# Patient Record
Sex: FEMALE | Race: WHITE | NOT HISPANIC OR LATINO | Employment: OTHER | URBAN - METROPOLITAN AREA
[De-identification: names, ages, dates, MRNs, and addresses within clinical notes are randomized per-mention and may not be internally consistent; named-entity substitution may affect disease eponyms.]

---

## 2017-02-22 ENCOUNTER — HOSPITAL ENCOUNTER (OUTPATIENT)
Dept: CT IMAGING | Facility: HOSPITAL | Age: 82
Discharge: HOME | End: 2017-02-22
Attending: THORACIC SURGERY (CARDIOTHORACIC VASCULAR SURGERY) | Admitting: THORACIC SURGERY (CARDIOTHORACIC VASCULAR SURGERY)

## 2017-02-22 DIAGNOSIS — J90 PLEURAL EFFUSION: ICD-10-CM

## 2017-02-22 DIAGNOSIS — R91.8 PULMONARY NODULES/LESIONS, MULTIPLE: ICD-10-CM

## 2017-02-22 PROCEDURE — 71250 CT THORAX DX C-: CPT

## 2017-03-08 ENCOUNTER — OFFICE VISIT (OUTPATIENT)
Dept: SURGERY | Facility: CLINIC | Age: 82
End: 2017-03-08

## 2017-03-08 VITALS
HEART RATE: 70 BPM | BODY MASS INDEX: 36.99 KG/M2 | RESPIRATION RATE: 16 BRPM | DIASTOLIC BLOOD PRESSURE: 70 MMHG | OXYGEN SATURATION: 95 % | SYSTOLIC BLOOD PRESSURE: 136 MMHG | WEIGHT: 222 LBS | HEIGHT: 65 IN

## 2017-03-08 DIAGNOSIS — R91.1 SOLITARY PULMONARY NODULE: Primary | ICD-10-CM

## 2017-03-08 PROCEDURE — 99213 OFFICE O/P EST LOW 20 MIN: CPT | Performed by: NURSE PRACTITIONER

## 2017-03-08 RX ORDER — TIMOLOL MALEATE 5 MG/ML
SOLUTION/ DROPS OPHTHALMIC
COMMUNITY
Start: 2017-02-28 | End: 2017-03-08

## 2017-03-08 RX ORDER — COLCHICINE 0.6 MG/1
TABLET ORAL
COMMUNITY
Start: 2017-02-20 | End: 2017-07-06

## 2017-03-08 RX ORDER — OFLOXACIN 3 MG/ML
SOLUTION/ DROPS OPHTHALMIC
COMMUNITY
Start: 2017-03-06 | End: 2017-12-12 | Stop reason: ALTCHOICE

## 2017-03-08 NOTE — PROGRESS NOTES
Subjective     Patient ID: Alessia Madrigal is a 88 y.o. female is here today for 6 month follow-up.    History of Present Illness    Alessia Madrigal presents to the office today for continued follow-up and surveillance concerning prior history of bi-basilar pulmonary nodules and bilateral pleural effusions. Since she was last seen, she has been doing well.  Her main complaints are post nasal drip causing a cough.  She denies any other concerns.  She initially had left lower back pain which is why a CT chest was initially completed.  She states this has also resolved. She denies any complaints of fever, chills, cough, hemoptysis, pleuritic chest pain, shortness of air, dyspnea with exertion, night sweats, hoarseness, or unintentional weight loss.      Patient Active Problem List   Diagnosis   • Actinic keratosis   • Anemia of chronic disorder   • Disorder of aorta   • Chronic coronary artery disease   • Chronic kidney disease, stage IV (severe)   • Constipation   • Cor pulmonale   • Diabetes mellitus   • High level of uric acid in blood   • Fatigue   • Hyperlipidemia   • Hypertension   • Hypothyroidism   • Iron deficiency   • Mitral valve insufficiency   • Pulmonary hypertension   • Swelling of lower extremity   • Shortness of breath   • Tricuspid valve insufficiency   • Uncontrolled type 2 diabetes mellitus   • Type 2 diabetes mellitus   • Vitamin D deficiency   • Secondary hyperparathyroidism, non-renal   • Pulmonary nodules/lesions, multiple   • Pleural effusion   • Paronychia of second finger of right hand   • Hyperuricemia     Past Medical History   Diagnosis Date   • Arthritis 03/13/2014     CONT TYLENOL FOR PAIN/ [facility] CHEW (INTERNAL MEDICINE)   • Cancer of uterus    • Carotid artery stenosis    • Chronic renal failure syndrome    • Coronary atherosclerosis    • Esophageal reflux    • Glaucoma    • Hypertension    • Osteoarthritis    • Sleep apnea      USING CPAP   • Spinal stenosis    • Thyroid  disease    • Type 2 diabetes mellitus 1991     INsulin begun 2012   • Vitamin B12 deficiency      SHE HAS NOT HAD THIS CHECKED IN A WHILE. SHE HAD BEEN ON SHOTS.  CHECK HER B12 LEVELS TODAY.  BY JOSE DANIEL CHEW     Past Surgical History   Procedure Laterality Date   • Coronary artery bypass graft  1991     X3   • Cataract extraction Bilateral    • Cholecystectomy     • Colonoscopy     • Hysterectomy     • Transluminal atherectomy peroneal artery       PERCUTANEOUS TRANSLUMINAL ATHERECTOMY RENAL ARTERY   • Cataract extraction w/ intraocular lens implant Right 10/25/2016     Procedure: RT SUPERFICIAL KERATECTOMY ;  Surgeon: Arian Singh MD;  Location: [facility] [place];  Service:      Family History   Problem Relation Age of Onset   • Colon cancer Other    • Heart disease Other    • Hypertension Other    • Stroke Other      ISCHEMIC   • Colon cancer Mother    • Heart disease Father    • Stroke Father      Social History     Social History   • Marital status: Single     Spouse name: N/A   • Number of children: N/A   • Years of education: N/A     Occupational History   • Not on file.     Social History Main Topics   • Smoking status: Never Smoker   • Smokeless tobacco: Not on file   • Alcohol use No   • Drug use: Not on file   • Sexual activity: Not on file     Other Topics Concern   • Not on file     Social History Narrative       Current Outpatient Prescriptions:   •  acetaminophen (TYLENOL) 500 MG tablet, Take 500 mg by mouth every 4 (four) hours as needed for mild pain (1-3) (Back Pain). EVERY 4 TO 6 HOURS AS NEEDED, Disp: , Rfl:   •  aspirin 81 MG tablet, Take 81 mg by mouth daily., Disp: , Rfl:   •  Cholecalciferol (VITAMIN D) 1000 UNITS tablet, Take 1,000 Units by mouth daily., Disp: , Rfl:   •  clopidogrel (PLAVIX) 75 MG tablet, Take 75 mg by mouth daily., Disp: , Rfl:   •  Coenzyme Q10 (CO Q-10) 300 MG capsule, Take 300 mg by mouth daily., Disp: , Rfl:   •  colchicine 0.6 MG tablet, , Disp: , Rfl:   •   furosemide (LASIX) 40 MG tablet, Take 40 mg by mouth daily., Disp: , Rfl:   •  glucose blood (ONE TOUCH ULTRA TEST) test strip, Test 4 times a day, Disp: 400 each, Rfl: 3  •  insulin detemir (LEVEMIR) 100 UNIT/ML injection, Inject 10 Units under the skin daily., Disp: 5 pen, Rfl: 3  •  insulin lispro (HumaLOG) 100 UNIT/ML patient supplied pump, Inject  under the skin See Admin Instructions. Take 6 units before breakfast, Take 5 units before lunch, and Take 5 units before dinner., Disp: , Rfl:   •  Insulin Pen Needle 32G X 4 MM misc, Use as directed 3 times daily, Disp: 270 each, Rfl: 2  •  isosorbide mononitrate (IMDUR) 120 MG 24 hr tablet, TAKE 1 TABLET DAILY, Disp: 90 tablet, Rfl: 1  •  levothyroxine (SYNTHROID, LEVOTHROID) 125 MCG tablet, TAKE 1 TABLET DAILY AS DIRECTED, Disp: 90 tablet, Rfl: 3  •  losartan (COZAAR) 50 MG tablet, TAKE 1 TABLET DAILY, Disp: 90 tablet, Rfl: 2  •  metoprolol succinate XL (TOPROL-XL) 50 MG 24 hr tablet, TAKE 1 TABLET TWICE A DAY, Disp: 180 tablet, Rfl: 2  •  niacin (NIASPAN) 500 MG CR tablet, Take 1 tablet by mouth every night., Disp: 90 tablet, Rfl: 3  •  NIFEdipine XL (PROCARDIA XL) 90 MG 24 hr tablet, TAKE 1 TABLET DAILY, Disp: 90 tablet, Rfl: 2  •  nitroglycerin (NITROSTAT) 0.4 MG SL tablet, 1 under the tongue as needed for angina, may repeat q5mins for up three doses, Disp: 100 tablet, Rfl: 11  •  ofloxacin (OCUFLOX) 0.3 % ophthalmic solution, , Disp: , Rfl:   •  raNITIdine (ZANTAC) 150 MG tablet, TAKE 1 TABLET EVERY 12 HOURS AS NEEDED, Disp: 180 tablet, Rfl: 3  •  spironolactone (ALDACTONE) 25 MG tablet, Take 1 tablet by mouth daily., Disp: 30 tablet, Rfl: 5  •  travoprost, BAK free, (TRAVATAN) 0.004 % solution ophthalmic solution, Administer 1 drop to both eyes every night. in affected eye(s) , Disp: , Rfl:   Allergies   Allergen Reactions   • Clindamycin/Lincomycin Itching     Felt like she was burning and itching around the neck   • Ampicillin Rash   • Penicillins Rash          Review of Systems   Constitution: Negative.   HENT: Negative.    Eyes: Negative.    Cardiovascular: Negative.    Respiratory: Positive for cough.    Endocrine: Negative.    Hematologic/Lymphatic: Negative.    Skin: Negative.    Musculoskeletal: Negative.    Gastrointestinal: Negative.    Genitourinary: Negative.    Neurological: Negative.    Psychiatric/Behavioral: Negative.    Allergic/Immunologic:        Pnd       Vitals:    03/08/17 1302   BP: 136/70   Pulse: 70   Resp: 16   SpO2: 95%       Objective     Physical Exam   Constitutional: She is oriented to person, place, and time. Vital signs are normal. She appears well-developed.   HENT:   Head: Normocephalic and atraumatic.   Neck: Normal range of motion. Neck supple.   Cardiovascular: Normal rate, regular rhythm, normal heart sounds and intact distal pulses.    No murmur heard.  Pulmonary/Chest: Effort normal and breath sounds normal. She has no wheezes. She has no rhonchi. She has no rales. She exhibits no tenderness.   Abdominal: Soft. There is no tenderness.   Musculoskeletal: She exhibits edema (chronic lower extremity swelling). She exhibits no tenderness.   Ambulates with cane   Neurological: She is alert and oriented to person, place, and time. She has normal strength.   Skin: Skin is warm and dry. No rash noted. No cyanosis or erythema.   Psychiatric: She has a normal mood and affect. Her behavior is normal.       Review of Radiographic Studies:    Study Result   CT CHEST WITHOUT CONTRAST-      CLINICAL: Follow-up pleural effusions and pulmonary nodules.      COMPARISON: 08/10/2016.      CT CHEST FINDINGS: Heart size normal. Sizable hiatal hernia similar to  the prior examination. The esophagus is satisfactory in course and  caliber. Thoracic aorta is satisfactory in caliber, there is  atherosclerotic plaque formation. Small stable mediastinal lymph nodes,  no adenopathy.      No pleural effusion. Ovoid nodularity within the left lower  lobe  costophrenic sulcus has substantially diminished, near completely  resolved in the interim. No acute airspace disease or new pulmonary mass  has developed within the interim. Left lower lobe process likely benign.  Limited images through the upper abdomen are unremarkable.      CONCLUSION:  1. No pleural effusion demonstrated.  2. Ovoid nodularity within the left lower lobe costophrenic sulcus near  completely resolved within the interim, supporting a benign etiology. No  acute airspace disease or new pulmonary nodule has developed.  3. Hiatal hernia.      This report was finalized on 2/23/2017 10:49 AM by Dr. Rm Grace MD.         Assessment/Plan   Diagnoses and all orders for this visit:    Solitary pulmonary nodule  -     CT Chest Without Contrast; Future        SUMMARY  Alessia Madrigal has been doing well since she was last seen.  She denies any new complaints or concerns.  Her CT scan of chest remains stable.  The left lower lobe nodule has decreased.  The mediastinal lymph nodes remain stable.  We will continue surveillance with a repeat CT scan of chest without contrast in one year and follow-up with Dr. Aranda to assure stability.  In the meantime, instructed to contact us sooner for any problems or concerns.        Minerva Mancuso, APRN  03/08/17  3:16 PM

## 2017-03-15 RX ORDER — CLOPIDOGREL BISULFATE 75 MG/1
TABLET ORAL
Qty: 90 TABLET | Refills: 0 | Status: SHIPPED | OUTPATIENT
Start: 2017-03-15 | End: 2017-06-29 | Stop reason: SDUPTHER

## 2017-04-21 RX ORDER — ISOSORBIDE MONONITRATE 120 MG/1
TABLET, EXTENDED RELEASE ORAL
Qty: 90 TABLET | Refills: 0 | Status: SHIPPED | OUTPATIENT
Start: 2017-04-21 | End: 2017-07-20 | Stop reason: SDUPTHER

## 2017-05-25 DIAGNOSIS — I10 ESSENTIAL HYPERTENSION: ICD-10-CM

## 2017-05-25 DIAGNOSIS — E55.9 VITAMIN D DEFICIENCY: ICD-10-CM

## 2017-05-25 RX ORDER — SPIRONOLACTONE 25 MG/1
25 TABLET ORAL DAILY
Qty: 90 TABLET | Refills: 1 | Status: SHIPPED | OUTPATIENT
Start: 2017-05-25 | End: 2017-07-20 | Stop reason: SDUPTHER

## 2017-06-03 ENCOUNTER — APPOINTMENT (OUTPATIENT)
Dept: GENERAL RADIOLOGY | Facility: HOSPITAL | Age: 82
End: 2017-06-03
Attending: EMERGENCY MEDICINE
Payer: MEDICARE

## 2017-06-03 ENCOUNTER — APPOINTMENT (OUTPATIENT)
Dept: CV DIAGNOSTICS | Facility: HOSPITAL | Age: 82
End: 2017-06-03
Attending: HOSPITALIST
Payer: MEDICARE

## 2017-06-03 ENCOUNTER — HOSPITAL ENCOUNTER (OUTPATIENT)
Facility: HOSPITAL | Age: 82
Setting detail: OBSERVATION
Discharge: HOME | End: 2017-06-05
Attending: EMERGENCY MEDICINE | Admitting: HOSPITALIST
Payer: MEDICARE

## 2017-06-03 DIAGNOSIS — R07.9 ACUTE CHEST PAIN: Primary | ICD-10-CM

## 2017-06-03 PROBLEM — Z91.81 AT RISK FOR FALLING: Status: ACTIVE | Noted: 2017-06-03

## 2017-06-03 PROCEDURE — 93306 TTE W/DOPPLER COMPLETE: CPT | Performed by: HOSPITALIST

## 2017-06-03 PROCEDURE — 71010 XR CHEST AP PORTABLE  (CPT=71010): CPT | Performed by: EMERGENCY MEDICINE

## 2017-06-03 PROCEDURE — 99219 INITIAL OBSERVATION CARE,LEVL II: CPT | Performed by: HOSPITALIST

## 2017-06-03 RX ORDER — EZETIMIBE 10 MG/1
10 TABLET ORAL
Status: DISCONTINUED | OUTPATIENT
Start: 2017-06-03 | End: 2017-06-05

## 2017-06-03 RX ORDER — LEVOTHYROXINE SODIUM 0.07 MG/1
75 TABLET ORAL
COMMUNITY

## 2017-06-03 RX ORDER — LEVOTHYROXINE SODIUM 0.07 MG/1
75 TABLET ORAL
Status: DISCONTINUED | OUTPATIENT
Start: 2017-06-03 | End: 2017-06-05

## 2017-06-03 RX ORDER — ASPIRIN 81 MG/1
324 TABLET, CHEWABLE ORAL ONCE
Status: DISCONTINUED | OUTPATIENT
Start: 2017-06-03 | End: 2017-06-03

## 2017-06-03 RX ORDER — CHOLECALCIFEROL (VITAMIN D3) 125 MCG
6 CAPSULE ORAL NIGHTLY
COMMUNITY

## 2017-06-03 RX ORDER — ENOXAPARIN SODIUM 100 MG/ML
40 INJECTION SUBCUTANEOUS DAILY
Status: DISCONTINUED | OUTPATIENT
Start: 2017-06-03 | End: 2017-06-03

## 2017-06-03 RX ORDER — POLYETHYLENE GLYCOL 3350 17 G/17G
17 POWDER, FOR SOLUTION ORAL DAILY
COMMUNITY

## 2017-06-03 RX ORDER — PANTOPRAZOLE SODIUM 40 MG/1
40 TABLET, DELAYED RELEASE ORAL
Status: DISCONTINUED | OUTPATIENT
Start: 2017-06-03 | End: 2017-06-05

## 2017-06-03 RX ORDER — ASPIRIN 325 MG
325 TABLET ORAL DAILY
Status: DISCONTINUED | OUTPATIENT
Start: 2017-06-03 | End: 2017-06-05

## 2017-06-03 RX ORDER — GABAPENTIN 300 MG/1
300 CAPSULE ORAL NIGHTLY
Status: DISCONTINUED | OUTPATIENT
Start: 2017-06-03 | End: 2017-06-05

## 2017-06-03 RX ORDER — SODIUM PHOSPHATE, DIBASIC AND SODIUM PHOSPHATE, MONOBASIC 7; 19 G/133ML; G/133ML
1 ENEMA RECTAL ONCE AS NEEDED
Status: DISCONTINUED | OUTPATIENT
Start: 2017-06-03 | End: 2017-06-05

## 2017-06-03 RX ORDER — LEVOTHYROXINE SODIUM 0.1 MG/1
100 TABLET ORAL
Status: DISCONTINUED | OUTPATIENT
Start: 2017-06-03 | End: 2017-06-03

## 2017-06-03 RX ORDER — CALCIUM CARBONATE 200(500)MG
1 TABLET,CHEWABLE ORAL DAILY
COMMUNITY

## 2017-06-03 RX ORDER — BUTALBITAL, ASPIRIN, AND CAFFEINE 50; 325; 40 MG/1; MG/1; MG/1
1 CAPSULE ORAL EVERY 4 HOURS PRN
COMMUNITY

## 2017-06-03 RX ORDER — POLYETHYLENE GLYCOL 3350 17 G/17G
17 POWDER, FOR SOLUTION ORAL DAILY PRN
Status: DISCONTINUED | OUTPATIENT
Start: 2017-06-03 | End: 2017-06-05

## 2017-06-03 RX ORDER — DIMENHYDRINATE 50 MG/1
50 TABLET ORAL NIGHTLY PRN
COMMUNITY

## 2017-06-03 RX ORDER — BISACODYL 10 MG
10 SUPPOSITORY, RECTAL RECTAL
Status: DISCONTINUED | OUTPATIENT
Start: 2017-06-03 | End: 2017-06-05

## 2017-06-03 RX ORDER — ENOXAPARIN SODIUM 100 MG/ML
30 INJECTION SUBCUTANEOUS DAILY
Status: DISCONTINUED | OUTPATIENT
Start: 2017-06-04 | End: 2017-06-05

## 2017-06-03 RX ORDER — MAGNESIUM OXIDE 400 MG (241.3 MG MAGNESIUM) TABLET
1 TABLET NIGHTLY
Status: DISCONTINUED | OUTPATIENT
Start: 2017-06-03 | End: 2017-06-05

## 2017-06-03 RX ORDER — ESCITALOPRAM OXALATE 5 MG/1
5 TABLET ORAL DAILY
Status: DISCONTINUED | OUTPATIENT
Start: 2017-06-03 | End: 2017-06-05

## 2017-06-03 RX ORDER — PRAMIPEXOLE DIHYDROCHLORIDE 0.25 MG/1
0.25 TABLET ORAL 3 TIMES DAILY
Status: DISCONTINUED | OUTPATIENT
Start: 2017-06-03 | End: 2017-06-03

## 2017-06-03 RX ORDER — OMEPRAZOLE 20 MG/1
40 CAPSULE, DELAYED RELEASE ORAL
COMMUNITY

## 2017-06-03 RX ORDER — ESCITALOPRAM OXALATE 5 MG/1
5 TABLET ORAL DAILY
COMMUNITY

## 2017-06-03 RX ORDER — HYDROCHLOROTHIAZIDE 25 MG/1
25 TABLET ORAL DAILY
Status: DISCONTINUED | OUTPATIENT
Start: 2017-06-03 | End: 2017-06-05

## 2017-06-03 RX ORDER — HYDROCHLOROTHIAZIDE 25 MG/1
25 TABLET ORAL DAILY
COMMUNITY

## 2017-06-03 RX ORDER — GABAPENTIN 300 MG/1
300 CAPSULE ORAL DAILY
Status: DISCONTINUED | OUTPATIENT
Start: 2017-06-03 | End: 2017-06-05

## 2017-06-03 RX ORDER — ACETAMINOPHEN 325 MG/1
650 TABLET ORAL EVERY 6 HOURS PRN
Status: DISCONTINUED | OUTPATIENT
Start: 2017-06-03 | End: 2017-06-05

## 2017-06-03 RX ORDER — MELATONIN
6 NIGHTLY
Status: DISCONTINUED | OUTPATIENT
Start: 2017-06-03 | End: 2017-06-03 | Stop reason: SDUPTHER

## 2017-06-03 RX ORDER — BUPROPION HYDROCHLORIDE 150 MG/1
150 TABLET, EXTENDED RELEASE ORAL 2 TIMES DAILY
Status: DISCONTINUED | OUTPATIENT
Start: 2017-06-03 | End: 2017-06-05

## 2017-06-03 RX ORDER — ONDANSETRON 2 MG/ML
4 INJECTION INTRAMUSCULAR; INTRAVENOUS EVERY 6 HOURS PRN
Status: DISCONTINUED | OUTPATIENT
Start: 2017-06-03 | End: 2017-06-05

## 2017-06-03 RX ORDER — GABAPENTIN 300 MG/1
300 CAPSULE ORAL DAILY
COMMUNITY

## 2017-06-03 RX ORDER — NITROGLYCERIN 0.4 MG/1
0.4 TABLET SUBLINGUAL EVERY 5 MIN PRN
Status: DISCONTINUED | OUTPATIENT
Start: 2017-06-03 | End: 2017-06-05

## 2017-06-03 NOTE — PROGRESS NOTES
ADMISSION NOTE  Pt. Oriented to room. Call light in reach, Menu in room. Tele monitor on, VS taken. Head to Toe complete. Admission Navigators complete including PTA medications.   Cons

## 2017-06-03 NOTE — PROGRESS NOTES
06/03/17 1336 06/03/17 1339 06/03/17 1344   Vital Signs   Heart Rate Source Monitor --  --    Resp 18 --  --    Respiratory Quality Normal --  --    /67 mmHg 140/65 mmHg 145/77 mmHg   BP Location Left arm Left arm Left arm   BP Method Automatic Au

## 2017-06-03 NOTE — CONSULTS
Cardiology Consult for Chest Pain    Atypical chest pain  ECG with no ischemia or injury  Initial troponin negative  Hyperlipidemia only known risk factor for CAD    Plan    Serial cardiac markers  Cont ASA and Zetia  Echo

## 2017-06-03 NOTE — ED PROVIDER NOTES
Patient Seen in: [facility] Emergency Department    History   Patient presents with:  Chest Pain Angina (cardiovascular)    Stated Complaint: cp    HPI    70-year-old female, here for chest pain that lasted about a half hour just prior to arrival. Tab,  1 TABLET DAILY   SYNTHROID 100 MCG OR TABS,  1 TABLET DAILY   FOLIC ACID 1 MG OR TABS,  1 TABLET DAILY   WELLBUTRIN  MG OR TB12,  1 TABLET TWICE DAILY   ZETIA 10 MG OR TABS,  1 TABLET DAILY   CLONAZEPAM 1 MG OR TABS,  1 TABLET 3 TIMES DAILY   P content normal.       ED Course     Labs Reviewed   COMP METABOLIC PANEL (14) - Abnormal; Notable for the following:     Creatinine 1.28 (*)     GFR 37 (*)     Alt 13 (*)     Albumin 3.1 (*)     All other components within normal limits   CBC W/ DIFFERENTI Impression:  Acute chest pain  (primary encounter diagnosis)    Disposition:  Admit    Follow-up:  No follow-up provider specified.     Medications Prescribed:  Current Discharge Medication List        Present on Admission           ICD-10-CM Noted POA    A

## 2017-06-03 NOTE — PROGRESS NOTES
[facility] Note:  Renal Dose Adjustment for Enoxaparin (LOVENOX)    Sara Medina has been prescribed Enoxaparin (LOVENOX) 40 mg subcutaneously every 24 hours. Estimated Creatinine Clearance: 21.8 mL/min (based on Cr of 1.28).     Her calculated c

## 2017-06-04 PROCEDURE — 99225 SUBSEQUENT OBSERVATION CARE: CPT | Performed by: HOSPITALIST

## 2017-06-04 RX ORDER — POTASSIUM CHLORIDE 20 MEQ/1
40 TABLET, EXTENDED RELEASE ORAL ONCE
Status: DISCONTINUED | OUTPATIENT
Start: 2017-06-04 | End: 2017-06-05

## 2017-06-04 RX ORDER — POTASSIUM CHLORIDE 20 MEQ/1
40 TABLET, EXTENDED RELEASE ORAL ONCE
Status: COMPLETED | OUTPATIENT
Start: 2017-06-04 | End: 2017-06-04

## 2017-06-04 NOTE — CONSULTS
[facility]    PATIENT'S NAME: Ritchie Kuhn   ATTENDING PHYSICIAN: Amado Hays.  Estela Matamoros PHYSICIAN: Naveen Godinez MD   PATIENT ACCOUNT#:   [id number]    [facility] [address] [facility]  MEDICAL RECORD #:   [id number]       DATE OF LANDON or hematochezia. GENITOURINARY: She denies dysuria. MUSCULOSKELETAL: She denies joint stiffness or joint pain.   ENDOCRINE: She denies intolerance to heat or cold, excessive hunger or thirst.  NEUROLOGIC: She denies numbness, tingling, or weakness of extr markers. We will do a 2D echocardiogram to evaluate her left ventricular function. She feels that she made a mistake by coming to the hospital, as the pain has resolved and was better once she got out of bed and started walking.   I have discussed a jeffy

## 2017-06-04 NOTE — PROGRESS NOTES
[facility]  Progress Note     Maryse Granda Patient Status:  Observation    10/9/1928 MRN [id number]   [facility] 2NE-A Attending Taniya Vazquez, [address] Day # 1 PCP Shannan Andersen     Chief Complaint: chest pain    S: Patie mg Oral Daily   • gabapentin  300 mg Oral Daily   • hydrochlorothiazide  25 mg Oral Daily   • Levothyroxine Sodium  75 mcg Oral Before breakfast   • Pantoprazole Sodium  40 mg Oral QAM AC   • enoxaparin  30 mg Subcutaneous Daily   • melatonin  1 mg Oral Ni

## 2017-06-04 NOTE — PLAN OF CARE
CARDIOVASCULAR - ADULT    • Absence of cardiac arrhythmias or at baseline Progressing        Patient/Family Goals    • Patient/Family Long Term Goal Progressing    • Patient/Family Short Term Goal Progressing        Cardiac monitor shows. ... SR  Cont.  Monit

## 2017-06-04 NOTE — PROGRESS NOTES
· [facility] Cardiology Progress Note     Subjective:  No recurrent chest pain    Objective:  152/52  Afebrile  SR    BUN/cr 16/1.07  Troponin normal   I/O incomplete    Neuro:awake/alert  HEENT:no JVD  Cardiac:S1 S2 regular  Lungs: clear  Abdomen:soft

## 2017-06-04 NOTE — PLAN OF CARE
CARDIOVASCULAR - ADULT    • Absence of cardiac arrhythmias or at baseline Progressing        Patient/Family Goals    • Patient/Family Long Term Goal Progressing    • Patient/Family Short Term Goal Progressing          Denies c/o pain, malaise, or cardiac s

## 2017-06-05 ENCOUNTER — APPOINTMENT (OUTPATIENT)
Dept: CV DIAGNOSTICS | Facility: HOSPITAL | Age: 82
End: 2017-06-05
Attending: INTERNAL MEDICINE
Payer: MEDICARE

## 2017-06-05 VITALS
HEART RATE: 86 BPM | DIASTOLIC BLOOD PRESSURE: 86 MMHG | RESPIRATION RATE: 18 BRPM | HEIGHT: 60 IN | BODY MASS INDEX: 32.85 KG/M2 | WEIGHT: 167.31 LBS | OXYGEN SATURATION: 96 % | TEMPERATURE: 98 F | SYSTOLIC BLOOD PRESSURE: 154 MMHG

## 2017-06-05 PROCEDURE — 78452 HT MUSCLE IMAGE SPECT MULT: CPT | Performed by: INTERNAL MEDICINE

## 2017-06-05 PROCEDURE — 93018 CV STRESS TEST I&R ONLY: CPT | Performed by: INTERNAL MEDICINE

## 2017-06-05 PROCEDURE — 93017 CV STRESS TEST TRACING ONLY: CPT | Performed by: INTERNAL MEDICINE

## 2017-06-05 PROCEDURE — 99217 OBSERVATION CARE DISCHARGE: CPT | Performed by: HOSPITALIST

## 2017-06-05 RX ORDER — HYDROCODONE BITARTRATE AND ACETAMINOPHEN 5; 325 MG/1; MG/1
1 TABLET ORAL ONCE
Status: COMPLETED | OUTPATIENT
Start: 2017-06-05 | End: 2017-06-05

## 2017-06-05 RX ORDER — HYDROCODONE BITARTRATE AND ACETAMINOPHEN 5; 325 MG/1; MG/1
1 TABLET ORAL NIGHTLY PRN
Status: DISCONTINUED | OUTPATIENT
Start: 2017-06-05 | End: 2017-06-05

## 2017-06-05 NOTE — PLAN OF CARE
Pt a/ox3. Forgetful. Dementia at baseline. RA. 1st AV block. Echo results pending. Lexiscan negative. Voids. Denies pain. Up with standby assist and a walker. Non-pitting edema to BLE. Awaiting Hospitalist to round. Call light in reach.  Will continue to mo

## 2017-06-05 NOTE — PROGRESS NOTES
NURSING DISCHARGE NOTE    Discharged Home via Wheelchair. Accompanied by Family member  Belongings Taken by patient/family. VSS. DC papers given to pt and ambulance personal. Pt taken to [address] [place] [address] via Ambulance.  All personal belongings harriett

## 2017-06-05 NOTE — PROGRESS NOTES
Cardiology Progress Note     PRIMARY CARDIOLOGIST: LUIS      CONSULT FOR: ATYPICAL CHEST PAIN      SUBJECTIVE: NO RECURRENT CHEST PAIN      Scheduled Meds:   • Potassium Chloride ER  40 mEq Oral Once   • BuPROPion HCl ER (SR)  150 mg Oral BID   • ezetimibe extremities. Skin: Skin color, texture, turgor normal. No rashes or lesions   Neurologic: CNII-XII intact. Normal strength and sensation throughout.        Data Review:     HEM: Recent Labs   Lab  06/03/17   1017   WBC  5.7   HGB  13.0   PLT  185.0   INR

## 2017-06-05 NOTE — PROGRESS NOTES
PRELIMINARY LEXISCAN EKG NOTE  Pt tolerated Lexiscan well. No ekg changes, or arrhythmias. Denied cardiac symptoms. Nuclear images pending. Nuclear results: LVEF 76%, no ischemia.  PRASHANTH Lovell

## 2017-06-05 NOTE — PLAN OF CARE
Patient received A+Ox4. Denies sob or cp. On room air. NSR on tele. Up with assistance with walker. Continue to monitor and assess. See Flowsheets for further assessment information.

## 2017-06-05 NOTE — PHYSICAL THERAPY NOTE
PHYSICAL THERAPY EVALUATION - INPATIENT     Room Number: 2606/2606-A  Evaluation Date: 6/5/2017  Type of Evaluation: Initial  Physician Order: PT Eval and Treat (Fall Risk Protocol)    Presenting Problem: L Chest Pain  Reason for Therapy: [employer] staff, in her wheelchair for meals. The pt reports a fall last week, resulting in a right knee injury with swelling. SUBJECTIVE  \"I always walk fast.  They tell me I need to slow down. \"    Patient self-stated goal is return home.     OBJECTIVE  [place]au LLE AROM  L Hip Flexion: 3/4 - Full ROM   L Knee Flexion: 3/4 - Full ROM   L Knee Extension: 3/4 - Full ROM   L Ankle Dorsiflexion:  3/4 - Full ROM  L Ankle Plantar Flexion: 3/4 - Full ROM   LLE PROM     LLE Strength  L Hip Iliopsoas: 3+/5   L Quadricep: support. Pt completed stand>sit to EOB with SBA. Pt ambulated with a gait speed, tested over 25 feet, of .28 meters/second.     Exercise/Education Provided:  Functional activity tolerated  Gait training  Transfer training    Patient End of Session: Needs 5      CURRENT GOALS    Goal #1 Patient is able to demonstrate supine - sit EOB @ level: modified independent     Goal #2 Patient is able to demonstrate transfers Sit to/from Stand at assistance level: modified independent     Goal #3 Patient is able to am

## 2017-07-03 DIAGNOSIS — Z00.00 HEALTH CARE MAINTENANCE: Primary | ICD-10-CM

## 2017-07-03 DIAGNOSIS — E55.9 VITAMIN D DEFICIENCY: ICD-10-CM

## 2017-07-03 DIAGNOSIS — E61.1 IRON DEFICIENCY: ICD-10-CM

## 2017-07-03 DIAGNOSIS — E11.8 UNCONTROLLED TYPE 2 DIABETES MELLITUS WITH COMPLICATION, UNSPECIFIED LONG TERM INSULIN USE STATUS: ICD-10-CM

## 2017-07-03 DIAGNOSIS — E11.65 UNCONTROLLED TYPE 2 DIABETES MELLITUS WITH COMPLICATION, UNSPECIFIED LONG TERM INSULIN USE STATUS: ICD-10-CM

## 2017-07-03 DIAGNOSIS — E78.2 MIXED HYPERLIPIDEMIA: ICD-10-CM

## 2017-07-03 DIAGNOSIS — I10 ESSENTIAL HYPERTENSION: ICD-10-CM

## 2017-07-03 RX ORDER — CLOPIDOGREL BISULFATE 75 MG/1
75 TABLET ORAL DAILY
Qty: 90 TABLET | Refills: 1 | Status: SHIPPED | OUTPATIENT
Start: 2017-07-03 | End: 2018-10-18 | Stop reason: SDUPTHER

## 2017-07-03 RX ORDER — CLOPIDOGREL BISULFATE 75 MG/1
TABLET ORAL
Qty: 90 TABLET | Refills: 3 | Status: SHIPPED | OUTPATIENT
Start: 2017-07-03 | End: 2017-07-03 | Stop reason: SDUPTHER

## 2017-07-05 LAB
25(OH)D3+25(OH)D2 SERPL-MCNC: 21.6 NG/ML (ref 30–100)
ALBUMIN SERPL-MCNC: 4.2 G/DL (ref 3.5–5.2)
ALBUMIN/CREAT UR: 11.5 MG/G CREAT (ref 0–30)
ALBUMIN/GLOB SERPL: 1.8 G/DL
ALP SERPL-CCNC: 121 U/L (ref 39–117)
ALT SERPL-CCNC: 5 U/L (ref 1–33)
APPEARANCE UR: CLEAR
AST SERPL-CCNC: 10 U/L (ref 1–32)
BACTERIA #/AREA URNS HPF: NORMAL /HPF
BACTERIA UR CULT: NO GROWTH
BACTERIA UR CULT: NORMAL
BASOPHILS # BLD AUTO: 0.05 10*3/MM3 (ref 0–0.2)
BASOPHILS NFR BLD AUTO: 0.7 % (ref 0–1.5)
BILIRUB SERPL-MCNC: 0.3 MG/DL (ref 0.1–1.2)
BILIRUB UR QL STRIP: NEGATIVE
BUN SERPL-MCNC: 39 MG/DL (ref 8–23)
BUN/CREAT SERPL: 27.3 (ref 7–25)
CALCIUM SERPL-MCNC: 9.4 MG/DL (ref 8.6–10.5)
CHLORIDE SERPL-SCNC: 99 MMOL/L (ref 98–107)
CHOLEST SERPL-MCNC: 204 MG/DL (ref 0–200)
CO2 SERPL-SCNC: 23.7 MMOL/L (ref 22–29)
COLOR UR: YELLOW
CREAT SERPL-MCNC: 1.43 MG/DL (ref 0.57–1)
CREAT UR-MCNC: 31.3 MG/DL
EOSINOPHIL # BLD AUTO: 0.45 10*3/MM3 (ref 0–0.7)
EOSINOPHIL NFR BLD AUTO: 6.4 % (ref 0.3–6.2)
EPI CELLS #/AREA URNS HPF: NORMAL /HPF
ERYTHROCYTE [DISTWIDTH] IN BLOOD BY AUTOMATED COUNT: 14.1 % (ref 11.7–13)
GLOBULIN SER CALC-MCNC: 2.4 GM/DL
GLUCOSE SERPL-MCNC: 129 MG/DL (ref 65–99)
GLUCOSE UR QL: NEGATIVE
HBA1C MFR BLD: 8.54 % (ref 4.8–5.6)
HCT VFR BLD AUTO: 40.5 % (ref 35.6–45.5)
HDLC SERPL-MCNC: 73 MG/DL (ref 40–60)
HGB BLD-MCNC: 13.1 G/DL (ref 11.9–15.5)
HGB UR QL STRIP: NEGATIVE
IMM GRANULOCYTES # BLD: 0.03 10*3/MM3 (ref 0–0.03)
IMM GRANULOCYTES NFR BLD: 0.4 % (ref 0–0.5)
KETONES UR QL STRIP: NEGATIVE
LDLC SERPL CALC-MCNC: 117 MG/DL (ref 0–100)
LEUKOCYTE ESTERASE UR QL STRIP: ABNORMAL
LYMPHOCYTES # BLD AUTO: 1.9 10*3/MM3 (ref 0.9–4.8)
LYMPHOCYTES NFR BLD AUTO: 26.9 % (ref 19.6–45.3)
MCH RBC QN AUTO: 31.9 PG (ref 26.9–32)
MCHC RBC AUTO-ENTMCNC: 32.3 G/DL (ref 32.4–36.3)
MCV RBC AUTO: 98.5 FL (ref 80.5–98.2)
MICRO URNS: ABNORMAL
MICROALBUMIN UR-MCNC: 3.6 UG/ML
MONOCYTES # BLD AUTO: 0.74 10*3/MM3 (ref 0.2–1.2)
MONOCYTES NFR BLD AUTO: 10.5 % (ref 5–12)
NEUTROPHILS # BLD AUTO: 3.9 10*3/MM3 (ref 1.9–8.1)
NEUTROPHILS NFR BLD AUTO: 55.1 % (ref 42.7–76)
NITRITE UR QL STRIP: NEGATIVE
PH UR STRIP: 6.5 [PH] (ref 5–7.5)
PLATELET # BLD AUTO: 314 10*3/MM3 (ref 140–500)
POTASSIUM SERPL-SCNC: 4.4 MMOL/L (ref 3.5–5.2)
PROT SERPL-MCNC: 6.6 G/DL (ref 6–8.5)
PROT UR QL STRIP: NEGATIVE
RBC # BLD AUTO: 4.11 10*6/MM3 (ref 3.9–5.2)
RBC #/AREA URNS HPF: NORMAL /HPF
SODIUM SERPL-SCNC: 139 MMOL/L (ref 136–145)
SP GR UR: 1.01 (ref 1–1.03)
TRIGL SERPL-MCNC: 68 MG/DL (ref 0–150)
TSH SERPL DL<=0.005 MIU/L-ACNC: 1.17 MIU/ML (ref 0.27–4.2)
URATE SERPL-MCNC: 7.1 MG/DL (ref 2.4–5.7)
URINALYSIS REFLEX: ABNORMAL
UROBILINOGEN UR STRIP-MCNC: 0.2 MG/DL (ref 0.2–1)
VLDLC SERPL CALC-MCNC: 13.6 MG/DL (ref 5–40)
WBC # BLD AUTO: 7.07 10*3/MM3 (ref 4.5–10.7)
WBC #/AREA URNS HPF: NORMAL /HPF

## 2017-07-06 ENCOUNTER — OFFICE VISIT (OUTPATIENT)
Dept: INTERNAL MEDICINE | Facility: CLINIC | Age: 82
End: 2017-07-06

## 2017-07-06 VITALS
SYSTOLIC BLOOD PRESSURE: 126 MMHG | DIASTOLIC BLOOD PRESSURE: 60 MMHG | RESPIRATION RATE: 18 BRPM | HEIGHT: 64 IN | WEIGHT: 214 LBS | HEART RATE: 77 BPM | BODY MASS INDEX: 36.54 KG/M2 | OXYGEN SATURATION: 98 %

## 2017-07-06 DIAGNOSIS — Z00.00 MEDICARE ANNUAL WELLNESS VISIT, SUBSEQUENT: ICD-10-CM

## 2017-07-06 DIAGNOSIS — E03.9 ACQUIRED HYPOTHYROIDISM: ICD-10-CM

## 2017-07-06 DIAGNOSIS — N18.4 CHRONIC KIDNEY DISEASE, STAGE IV (SEVERE) (HCC): ICD-10-CM

## 2017-07-06 DIAGNOSIS — E79.0 HYPERURICEMIA: ICD-10-CM

## 2017-07-06 DIAGNOSIS — Z00.00 HEALTH MAINTENANCE EXAMINATION: Primary | ICD-10-CM

## 2017-07-06 DIAGNOSIS — I10 ESSENTIAL HYPERTENSION: ICD-10-CM

## 2017-07-06 DIAGNOSIS — E78.2 MIXED HYPERLIPIDEMIA: ICD-10-CM

## 2017-07-06 DIAGNOSIS — IMO0002 UNCONTROLLED TYPE 2 DIABETES MELLITUS WITH STAGE 4 CHRONIC KIDNEY DISEASE, WITH LONG-TERM CURRENT USE OF INSULIN: ICD-10-CM

## 2017-07-06 PROCEDURE — G0439 PPPS, SUBSEQ VISIT: HCPCS | Performed by: INTERNAL MEDICINE

## 2017-07-06 PROCEDURE — 99397 PER PM REEVAL EST PAT 65+ YR: CPT | Performed by: INTERNAL MEDICINE

## 2017-07-06 RX ORDER — ALLOPURINOL 100 MG/1
TABLET ORAL
COMMUNITY
Start: 2017-06-18 | End: 2017-12-12

## 2017-07-06 NOTE — PROGRESS NOTES
Medicare Annual Wellness Visit & CPE    Chief Complaint:  Annual Exam (CPE & SUB AWV)      History of Present Illness    Alessia Madrigal is a 88 y.o. female who presents for an Annual Wellness Visit and CPE. In addition, we addressed the following health issues:    Mammo:10/1/15 when she got her notice last year, she was in the middle of moving and she forgot to go.    C-scope:2014  Flu shot:2016  Prevnar:3/26/15  Pneumovax: she had this years ago.  Dental Exam: about a month ago.  She has this scheduled soon.    Eye Exam:she goes every 2-3 months.  Dr. Arian Singh  Exercise: minimal    Advanced Care Planning:  has an advance directive - a copy has been provided and is in file   Immunization History   Administered Date(s) Administered   • Influenza Split High Dose Preservative Free IM 09/29/2016   • Influenza, Quadrivalent 09/15/2014   • Pneumococcal Conjugate 03/26/2015     She has been having some SOA more than usual.  She is worried about her GFR and the spironolactone.  Dr. Garza ordered the spironolactone.  She's been on this for about a year and a half.  About a month ago, she had an episode of soa and an odd feeling.  She didn't know what was going on.  It did finally pass.  She hasn't been having any cp or pressure.  It doesn't take much for her to get out of breath.  Her balance is not as good as it used to be.      Her blood sugars are high.  Most nights she is taking 8 or 9 units of levemir.  If she takes 10 units she gets hypoglycemic.  She is supposed to take 4 units of humalog with each meal.  Sometimes she forgets to take this.  She had a feeling her A1c would be up more.      Her right foot hurt all day long.  It hurt to flex her foot.  It's not hurting as much today.  She kept her leg elevated last night and during the night.  She hadn't injured her leg.  She has a strong family history of blood clots.  It hurt to walk yesterday.      Her bypass surgery was 26 years ago.      Review of  Systems   Constitutional: Positive for fatigue. Negative for chills and fever.   HENT: Negative for hearing loss, sore throat, tinnitus, trouble swallowing and voice change.    Eyes: Negative for visual disturbance.   Respiratory: Positive for cough and shortness of breath.    Cardiovascular: Positive for leg swelling. Negative for chest pain and palpitations.   Gastrointestinal: Negative for abdominal distention, abdominal pain, anal bleeding, blood in stool, constipation, diarrhea, nausea and vomiting.   Endocrine: Negative for polydipsia and polyuria.   Genitourinary: Positive for frequency. Negative for dysuria and hematuria.   Musculoskeletal: Positive for arthralgias. Negative for myalgias.   Skin: Negative for rash and wound.   Neurological: Negative for dizziness, syncope, light-headedness and headaches.   Hematological: Negative for adenopathy. Does not bruise/bleed easily.   Psychiatric/Behavioral: Negative for confusion and dysphoric mood. The patient is not nervous/anxious.        Past Medical History:   Diagnosis Date   • Arthritis 03/13/2014    CONT TYLENOL FOR PAIN/ JOSE DANIEL CHEW (INTERNAL MEDICINE)   • Cancer of uterus    • Carotid artery stenosis    • Chronic renal failure syndrome    • Coronary atherosclerosis    • Esophageal reflux    • Glaucoma    • Hypertension    • Osteoarthritis    • Sleep apnea     USING CPAP   • Spinal stenosis    • Thyroid disease    • Type 2 diabetes mellitus 1991    INsulin begun 2012   • Vitamin B12 deficiency     SHE HAS NOT HAD THIS CHECKED IN A WHILE. SHE HAD BEEN ON SHOTS.  CHECK HER B12 LEVELS TODAY.  BY JOSE DANIEL CHEW       Past Surgical History:   Procedure Laterality Date   • CATARACT EXTRACTION Bilateral    • CATARACT EXTRACTION W/ INTRAOCULAR LENS IMPLANT Right 10/25/2016    Procedure: RT SUPERFICIAL KERATECTOMY ;  Surgeon: Arian Singh MD;  [place] [facility] [place] [facility] [place]  Service:    • CHOLECYSTECTOMY     • COLONOSCOPY     • CORONARY ARTERY BYPASS GRAFT   1991    X3   • HYSTERECTOMY     • TRANSLUMINAL ATHERECTOMY PERONEAL ARTERY      PERCUTANEOUS TRANSLUMINAL ATHERECTOMY RENAL ARTERY       Social History     Social History   • Marital status: Single     Spouse name: N/A   • Number of children: N/A   • Years of education: N/A     Occupational History   • Not on file.     Social History Main Topics   • Smoking status: Never Smoker   • Smokeless tobacco: Never Used   • Alcohol use No   • Drug use: Not on file   • Sexual activity: Not on file     Other Topics Concern   • Not on file     Social History Narrative   • No narrative on file       Family History   Problem Relation Age of Onset   • Colon cancer Other    • Heart disease Other    • Hypertension Other    • Stroke Other      ISCHEMIC   • Colon cancer Mother    • Heart disease Father    • Stroke Father        Allergies   Allergen Reactions   • Clindamycin/Lincomycin Itching     Felt like she was burning and itching around the neck   • Ampicillin Rash   • Penicillins Rash       Current Outpatient Prescriptions on File Prior to Visit   Medication Sig Dispense Refill   • acetaminophen (TYLENOL) 500 MG tablet Take 500 mg by mouth every 4 (four) hours as needed for mild pain (1-3) (Back Pain). EVERY 4 TO 6 HOURS AS NEEDED     • aspirin 81 MG tablet Take 81 mg by mouth daily.     • Cholecalciferol (VITAMIN D) 1000 UNITS tablet Take 1,000 Units by mouth daily.     • clopidogrel (PLAVIX) 75 MG tablet Take 1 tablet by mouth Daily. 90 tablet 1   • Coenzyme Q10 (CO Q-10) 300 MG capsule Take 300 mg by mouth daily.     • colchicine 0.6 MG tablet      • furosemide (LASIX) 40 MG tablet Take 40 mg by mouth daily.     • insulin detemir (LEVEMIR) 100 UNIT/ML injection Inject 10 Units under the skin daily. 5 pen 3   • insulin lispro (HumaLOG) 100 UNIT/ML patient supplied pump Inject  under the skin See Admin Instructions. Take 6 units before breakfast, Take 5 units before lunch, and Take 5 units before dinner.     • Insulin Pen Needle  32G X 4 MM misc Use as directed 3 times daily 270 each 2   • isosorbide mononitrate (IMDUR) 120 MG 24 hr tablet TAKE 1 TABLET DAILY 90 tablet 0   • levothyroxine (SYNTHROID, LEVOTHROID) 125 MCG tablet TAKE 1 TABLET DAILY AS DIRECTED 90 tablet 3   • losartan (COZAAR) 50 MG tablet TAKE 1 TABLET DAILY 90 tablet 2   • metoprolol succinate XL (TOPROL-XL) 50 MG 24 hr tablet TAKE 1 TABLET TWICE A  tablet 2   • niacin (NIASPAN) 500 MG CR tablet Take 1 tablet by mouth every night. 90 tablet 3   • NIFEdipine XL (PROCARDIA XL) 90 MG 24 hr tablet TAKE 1 TABLET DAILY 90 tablet 2   • nitroglycerin (NITROSTAT) 0.4 MG SL tablet 1 under the tongue as needed for angina, may repeat q5mins for up three doses 100 tablet 11   • ofloxacin (OCUFLOX) 0.3 % ophthalmic solution      • ONE TOUCH ULTRA TEST test strip TEST FOUR TIMES A  each 3   • raNITIdine (ZANTAC) 150 MG tablet TAKE 1 TABLET EVERY 12 HOURS AS NEEDED 180 tablet 3   • spironolactone (ALDACTONE) 25 MG tablet Take 1 tablet by mouth Daily. 90 tablet 1   • travoprost, BAK free, (TRAVATAN) 0.004 % solution ophthalmic solution Administer 1 drop to both eyes every night. in affected eye(s)        No current facility-administered medications on file prior to visit.        Patient Active Problem List   Diagnosis   • Actinic keratosis   • Anemia of chronic disorder   • Disorder of aorta   • Chronic coronary artery disease   • Chronic kidney disease, stage IV (severe)   • Constipation   • Cor pulmonale   • Diabetes mellitus   • High level of uric acid in blood   • Fatigue   • Hyperlipidemia   • Hypertension   • Hypothyroidism   • Iron deficiency   • Mitral valve insufficiency   • Pulmonary hypertension   • Swelling of lower extremity   • Shortness of breath   • Tricuspid valve insufficiency   • Uncontrolled type 2 diabetes mellitus   • Type 2 diabetes mellitus   • Vitamin D deficiency   • Secondary hyperparathyroidism, non-renal   • Pulmonary nodules/lesions, multiple   •  "Pleural effusion   • Paronychia of second finger of right hand   • Hyperuricemia       Health Risk Assessment/Depression Screen/Functional and Cognitive Screening:   The patient has completed a Health Risk Assessment & Depression screen. These have been reviewed with them and have been scanned as a separate documents.    Age-appropriate Screening Schedule:  Refer to the list below for future screening recommendations based on patient's age. Orders for these recommended tests are listed in the plan section. The patient has been provided with a written plan.     I have reviewed their problem list, past medical history, family history, social history, and surgical history.     Vitals:    07/06/17 1116   BP: 142/66   Pulse: 77   Resp: 18   SpO2: 98%   Weight: 214 lb (97.1 kg)   Height: 64\" (162.6 cm)   PainSc: 4  Comment: Normal back and arthritis pain.   PainLoc: Back       Body mass index is 36.73 kg/(m^2).    Physical Exam   Constitutional: She is oriented to person, place, and time. She appears well-developed and well-nourished. No distress.   HENT:   Head: Normocephalic and atraumatic.   Nose: Nose normal.   Mouth/Throat: Oropharynx is clear and moist.   Eyes: Conjunctivae and EOM are normal. Pupils are equal, round, and reactive to light. No scleral icterus.   Neck: Normal range of motion. Neck supple. No thyromegaly present.   Cardiovascular: Normal rate, regular rhythm and normal heart sounds.  Exam reveals no gallop and no friction rub.    No murmur heard.  Pulses:       Carotid pulses are 2+ on the right side, and 2+ on the left side.       Femoral pulses are 2+ on the right side, and 2+ on the left side.       Dorsalis pedis pulses are 2+ on the right side, and 2+ on the left side.        Posterior tibial pulses are 2+ on the right side, and 2+ on the left side.   Pulmonary/Chest: Effort normal and breath sounds normal. No respiratory distress. She has no wheezes. She has no rales. Right breast exhibits no " mass and no nipple discharge. Left breast exhibits no mass and no nipple discharge.   Abdominal: Soft. Bowel sounds are normal. She exhibits no distension and no mass. There is no tenderness.   Musculoskeletal: Normal range of motion. She exhibits edema (right leg with chronic venous stasis changes.  3+ edema.  Left leg 2+ edema.  ). She exhibits no tenderness.        Right ankle: She exhibits swelling. She exhibits normal range of motion and normal pulse. No tenderness.        Left ankle: She exhibits swelling (minor). She exhibits normal range of motion and normal pulse.    Alessia had a diabetic foot exam performed today.   During the foot exam she had a monofilament test performed (normal throughout).    Vascular Status -  Her exam exhibits right foot vasculature normal and right foot edema. Her exam exhibits left foot vasculature normal and left foot edema.   Skin Integrity  -  Her right foot skin is intact.  She has right foot onychomycosis.  She hasno right foot ulcer, no right foot warmth and no right foot blister.    Alessia 's left foot skin is intact. She has left foot onychomycosis. She has no left foot ulcer, no left foot warmth and no left foot blister..  Lymphadenopathy:     She has no cervical adenopathy.     She has no axillary adenopathy.        Right: No inguinal and no supraclavicular adenopathy present.        Left: No inguinal and no supraclavicular adenopathy present.   Neurological: She is alert and oriented to person, place, and time. She has normal reflexes. No cranial nerve deficit.   Skin: Skin is warm and dry.   Psychiatric: She has a normal mood and affect. Her speech is normal and behavior is normal. Judgment and thought content normal. Cognition and memory are normal.   Vitals reviewed.      Results for orders placed or performed in visit on 07/03/17   Comprehensive Metabolic Panel   Result Value Ref Range    Glucose 129 (H) 65 - 99 mg/dL    BUN 39 (H) 8 - 23 mg/dL    Creatinine  1.43 (H) 0.57 - 1.00 mg/dL    eGFR Non African Am 35 (L) >60 mL/min/1.73    eGFR  Am 42 (L) >60 mL/min/1.73    BUN/Creatinine Ratio 27.3 (H) 7.0 - 25.0    Sodium 139 136 - 145 mmol/L    Potassium 4.4 3.5 - 5.2 mmol/L    Chloride 99 98 - 107 mmol/L    Total CO2 23.7 22.0 - 29.0 mmol/L    Calcium 9.4 8.6 - 10.5 mg/dL    Total Protein 6.6 6.0 - 8.5 g/dL    Albumin 4.20 3.50 - 5.20 g/dL    Globulin 2.4 gm/dL    A/G Ratio 1.8 g/dL    Total Bilirubin 0.3 0.1 - 1.2 mg/dL    Alkaline Phosphatase 121 (H) 39 - 117 U/L    AST (SGOT) 10 1 - 32 U/L    ALT (SGPT) 5 1 - 33 U/L   Lipid Panel   Result Value Ref Range    Total Cholesterol 204 (H) 0 - 200 mg/dL    Triglycerides 68 0 - 150 mg/dL    HDL Cholesterol 73 (H) 40 - 60 mg/dL    VLDL Cholesterol 13.6 5 - 40 mg/dL    LDL Cholesterol  117 (H) 0 - 100 mg/dL   TSH Rfx On Abnormal To Free T4   Result Value Ref Range    TSH 1.17 0.27 - 4.2 mIU/mL   UA / M With / Rflx Culture([facility] ONLY)   Result Value Ref Range    Specific Gravity, UA 1.009 1.005 - 1.030    pH, UA 6.5 5.0 - 7.5    Color, UA Yellow Yellow    Appearance, UA Clear Clear    Leukocytes, UA Trace (A) Negative    Protein Negative Negative/Trace    Glucose, UA Negative Negative    Ketones Negative Negative    Blood, UA Negative Negative    Bilirubin, UA Negative Negative    Urobilinogen, UA 0.2 0.2 - 1.0 mg/dL    Nitrite, UA Negative Negative    Microscopic Examination See below:     Urinalysis Reflex Comment    Vitamin D 25 Hydroxy   Result Value Ref Range    25 Hydroxy, Vitamin D 21.6 (L) 30.0 - 100.0 ng/mL   Hemoglobin A1c   Result Value Ref Range    Hemoglobin A1C 8.54 (H) 4.80 - 5.60 %   Microalbumin / Creatinine Urine Ratio   Result Value Ref Range    Creatinine, Urine 31.3 Not Estab. mg/dL    Microalbumin, Urine 3.6 Not Estab. ug/mL    Microalbumin/Creatinine Ratio Urine 11.5 0.0 - 30.0 mg/g creat   Microscopic Examination   Result Value Ref Range    WBC, UA 0-5 0 - 5 /hpf    RBC, UA None seen 0 - 2 /hpf     Epithelial Cells (non renal) 0-10 0 - 10 /hpf    Bacteria, UA None seen None seen/Few /hpf   Urine Culture, Routine   Result Value Ref Range    Urine Culture Final report     Result 1 No growth    Uric Acid   Result Value Ref Range    Uric Acid 7.1 (H) 2.4 - 5.7 mg/dL   CBC & Differential   Result Value Ref Range    WBC 7.07 4.50 - 10.70 10*3/mm3    RBC 4.11 3.90 - 5.20 10*6/mm3    Hemoglobin 13.1 11.9 - 15.5 g/dL    Hematocrit 40.5 35.6 - 45.5 %    MCV 98.5 (H) 80.5 - 98.2 fL    MCH 31.9 26.9 - 32.0 pg    MCHC 32.3 (L) 32.4 - 36.3 g/dL    RDW 14.1 (H) 11.7 - 13.0 %    Platelets 314 140 - 500 10*3/mm3    Neutrophil Rel % 55.1 42.7 - 76.0 %    Lymphocyte Rel % 26.9 19.6 - 45.3 %    Monocyte Rel % 10.5 5.0 - 12.0 %    Eosinophil Rel % 6.4 (H) 0.3 - 6.2 %    Basophil Rel % 0.7 0.0 - 1.5 %    Neutrophils Absolute 3.90 1.90 - 8.10 10*3/mm3    Lymphocytes Absolute 1.90 0.90 - 4.80 10*3/mm3    Monocytes Absolute 0.74 0.20 - 1.20 10*3/mm3    Eosinophils Absolute 0.45 0.00 - 0.70 10*3/mm3    Basophils Absolute 0.05 0.00 - 0.20 10*3/mm3    Immature Granulocyte Rel % 0.4 0.0 - 0.5 %    Immature Grans Absolute 0.03 0.00 - 0.03 10*3/mm3       Assessment & Plan:    Diagnoses and all orders for this visit:    Health maintenance examination    Medicare annual wellness visit, subsequent    Chronic kidney disease, stage IV (severe)    Uncontrolled type 2 diabetes mellitus with stage 4 chronic kidney disease, with long-term current use of insulin    Essential hypertension    Mixed hyperlipidemia    Other orders  -     allopurinol (ZYLOPRIM) 100 MG tablet;         Discussion/Summary  Alessia is here for her AWV and CPE and follow up.  She is up to date on most health maintenance.  She missed her mammo last year but will schedule this.   She has had her diabetic eye exam but we do not have a copy of it.  Will request this to be sent over.  She has had more soa lately and has an appt in about two weeks with her cardiologist.  She had a  CT chest in Feb which was negative.  Her DM is a little worse.  She has missed doses of her insulin.  She is going to try to be better about this. Her creatinine is better than it was on last check.  She is very good about following up on her kidneys.  Advised to continue all current meds.  Her right foot/ankle pain and swelling seems better today.  Advised that if it got worse again, we could always get a doppler.  She has chronic venous insufficiency in that leg worse than in the right anyway.  She is going to just keep an eye on it today and tomorrow since it did improve since yesterday.  Her uric acid is better than it had been.  As she is really quite stable I am not going to change anything other than to have her try not to miss her insulin.  I will see her back in six months.     Follow Up:  No Follow-up on file.     An After Visit Summary and PPPS with all of these plans were given to the patient.

## 2017-07-06 NOTE — PATIENT INSTRUCTIONS
Medicare Wellness  Personal Prevention Plan of Service     Date of Office Visit:  2017  Encounter Provider:  Summer Gordon MD  Place of Service:  [facility] INTERNAL MEDICINE  Patient Name: Alessia Madrigal  :  10/9/1928    As part of the Medicare Wellness portion of your visit today, we are providing you with this personalized preventive plan of services (PPPS). This plan is based upon recommendations of the [place] Preventive Services Task Force (USPSTF) and the Advisory Committee on Immunization Practices (ACIP).    This lists the preventive care services that should be considered, and provides dates of when you are due. Items listed as completed are up-to-date and do not require any further intervention.    Health Maintenance   Topic Date Due   • MEDICARE ANNUAL WELLNESS  2017   • DIABETIC FOOT EXAM  2017   • DIABETIC EYE EXAM  2017   • INFLUENZA VACCINE  2017   • MAMMOGRAM  10/01/2017   • HEMOGLOBIN A1C  2018   • LIPID PANEL  2018   • TDAP/TD VACCINES (2 - Td) 2019   • PNEUMOCOCCAL VACCINES (65+ LOW/MEDIUM RISK)  Addressed   • ZOSTER VACCINE  Completed       No orders of the defined types were placed in this encounter.      No Follow-up on file.

## 2017-07-19 ENCOUNTER — OFFICE VISIT (OUTPATIENT)
Dept: CARDIOLOGY | Facility: CLINIC | Age: 82
End: 2017-07-19

## 2017-07-19 VITALS
BODY MASS INDEX: 35.99 KG/M2 | DIASTOLIC BLOOD PRESSURE: 77 MMHG | SYSTOLIC BLOOD PRESSURE: 150 MMHG | HEART RATE: 77 BPM | HEIGHT: 65 IN | WEIGHT: 216 LBS

## 2017-07-19 DIAGNOSIS — E78.2 MIXED HYPERLIPIDEMIA: ICD-10-CM

## 2017-07-19 DIAGNOSIS — I27.20 PULMONARY HYPERTENSION (HCC): ICD-10-CM

## 2017-07-19 DIAGNOSIS — N18.4 CHRONIC KIDNEY DISEASE, STAGE IV (SEVERE) (HCC): ICD-10-CM

## 2017-07-19 DIAGNOSIS — I25.10 CAD IN NATIVE ARTERY: Primary | ICD-10-CM

## 2017-07-19 DIAGNOSIS — I77.9 DISORDER OF AORTA (HCC): ICD-10-CM

## 2017-07-19 DIAGNOSIS — I10 HTN (HYPERTENSION), BENIGN: ICD-10-CM

## 2017-07-19 DIAGNOSIS — I27.81 COR PULMONALE (HCC): ICD-10-CM

## 2017-07-19 DIAGNOSIS — I25.10 CHRONIC CORONARY ARTERY DISEASE: ICD-10-CM

## 2017-07-19 DIAGNOSIS — M79.89 SWELLING OF LOWER EXTREMITY: ICD-10-CM

## 2017-07-19 DIAGNOSIS — R91.8 PULMONARY NODULES/LESIONS, MULTIPLE: ICD-10-CM

## 2017-07-19 DIAGNOSIS — I36.1 NON-RHEUMATIC TRICUSPID VALVE INSUFFICIENCY: ICD-10-CM

## 2017-07-19 DIAGNOSIS — I34.0 NON-RHEUMATIC MITRAL REGURGITATION: ICD-10-CM

## 2017-07-19 DIAGNOSIS — R53.82 CHRONIC FATIGUE: ICD-10-CM

## 2017-07-19 DIAGNOSIS — I10 ESSENTIAL HYPERTENSION: ICD-10-CM

## 2017-07-19 DIAGNOSIS — D63.8 ANEMIA OF CHRONIC DISORDER: ICD-10-CM

## 2017-07-19 PROCEDURE — 99214 OFFICE O/P EST MOD 30 MIN: CPT | Performed by: INTERNAL MEDICINE

## 2017-07-19 PROCEDURE — 93000 ELECTROCARDIOGRAM COMPLETE: CPT | Performed by: INTERNAL MEDICINE

## 2017-07-19 NOTE — PROGRESS NOTES
[facility]  Cardiology Office Visit Note        Subjective:     Encounter Date:2017      Patient ID: Alessia Madrigal   Age: 88 y.o.  Sex: female  :  10/9/1928  MRN: [id number]             Date of Office Visit: 2017  Encounter Provider: Kenneth Constantino MD  Place of Service: [facility]     .    Chief Complaint:  History of Present Illness    The following portions of the patient's history were reviewed and updated as appropriate: allergies, current medications, past family history, past medical history, past social history, past surgical history and problem list.    Review of Systems   Constitution: Negative for chills, fever and weight gain.   HENT: Negative for congestion, headaches, hearing loss and sore throat.    Eyes: Negative for blurred vision and double vision.   Cardiovascular: Positive for leg swelling. Negative for chest pain, claudication, cyanosis, dyspnea on exertion, irregular heartbeat, near-syncope, orthopnea, palpitations, paroxysmal nocturnal dyspnea and syncope.   Respiratory: Positive for cough and shortness of breath. Negative for snoring and wheezing.    Endocrine: Negative for cold intolerance.   Hematologic/Lymphatic: Negative for adenopathy. Does not bruise/bleed easily.   Skin: Negative for rash.   Musculoskeletal: Negative for back pain.   Gastrointestinal: Negative for abdominal pain, change in bowel habit, constipation, diarrhea, nausea and vomiting.   Genitourinary: Negative for dysuria, frequency, hematuria and hesitancy.   Neurological: Positive for loss of balance. Negative for disturbances in coordination, excessive daytime sleepiness, dizziness, focal weakness, light-headedness and numbness.   Psychiatric/Behavioral: Negative for depression and memory loss. The patient is not nervous/anxious.    Allergic/Immunologic: Negative for hives.         ECG 12 Lead  Date/Time: 2017 11:35 AM  Performed by:  HERO HUMPHRIES JR  Authorized by: HERO HUMPHRIES JR   Comparison: compared with previous ECG   Similar to previous ECG  Rhythm: sinus rhythm  BPM: 74  T flattening: all  Clinical impression: abnormal ECG                       HPI     The patient is a 88-year-old white female with history of CABG 26 years ago, history of renal artery stent in 2003, diabetes, hyperlipidemia, hypertension, anemia presents for cardiac follow-up.  I last saw in the office in November 2016.  She had been on Ranexa at one point in the past year but stopped because she was worried that it might affect her kidneys and cause her constipation.  Since I last saw her, she denies chest pain.  She had a weird feeling in her chest recently when she was getting up out of a chair.  She sat back down for about 35 minutes before the pain went away.  No associated diaphoresis or nausea but she was short of breath.  No radiation of the pain.  She does not take any medicine for the pain.  The pain did not seem to worsen with taking a deep breath, movement, change of position or eating or palpation.    Cardiac review systems: She uses CPAP at night.  No PND, orthopnea.  She has chronic ankle edema where the veins were taken for the bypass surgery.  She does complain of dyspnea on exertion.  No recent change in weight.  No palpitations dizziness or syncope.  She wonders whether she should come off Aldactone    Cardiac risk factors: Positive for family history really CAD his father had MI at age 53 and brother MI at age 70.  Positive for diabetes, hypertension and hyperlipidemia.  No smoking ever.          Past Medical History:   Diagnosis Date   • Arthritis 03/13/2014    CONT TYLENOL FOR PAIN/ [facility]W (INTERNAL MEDICINE)   • Cancer of uterus    • Carotid artery stenosis    • Chronic renal failure syndrome    • Coronary atherosclerosis    • Esophageal reflux    • Glaucoma    • Hypertension    • Osteoarthritis    • Sleep apnea     USING CPAP   •  Spinal stenosis    • Thyroid disease    • Type 2 diabetes mellitus 1991    INsulin begun 2012   • Vitamin B12 deficiency     SHE HAS NOT HAD THIS CHECKED IN A WHILE. SHE HAD BEEN ON SHOTS.  CHECK HER B12 LEVELS TODAY.  BY JOSE DANIEL CHEW       Past Surgical History:   Procedure Laterality Date   • CATARACT EXTRACTION Bilateral    • CATARACT EXTRACTION W/ INTRAOCULAR LENS IMPLANT Right 10/25/2016    Procedure: RT SUPERFICIAL KERATECTOMY ;  Surgeon: Arian Singh MD;  Location: [facility] OR [facility];  Service:    • CHOLECYSTECTOMY     • COLONOSCOPY     • CORONARY ARTERY BYPASS GRAFT  1991    X3   • HYSTERECTOMY     • TRANSLUMINAL ATHERECTOMY PERONEAL ARTERY      PERCUTANEOUS TRANSLUMINAL ATHERECTOMY RENAL ARTERY       Social History     Social History   • Marital status: Single     Spouse name: N/A   • Number of children: N/A   • Years of education: N/A     Occupational History   • Not on file.     Social History Main Topics   • Smoking status: Never Smoker   • Smokeless tobacco: Never Used   • Alcohol use No   • Drug use: Not on file   • Sexual activity: Not on file     Other Topics Concern   • Not on file     Social History Narrative   • No narrative on file       Family History   Problem Relation Age of Onset   • Colon cancer Other    • Heart disease Other    • Hypertension Other    • Stroke Other      ISCHEMIC   • Colon cancer Mother    • Heart disease Father    • Stroke Father            Scheduled Meds:  Current Outpatient Prescriptions on File Prior to Visit   Medication Sig Dispense Refill   • acetaminophen (TYLENOL) 500 MG tablet Take 500 mg by mouth every 4 (four) hours as needed for mild pain (1-3) (Back Pain). EVERY 4 TO 6 HOURS AS NEEDED     • allopurinol (ZYLOPRIM) 100 MG tablet      • aspirin 81 MG tablet Take 81 mg by mouth daily.     • Cholecalciferol (VITAMIN D) 1000 UNITS tablet Take 1,000 Units by mouth daily.     • clopidogrel (PLAVIX) 75 MG tablet Take 1 tablet by mouth Daily. 90 tablet 1   •  "Coenzyme Q10 (CO Q-10) 300 MG capsule Take 300 mg by mouth daily.     • furosemide (LASIX) 40 MG tablet Take 40 mg by mouth daily.     • insulin detemir (LEVEMIR) 100 UNIT/ML injection Inject 10 Units under the skin daily. 5 pen 3   • insulin lispro (HumaLOG) 100 UNIT/ML patient supplied pump Inject  under the skin See Admin Instructions. Take 6 units before breakfast, Take 5 units before lunch, and Take 5 units before dinner.     • Insulin Pen Needle 32G X 4 MM misc Use as directed 3 times daily 270 each 2   • isosorbide mononitrate (IMDUR) 120 MG 24 hr tablet TAKE 1 TABLET DAILY 90 tablet 0   • levothyroxine (SYNTHROID, LEVOTHROID) 125 MCG tablet TAKE 1 TABLET DAILY AS DIRECTED 90 tablet 3   • losartan (COZAAR) 50 MG tablet TAKE 1 TABLET DAILY 90 tablet 2   • metoprolol succinate XL (TOPROL-XL) 50 MG 24 hr tablet TAKE 1 TABLET TWICE A  tablet 2   • niacin (NIASPAN) 500 MG CR tablet Take 1 tablet by mouth every night. 90 tablet 3   • NIFEdipine XL (PROCARDIA XL) 90 MG 24 hr tablet TAKE 1 TABLET DAILY 90 tablet 2   • nitroglycerin (NITROSTAT) 0.4 MG SL tablet 1 under the tongue as needed for angina, may repeat q5mins for up three doses 100 tablet 11   • ofloxacin (OCUFLOX) 0.3 % ophthalmic solution      • ONE TOUCH ULTRA TEST test strip TEST FOUR TIMES A  each 3   • raNITIdine (ZANTAC) 150 MG tablet TAKE 1 TABLET EVERY 12 HOURS AS NEEDED 180 tablet 3   • spironolactone (ALDACTONE) 25 MG tablet Take 1 tablet by mouth Daily. 90 tablet 1   • travoprost, BAK free, (TRAVATAN) 0.004 % solution ophthalmic solution Administer 1 drop to both eyes every night. in affected eye(s)        No current facility-administered medications on file prior to visit.        /77  Pulse 77  Ht 64.5\" (163.8 cm)  Wt 216 lb (98 kg)  BMI 36.5 kg/m2    Objective:     Physical Exam   Constitutional: She is oriented to person, place, and time. She appears well-developed and well-nourished. No distress.   HENT:   Head: " Normocephalic and atraumatic.   Right Ear: External ear normal.   Left Ear: External ear normal.   Mouth/Throat: Oropharynx is clear and moist. No oropharyngeal exudate.   Eyes: Conjunctivae and EOM are normal. Pupils are equal, round, and reactive to light. No scleral icterus.   Neck: Normal range of motion. Neck supple. No JVD present. No tracheal deviation present. No thyromegaly present.   Cardiovascular: Normal rate, regular rhythm, S1 normal, S2 normal and intact distal pulses.  PMI is not displaced.  Exam reveals no gallop, no distant heart sounds, no friction rub and no decreased pulses.    Murmur heard.  2/6 systolic murmur at the left sternal border.   Pulmonary/Chest: Effort normal and breath sounds normal. No accessory muscle usage. No respiratory distress. She has no wheezes. She has no rales. She exhibits no tenderness.   Abdominal: Soft. Bowel sounds are normal. She exhibits no distension and no mass. There is no tenderness. There is no rebound and no guarding.   Musculoskeletal: Normal range of motion. She exhibits no edema, tenderness or deformity.   Lymphadenopathy:     She has no cervical adenopathy.   Neurological: She is alert and oriented to person, place, and time. She has normal reflexes. No cranial nerve deficit. Coordination normal.   Skin: Skin is dry. No rash noted. She is not diaphoretic. No erythema. No pallor.   Psychiatric: She has a normal mood and affect.             Lab Review:               Lab Review:         Lab Review     No results found for: CHOL  Lab Results   Component Value Date    HDL 73 (H) 07/03/2017    HDL 63 (H) 06/23/2016    HDL 74 (H) 03/25/2016     Lab Results   Component Value Date     (H) 07/03/2017    LDL 99 06/23/2016     (H) 03/25/2016     Lab Results   Component Value Date    TRIG 68 07/03/2017    TRIG 77 06/23/2016    TRIG 92 03/25/2016     No components found for: CHOLHDL  Lab Results   Component Value Date    GLUCOSE 214 (H) 11/08/2016    BUN  39 (H) 07/03/2017    CREATININE 1.43 (H) 07/03/2017    EGFRIFNONA 35 (L) 07/03/2017    EGFRIFAFRI 42 (L) 07/03/2017    BCR 27.3 (H) 07/03/2017    CO2 23.7 07/03/2017    CALCIUM 9.4 07/03/2017    PROTENTOTREF 6.6 07/03/2017    ALBUMIN 4.20 07/03/2017    LABIL2 1.8 07/03/2017    AST 10 07/03/2017    ALT 5 07/03/2017     Lab Results   Component Value Date    GLUCOSE 214 (H) 11/08/2016    CALCIUM 9.4 07/03/2017     07/03/2017    K 4.4 07/03/2017    CO2 23.7 07/03/2017    CL 99 07/03/2017    BUN 39 (H) 07/03/2017    CREATININE 1.43 (H) 07/03/2017    EGFRIFAFRI 42 (L) 07/03/2017    EGFRIFNONA 35 (L) 07/03/2017    BCR 27.3 (H) 07/03/2017    ANIONGAP 14.8 11/08/2016     Lab Results   Component Value Date    WBC 7.07 07/03/2017    HGB 13.1 07/03/2017    HCT 40.5 07/03/2017    MCV 98.5 (H) 07/03/2017     07/03/2017     Lab Results   Component Value Date    DDIMER 335 (H) 04/03/2015     Lab Results   Component Value Date    TSH 1.17 07/03/2017    S8UQMRU 8.28 03/25/2016     Lab Results   Component Value Date    CKTOTAL 53 08/18/2015     No results found for: DIGOXIN  Lab Results   Component Value Date    CKTOTAL 53 08/18/2015    CKMB 1.9 07/15/2015    TROPONINT 0.03 07/15/2015     Lab Results   Component Value Date    INR 0.9 07/13/2015    PROTIME 10.6 07/13/2015     Estimated Creatinine Clearance: 31.2 mL/min (by C-G formula based on Cr of 1.43).    Assessment:          Diagnosis Plan   1. CAD in native artery  ECG 12 Lead   2. HTN (hypertension), benign  ECG 12 Lead   3. Chronic coronary artery disease     4. Cor pulmonale     5. Disorder of aorta     6. Mixed hyperlipidemia     7. Essential hypertension     8. Non-rheumatic mitral regurgitation     9. Pulmonary hypertension     10. Non-rheumatic tricuspid valve insufficiency     11. Pulmonary nodules/lesions, multiple     12. Chronic kidney disease, stage IV (severe)     13. Anemia of chronic disorder     14. Chronic fatigue     15. Swelling of lower extremity             Assessment and Plan:    Alessia was seen today for coronary artery disease, hypertension and hyperlipidemia.    Diagnoses and all orders for this visit:    CAD in native artery  -     ECG 12 Lead    HTN (hypertension), benign  -     ECG 12 Lead    Chronic coronary artery disease    Cor pulmonale    Disorder of aorta    Mixed hyperlipidemia    Essential hypertension    Non-rheumatic mitral regurgitation    Pulmonary hypertension    Non-rheumatic tricuspid valve insufficiency    Pulmonary nodules/lesions, multiple    Chronic kidney disease, stage IV (severe)    Anemia of chronic disorder    Chronic fatigue    Swelling of lower extremity    The patient had an episode of chest discomfort this was a weird feeling substernally when trying to push himself out of a chair.  She was short of breath.  The symptoms passed after about 35 minutes.  She has not had them since.  It has been 26 years since her bypass surgery.  Her last Cardiolite stress test was in December 2016 was normal.  Her last echo at the same time showed normal ejection fraction.  Normal wall motion.  EKG shows tiny T-wave inversion generally, unchanged from prior EKG.  I did not think the patient had a heart attack.  However, I cannot be sure that the chest pain was not due to ischemia.  At age 88, I will be conservative and treat her medically and not have her undergo another stress test or catheterization.  Therefore, I will be more aggressive in treating underlying coronary artery disease.  I will Continue her usual anti-ischemic medications including Toprol, Procardia and Imdur.  I will add back a statin which she stopped in the past year because of myalgia.  She will start on Pravachol 40 mg by mouth daily at bedtime Monday Wednesday Friday.  If she does not develop myalgia after month, she can add another dose for the week.  Eventually, she might feel tolerated 7 days a week.  She is also on coenzyme Q 10 to reduce myalgia.    Her last  cardiac catheterization in 2012 showed 100% occlusion of the LAD, RCA and circumflex.  The circumflex filled by retrograde flow from an obtuse marginal-2.  There is a 55% stenosis of the obtuse marginal-1.  The LAD filled by the LIMA, as well as a vein graft to the diagonal.  The vein graft to the RCA was patent.  She was treated medically.      A total of 25 minutes was spent in the care of this patient, including at least 13 minutes face-to-face with the patient.    I not only counseled the patient today on the significant factors noted in the assessment and plan, but I also recommended that the patient reduce salt and saturated animal fat intake in diet, as well as to perform scheduled exercise on a regular basis.      Plan:                  07/19/2017  1:31 PM  MD Kenneth Herrmann MD  7/19/2017, 1:31 PM    EMR Dragon/Transcription disclaimer:   Much of this encounter note is an electronic transcription/translation of spoken language to printed text. The electronic translation of spoken language may permit erroneous, or at times, nonsensical words or phrases to be inadvertently transcribed; Although I have reviewed the note for such errors, some may still exist.

## 2017-07-20 DIAGNOSIS — R53.82 CHRONIC FATIGUE: ICD-10-CM

## 2017-07-20 DIAGNOSIS — I10 ESSENTIAL HYPERTENSION: ICD-10-CM

## 2017-07-20 DIAGNOSIS — E78.5 HYPERLIPIDEMIA, UNSPECIFIED HYPERLIPIDEMIA TYPE: ICD-10-CM

## 2017-07-20 DIAGNOSIS — E55.9 VITAMIN D DEFICIENCY: ICD-10-CM

## 2017-07-20 RX ORDER — SPIRONOLACTONE 25 MG/1
25 TABLET ORAL DAILY
Qty: 90 TABLET | Refills: 3 | Status: SHIPPED | OUTPATIENT
Start: 2017-07-20 | End: 2018-05-29 | Stop reason: SDUPTHER

## 2017-07-20 RX ORDER — ISOSORBIDE MONONITRATE 120 MG/1
120 TABLET, EXTENDED RELEASE ORAL DAILY
Qty: 90 TABLET | Refills: 3 | Status: SHIPPED | OUTPATIENT
Start: 2017-07-20 | End: 2018-03-22 | Stop reason: DRUGHIGH

## 2017-07-20 RX ORDER — NIFEDIPINE 90 MG/1
90 TABLET, EXTENDED RELEASE ORAL DAILY
Qty: 90 TABLET | Refills: 3 | Status: SHIPPED | OUTPATIENT
Start: 2017-07-20 | End: 2018-05-29 | Stop reason: SDUPTHER

## 2017-07-20 RX ORDER — NIACIN 500 MG/1
500 TABLET, EXTENDED RELEASE ORAL NIGHTLY
Qty: 90 TABLET | Refills: 3 | Status: ON HOLD | OUTPATIENT
Start: 2017-07-20 | End: 2018-03-11

## 2017-07-20 RX ORDER — LOSARTAN POTASSIUM 50 MG/1
50 TABLET ORAL DAILY
Qty: 90 TABLET | Refills: 3 | Status: SHIPPED | OUTPATIENT
Start: 2017-07-20 | End: 2018-05-29 | Stop reason: SDUPTHER

## 2017-07-21 ENCOUNTER — CLINICAL SUPPORT (OUTPATIENT)
Dept: ORTHOPEDIC SURGERY | Facility: CLINIC | Age: 82
End: 2017-07-21

## 2017-07-21 VITALS — BODY MASS INDEX: 35.99 KG/M2 | TEMPERATURE: 98.6 F | WEIGHT: 216 LBS | HEIGHT: 65 IN

## 2017-07-21 DIAGNOSIS — M17.12 PRIMARY OSTEOARTHRITIS OF LEFT KNEE: Primary | ICD-10-CM

## 2017-07-21 PROCEDURE — 20610 DRAIN/INJ JOINT/BURSA W/O US: CPT | Performed by: NURSE PRACTITIONER

## 2017-07-21 RX ORDER — METHYLPREDNISOLONE ACETATE 80 MG/ML
80 INJECTION, SUSPENSION INTRA-ARTICULAR; INTRALESIONAL; INTRAMUSCULAR; SOFT TISSUE
Status: COMPLETED | OUTPATIENT
Start: 2017-07-21 | End: 2017-07-21

## 2017-07-21 RX ORDER — BUPIVACAINE HYDROCHLORIDE 2.5 MG/ML
2 INJECTION, SOLUTION INFILTRATION; PERINEURAL
Status: COMPLETED | OUTPATIENT
Start: 2017-07-21 | End: 2017-07-21

## 2017-07-21 RX ORDER — LIDOCAINE HYDROCHLORIDE 10 MG/ML
4 INJECTION, SOLUTION INFILTRATION; PERINEURAL
Status: COMPLETED | OUTPATIENT
Start: 2017-07-21 | End: 2017-07-21

## 2017-07-21 RX ADMIN — BUPIVACAINE HYDROCHLORIDE 2 ML: 2.5 INJECTION, SOLUTION INFILTRATION; PERINEURAL at 16:39

## 2017-07-21 RX ADMIN — METHYLPREDNISOLONE ACETATE 80 MG: 80 INJECTION, SUSPENSION INTRA-ARTICULAR; INTRALESIONAL; INTRAMUSCULAR; SOFT TISSUE at 16:39

## 2017-07-21 RX ADMIN — LIDOCAINE HYDROCHLORIDE 4 ML: 10 INJECTION, SOLUTION INFILTRATION; PERINEURAL at 16:39

## 2017-07-21 NOTE — PROGRESS NOTES
7/21/2017    Alessia Madrigal is here today for worsening knee pain. Pt has undergone injection of the knee in the past with good resolution of symptoms. Pt is requesting a repeat injection.     KNEE Injection Procedure Note:    Large Joint Arthrocentesis  Date/Time: 7/21/2017 4:39 PM  Consent given by: patient  Site marked: site marked  Timeout: Immediately prior to procedure a time out was called to verify the correct patient, procedure, equipment, support staff and site/side marked as required   Supporting Documentation  Indications: pain and joint swelling   Procedure Details  Location: knee - L knee  Preparation: Patient was prepped and draped in the usual sterile fashion  Needle size: 18 G  Approach: anterolateral  Medications administered: 4 mL lidocaine 1 %; 80 mg methylPREDNISolone acetate 80 MG/ML; 2 mL bupivacaine            At the conclusion of the injection I discussed the importance of continued quad strengthening exercises on a daily basis. I will see the patient back if the symptoms should fail to improve or worsen.    Nicole Serna, APRN  7/21/2017

## 2017-08-08 RX ORDER — PRAVASTATIN SODIUM 40 MG
40 TABLET ORAL DAILY
Qty: 45 TABLET | Refills: 3 | Status: SHIPPED | OUTPATIENT
Start: 2017-08-08 | End: 2017-12-12

## 2017-08-24 ENCOUNTER — TELEPHONE (OUTPATIENT)
Dept: CARDIOLOGY | Facility: CLINIC | Age: 82
End: 2017-08-24

## 2017-08-24 RX ORDER — METOPROLOL SUCCINATE 50 MG/1
50 TABLET, EXTENDED RELEASE ORAL DAILY
Qty: 180 TABLET | Refills: 0 | Status: SHIPPED | OUTPATIENT
Start: 2017-08-24 | End: 2018-05-29 | Stop reason: SDUPTHER

## 2017-08-24 NOTE — TELEPHONE ENCOUNTER
----- Message from Marilynn Hutchinson sent at 8/24/2017  1:06 PM EDT -----  Regarding: refill  [facility]      Done

## 2017-08-30 RX ORDER — PEN NEEDLE, DIABETIC 32GX 5/32"
NEEDLE, DISPOSABLE MISCELLANEOUS
Qty: 100 EACH | Refills: 3 | Status: SHIPPED | OUTPATIENT
Start: 2017-08-30 | End: 2017-09-05 | Stop reason: SDUPTHER

## 2017-10-23 DIAGNOSIS — E55.9 VITAMIN D DEFICIENCY: ICD-10-CM

## 2017-10-23 DIAGNOSIS — IMO0002 UNCONTROLLED TYPE 2 DIABETES MELLITUS: ICD-10-CM

## 2017-10-23 DIAGNOSIS — R53.82 CHRONIC FATIGUE: ICD-10-CM

## 2017-10-23 DIAGNOSIS — E21.1 SECONDARY HYPERPARATHYROIDISM, NON-RENAL (HCC): ICD-10-CM

## 2017-10-23 DIAGNOSIS — E78.5 HYPERLIPIDEMIA: ICD-10-CM

## 2017-10-23 DIAGNOSIS — I10 ESSENTIAL HYPERTENSION: ICD-10-CM

## 2017-10-23 DIAGNOSIS — E03.9 HYPOTHYROIDISM: ICD-10-CM

## 2017-10-23 RX ORDER — LEVOTHYROXINE SODIUM 0.12 MG/1
TABLET ORAL
Qty: 90 TABLET | Refills: 3 | Status: SHIPPED | OUTPATIENT
Start: 2017-10-23 | End: 2018-11-07 | Stop reason: SDUPTHER

## 2017-12-12 ENCOUNTER — OFFICE VISIT (OUTPATIENT)
Dept: CARDIOLOGY | Facility: CLINIC | Age: 82
End: 2017-12-12

## 2017-12-12 VITALS
HEIGHT: 65 IN | WEIGHT: 223 LBS | SYSTOLIC BLOOD PRESSURE: 134 MMHG | BODY MASS INDEX: 37.15 KG/M2 | HEART RATE: 66 BPM | DIASTOLIC BLOOD PRESSURE: 80 MMHG

## 2017-12-12 DIAGNOSIS — I10 ESSENTIAL HYPERTENSION: ICD-10-CM

## 2017-12-12 DIAGNOSIS — I25.708 CORONARY ARTERY DISEASE INVOLVING CORONARY BYPASS GRAFT OF NATIVE HEART WITH OTHER FORMS OF ANGINA PECTORIS (HCC): Primary | ICD-10-CM

## 2017-12-12 DIAGNOSIS — E78.2 MIXED HYPERLIPIDEMIA: ICD-10-CM

## 2017-12-12 DIAGNOSIS — E11.59 TYPE 2 DIABETES MELLITUS WITH OTHER CIRCULATORY COMPLICATION, WITHOUT LONG-TERM CURRENT USE OF INSULIN (HCC): ICD-10-CM

## 2017-12-12 PROCEDURE — 93000 ELECTROCARDIOGRAM COMPLETE: CPT | Performed by: INTERNAL MEDICINE

## 2017-12-12 PROCEDURE — 99214 OFFICE O/P EST MOD 30 MIN: CPT | Performed by: INTERNAL MEDICINE

## 2017-12-12 RX ORDER — ERGOCALCIFEROL 1.25 MG/1
50000 CAPSULE ORAL
Status: ON HOLD | COMMUNITY
End: 2018-03-11 | Stop reason: ALTCHOICE

## 2017-12-12 NOTE — PROGRESS NOTES
Date of Office Visit: 17  Encounter Provider: Griffin Joseph MD  Place of Service: [facility] CARDIOLOGY  Patient Name: Alessia Madrigal  :10/9/1928      Chief Complaint   Patient presents with   • Coronary Artery Disease     History of Present Illness  HPI Comments: Mrs Madrigal is a pleasant 89 yo female with history of coronary disease, previous colon bypass grafting, hypertension, diabetes mellitus, hyperlipidemia, renal failure, previous renal artery stenting.  In  she had coronary bypass grafting in  had repeat cardiac catheterization which showed severe three-vessel coronary disease with occlusion of left anterior descending and right coronary artery and circumflex the left internal mammary graft left anterior descending was patent the vein graft to the diagonal was patent vein graft to the right coronary was patent and the circumflex filled by collaterals was only ischemic area.  She's also had previous left renal artery stent follows with nephrology for renal failure.  She has carotid artery disease follows with vascular surgery just mild.  She now comes into establish with cardiology.  She does have shortness of breath which may be gradually worse over the past year but she has severe spinal stenosis and arthritis which makes it difficult for her to walk.  Some of this she denies any chest pain or pressure denies any orthopnea or PND.  Denies any palpitations, near-syncope or syncope denies any blood in her stool or black tarry stools.  Denies any stroke type symptoms such as dysarthria, paralysis, or paresthesia.  No blood in her stool or black tarry stools.  She still lives at home alone he gets along fine.    Coronary Artery Disease   Pertinent negatives include no dizziness, muscle weakness or weight gain. There is no history of past myocardial infarction.         Past Medical History:   Diagnosis Date   • Arthritis 2014    CONT TYLENOL FOR PAIN/  JOSE DANIEL CHEW (INTERNAL MEDICINE)   • Cancer of uterus    • Carotid artery stenosis    • Chronic renal failure syndrome    • Coronary atherosclerosis    • Esophageal reflux    • Glaucoma    • Hypertension    • Osteoarthritis    • Sleep apnea     USING CPAP   • Spinal stenosis    • Thyroid disease    • Type 2 diabetes mellitus 1991    INsulin begun 2012   • Vitamin B12 deficiency     SHE HAS NOT HAD THIS CHECKED IN A WHILE. SHE HAD BEEN ON SHOTS.  CHECK HER B12 LEVELS TODAY.  BY JOSE DANIEL CHEW         Past Surgical History:   Procedure Laterality Date   • CATARACT EXTRACTION Bilateral    • CATARACT EXTRACTION W/ INTRAOCULAR LENS IMPLANT Right 10/25/2016    Procedure: RT SUPERFICIAL KERATECTOMY ;  Surgeon: Arian Singh MD;  Location: [facility] OR [facility] [place];  Service:    • CHOLECYSTECTOMY     • COLONOSCOPY     • CORONARY ARTERY BYPASS GRAFT  1991    X3   • HYSTERECTOMY     • TRANSLUMINAL ATHERECTOMY PERONEAL ARTERY      PERCUTANEOUS TRANSLUMINAL ATHERECTOMY RENAL ARTERY         Current Outpatient Prescriptions on File Prior to Visit   Medication Sig Dispense Refill   • acetaminophen (TYLENOL) 500 MG tablet Take 500 mg by mouth every 4 (four) hours as needed for mild pain (1-3) (Back Pain). EVERY 4 TO 6 HOURS AS NEEDED     • aspirin 81 MG tablet Take 81 mg by mouth daily.     • clopidogrel (PLAVIX) 75 MG tablet Take 1 tablet by mouth Daily. 90 tablet 1   • Coenzyme Q10 (CO Q-10) 300 MG capsule Take 300 mg by mouth daily.     • furosemide (LASIX) 40 MG tablet Take 40 mg by mouth daily.     • insulin detemir (LEVEMIR) 100 UNIT/ML injection Inject 10 Units under the skin daily. 5 pen 3   • insulin lispro (HumaLOG) 100 UNIT/ML patient supplied pump Inject  under the skin See Admin Instructions. Take 6 units before breakfast, Take 5 units before lunch, and Take 5 units before dinner.     • Insulin Pen Needle (BD PEN NEEDLE LISA U/F) 32G X 4 MM misc USE AS DIRECTED THREE TIMES A DAY. 300 each 3   • isosorbide mononitrate  (IMDUR) 120 MG 24 hr tablet Take 1 tablet by mouth Daily. 90 tablet 3   • levothyroxine (SYNTHROID, LEVOTHROID) 125 MCG tablet TAKE 1 TABLET DAILY AS DIRECTED 90 tablet 3   • losartan (COZAAR) 50 MG tablet Take 1 tablet by mouth Daily. 90 tablet 3   • metoprolol succinate XL (TOPROL-XL) 50 MG 24 hr tablet Take 1 tablet by mouth Daily. (Patient taking differently: Take 50 mg by mouth 2 (Two) Times a Day.) 180 tablet 0   • niacin (NIASPAN) 500 MG CR tablet Take 1 tablet by mouth Every Night. 90 tablet 3   • NIFEdipine XL (PROCARDIA XL) 90 MG 24 hr tablet Take 1 tablet by mouth Daily. 90 tablet 3   • nitroglycerin (NITROSTAT) 0.4 MG SL tablet 1 under the tongue as needed for angina, may repeat q5mins for up three doses 100 tablet 11   • ONE TOUCH ULTRA TEST test strip TEST FOUR TIMES A  each 3   • raNITIdine (ZANTAC) 150 MG tablet TAKE 1 TABLET EVERY 12 HOURS AS NEEDED 180 tablet 3   • spironolactone (ALDACTONE) 25 MG tablet Take 1 tablet by mouth Daily. 90 tablet 3   • travoprost, BAK free, (TRAVATAN) 0.004 % solution ophthalmic solution Administer 1 drop to both eyes every night. in affected eye(s)      • [DISCONTINUED] allopurinol (ZYLOPRIM) 100 MG tablet      • [DISCONTINUED] Cholecalciferol (VITAMIN D) 1000 UNITS tablet Take 1,000 Units by mouth daily.     • [DISCONTINUED] ofloxacin (OCUFLOX) 0.3 % ophthalmic solution      • [DISCONTINUED] pravastatin (PRAVACHOL) 40 MG tablet Take 1 tablet by mouth Daily. On Monday, Wednesday and Fridays only. 45 tablet 3     No current facility-administered medications on file prior to visit.          Social History     Social History   • Marital status: Single     Spouse name: N/A   • Number of children: N/A   • Years of education: N/A     Occupational History   • Not on file.     Social History Main Topics   • Smoking status: Never Smoker   • Smokeless tobacco: Never Used   • Alcohol use No   • Drug use: Not on file   • Sexual activity: Defer     Other Topics Concern   •  "Not on file     Social History Narrative       Family History   Problem Relation Age of Onset   • Colon cancer Other    • Heart disease Other    • Hypertension Other    • Stroke Other      ISCHEMIC   • Colon cancer Mother    • Heart disease Father    • Stroke Father          Review of Systems   Constitution: Positive for malaise/fatigue. Negative for decreased appetite, diaphoresis, fever, weakness, weight gain and weight loss.   HENT: Negative for congestion, hearing loss, nosebleeds and tinnitus.    Eyes: Negative for blurred vision, double vision, vision loss in left eye, vision loss in right eye and visual disturbance.   Cardiovascular:        As noted in HPI   Respiratory:        As noted HPI   Endocrine: Negative for cold intolerance and heat intolerance.   Hematologic/Lymphatic: Negative for bleeding problem. Does not bruise/bleed easily.   Skin: Negative for color change, flushing, itching and rash.   Musculoskeletal: Positive for back pain and joint pain. Negative for arthritis, joint swelling, muscle weakness and myalgias.   Gastrointestinal: Negative for bloating, abdominal pain, constipation, diarrhea, dysphagia, heartburn, hematemesis, hematochezia, melena, nausea and vomiting.   Genitourinary: Negative for bladder incontinence, dysuria, frequency, nocturia and urgency.   Neurological: Negative for dizziness, focal weakness, headaches, light-headedness, loss of balance, numbness, paresthesias and vertigo.   Psychiatric/Behavioral: Negative for depression, memory loss and substance abuse.       Procedures      ECG 12 Lead  Date/Time: 12/12/2017 12:03 PM  Performed by: CEFERINO LEA  Authorized by: CEFERINO LEA   Comparison: compared with previous ECG   Comparison to previous ECG: T wave inversion in I and AVL worse  Rhythm: sinus rhythm  Rate: normal  T depression: I, aVL and V2  QRS axis: normal                 Objective:    /80 (BP Location: Left arm)  Pulse 66  Ht 163.8 cm (64.5\")  " Wt 101 kg (223 lb)  BMI 37.69 kg/m2       Physical Exam  Physical Exam   Constitutional: She is oriented to person, place, and time. She appears well-developed and well-nourished. No distress.   HENT:   Head: Normocephalic.   Eyes: Conjunctivae are normal. Pupils are equal, round, and reactive to light. No scleral icterus.   Neck: Normal carotid pulses, no hepatojugular reflux and no JVD present. Carotid bruit is not present. No tracheal deviation, no edema and no erythema present. No thyromegaly present.   Cardiovascular: Normal rate, regular rhythm, S1 normal, S2 normal, normal heart sounds and intact distal pulses.   No extrasystoles are present. PMI is not displaced.  Exam reveals no gallop, no distant heart sounds and no friction rub.    No murmur heard.  Pulses:       Carotid pulses are 2+ on the right side with bruit, and 2+ on the left side.       Radial pulses are 2+ on the right side, and 2+ on the left side.        Femoral pulses are 2+ on the right side, and 2+ on the left side.       Dorsalis pedis pulses are 2+ on the right side, and 2+ on the left side.        Posterior tibial pulses are 2+ on the right side, and 2+ on the left side.   Pulmonary/Chest: Effort normal and breath sounds normal. No respiratory distress. She has no decreased breath sounds. She has no wheezes. She has no rhonchi. She has no rales. She exhibits no tenderness.   Abdominal: Soft. Bowel sounds are normal. She exhibits no distension and no mass. There is no hepatosplenomegaly. There is no tenderness. There is no rebound and no guarding.   Musculoskeletal: She exhibits edema (1+ bilateral tibial). She exhibits no tenderness or deformity.   Neurological: She is alert and oriented to person, place, and time.   Skin: Skin is warm and dry. No rash noted. She is not diaphoretic. No cyanosis or erythema. No pallor. Nails show no clubbing.   Psychiatric: She has a normal mood and affect. Her speech is normal and behavior is normal.  Judgment and thought content normal.           Assessment:   1.  89-year-old female with history of severe coronary disease previous corneal bypass grafting.  Catheterization 2012 patent left internal graft left anterior descending, patent vein graft to diagonal, patent vein graft to the right coronary artery ischemic area just the circumflex distribution normal left ventricular systolic function.  Perfusion stress test November 2016 no ischemia and normal left ventricular function.  Coronary Artery Disease  Assessment  • The patient has no angina    Plan  • Lifestyle modifications discussed include adhering to a heart healthy diet, avoidance of tobacco products, maintenance of a healthy weight, medication compliance, regular exercise and regular monitoring of cholesterol and blood pressure    Subjective - Objective  • There is a history of previous coronary artery bypass graft  • Current antiplatelet therapy includes aspirin 81 mg    She's having no angina but her ECG is changed in view of her age and renal failure would want to be conservative therefore we'll follow her clinically we did discuss this with her today.  She'll see skin follow up in 6 month.  .  2.  Hypertension blood pressure adequately controlled continue same  3.  Hyperlipidemia intolerant of multiple statins and her age do not feel like I wouldn't pursue any further treatment.  4.  Renal failure H3 last GFR 35 follows with nephrology.  5.  Renal artery stenosis status post left renal artery stenting grade  6.  Mild carotid artery stenosis follows with vascular.  7.  Obstructive sleep apnea on CPAP.   8.  Diabetes mellitus her PCP.         Plan:

## 2017-12-29 DIAGNOSIS — E03.9 ACQUIRED HYPOTHYROIDISM: ICD-10-CM

## 2017-12-29 DIAGNOSIS — E78.2 MIXED HYPERLIPIDEMIA: Primary | ICD-10-CM

## 2017-12-29 DIAGNOSIS — I10 ESSENTIAL HYPERTENSION: ICD-10-CM

## 2017-12-29 DIAGNOSIS — IMO0002 UNCONTROLLED TYPE 2 DIABETES MELLITUS WITH STAGE 4 CHRONIC KIDNEY DISEASE, WITH LONG-TERM CURRENT USE OF INSULIN: ICD-10-CM

## 2017-12-29 DIAGNOSIS — E55.9 VITAMIN D DEFICIENCY: ICD-10-CM

## 2017-12-29 DIAGNOSIS — E61.1 IRON DEFICIENCY: ICD-10-CM

## 2018-01-03 LAB
ALBUMIN SERPL-MCNC: 4.2 G/DL (ref 3.5–5.2)
ALBUMIN/GLOB SERPL: 1.8 G/DL
ALP SERPL-CCNC: 104 U/L (ref 39–117)
ALT SERPL-CCNC: 7 U/L (ref 1–33)
AST SERPL-CCNC: 9 U/L (ref 1–32)
BILIRUB SERPL-MCNC: 0.3 MG/DL (ref 0.1–1.2)
BUN SERPL-MCNC: 32 MG/DL (ref 8–23)
BUN/CREAT SERPL: 22.4 (ref 7–25)
CALCIUM SERPL-MCNC: 9.6 MG/DL (ref 8.6–10.5)
CHLORIDE SERPL-SCNC: 104 MMOL/L (ref 98–107)
CO2 SERPL-SCNC: 22.7 MMOL/L (ref 22–29)
CREAT SERPL-MCNC: 1.43 MG/DL (ref 0.57–1)
GLOBULIN SER CALC-MCNC: 2.4 GM/DL
GLUCOSE SERPL-MCNC: 132 MG/DL (ref 65–99)
HBA1C MFR BLD: 8.2 % (ref 4.8–5.6)
POTASSIUM SERPL-SCNC: 4.7 MMOL/L (ref 3.5–5.2)
PROT SERPL-MCNC: 6.6 G/DL (ref 6–8.5)
SODIUM SERPL-SCNC: 143 MMOL/L (ref 136–145)
TSH SERPL DL<=0.005 MIU/L-ACNC: 3.24 MIU/ML (ref 0.27–4.2)

## 2018-01-10 ENCOUNTER — OFFICE VISIT (OUTPATIENT)
Dept: INTERNAL MEDICINE | Facility: CLINIC | Age: 83
End: 2018-01-10

## 2018-01-10 VITALS
DIASTOLIC BLOOD PRESSURE: 70 MMHG | BODY MASS INDEX: 37.05 KG/M2 | SYSTOLIC BLOOD PRESSURE: 154 MMHG | OXYGEN SATURATION: 98 % | WEIGHT: 217 LBS | HEART RATE: 101 BPM | HEIGHT: 64 IN | RESPIRATION RATE: 18 BRPM

## 2018-01-10 DIAGNOSIS — E78.2 MIXED HYPERLIPIDEMIA: ICD-10-CM

## 2018-01-10 DIAGNOSIS — M25.511 ACUTE PAIN OF BOTH SHOULDERS: ICD-10-CM

## 2018-01-10 DIAGNOSIS — N18.4 CHRONIC KIDNEY DISEASE, STAGE IV (SEVERE) (HCC): ICD-10-CM

## 2018-01-10 DIAGNOSIS — IMO0002 UNCONTROLLED TYPE 2 DIABETES MELLITUS WITH STAGE 4 CHRONIC KIDNEY DISEASE, WITH LONG-TERM CURRENT USE OF INSULIN: Primary | ICD-10-CM

## 2018-01-10 DIAGNOSIS — Z23 NEED FOR INFLUENZA VACCINATION: ICD-10-CM

## 2018-01-10 DIAGNOSIS — I10 ESSENTIAL HYPERTENSION: ICD-10-CM

## 2018-01-10 DIAGNOSIS — M25.512 ACUTE PAIN OF BOTH SHOULDERS: ICD-10-CM

## 2018-01-10 PROCEDURE — 90662 IIV NO PRSV INCREASED AG IM: CPT | Performed by: INTERNAL MEDICINE

## 2018-01-10 PROCEDURE — G0008 ADMIN INFLUENZA VIRUS VAC: HCPCS | Performed by: INTERNAL MEDICINE

## 2018-01-10 PROCEDURE — 99214 OFFICE O/P EST MOD 30 MIN: CPT | Performed by: INTERNAL MEDICINE

## 2018-01-10 NOTE — PROGRESS NOTES
Chief Complaint  Alessia Madrigal is a 89 y.o. female who presents for Hyperlipidemia; Hypertension; Diabetes; Hypothyroidism; and Follow-up (6 month)  .    History of Present Illness   Alessia is here for routine follow up on her HTN, HLD, Hypothyroid, and DM.  She has a new issue as well.  She has pain in her arms and thighs for over a month.  It is getting difficult for her to reach things on her cabinet shelves.  It is getting harder to do things with her arms lifted.  She knows she has arthritis but she's not sure this is the culprit.  She is taking Tylenol for her arthritis.  She is taking about 3000 mg of tylenol a day.  No cp or palp or dizziness.  She has chronic edema.  No SOA.    She is taking about 7 units of Levemir at night.  She adjusts this based on how her bs has been.  IF she takes 10 she has hypoglycemic episodes in the morning.  At 6 am this morning, her bs was 74.      Review of Systems   Constitution: Positive for weight loss. Negative for malaise/fatigue.   Cardiovascular: Positive for leg swelling (chronic). Negative for chest pain, dyspnea on exertion and palpitations.   Respiratory: Negative for cough and shortness of breath.    Endocrine: Negative for polydipsia and polyuria.   Musculoskeletal: Positive for myalgias. Negative for muscle cramps and muscle weakness.       Patient Active Problem List   Diagnosis   • Actinic keratosis   • Anemia of chronic disorder   • Disorder of aorta   • Chronic coronary artery disease   • Chronic kidney disease, stage IV (severe)   • Constipation   • Cor pulmonale   • Diabetes mellitus   • High level of uric acid in blood   • Fatigue   • Hyperlipidemia   • Hypertension   • Hypothyroidism   • Iron deficiency   • Mitral valve insufficiency   • Pulmonary hypertension   • Swelling of lower extremity   • Shortness of breath   • Tricuspid valve insufficiency   • Uncontrolled type 2 diabetes mellitus   • Type 2 diabetes mellitus   • Vitamin D deficiency   •  Secondary hyperparathyroidism, non-renal   • Pulmonary nodules/lesions, multiple   • Pleural effusion   • Paronychia of second finger of right hand   • Hyperuricemia   • Acute pain of both shoulders       Past Medical History:   Diagnosis Date   • Arthritis 03/13/2014    CONT TYLENOL FOR PAIN/ JOSE DANIEL CHEW (INTERNAL MEDICINE)   • Cancer of uterus    • Carotid artery stenosis    • Chronic renal failure syndrome    • Coronary atherosclerosis    • Esophageal reflux    • Glaucoma    • Hypertension    • Osteoarthritis    • Sleep apnea     USING CPAP   • Spinal stenosis    • Thyroid disease    • Type 2 diabetes mellitus 1991    INsulin begun 2012   • Vitamin B12 deficiency     SHE HAS NOT HAD THIS CHECKED IN A WHILE. SHE HAD BEEN ON SHOTS.  CHECK HER B12 LEVELS TODAY.  BY JOSE DANIEL CHEW       Past Surgical History:   Procedure Laterality Date   • CATARACT EXTRACTION Bilateral    • CATARACT EXTRACTION W/ INTRAOCULAR LENS IMPLANT Right 10/25/2016    Procedure: RT SUPERFICIAL KERATECTOMY ;  Surgeon: Arian Singh MD;  Location: [facility] OR [facility] [place];  Service:    • CHOLECYSTECTOMY     • COLONOSCOPY     • CORONARY ARTERY BYPASS GRAFT  1991    X3   • HYSTERECTOMY     • TRANSLUMINAL ATHERECTOMY PERONEAL ARTERY      PERCUTANEOUS TRANSLUMINAL ATHERECTOMY RENAL ARTERY       Family History   Problem Relation Age of Onset   • Colon cancer Other    • Heart disease Other    • Hypertension Other    • Stroke Other      ISCHEMIC   • Colon cancer Mother    • Heart disease Father    • Stroke Father        Social History     Social History   • Marital status: Single     Spouse name: N/A   • Number of children: N/A   • Years of education: N/A     Occupational History   • Not on file.     Social History Main Topics   • Smoking status: Never Smoker   • Smokeless tobacco: Never Used   • Alcohol use No   • Drug use: Not on file   • Sexual activity: Defer     Other Topics Concern   • Not on file     Social History Narrative       Current  Outpatient Prescriptions on File Prior to Visit   Medication Sig Dispense Refill   • acetaminophen (TYLENOL) 500 MG tablet Take 500 mg by mouth every 4 (four) hours as needed for mild pain (1-3) (Back Pain). EVERY 4 TO 6 HOURS AS NEEDED     • aspirin 81 MG tablet Take 81 mg by mouth daily.     • clopidogrel (PLAVIX) 75 MG tablet Take 1 tablet by mouth Daily. 90 tablet 1   • Coenzyme Q10 (CO Q-10) 300 MG capsule Take 300 mg by mouth daily.     • furosemide (LASIX) 40 MG tablet Take 40 mg by mouth daily.     • insulin detemir (LEVEMIR) 100 UNIT/ML injection Inject 10 Units under the skin daily. 5 pen 3   • insulin lispro (HumaLOG) 100 UNIT/ML patient supplied pump Inject  under the skin See Admin Instructions. Take 6 units before breakfast, Take 5 units before lunch, and Take 5 units before dinner.     • Insulin Pen Needle (BD PEN NEEDLE LISA U/F) 32G X 4 MM misc USE AS DIRECTED THREE TIMES A DAY. 300 each 3   • isosorbide mononitrate (IMDUR) 120 MG 24 hr tablet Take 1 tablet by mouth Daily. 90 tablet 3   • levothyroxine (SYNTHROID, LEVOTHROID) 125 MCG tablet TAKE 1 TABLET DAILY AS DIRECTED 90 tablet 3   • losartan (COZAAR) 50 MG tablet Take 1 tablet by mouth Daily. 90 tablet 3   • metoprolol succinate XL (TOPROL-XL) 50 MG 24 hr tablet Take 1 tablet by mouth Daily. (Patient taking differently: Take 50 mg by mouth 2 (Two) Times a Day.) 180 tablet 0   • niacin (NIASPAN) 500 MG CR tablet Take 1 tablet by mouth Every Night. 90 tablet 3   • NIFEdipine XL (PROCARDIA XL) 90 MG 24 hr tablet Take 1 tablet by mouth Daily. 90 tablet 3   • nitroglycerin (NITROSTAT) 0.4 MG SL tablet 1 under the tongue as needed for angina, may repeat q5mins for up three doses 100 tablet 11   • ONE TOUCH ULTRA TEST test strip TEST FOUR TIMES A  each 3   • raNITIdine (ZANTAC) 150 MG tablet TAKE 1 TABLET EVERY 12 HOURS AS NEEDED 180 tablet 3   • spironolactone (ALDACTONE) 25 MG tablet Take 1 tablet by mouth Daily. 90 tablet 3   • travoprost,  "KAL free, (TRAVATAN) 0.004 % solution ophthalmic solution Administer 1 drop to both eyes every night. in affected eye(s)      • vitamin D (ERGOCALCIFEROL) 39145 units capsule capsule Take 50,000 Units by mouth Every 7 (Seven) Days.       No current facility-administered medications on file prior to visit.        Allergies   Allergen Reactions   • Allopurinol    • Clindamycin/Lincomycin Itching     Felt like she was burning and itching around the neck   • Pravastatin Myalgia   • Ampicillin Rash   • Penicillins Rash       Vitals:    01/10/18 1043   BP: 154/70   Pulse: 101   Resp: 18   SpO2: 98%   Weight: 98.4 kg (217 lb)   Height: 163.8 cm (64.49\")       Body mass index is 36.69 kg/(m^2).    Objective   Physical Exam   Constitutional: She is oriented to person, place, and time. She appears well-developed and well-nourished. No distress.   HENT:   Head: Normocephalic and atraumatic.   Eyes: Conjunctivae are normal. No scleral icterus.   Neck: Normal range of motion. Neck supple.   Cardiovascular: Normal rate, regular rhythm and normal heart sounds.  Exam reveals no gallop and no friction rub.    No murmur heard.  Pulmonary/Chest: Effort normal and breath sounds normal. No respiratory distress. She has no wheezes. She has no rales.   Musculoskeletal: She exhibits edema (chronic bilateral LE).        Right shoulder: She exhibits decreased range of motion, tenderness and decreased strength.        Left shoulder: She exhibits decreased range of motion, tenderness and decreased strength.   Lymphadenopathy:     She has no cervical adenopathy.   Neurological: She is alert and oriented to person, place, and time. No cranial nerve deficit.   Psychiatric: She has a normal mood and affect. Her behavior is normal. Judgment and thought content normal.       Results for orders placed or performed in visit on 12/29/17   Comprehensive Metabolic Panel   Result Value Ref Range    Glucose 132 (H) 65 - 99 mg/dL    BUN 32 (H) 8 - 23 mg/dL "    Creatinine 1.43 (H) 0.57 - 1.00 mg/dL    eGFR Non African Am 35 (L) >60 mL/min/1.73    eGFR  Am 42 (L) >60 mL/min/1.73    BUN/Creatinine Ratio 22.4 7.0 - 25.0    Sodium 143 136 - 145 mmol/L    Potassium 4.7 3.5 - 5.2 mmol/L    Chloride 104 98 - 107 mmol/L    Total CO2 22.7 22.0 - 29.0 mmol/L    Calcium 9.6 8.6 - 10.5 mg/dL    Total Protein 6.6 6.0 - 8.5 g/dL    Albumin 4.20 3.50 - 5.20 g/dL    Globulin 2.4 gm/dL    A/G Ratio 1.8 g/dL    Total Bilirubin 0.3 0.1 - 1.2 mg/dL    Alkaline Phosphatase 104 39 - 117 U/L    AST (SGOT) 9 1 - 32 U/L    ALT (SGPT) 7 1 - 33 U/L   TSH Rfx On Abnormal To Free T4   Result Value Ref Range    TSH 3.24 0.27 - 4.2 mIU/mL   Hemoglobin A1c   Result Value Ref Range    Hemoglobin A1C 8.20 (H) 4.80 - 5.60 %       Assessment/Plan   Diagnoses and all orders for this visit:    Uncontrolled type 2 diabetes mellitus with stage 4 chronic kidney disease, with long-term current use of insulin    Mixed hyperlipidemia    Essential hypertension    Chronic kidney disease, stage IV (severe)    Acute pain of both shoulders  -     Sedimentation rate, automated    Need for influenza vaccination  -     Flu Vaccine High Dose PF 65YR+ ([id number])    Other orders  -     Timolol Hemihydrate (BETIMOL OP); Apply  to eye.      Discussion/Summary  Alessia is here for routine follow up.  Her DM is stable for her age.  I am not going to adjust her insulin doses.  Her kidney function is stable.  Her thyroid is well controlled.  I am concerned about PMR with her shoulder and thigh pain.  Will check ESR today.  Certainly if this is positive and we treat with steroids, this will necessitate adjustment of her insulin.  If the ESR is negative, she would like to go to PT.  [facility].    Return in about 6 months (around 7/10/2018) for Annual physical, with fasting labs prior.

## 2018-01-11 ENCOUNTER — TELEPHONE (OUTPATIENT)
Dept: INTERNAL MEDICINE | Facility: CLINIC | Age: 83
End: 2018-01-11

## 2018-01-11 DIAGNOSIS — M25.512 CHRONIC PAIN OF BOTH SHOULDERS: Primary | ICD-10-CM

## 2018-01-11 DIAGNOSIS — M25.619 DECREASED RANGE OF MOTION OF SHOULDER, UNSPECIFIED LATERALITY: ICD-10-CM

## 2018-01-11 DIAGNOSIS — G89.29 CHRONIC PAIN OF BOTH SHOULDERS: Primary | ICD-10-CM

## 2018-01-11 DIAGNOSIS — M25.511 CHRONIC PAIN OF BOTH SHOULDERS: Primary | ICD-10-CM

## 2018-01-11 LAB — ERYTHROCYTE [SEDIMENTATION RATE] IN BLOOD BY WESTERGREN METHOD: 23 MM/HR (ref 0–30)

## 2018-01-11 NOTE — TELEPHONE ENCOUNTER
----- Message from Summer Gordon MD sent at 1/11/2018  8:15 AM EST -----  Please call - her sed rate is normal.  This does not look like polymyalgia rheumatica.  Will put in a referral for [facility].

## 2018-03-07 ENCOUNTER — HOSPITAL ENCOUNTER (OUTPATIENT)
Dept: CT IMAGING | Facility: HOSPITAL | Age: 83
Discharge: HOME | End: 2018-03-07
Admitting: NURSE PRACTITIONER

## 2018-03-07 DIAGNOSIS — R91.1 SOLITARY PULMONARY NODULE: ICD-10-CM

## 2018-03-07 PROCEDURE — 71250 CT THORAX DX C-: CPT

## 2018-03-11 ENCOUNTER — APPOINTMENT (OUTPATIENT)
Dept: GENERAL RADIOLOGY | Facility: HOSPITAL | Age: 83
End: 2018-03-11

## 2018-03-11 ENCOUNTER — HOSPITAL ENCOUNTER (INPATIENT)
Facility: HOSPITAL | Age: 83
LOS: 3 days | Discharge: NURSING, REHAB OR LONG-TERM CARE | End: 2018-03-14
Attending: EMERGENCY MEDICINE | Admitting: INTERNAL MEDICINE

## 2018-03-11 DIAGNOSIS — I21.19 ACUTE TRANSMURAL INFERIOR WALL MI (HCC): Primary | ICD-10-CM

## 2018-03-11 DIAGNOSIS — Z74.09 IMPAIRED FUNCTIONAL MOBILITY, BALANCE, GAIT, AND ENDURANCE: ICD-10-CM

## 2018-03-11 LAB
ALBUMIN SERPL-MCNC: 4 G/DL (ref 3.5–5.2)
ALBUMIN/GLOB SERPL: 1.3 G/DL
ALP SERPL-CCNC: 92 U/L (ref 39–117)
ALT SERPL W P-5'-P-CCNC: 10 U/L (ref 1–33)
ANION GAP SERPL CALCULATED.3IONS-SCNC: 16.1 MMOL/L
AST SERPL-CCNC: 15 U/L (ref 1–32)
BASOPHILS # BLD AUTO: 0.05 10*3/MM3 (ref 0–0.2)
BASOPHILS NFR BLD AUTO: 0.6 % (ref 0–1.5)
BILIRUB SERPL-MCNC: 0.3 MG/DL (ref 0.1–1.2)
BUN BLD-MCNC: 24 MG/DL (ref 8–23)
BUN/CREAT SERPL: 20.2 (ref 7–25)
CALCIUM SPEC-SCNC: 9.6 MG/DL (ref 8.6–10.5)
CHLORIDE SERPL-SCNC: 103 MMOL/L (ref 98–107)
CO2 SERPL-SCNC: 21.9 MMOL/L (ref 22–29)
CREAT BLD-MCNC: 1.19 MG/DL (ref 0.57–1)
DEPRECATED RDW RBC AUTO: 50.1 FL (ref 37–54)
EOSINOPHIL # BLD AUTO: 0.28 10*3/MM3 (ref 0–0.7)
EOSINOPHIL NFR BLD AUTO: 3.5 % (ref 0.3–6.2)
ERYTHROCYTE [DISTWIDTH] IN BLOOD BY AUTOMATED COUNT: 13.3 % (ref 11.7–13)
GFR SERPL CREATININE-BSD FRML MDRD: 43 ML/MIN/1.73
GLOBULIN UR ELPH-MCNC: 3.1 GM/DL
GLUCOSE BLD-MCNC: 263 MG/DL (ref 65–99)
GLUCOSE BLDC GLUCOMTR-MCNC: 247 MG/DL (ref 70–130)
GLUCOSE BLDC GLUCOMTR-MCNC: 259 MG/DL (ref 70–130)
HCT VFR BLD AUTO: 37.2 % (ref 35.6–45.5)
HGB BLD-MCNC: 11.8 G/DL (ref 11.9–15.5)
HOLD SPECIMEN: NORMAL
HOLD SPECIMEN: NORMAL
IMM GRANULOCYTES # BLD: 0.06 10*3/MM3 (ref 0–0.03)
IMM GRANULOCYTES NFR BLD: 0.7 % (ref 0–0.5)
INR PPP: 0.99 (ref 0.9–1.1)
LYMPHOCYTES # BLD AUTO: 1.52 10*3/MM3 (ref 0.9–4.8)
LYMPHOCYTES NFR BLD AUTO: 18.8 % (ref 19.6–45.3)
MCH RBC QN AUTO: 32.3 PG (ref 26.9–32)
MCHC RBC AUTO-ENTMCNC: 31.7 G/DL (ref 32.4–36.3)
MCV RBC AUTO: 101.9 FL (ref 80.5–98.2)
MONOCYTES # BLD AUTO: 0.77 10*3/MM3 (ref 0.2–1.2)
MONOCYTES NFR BLD AUTO: 9.5 % (ref 5–12)
NEUTROPHILS # BLD AUTO: 5.42 10*3/MM3 (ref 1.9–8.1)
NEUTROPHILS NFR BLD AUTO: 66.9 % (ref 42.7–76)
PLATELET # BLD AUTO: 315 10*3/MM3 (ref 140–500)
PMV BLD AUTO: 10.6 FL (ref 6–12)
POTASSIUM BLD-SCNC: 4.4 MMOL/L (ref 3.5–5.2)
PROT SERPL-MCNC: 7.1 G/DL (ref 6–8.5)
PROTHROMBIN TIME: 12.9 SECONDS (ref 11.7–14.2)
RBC # BLD AUTO: 3.65 10*6/MM3 (ref 3.9–5.2)
SODIUM BLD-SCNC: 141 MMOL/L (ref 136–145)
TROPONIN T SERPL-MCNC: 0.56 NG/ML (ref 0–0.03)
WBC NRBC COR # BLD: 8.1 10*3/MM3 (ref 4.5–10.7)
WHOLE BLOOD HOLD SPECIMEN: NORMAL
WHOLE BLOOD HOLD SPECIMEN: NORMAL

## 2018-03-11 PROCEDURE — 71045 X-RAY EXAM CHEST 1 VIEW: CPT

## 2018-03-11 PROCEDURE — 93005 ELECTROCARDIOGRAM TRACING: CPT | Performed by: EMERGENCY MEDICINE

## 2018-03-11 PROCEDURE — 25010000002 MIDAZOLAM PER 1 MG: Performed by: INTERNAL MEDICINE

## 2018-03-11 PROCEDURE — 25010000002 HEPARIN (PORCINE) PER 1000 UNITS: Performed by: INTERNAL MEDICINE

## 2018-03-11 PROCEDURE — 93010 ELECTROCARDIOGRAM REPORT: CPT | Performed by: INTERNAL MEDICINE

## 2018-03-11 PROCEDURE — B2151ZZ FLUOROSCOPY OF LEFT HEART USING LOW OSMOLAR CONTRAST: ICD-10-PCS | Performed by: INTERNAL MEDICINE

## 2018-03-11 PROCEDURE — C1887 CATHETER, GUIDING: HCPCS | Performed by: INTERNAL MEDICINE

## 2018-03-11 PROCEDURE — 99223 1ST HOSP IP/OBS HIGH 75: CPT | Performed by: INTERNAL MEDICINE

## 2018-03-11 PROCEDURE — B2131ZZ FLUOROSCOPY OF MULTIPLE CORONARY ARTERY BYPASS GRAFTS USING LOW OSMOLAR CONTRAST: ICD-10-PCS | Performed by: INTERNAL MEDICINE

## 2018-03-11 PROCEDURE — 99284 EMERGENCY DEPT VISIT MOD MDM: CPT

## 2018-03-11 PROCEDURE — 99152 MOD SED SAME PHYS/QHP 5/>YRS: CPT | Performed by: INTERNAL MEDICINE

## 2018-03-11 PROCEDURE — B2111ZZ FLUOROSCOPY OF MULTIPLE CORONARY ARTERIES USING LOW OSMOLAR CONTRAST: ICD-10-PCS | Performed by: INTERNAL MEDICINE

## 2018-03-11 PROCEDURE — C1769 GUIDE WIRE: HCPCS | Performed by: INTERNAL MEDICINE

## 2018-03-11 PROCEDURE — 82962 GLUCOSE BLOOD TEST: CPT

## 2018-03-11 PROCEDURE — 25010000002 FENTANYL CITRATE (PF) 100 MCG/2ML SOLUTION: Performed by: INTERNAL MEDICINE

## 2018-03-11 PROCEDURE — 93455 CORONARY ART/GRFT ANGIO S&I: CPT | Performed by: INTERNAL MEDICINE

## 2018-03-11 PROCEDURE — 027034Z DILATION OF CORONARY ARTERY, ONE ARTERY WITH DRUG-ELUTING INTRALUMINAL DEVICE, PERCUTANEOUS APPROACH: ICD-10-PCS | Performed by: INTERNAL MEDICINE

## 2018-03-11 PROCEDURE — C1874 STENT, COATED/COV W/DEL SYS: HCPCS | Performed by: INTERNAL MEDICINE

## 2018-03-11 PROCEDURE — 80053 COMPREHEN METABOLIC PANEL: CPT | Performed by: EMERGENCY MEDICINE

## 2018-03-11 PROCEDURE — 0 IOPAMIDOL PER 1 ML: Performed by: INTERNAL MEDICINE

## 2018-03-11 PROCEDURE — 85025 COMPLETE CBC W/AUTO DIFF WBC: CPT | Performed by: EMERGENCY MEDICINE

## 2018-03-11 PROCEDURE — 93005 ELECTROCARDIOGRAM TRACING: CPT | Performed by: INTERNAL MEDICINE

## 2018-03-11 PROCEDURE — 84484 ASSAY OF TROPONIN QUANT: CPT | Performed by: EMERGENCY MEDICINE

## 2018-03-11 PROCEDURE — C1725 CATH, TRANSLUMIN NON-LASER: HCPCS | Performed by: INTERNAL MEDICINE

## 2018-03-11 PROCEDURE — 63710000001 INSULIN DETEMER PER 5 UNITS: Performed by: INTERNAL MEDICINE

## 2018-03-11 PROCEDURE — C1894 INTRO/SHEATH, NON-LASER: HCPCS | Performed by: INTERNAL MEDICINE

## 2018-03-11 PROCEDURE — 4A023N7 MEASUREMENT OF CARDIAC SAMPLING AND PRESSURE, LEFT HEART, PERCUTANEOUS APPROACH: ICD-10-PCS | Performed by: INTERNAL MEDICINE

## 2018-03-11 PROCEDURE — 85347 COAGULATION TIME ACTIVATED: CPT

## 2018-03-11 PROCEDURE — C9606 PERC D-E COR REVASC W AMI S: HCPCS | Performed by: INTERNAL MEDICINE

## 2018-03-11 PROCEDURE — 85610 PROTHROMBIN TIME: CPT | Performed by: EMERGENCY MEDICINE

## 2018-03-11 PROCEDURE — 92937 PRQ TRLUML REVSC CAB GRF 1: CPT | Performed by: INTERNAL MEDICINE

## 2018-03-11 PROCEDURE — 99153 MOD SED SAME PHYS/QHP EA: CPT | Performed by: INTERNAL MEDICINE

## 2018-03-11 DEVICE — XIENCE ALPINE EVEROLIMUS ELUTING CORONARY STENT SYSTEM 3.50 MM X 23 MM / RAPID-EXCHANGE
Type: IMPLANTABLE DEVICE | Status: FUNCTIONAL
Brand: XIENCE ALPINE

## 2018-03-11 RX ORDER — HYDROCODONE BITARTRATE AND ACETAMINOPHEN 5; 325 MG/1; MG/1
1 TABLET ORAL EVERY 4 HOURS PRN
Status: DISCONTINUED | OUTPATIENT
Start: 2018-03-11 | End: 2018-03-14 | Stop reason: HOSPADM

## 2018-03-11 RX ORDER — ONDANSETRON 4 MG/1
4 TABLET, ORALLY DISINTEGRATING ORAL EVERY 6 HOURS PRN
Status: DISCONTINUED | OUTPATIENT
Start: 2018-03-11 | End: 2018-03-14 | Stop reason: HOSPADM

## 2018-03-11 RX ORDER — NITROGLYCERIN 20 MG/100ML
INJECTION INTRAVENOUS
Status: COMPLETED | OUTPATIENT
Start: 2018-03-11 | End: 2018-03-11

## 2018-03-11 RX ORDER — SODIUM CHLORIDE 9 MG/ML
INJECTION, SOLUTION INTRAVENOUS CONTINUOUS PRN
Status: DISCONTINUED | OUTPATIENT
Start: 2018-03-11 | End: 2018-03-11 | Stop reason: HOSPADM

## 2018-03-11 RX ORDER — ASPIRIN 81 MG/1
TABLET, CHEWABLE ORAL
Status: COMPLETED | OUTPATIENT
Start: 2018-03-11 | End: 2018-03-11

## 2018-03-11 RX ORDER — CLOPIDOGREL BISULFATE 75 MG/1
TABLET ORAL
Status: COMPLETED | OUTPATIENT
Start: 2018-03-11 | End: 2018-03-11

## 2018-03-11 RX ORDER — ACETAMINOPHEN 500 MG
500 TABLET ORAL EVERY 4 HOURS PRN
Status: DISCONTINUED | OUTPATIENT
Start: 2018-03-11 | End: 2018-03-14 | Stop reason: HOSPADM

## 2018-03-11 RX ORDER — SPIRONOLACTONE 25 MG/1
25 TABLET ORAL DAILY
Status: DISCONTINUED | OUTPATIENT
Start: 2018-03-11 | End: 2018-03-14 | Stop reason: HOSPADM

## 2018-03-11 RX ORDER — HEPARIN SODIUM 1000 [USP'U]/ML
INJECTION, SOLUTION INTRAVENOUS; SUBCUTANEOUS AS NEEDED
Status: DISCONTINUED | OUTPATIENT
Start: 2018-03-11 | End: 2018-03-11 | Stop reason: HOSPADM

## 2018-03-11 RX ORDER — ISOSORBIDE MONONITRATE 60 MG/1
120 TABLET, EXTENDED RELEASE ORAL DAILY
Status: DISCONTINUED | OUTPATIENT
Start: 2018-03-11 | End: 2018-03-14 | Stop reason: HOSPADM

## 2018-03-11 RX ORDER — NITROGLYCERIN 0.4 MG/1
0.4 TABLET SUBLINGUAL
Status: DISCONTINUED | OUTPATIENT
Start: 2018-03-11 | End: 2018-03-14 | Stop reason: HOSPADM

## 2018-03-11 RX ORDER — ONDANSETRON 4 MG/1
4 TABLET, FILM COATED ORAL EVERY 6 HOURS PRN
Status: DISCONTINUED | OUTPATIENT
Start: 2018-03-11 | End: 2018-03-14 | Stop reason: HOSPADM

## 2018-03-11 RX ORDER — ONDANSETRON 2 MG/ML
4 INJECTION INTRAMUSCULAR; INTRAVENOUS EVERY 6 HOURS PRN
Status: DISCONTINUED | OUTPATIENT
Start: 2018-03-11 | End: 2018-03-14 | Stop reason: HOSPADM

## 2018-03-11 RX ORDER — ASPIRIN 81 MG/1
81 TABLET, CHEWABLE ORAL DAILY
Status: DISCONTINUED | OUTPATIENT
Start: 2018-03-11 | End: 2018-03-14 | Stop reason: HOSPADM

## 2018-03-11 RX ORDER — ASPIRIN 81 MG/1
81 TABLET, CHEWABLE ORAL DAILY
COMMUNITY
End: 2020-01-01 | Stop reason: HOSPADM

## 2018-03-11 RX ORDER — ACETAMINOPHEN 325 MG/1
650 TABLET ORAL EVERY 4 HOURS PRN
Status: DISCONTINUED | OUTPATIENT
Start: 2018-03-11 | End: 2018-03-14 | Stop reason: HOSPADM

## 2018-03-11 RX ORDER — LEVOTHYROXINE SODIUM 0.12 MG/1
125 TABLET ORAL DAILY
Status: DISCONTINUED | OUTPATIENT
Start: 2018-03-11 | End: 2018-03-14 | Stop reason: HOSPADM

## 2018-03-11 RX ORDER — MIDAZOLAM HYDROCHLORIDE 1 MG/ML
INJECTION INTRAMUSCULAR; INTRAVENOUS AS NEEDED
Status: DISCONTINUED | OUTPATIENT
Start: 2018-03-11 | End: 2018-03-11 | Stop reason: HOSPADM

## 2018-03-11 RX ORDER — SODIUM CHLORIDE 9 MG/ML
75 INJECTION, SOLUTION INTRAVENOUS CONTINUOUS
Status: DISCONTINUED | OUTPATIENT
Start: 2018-03-11 | End: 2018-03-12

## 2018-03-11 RX ORDER — RANITIDINE 150 MG/1
150 TABLET ORAL 2 TIMES DAILY
COMMUNITY
End: 2018-05-01 | Stop reason: SDUPTHER

## 2018-03-11 RX ORDER — NITROGLYCERIN 0.4 MG/1
TABLET SUBLINGUAL
Status: COMPLETED | OUTPATIENT
Start: 2018-03-11 | End: 2018-03-11

## 2018-03-11 RX ORDER — CLOPIDOGREL BISULFATE 75 MG/1
75 TABLET ORAL DAILY
Status: DISCONTINUED | OUTPATIENT
Start: 2018-03-12 | End: 2018-03-12

## 2018-03-11 RX ORDER — CLOPIDOGREL BISULFATE 75 MG/1
600 TABLET ORAL ONCE
Status: DISCONTINUED | OUTPATIENT
Start: 2018-03-11 | End: 2018-03-11

## 2018-03-11 RX ORDER — METOPROLOL TARTRATE 5 MG/5ML
INJECTION INTRAVENOUS
Status: COMPLETED | OUTPATIENT
Start: 2018-03-11 | End: 2018-03-11

## 2018-03-11 RX ORDER — FENTANYL CITRATE 50 UG/ML
INJECTION, SOLUTION INTRAMUSCULAR; INTRAVENOUS AS NEEDED
Status: DISCONTINUED | OUTPATIENT
Start: 2018-03-11 | End: 2018-03-11 | Stop reason: HOSPADM

## 2018-03-11 RX ORDER — LOSARTAN POTASSIUM 50 MG/1
50 TABLET ORAL DAILY
Status: DISCONTINUED | OUTPATIENT
Start: 2018-03-11 | End: 2018-03-14 | Stop reason: HOSPADM

## 2018-03-11 RX ORDER — CLOPIDOGREL BISULFATE 75 MG/1
75 TABLET ORAL DAILY
Status: DISCONTINUED | OUTPATIENT
Start: 2018-03-11 | End: 2018-03-11

## 2018-03-11 RX ORDER — LIDOCAINE HYDROCHLORIDE 20 MG/ML
INJECTION, SOLUTION INFILTRATION; PERINEURAL AS NEEDED
Status: DISCONTINUED | OUTPATIENT
Start: 2018-03-11 | End: 2018-03-11 | Stop reason: HOSPADM

## 2018-03-11 RX ORDER — METOPROLOL TARTRATE 5 MG/5ML
INJECTION INTRAVENOUS
Status: DISPENSED
Start: 2018-03-11 | End: 2018-03-12

## 2018-03-11 RX ORDER — SODIUM CHLORIDE 0.9 % (FLUSH) 0.9 %
10 SYRINGE (ML) INJECTION AS NEEDED
Status: DISCONTINUED | OUTPATIENT
Start: 2018-03-11 | End: 2018-03-14 | Stop reason: HOSPADM

## 2018-03-11 RX ORDER — NIACIN 500 MG/1
500 TABLET, EXTENDED RELEASE ORAL NIGHTLY
Status: ON HOLD | COMMUNITY
End: 2018-03-12

## 2018-03-11 RX ORDER — ATORVASTATIN CALCIUM 20 MG/1
40 TABLET, FILM COATED ORAL NIGHTLY
Status: DISCONTINUED | OUTPATIENT
Start: 2018-03-11 | End: 2018-03-11

## 2018-03-11 RX ORDER — FUROSEMIDE 40 MG/1
40 TABLET ORAL DAILY
Status: DISCONTINUED | OUTPATIENT
Start: 2018-03-11 | End: 2018-03-11

## 2018-03-11 RX ORDER — METOPROLOL SUCCINATE 50 MG/1
50 TABLET, EXTENDED RELEASE ORAL DAILY
Status: DISCONTINUED | OUTPATIENT
Start: 2018-03-11 | End: 2018-03-14 | Stop reason: HOSPADM

## 2018-03-11 RX ADMIN — METOPROLOL TARTRATE 5 MG: 5 INJECTION, SOLUTION INTRAVENOUS at 13:28

## 2018-03-11 RX ADMIN — ASPIRIN 324 MG: 81 TABLET, CHEWABLE ORAL at 13:16

## 2018-03-11 RX ADMIN — METOPROLOL TARTRATE 5 MG: 5 INJECTION, SOLUTION INTRAVENOUS at 13:39

## 2018-03-11 RX ADMIN — METOPROLOL SUCCINATE 50 MG: 50 TABLET, FILM COATED, EXTENDED RELEASE ORAL at 16:11

## 2018-03-11 RX ADMIN — INSULIN DETEMIR 10 UNITS: 100 INJECTION, SOLUTION SUBCUTANEOUS at 21:04

## 2018-03-11 RX ADMIN — CLOPIDOGREL BISULFATE 600 MG: 75 TABLET ORAL at 13:24

## 2018-03-11 RX ADMIN — NITROGLYCERIN 0.4 MG: 0.4 TABLET SUBLINGUAL at 13:17

## 2018-03-11 RX ADMIN — ISOSORBIDE MONONITRATE 120 MG: 30 TABLET ORAL at 16:10

## 2018-03-11 RX ADMIN — LEVOTHYROXINE SODIUM 125 MCG: 125 TABLET ORAL at 16:11

## 2018-03-11 RX ADMIN — NIFEDIPINE 90 MG: 60 TABLET, FILM COATED, EXTENDED RELEASE ORAL at 16:11

## 2018-03-11 RX ADMIN — LOSARTAN POTASSIUM 50 MG: 50 TABLET, FILM COATED ORAL at 16:11

## 2018-03-11 RX ADMIN — SODIUM CHLORIDE 75 ML/HR: 9 INJECTION, SOLUTION INTRAVENOUS at 18:21

## 2018-03-11 RX ADMIN — NITROGLYCERIN 5 MCG/MIN: 20 INJECTION INTRAVENOUS at 13:20

## 2018-03-11 NOTE — H&P
Date of Hospital Visit: 18  Encounter Provider: Cameron Chisholm MD  Place of Service: [facility] CARDIOLOGY  Patient Name: Alessia Madrigal  :10/9/1928  [id number]  Referral Provider: No ref. provider found    Chief complaint: Chest pain    History of Present Illness: 89-year-old lady who had a bypass in  catheter in  showed all native coronaries were occluded we move the LAD was patent vein to a diagonal was patent vein to the PDA was patent vein to OM1 was occluded she's been managed medically she started having chest discomfort into her left arm 3 days ago off and on but have never had really has remitted finally today she decided to come in and she's having an acute inferior STEMI she still is having a little bit of discomfort right now she has not had any history of bleeding difficulty no history of lung disease or kidney she does have class III kidney disease and she has gout and she has diabetes and she has hypertension and she has obesity and she has chronic edema in her legs she lives at home by herself with her cat and it really has no other family she desires to be a full code      Past Medical History:   Diagnosis Date   • Arthritis 2014    CONT TYLENOL FOR PAIN/ JOSE DANIEL CHEW (INTERNAL MEDICINE)   • Cancer of uterus    • Carotid artery stenosis    • Chronic renal failure syndrome    • Coronary atherosclerosis    • Esophageal reflux    • Glaucoma    • Hypertension    • Osteoarthritis    • Sleep apnea     USING CPAP   • Spinal stenosis    • Thyroid disease    • Type 2 diabetes mellitus     INsulin begun    • Vitamin B12 deficiency     SHE HAS NOT HAD THIS CHECKED IN A WHILE. SHE HAD BEEN ON SHOTS.  CHECK HER B12 LEVELS TODAY.  BY JOSE DANIEL CHEW       Past Surgical History:   Procedure Laterality Date   • CATARACT EXTRACTION Bilateral    • CATARACT EXTRACTION W/ INTRAOCULAR LENS IMPLANT Right 10/25/2016    Procedure: RT SUPERFICIAL KERATECTOMY ;   Surgeon: Arian Singh MD;  Location: [facility] OR [facility] [place] [facility];  Service:    • CHOLECYSTECTOMY     • COLONOSCOPY     • CORONARY ARTERY BYPASS GRAFT  1991    X3   • HYSTERECTOMY     • TRANSLUMINAL ATHERECTOMY PERONEAL ARTERY      PERCUTANEOUS TRANSLUMINAL ATHERECTOMY RENAL ARTERY       Prescriptions Prior to Admission   Medication Sig Dispense Refill Last Dose   • acetaminophen (TYLENOL) 500 MG tablet Take 500 mg by mouth every 4 (four) hours as needed for mild pain (1-3) (Back Pain). EVERY 4 TO 6 HOURS AS NEEDED   Taking   • aspirin 81 MG tablet Take 81 mg by mouth daily.   Taking   • clopidogrel (PLAVIX) 75 MG tablet Take 1 tablet by mouth Daily. 90 tablet 1 Taking   • Coenzyme Q10 (CO Q-10) 300 MG capsule Take 300 mg by mouth daily.   Taking   • furosemide (LASIX) 40 MG tablet Take 40 mg by mouth daily.   Taking   • insulin detemir (LEVEMIR) 100 UNIT/ML injection Inject 10 Units under the skin daily. 5 pen 3 Taking   • insulin lispro (HumaLOG) 100 UNIT/ML patient supplied pump Inject  under the skin See Admin Instructions. Take 6 units before breakfast, Take 5 units before lunch, and Take 5 units before dinner.   Taking   • Insulin Pen Needle (BD PEN NEEDLE LISA U/F) 32G X 4 MM misc USE AS DIRECTED THREE TIMES A DAY. 300 each 3 Taking   • isosorbide mononitrate (IMDUR) 120 MG 24 hr tablet Take 1 tablet by mouth Daily. 90 tablet 3 Taking   • levothyroxine (SYNTHROID, LEVOTHROID) 125 MCG tablet TAKE 1 TABLET DAILY AS DIRECTED 90 tablet 3 Taking   • losartan (COZAAR) 50 MG tablet Take 1 tablet by mouth Daily. 90 tablet 3 Taking   • metoprolol succinate XL (TOPROL-XL) 50 MG 24 hr tablet Take 1 tablet by mouth Daily. (Patient taking differently: Take 50 mg by mouth 2 (Two) Times a Day.) 180 tablet 0 Taking   • niacin (NIASPAN) 500 MG CR tablet Take 1 tablet by mouth Every Night. 90 tablet 3 Taking   • NIFEdipine XL (PROCARDIA XL) 90 MG 24 hr tablet Take 1 tablet by mouth Daily. 90 tablet 3 Taking   • nitroglycerin  (NITROSTAT) 0.4 MG SL tablet 1 under the tongue as needed for angina, may repeat q5mins for up three doses 100 tablet 11 Taking   • ONE TOUCH ULTRA TEST test strip TEST FOUR TIMES A  each 3 Taking   • raNITIdine (ZANTAC) 150 MG tablet TAKE 1 TABLET EVERY 12 HOURS AS NEEDED 180 tablet 3 Taking   • spironolactone (ALDACTONE) 25 MG tablet Take 1 tablet by mouth Daily. 90 tablet 3 Taking   • Timolol Hemihydrate (BETIMOL OP) Apply  to eye.      • travoprost, BAK free, (TRAVATAN) 0.004 % solution ophthalmic solution Administer 1 drop to both eyes every night. in affected eye(s)    Taking   • vitamin D (ERGOCALCIFEROL) 24463 units capsule capsule Take 50,000 Units by mouth Every 7 (Seven) Days.   Taking       Current Meds  Scheduled Meds:  metoprolol tartrate        Continuous Infusions:   PRN Meds:.[MAR Hold] sodium chloride    Allergies as of 03/11/2018 - Reviewed 03/11/2018   Allergen Reaction Noted   • Allopurinol  12/12/2017   • Clindamycin/lincomycin Itching 11/02/2016   • Ampicillin Rash 01/12/2016   • Penicillins Rash 01/12/2016   • Pravastatin Myalgia 12/12/2017       Social History     Social History   • Marital status: Single     Spouse name: N/A   • Number of children: N/A   • Years of education: N/A     Occupational History   • Not on file.     Social History Main Topics   • Smoking status: Never Smoker   • Smokeless tobacco: Never Used   • Alcohol use No   • Drug use: Unknown   • Sexual activity: Defer     Other Topics Concern   • Not on file     Social History Narrative   • No narrative on file       Family History   Problem Relation Age of Onset   • Colon cancer Other    • Heart disease Other    • Hypertension Other    • Stroke Other      ISCHEMIC   • Colon cancer Mother    • Heart disease Father    • Stroke Father        REVIEW OF SYSTEMS:   ROS was performed and is negative except as outlined in HPI     REVIEW OF SYSTEMS:   CONSTITUTIONAL: No weight loss, fever, chills, weakness or fatigue.  "  HEENT: Eyes: No visual loss, blurred vision, double vision or yellow sclerae. Ears, Nose, Throat: No hearing loss, sneezing, congestion, runny nose or sore throat.   SKIN: No rash or itching.     RESPIRATORY: No shortness of breath, hemoptysis, cough or sputum.   GASTROINTESTINAL: No anorexia, nausea, vomiting or diarrhea. No abdominal pain, bright red blood per rectum or melena.  GENITOURINARY: No burning on urination, hematuria or increased frequency.  NEUROLOGICAL: No headache, dizziness, syncope, paralysis, ataxia, numbness or tingling in the extremities. No change in bowel or bladder control.   MUSCULOSKELETAL: No muscle, back pain, joint pain or stiffness.   HEMATOLOGIC: No anemia, bleeding or bruising.   LYMPHATICS: No enlarged nodes. No history of splenectomy.   PSYCHIATRIC: No history of depression, anxiety, hallucinations.   ENDOCRINOLOGIC: No reports of sweating, cold or heat intolerance. No polyuria or polydipsia.       Objective:   Temp:  [97.6 °F (36.4 °C)] 97.6 °F (36.4 °C)  Heart Rate:  [79-93] 79  Resp:  [16-18] 18  BP: (147-152)/(74-84) 152/84  There is no height or weight on file to calculate BMI.  Flowsheet Rows    Flowsheet Row First Filed Value   Admission Height 162.6 cm (64\") Documented at 03/11/2018 1326   Admission Weight         Vitals:    03/11/18 1341   BP: 152/84   Pulse:    Resp:    Temp:    SpO2:        Head:    Normocephalic, without obvious abnormality, atraumatic,Elderly and frail obese    Eyes:            Lids and lashes normal, conjunctivae and sclerae normal, no   icterus, no pallor   Ears:    Ears appear intact with no abnormalities noted   Throat:   No oral lesions, dentition good   Neck:   No adenopathy, supple, trachea midline, no thyromegaly, no   carotid bruit, no JVD   Lungs:     Breath sounds are equal and clear to auscultation    Heart:    Normal S1 and S2, RRR, No M/G/R   Abdomen:     Normal bowel sounds, no masses, no organomegaly, soft        non-tender, " non-distended, no guarding   Extremities:   Moves all extremities well, +1 bilateral 2+2 edema, no cyanosis, no redness   Pulses:   Pulses palpable and equal bilaterally.    Skin:  Psychiatric:   No bleeding, bruising or rash    Awake, alert and oriented x 3, normal mood and affect             I personally viewed and interpreted the patient's EKG/Telemetry data    Assessment:  There are no hospital problems to display for this patient.      Plan:Inferior STEMI that's been going on for a couple days there is a little bit a terminal T-wave inversion she still is having as his symptoms and her troponin is arty high on her initial once of this did not happen today this is a difficult situation she desires full therapy which is not unreasonable but does carry increased risk of not sure that there is given a be anything we can do likely this is from the vein graft to the right that's occluded lumina try and go in her left wrist and do the case via that way I think it's safer for her further decisions will be based on the findings at the time of catheter but this is a life-threatening situation    Cameron Chisholm MD  03/11/18  1:53 PM.

## 2018-03-11 NOTE — ED PROVIDER NOTES
EMERGENCY DEPARTMENT ENCOUNTER    CHIEF COMPLAINT  Chief Complaint: Chest pain  History given by: Pt  History limited by: Nothing  Room Number: 334/1  PMD: Summer Gordon MD  Cardiology: Dr. Joseph    HPI:  Pt is a 89 y.o. female who presents complaining of episodic substernal chest pain onset 3 days ago. Pt reports her pain has been mild. She rates her current pain as a 2-3/10 with her current pain being constant since last night/earlier this morning. She reports her pain is worse with exertion, and improved by rest and NTG. Pt also c/o SOA. She denies nausea or diaphoresis. She reports she has had triple bypass surgery in the past.     Duration:  4 days  Onset: gradual  Timing: episodic  Location: substernal  Radiation: none  Quality: pain  Intensity/Severity: 2-3/10  Progression: unchanged  Associated Symptoms: SOA  Aggravating Factors: exertion  Alleviating Factors: rest  Previous Episodes: None stated  Treatment before arrival: Pt reports she has had a triple cardiac bypass in the past    PAST MEDICAL HISTORY  Active Ambulatory Problems     Diagnosis Date Noted   • Actinic keratosis 02/02/2016   • Anemia of chronic disorder 02/02/2016   • Disorder of aorta 02/02/2016   • Chronic coronary artery disease 02/02/2016   • Chronic kidney disease, stage IV (severe) 02/02/2016   • Constipation 02/02/2016   • Cor pulmonale 02/02/2016   • Diabetes mellitus 02/02/2016   • High level of uric acid in blood 02/02/2016   • Fatigue 02/02/2016   • Hyperlipidemia 02/02/2016   • Hypertension 02/02/2016   • Hypothyroidism 02/02/2016   • Iron deficiency 02/02/2016   • Mitral valve insufficiency 02/02/2016   • Pulmonary hypertension 02/02/2016   • Swelling of lower extremity 02/02/2016   • Shortness of breath 02/02/2016   • Tricuspid valve insufficiency 02/02/2016   • Uncontrolled type 2 diabetes mellitus 02/02/2016   • Type 2 diabetes mellitus 02/02/2016   • Vitamin D deficiency 03/25/2016   • Secondary hyperparathyroidism,  non-renal 03/25/2016   • Pulmonary nodules/lesions, multiple 05/27/2016   • Pleural effusion 05/27/2016   • Paronychia of second finger of right hand 09/29/2016   • Hyperuricemia 12/29/2016   • Acute pain of both shoulders 01/10/2018     Resolved Ambulatory Problems     Diagnosis Date Noted   • No Resolved Ambulatory Problems     Past Medical History:   Diagnosis Date   • Arthritis 03/13/2014   • Cancer of uterus    • Carotid artery stenosis    • Chronic renal failure syndrome    • Coronary atherosclerosis    • Esophageal reflux    • Glaucoma    • Hypertension    • Osteoarthritis    • Sleep apnea    • Spinal stenosis    • Thyroid disease    • Type 2 diabetes mellitus 1991   • Vitamin B12 deficiency        PAST SURGICAL HISTORY  Past Surgical History:   Procedure Laterality Date   • CATARACT EXTRACTION Bilateral    • CATARACT EXTRACTION W/ INTRAOCULAR LENS IMPLANT Right 10/25/2016    Procedure: RT SUPERFICIAL KERATECTOMY ;  Surgeon: Arian Singh MD;  Location: [facility];  Service:    • CHOLECYSTECTOMY     • COLONOSCOPY     • CORONARY ARTERY BYPASS GRAFT  1991    X3   • HYSTERECTOMY     • TRANSLUMINAL ATHERECTOMY PERONEAL ARTERY      PERCUTANEOUS TRANSLUMINAL ATHERECTOMY RENAL ARTERY       FAMILY HISTORY  Family History   Problem Relation Age of Onset   • Colon cancer Other    • Heart disease Other    • Hypertension Other    • Stroke Other      ISCHEMIC   • Colon cancer Mother    • Heart disease Father    • Stroke Father        SOCIAL HISTORY  Social History     Social History   • Marital status: Single     Spouse name: N/A   • Number of children: N/A   • Years of education: N/A     Occupational History   • Not on file.     Social History Main Topics   • Smoking status: Never Smoker   • Smokeless tobacco: Never Used   • Alcohol use No   • Drug use: Unknown   • Sexual activity: Defer     Other Topics Concern   • Not on file     Social History Narrative   • No narrative on file        ALLERGIES  Allopurinol; Clindamycin/lincomycin; Ampicillin; Penicillins; and Pravastatin    REVIEW OF SYSTEMS  Review of Systems   Constitutional: Negative for diaphoresis and fever.   HENT: Negative for sore throat.    Eyes: Negative.    Respiratory: Positive for shortness of breath. Negative for cough.    Cardiovascular: Positive for chest pain.   Gastrointestinal: Negative for abdominal pain, diarrhea, nausea and vomiting.   Genitourinary: Negative for dysuria.   Musculoskeletal: Negative for neck pain.   Skin: Negative for rash.   Allergic/Immunologic: Negative.    Neurological: Negative for weakness, numbness and headaches.   Hematological: Negative.    Psychiatric/Behavioral: Negative.    All other systems reviewed and are negative.      PHYSICAL EXAM  ED Triage Vitals   Temp Heart Rate Resp BP SpO2   03/11/18 1331 03/11/18 1315 03/11/18 1315 03/11/18 1315 03/11/18 1315   97.6 °F (36.4 °C) 93 18 147/74 97 %      Temp src Heart Rate Source Patient Position BP Location FiO2 (%)   03/11/18 1331 03/11/18 1315 03/11/18 1315 03/11/18 1315 --   Tympanic Monitor Lying Right arm          Physical Exam   Constitutional: She is oriented to person, place, and time and well-developed, well-nourished, and in no distress. No distress.   HENT:   Head: Normocephalic and atraumatic.   Eyes: EOM are normal. Pupils are equal, round, and reactive to light.   Neck: Normal range of motion. Neck supple.   Cardiovascular: Normal rate, regular rhythm and normal heart sounds.    Pulmonary/Chest: Effort normal and breath sounds normal. No respiratory distress.   Abdominal: Soft. There is no tenderness. There is no rebound and no guarding.   Musculoskeletal: Normal range of motion. She exhibits edema (2+ BLE).   Neurological: She is alert and oriented to person, place, and time. She has normal sensation and normal strength.   Skin: Skin is warm and dry. No rash noted.   Psychiatric: Mood and affect normal.   Nursing note and vitals  reviewed.      LAB RESULTS  Lab Results (last 24 hours)     Procedure Component Value Units Date/Time    CBC & Differential [[id number]] Collected:  03/11/18 1321    Specimen:  Blood Updated:  03/11/18 1334    Narrative:       The following orders were created for panel order CBC & Differential.  Procedure                               Abnormality         Status                     ---------                               -----------         ------                     CBC Auto Differential[[id number]]        Abnormal            Final result                 Please view results for these tests on the individual orders.    Troponin [[id number]]  (Abnormal) Collected:  03/11/18 1321    Specimen:  Blood Updated:  03/11/18 1356     Troponin T 0.558 (C) ng/mL     Narrative:       Troponin T Reference Ranges:  Less than 0.03 ng/mL:    Negative for AMI  0.03 to 0.09 ng/mL:      Indeterminant for AMI  Greater than 0.09 ng/mL: Positive for AMI    Comprehensive Metabolic Panel [[id number]]  (Abnormal) Collected:  03/11/18 1321    Specimen:  Blood Updated:  03/11/18 1354     Glucose 263 (H) mg/dL      BUN 24 (H) mg/dL      Creatinine 1.19 (H) mg/dL      Sodium 141 mmol/L      Potassium 4.4 mmol/L      Chloride 103 mmol/L      CO2 21.9 (L) mmol/L      Calcium 9.6 mg/dL      Total Protein 7.1 g/dL      Albumin 4.00 g/dL      ALT (SGPT) 10 U/L      AST (SGOT) 15 U/L      Alkaline Phosphatase 92 U/L      Total Bilirubin 0.3 mg/dL      eGFR Non African Amer 43 (L) mL/min/1.73      Globulin 3.1 gm/dL      A/G Ratio 1.3 g/dL      BUN/Creatinine Ratio 20.2     Anion Gap 16.1 mmol/L     Narrative:       The MDRD GFR formula is only valid for adults with stable renal function between ages 18 and 70.    Protime-INR [[id number]]  (Normal) Collected:  03/11/18 1321    Specimen:  Blood Updated:  03/11/18 1342     Protime 12.9 Seconds      INR 0.99    CBC Auto Differential [[id number]]  (Abnormal) Collected:  03/11/18 1321    Specimen:  Blood  Updated:  03/11/18 1334     WBC 8.10 10*3/mm3      RBC 3.65 (L) 10*6/mm3      Hemoglobin 11.8 (L) g/dL      Hematocrit 37.2 %      .9 (H) fL      MCH 32.3 (H) pg      MCHC 31.7 (L) g/dL      RDW 13.3 (H) %      RDW-SD 50.1 fl      MPV 10.6 fL      Platelets 315 10*3/mm3      Neutrophil % 66.9 %      Lymphocyte % 18.8 (L) %      Monocyte % 9.5 %      Eosinophil % 3.5 %      Basophil % 0.6 %      Immature Grans % 0.7 (H) %      Neutrophils, Absolute 5.42 10*3/mm3      Lymphocytes, Absolute 1.52 10*3/mm3      Monocytes, Absolute 0.77 10*3/mm3      Eosinophils, Absolute 0.28 10*3/mm3      Basophils, Absolute 0.05 10*3/mm3      Immature Grans, Absolute 0.06 (H) 10*3/mm3     POC Glucose Once [[id number]]  (Abnormal) Collected:  03/11/18 1451    Specimen:  Blood Updated:  03/11/18 1452     Glucose 247 (H) mg/dL     Narrative:       Meter: [id number] : [id number] Jeremias TSANG          I ordered the above labs and reviewed the results    RADIOLOGY  XR Chest 1 View   Final Result           I ordered the above noted radiological studies. Interpreted by radiologist. Reviewed by me in PACS.       PROCEDURES  Critical Care  Performed by: MAKAYLA PRASAD  Authorized by: MAKAYLA PRASAD     Critical care provider statement:     Critical care time (minutes):  20    Critical care time was exclusive of:  Separately billable procedures and treating other patients    Critical care was necessary to treat or prevent imminent or life-threatening deterioration of the following conditions:  Cardiac failure    Critical care was time spent personally by me on the following activities:  Ordering and performing treatments and interventions, ordering and review of laboratory studies, ordering and review of radiographic studies, pulse oximetry, re-evaluation of patient's condition, examination of patient, evaluation of patient's response to treatment, discussions with consultants, development of treatment plan with patient or  surrogate, interpretation of cardiac output measurements and obtaining history from patient or surrogate          EKG           EKG time: 1300  Rhythm/Rate: nsr, 88  P waves and OR: 1st degree AV block  QRS, axis: Q waves in lead 1,3   ST and T waves: ST elevation in 3 and aVF with ST depression in 1, aVL and V2, inverted T waves in V3-V6     Interpreted Contemporaneously by me, independently viewed  changed compared to prior 7/15/2015      PROGRESS AND CONSULTS  ED Course     1316 - Consulted with Dr. Chisholm (Interventional Cardiologist) who reports that he is on his way to the ED to see the pt.     1319 - Provided pt sublingual NTG and she reports her pain is now a 1-2/10.     1324 - Rechecked pt. Pt reports her pain is a 1/10. HR 84, /75.    1327 - Dr. Chisholm is in the ER at bedside. He states he will take the pt to the cath lab.       MEDICAL DECISION MAKING  Results were reviewed/discussed with the patient and they were also made aware of online access. Pt also made aware that some labs, such as cultures, will not be resulted during ER visit and follow up with PMD is necessary.     MDM  Number of Diagnoses or Management Options  Acute transmural inferior wall MI:      Amount and/or Complexity of Data Reviewed  Clinical lab tests: ordered and reviewed (Troponin - 0.558)  Tests in the medicine section of CPT®: reviewed and ordered (See EKG procedure note.)  Discuss the patient with other providers: yes (Dr. Chisholm (Interventional cardiology))           DIAGNOSIS  Final diagnoses:   Acute transmural inferior wall MI       DISPOSITION  ADMISSION    Discussed treatment plan and reason for admission with pt/family and admitting physician.  Pt/family voiced understanding of the plan for admission for further testing/treatment as needed.         Latest Documented Vital Signs:  As of 3:34 PM  BP- 139/98 HR- 64 Temp- 98.4 °F (36.9 °C) (Oral) O2 sat- 98%    --  Documentation assistance provided by rickey Bhakta  Waqas for Dr. Jacobs.  Information recorded by the scribe was done at my direction and has been verified and validated by me.     Yony Alan  03/11/18 1328       Yony Alan  03/11/18 1440       Yony Alan  03/11/18 1214       Steven Jacobs MD  03/11/18 [id number]

## 2018-03-12 ENCOUNTER — APPOINTMENT (OUTPATIENT)
Dept: CARDIOLOGY | Facility: HOSPITAL | Age: 83
End: 2018-03-12
Attending: INTERNAL MEDICINE

## 2018-03-12 LAB
ACT BLD: 274 SECONDS (ref 82–152)
ANION GAP SERPL CALCULATED.3IONS-SCNC: 11.5 MMOL/L
ASCENDING AORTA: 3.2 CM
BH CV ECHO MEAS - ACS: 1.5 CM
BH CV ECHO MEAS - AO MAX PG (FULL): 4.2 MMHG
BH CV ECHO MEAS - AO MAX PG: 9.6 MMHG
BH CV ECHO MEAS - AO MEAN PG (FULL): 3 MMHG
BH CV ECHO MEAS - AO MEAN PG: 6 MMHG
BH CV ECHO MEAS - AO ROOT AREA (BSA CORRECTED): 1.3
BH CV ECHO MEAS - AO ROOT AREA: 5.3 CM^2
BH CV ECHO MEAS - AO ROOT DIAM: 2.6 CM
BH CV ECHO MEAS - AO V2 MAX: 155 CM/SEC
BH CV ECHO MEAS - AO V2 MEAN: 111 CM/SEC
BH CV ECHO MEAS - AO V2 VTI: 38.2 CM
BH CV ECHO MEAS - ASC AORTA: 3.2 CM
BH CV ECHO MEAS - AVA(I,A): 1.9 CM^2
BH CV ECHO MEAS - AVA(I,D): 1.9 CM^2
BH CV ECHO MEAS - AVA(V,A): 1.9 CM^2
BH CV ECHO MEAS - AVA(V,D): 1.9 CM^2
BH CV ECHO MEAS - BSA(HAYCOCK): 2.1 M^2
BH CV ECHO MEAS - BSA: 2 M^2
BH CV ECHO MEAS - BZI_BMI: 34.1 KILOGRAMS/M^2
BH CV ECHO MEAS - BZI_METRIC_HEIGHT: 167.6 CM
BH CV ECHO MEAS - BZI_METRIC_WEIGHT: 95.7 KG
BH CV ECHO MEAS - CONTRAST EF (2CH): 59.7 ML/M^2
BH CV ECHO MEAS - CONTRAST EF 4CH: 57.8 ML/M^2
BH CV ECHO MEAS - EDV(CUBED): 185.2 ML
BH CV ECHO MEAS - EDV(MOD-SP2): 129 ML
BH CV ECHO MEAS - EDV(MOD-SP4): 135 ML
BH CV ECHO MEAS - EDV(TEICH): 160 ML
BH CV ECHO MEAS - EF(CUBED): 57.1 %
BH CV ECHO MEAS - EF(MOD-SP2): 59.7 %
BH CV ECHO MEAS - EF(MOD-SP4): 57.8 %
BH CV ECHO MEAS - EF(TEICH): 48.1 %
BH CV ECHO MEAS - ESV(CUBED): 79.5 ML
BH CV ECHO MEAS - ESV(MOD-SP2): 52 ML
BH CV ECHO MEAS - ESV(MOD-SP4): 57 ML
BH CV ECHO MEAS - ESV(TEICH): 83.1 ML
BH CV ECHO MEAS - FS: 24.6 %
BH CV ECHO MEAS - IVS/LVPW: 1
BH CV ECHO MEAS - IVSD: 0.9 CM
BH CV ECHO MEAS - LAT PEAK E' VEL: 8 CM/SEC
BH CV ECHO MEAS - LV DIASTOLIC VOL/BSA (35-75): 66 ML/M^2
BH CV ECHO MEAS - LV MASS(C)D: 197.5 GRAMS
BH CV ECHO MEAS - LV MASS(C)DI: 96.5 GRAMS/M^2
BH CV ECHO MEAS - LV MAX PG: 5.4 MMHG
BH CV ECHO MEAS - LV MEAN PG: 3 MMHG
BH CV ECHO MEAS - LV SYSTOLIC VOL/BSA (12-30): 27.9 ML/M^2
BH CV ECHO MEAS - LV V1 MAX: 116 CM/SEC
BH CV ECHO MEAS - LV V1 MEAN: 77.9 CM/SEC
BH CV ECHO MEAS - LV V1 VTI: 27.9 CM
BH CV ECHO MEAS - LVIDD: 5.7 CM
BH CV ECHO MEAS - LVIDS: 4.3 CM
BH CV ECHO MEAS - LVLD AP2: 7.6 CM
BH CV ECHO MEAS - LVLD AP4: 7.9 CM
BH CV ECHO MEAS - LVLS AP2: 6.6 CM
BH CV ECHO MEAS - LVLS AP4: 6.4 CM
BH CV ECHO MEAS - LVOT AREA (M): 2.5 CM^2
BH CV ECHO MEAS - LVOT AREA: 2.5 CM^2
BH CV ECHO MEAS - LVOT DIAM: 1.8 CM
BH CV ECHO MEAS - LVPWD: 0.9 CM
BH CV ECHO MEAS - MED PEAK E' VEL: 5 CM/SEC
BH CV ECHO MEAS - MR MAX PG: 92.2 MMHG
BH CV ECHO MEAS - MR MAX VEL: 480 CM/SEC
BH CV ECHO MEAS - MV A DUR: 0.18 SEC
BH CV ECHO MEAS - MV A MAX VEL: 109 CM/SEC
BH CV ECHO MEAS - MV DEC SLOPE: 684 CM/SEC^2
BH CV ECHO MEAS - MV DEC TIME: 0.17 SEC
BH CV ECHO MEAS - MV E MAX VEL: 114 CM/SEC
BH CV ECHO MEAS - MV E/A: 1
BH CV ECHO MEAS - MV MAX PG: 5.3 MMHG
BH CV ECHO MEAS - MV MEAN PG: 2 MMHG
BH CV ECHO MEAS - MV P1/2T MAX VEL: 117 CM/SEC
BH CV ECHO MEAS - MV P1/2T: 50.1 MSEC
BH CV ECHO MEAS - MV V2 MAX: 115 CM/SEC
BH CV ECHO MEAS - MV V2 MEAN: 71 CM/SEC
BH CV ECHO MEAS - MV V2 VTI: 27.9 CM
BH CV ECHO MEAS - MVA P1/2T LCG: 1.9 CM^2
BH CV ECHO MEAS - MVA(P1/2T): 4.4 CM^2
BH CV ECHO MEAS - MVA(VTI): 2.5 CM^2
BH CV ECHO MEAS - PA ACC TIME: 0.12 SEC
BH CV ECHO MEAS - PA MAX PG (FULL): 1.4 MMHG
BH CV ECHO MEAS - PA MAX PG: 3.5 MMHG
BH CV ECHO MEAS - PA PR(ACCEL): 26.8 MMHG
BH CV ECHO MEAS - PA V2 MAX: 93.7 CM/SEC
BH CV ECHO MEAS - PULM A REVS DUR: 0.21 SEC
BH CV ECHO MEAS - PULM A REVS VEL: 30.5 CM/SEC
BH CV ECHO MEAS - PULM DIAS VEL: 66.6 CM/SEC
BH CV ECHO MEAS - PULM S/D: 1.1
BH CV ECHO MEAS - PULM SYS VEL: 73.8 CM/SEC
BH CV ECHO MEAS - PVA(V,A): 2.2 CM^2
BH CV ECHO MEAS - PVA(V,D): 2.2 CM^2
BH CV ECHO MEAS - QP/QS: 0.6
BH CV ECHO MEAS - RAP SYSTOLE: 3 MMHG
BH CV ECHO MEAS - RV MAX PG: 2.1 MMHG
BH CV ECHO MEAS - RV MEAN PG: 1 MMHG
BH CV ECHO MEAS - RV V1 MAX: 73.2 CM/SEC
BH CV ECHO MEAS - RV V1 MEAN: 49.4 CM/SEC
BH CV ECHO MEAS - RV V1 VTI: 15.1 CM
BH CV ECHO MEAS - RVOT AREA: 2.8 CM^2
BH CV ECHO MEAS - RVOT DIAM: 1.9 CM
BH CV ECHO MEAS - RVSP: 22 MMHG
BH CV ECHO MEAS - SI(AO): 99.1 ML/M^2
BH CV ECHO MEAS - SI(CUBED): 51.7 ML/M^2
BH CV ECHO MEAS - SI(LVOT): 34.7 ML/M^2
BH CV ECHO MEAS - SI(MOD-SP2): 37.6 ML/M^2
BH CV ECHO MEAS - SI(MOD-SP4): 38.1 ML/M^2
BH CV ECHO MEAS - SI(TEICH): 37.6 ML/M^2
BH CV ECHO MEAS - SV(AO): 202.8 ML
BH CV ECHO MEAS - SV(CUBED): 105.7 ML
BH CV ECHO MEAS - SV(LVOT): 71 ML
BH CV ECHO MEAS - SV(MOD-SP2): 77 ML
BH CV ECHO MEAS - SV(MOD-SP4): 78 ML
BH CV ECHO MEAS - SV(RVOT): 42.8 ML
BH CV ECHO MEAS - SV(TEICH): 77 ML
BH CV ECHO MEAS - TAPSE (>1.6): 1.7 CM2
BH CV ECHO MEAS - TR MAX VEL: 271 CM/SEC
BH CV VAS BP RIGHT ARM: NORMAL MMHG
BH CV XLRA - RV BASE: 3.4 CM
BH CV XLRA - TDI S': 10 CM/SEC
BUN BLD-MCNC: 23 MG/DL (ref 8–23)
BUN/CREAT SERPL: 20.2 (ref 7–25)
CALCIUM SPEC-SCNC: 8.6 MG/DL (ref 8.6–10.5)
CHLORIDE SERPL-SCNC: 108 MMOL/L (ref 98–107)
CHOLEST SERPL-MCNC: 153 MG/DL (ref 0–200)
CO2 SERPL-SCNC: 23.5 MMOL/L (ref 22–29)
CREAT BLD-MCNC: 1.14 MG/DL (ref 0.57–1)
DEPRECATED RDW RBC AUTO: 50.2 FL (ref 37–54)
E/E' RATIO: 19
ERYTHROCYTE [DISTWIDTH] IN BLOOD BY AUTOMATED COUNT: 13.3 % (ref 11.7–13)
GFR SERPL CREATININE-BSD FRML MDRD: 45 ML/MIN/1.73
GLUCOSE BLD-MCNC: 207 MG/DL (ref 65–99)
GLUCOSE BLDC GLUCOMTR-MCNC: 203 MG/DL (ref 70–130)
GLUCOSE BLDC GLUCOMTR-MCNC: 217 MG/DL (ref 70–130)
GLUCOSE BLDC GLUCOMTR-MCNC: 228 MG/DL (ref 70–130)
GLUCOSE BLDC GLUCOMTR-MCNC: 273 MG/DL (ref 70–130)
HBA1C MFR BLD: 8.7 % (ref 4.8–5.6)
HCT VFR BLD AUTO: 30.4 % (ref 35.6–45.5)
HDLC SERPL-MCNC: 45 MG/DL (ref 40–60)
HGB BLD-MCNC: 9.5 G/DL (ref 11.9–15.5)
LDLC SERPL CALC-MCNC: 92 MG/DL (ref 0–100)
LDLC/HDLC SERPL: 2.05 {RATIO}
LEFT ATRIUM VOLUME INDEX: 23 ML/M2
LV EF 2D ECHO EST: 57 %
MAXIMAL PREDICTED HEART RATE: 131 BPM
MCH RBC QN AUTO: 32 PG (ref 26.9–32)
MCHC RBC AUTO-ENTMCNC: 31.3 G/DL (ref 32.4–36.3)
MCV RBC AUTO: 102.4 FL (ref 80.5–98.2)
PLATELET # BLD AUTO: 275 10*3/MM3 (ref 140–500)
PMV BLD AUTO: 11.1 FL (ref 6–12)
POTASSIUM BLD-SCNC: 4 MMOL/L (ref 3.5–5.2)
RBC # BLD AUTO: 2.97 10*6/MM3 (ref 3.9–5.2)
SODIUM BLD-SCNC: 143 MMOL/L (ref 136–145)
STRESS TARGET HR: 111 BPM
TRIGL SERPL-MCNC: 78 MG/DL (ref 0–150)
VLDLC SERPL-MCNC: 15.6 MG/DL (ref 5–40)
WBC NRBC COR # BLD: 8.48 10*3/MM3 (ref 4.5–10.7)

## 2018-03-12 PROCEDURE — 82962 GLUCOSE BLOOD TEST: CPT

## 2018-03-12 PROCEDURE — 63710000001 INSULIN ASPART PER 5 UNITS: Performed by: INTERNAL MEDICINE

## 2018-03-12 PROCEDURE — 85027 COMPLETE CBC AUTOMATED: CPT | Performed by: INTERNAL MEDICINE

## 2018-03-12 PROCEDURE — 25010000002 ONDANSETRON PER 1 MG: Performed by: INTERNAL MEDICINE

## 2018-03-12 PROCEDURE — 80061 LIPID PANEL: CPT | Performed by: INTERNAL MEDICINE

## 2018-03-12 PROCEDURE — 25010000002 PERFLUTREN (DEFINITY) 8.476 MG IN SODIUM CHLORIDE 0.9 % 10 ML INJECTION: Performed by: INTERNAL MEDICINE

## 2018-03-12 PROCEDURE — 93005 ELECTROCARDIOGRAM TRACING: CPT | Performed by: INTERNAL MEDICINE

## 2018-03-12 PROCEDURE — 80048 BASIC METABOLIC PNL TOTAL CA: CPT | Performed by: INTERNAL MEDICINE

## 2018-03-12 PROCEDURE — 63710000001 INSULIN DETEMER PER 5 UNITS: Performed by: INTERNAL MEDICINE

## 2018-03-12 PROCEDURE — 99233 SBSQ HOSP IP/OBS HIGH 50: CPT | Performed by: INTERNAL MEDICINE

## 2018-03-12 PROCEDURE — 93010 ELECTROCARDIOGRAM REPORT: CPT | Performed by: INTERNAL MEDICINE

## 2018-03-12 PROCEDURE — 93306 TTE W/DOPPLER COMPLETE: CPT

## 2018-03-12 PROCEDURE — 83036 HEMOGLOBIN GLYCOSYLATED A1C: CPT | Performed by: INTERNAL MEDICINE

## 2018-03-12 PROCEDURE — 93306 TTE W/DOPPLER COMPLETE: CPT | Performed by: INTERNAL MEDICINE

## 2018-03-12 RX ORDER — CLOPIDOGREL BISULFATE 75 MG/1
75 TABLET ORAL DAILY
Status: DISCONTINUED | OUTPATIENT
Start: 2018-03-12 | End: 2018-03-14 | Stop reason: HOSPADM

## 2018-03-12 RX ORDER — DEXTROSE MONOHYDRATE 25 G/50ML
25 INJECTION, SOLUTION INTRAVENOUS
Status: DISCONTINUED | OUTPATIENT
Start: 2018-03-12 | End: 2018-03-14 | Stop reason: HOSPADM

## 2018-03-12 RX ORDER — GLIMEPIRIDE 2 MG/1
1 TABLET ORAL DAILY
Status: DISCONTINUED | OUTPATIENT
Start: 2018-03-12 | End: 2018-03-14 | Stop reason: HOSPADM

## 2018-03-12 RX ORDER — ASPIRIN 81 MG/1
81 TABLET, CHEWABLE ORAL DAILY
Status: DISCONTINUED | OUTPATIENT
Start: 2018-03-12 | End: 2018-03-12 | Stop reason: SDUPTHER

## 2018-03-12 RX ORDER — NICOTINE POLACRILEX 4 MG
15 LOZENGE BUCCAL
Status: DISCONTINUED | OUTPATIENT
Start: 2018-03-12 | End: 2018-03-14 | Stop reason: HOSPADM

## 2018-03-12 RX ORDER — FUROSEMIDE 40 MG/1
40 TABLET ORAL DAILY
Status: DISCONTINUED | OUTPATIENT
Start: 2018-03-12 | End: 2018-03-14 | Stop reason: HOSPADM

## 2018-03-12 RX ORDER — GLIMEPIRIDE 2 MG/1
1 TABLET ORAL
COMMUNITY
Start: 2018-01-25

## 2018-03-12 RX ORDER — TIMOLOL 5.12 MG/ML
1 SOLUTION/ DROPS OPHTHALMIC
COMMUNITY
Start: 2017-12-04 | End: 2019-01-22

## 2018-03-12 RX ADMIN — FUROSEMIDE 40 MG: 40 TABLET ORAL at 08:14

## 2018-03-12 RX ADMIN — LEVOTHYROXINE SODIUM 125 MCG: 125 TABLET ORAL at 08:15

## 2018-03-12 RX ADMIN — INSULIN ASPART 3 UNITS: 100 INJECTION, SOLUTION INTRAVENOUS; SUBCUTANEOUS at 20:55

## 2018-03-12 RX ADMIN — LOSARTAN POTASSIUM 50 MG: 50 TABLET, FILM COATED ORAL at 08:15

## 2018-03-12 RX ADMIN — PERFLUTREN 3 ML: 6.52 INJECTION, SUSPENSION INTRAVENOUS at 15:00

## 2018-03-12 RX ADMIN — ISOSORBIDE MONONITRATE 120 MG: 30 TABLET ORAL at 08:15

## 2018-03-12 RX ADMIN — CLOPIDOGREL 75 MG: 75 TABLET, FILM COATED ORAL at 08:15

## 2018-03-12 RX ADMIN — ASPIRIN 81 MG: 81 TABLET, CHEWABLE ORAL at 08:14

## 2018-03-12 RX ADMIN — INSULIN DETEMIR 10 UNITS: 100 INJECTION, SOLUTION SUBCUTANEOUS at 08:28

## 2018-03-12 RX ADMIN — SPIRONOLACTONE 25 MG: 25 TABLET ORAL at 08:14

## 2018-03-12 RX ADMIN — SODIUM CHLORIDE 75 ML/HR: 9 INJECTION, SOLUTION INTRAVENOUS at 03:57

## 2018-03-12 RX ADMIN — METOPROLOL SUCCINATE 50 MG: 50 TABLET, FILM COATED, EXTENDED RELEASE ORAL at 08:15

## 2018-03-12 RX ADMIN — NIFEDIPINE 90 MG: 60 TABLET, FILM COATED, EXTENDED RELEASE ORAL at 08:14

## 2018-03-12 RX ADMIN — ONDANSETRON 4 MG: 2 INJECTION INTRAMUSCULAR; INTRAVENOUS at 21:58

## 2018-03-12 RX ADMIN — INSULIN ASPART 3 UNITS: 100 INJECTION, SOLUTION INTRAVENOUS; SUBCUTANEOUS at 17:31

## 2018-03-12 NOTE — CONSULTS
Pt is s/p MI & stent placement yesterday on CCU.  She did not awaken with me in the room so I left a Phase II information packet which includes a yellow cover sheet, a [facility] handout, and two [facility] Letter articles that stress the importance of cardiac rehab after a heart event.  The yellow cover sheet explains why she is eligible to participate in cardiac rehab and when she would be eligible to start.  It also encourages the patient to call the cardiac rehab of her choice as soon as possible after discharge to set up her first appointment and to check with her insurance company to determine if coverage or if she has any co-pays or deductibles.  It recommends that the patient take her discharge instructions (AVS) from this hospitalization to her first cardiac rehab appointment.

## 2018-03-12 NOTE — PROGRESS NOTES
Continued Stay Note  [facility]     Patient Name: Alessia Madrigal  MRN: [id number]  Today's Date: 3/12/2018    Admit Date: 3/11/2018          Discharge Plan     Row Name 03/12/18 1223       Plan    Plan Comments Pt sleeping and CCP unable to rouse to complete screen. CCP left [facility] for and awaiting return contact from pt's emergency contact (José Rohan, [phone]) to complete screen. Therese Arroyo LCSW              Discharge Codes    No documentation.           Brittani Arroyo LCSW

## 2018-03-12 NOTE — PROGRESS NOTES
"CC: MI    Interval History:   No chest pain or pressure.  No other complaints.    Vital Signs  Temp:  [97.6 °F (36.4 °C)-98.4 °F (36.9 °C)] 98.4 °F (36.9 °C)  Heart Rate:  [63-93] 67  Resp:  [16-18] 18  BP: (107-152)/() 127/61    Intake/Output Summary (Last 24 hours) at 03/12/18 0732  Last data filed at 03/12/18 0500   Gross per 24 hour   Intake             1070 ml   Output             1050 ml   Net               20 ml     Flowsheet Rows    Flowsheet Row First Filed Value   Admission Height 162.6 cm (64\") Documented at 03/11/2018 1326   Admission Weight 95.8 kg (211 lb 3.2 oz) Documented at 03/11/2018 1522          PHYSICAL EXAM:  General: No acute distress  Resp:NL Rate, unlabored, clear  CV:NL rate and rhythm, NL PMI, Nl S1 and S2, no Mumur, no gallop, no rub, No JVD. Normal pedal pulses  ABD:Nl sounds, no masses or tenderness, nondistended, no quarding or rebound  Neuro: alert,cooperative and oriented  Extr: No edema or cyanosis, moves all extremities      Results Review:      Results from last 7 days  Lab Units 03/12/18  0509   SODIUM mmol/L 143   POTASSIUM mmol/L 4.0   CHLORIDE mmol/L 108*   CO2 mmol/L 23.5   BUN mg/dL 23   CREATININE mg/dL 1.14*   GLUCOSE mg/dL 207*   CALCIUM mg/dL 8.6       Results from last 7 days  Lab Units 03/11/18  1321   TROPONIN T ng/mL 0.558*       Results from last 7 days  Lab Units 03/12/18  0509   WBC 10*3/mm3 8.48   HEMOGLOBIN g/dL 9.5*   HEMATOCRIT % 30.4*   PLATELETS 10*3/mm3 275       Results from last 7 days  Lab Units 03/11/18  1321   INR  0.99       Results from last 7 days  Lab Units 03/12/18  0509   CHOLESTEROL mg/dL 153           Results from last 7 days  Lab Units 03/12/18  0509   CHOLESTEROL mg/dL 153   TRIGLYCERIDES mg/dL 78   HDL CHOL mg/dL 45   LDL CHOL mg/dL 92     @LABRCNT(bnp)@  I reviewed the patient's new clinical results.  I personally viewed and interpreted the patient's EKG/Telemetry data        Medication Review:   Meds reviewed     "     Assessment/Plan  1.  Acute inferior ST elevation infarct.  Status post drug eluding stent placed to the vein graft to the PDA.  Occluded vein graft to the diagonal patent left internal mammary graft to the left anterior descending.  We'll transfer to monitor and increase activity discharge the next day or 2.  2.  Coronary disease previous cord bypass grafting.  Catheterization this admission occlusion of all native vessels.  Left internal mammary artery graft left interior descending patent retrograde fills the mid LAD and diagonals with severe disease, vein graft to diagonal occluded, vein graft to the right coronary artery now stented.  Ischemic areas were still the circumflex distribution and diagonal distribution.  Continue medical therapy.  Echo cardiac and pending.  3.  Diabetes mellitus  4.  Renal failure appears stable will recheck this in the morning.  5.  Hyperlipidemia intolerant of all statins.  6.  Hypertension.  Follow blood pressure.        Griffin Joseph MD  03/12/18  7:32 AM

## 2018-03-12 NOTE — PLAN OF CARE
Problem: Patient Care Overview  Goal: Plan of Care Review  Outcome: Ongoing (interventions implemented as appropriate)   03/12/18 2233   OTHER   Outcome Summary Pt tranferred to [facility]I today, VSS throughout shift, Insulin sliding scale ordered, will continue to monitor.      Goal: Individualization and Mutuality  Outcome: Ongoing (interventions implemented as appropriate)    Goal: Discharge Needs Assessment  Outcome: Ongoing (interventions implemented as appropriate)    Goal: Interprofessional Rounds/Family Conf  Outcome: Ongoing (interventions implemented as appropriate)      Problem: Fall Risk (Adult)  Goal: Identify Related Risk Factors and Signs and Symptoms  Outcome: Ongoing (interventions implemented as appropriate)    Goal: Absence of Fall  Outcome: Ongoing (interventions implemented as appropriate)

## 2018-03-13 LAB
ANION GAP SERPL CALCULATED.3IONS-SCNC: 13.8 MMOL/L
BUN BLD-MCNC: 21 MG/DL (ref 8–23)
BUN/CREAT SERPL: 19.6 (ref 7–25)
CALCIUM SPEC-SCNC: 8.7 MG/DL (ref 8.6–10.5)
CHLORIDE SERPL-SCNC: 104 MMOL/L (ref 98–107)
CO2 SERPL-SCNC: 24.2 MMOL/L (ref 22–29)
CREAT BLD-MCNC: 1.07 MG/DL (ref 0.57–1)
GFR SERPL CREATININE-BSD FRML MDRD: 48 ML/MIN/1.73
GLUCOSE BLD-MCNC: 122 MG/DL (ref 65–99)
GLUCOSE BLDC GLUCOMTR-MCNC: 122 MG/DL (ref 70–130)
GLUCOSE BLDC GLUCOMTR-MCNC: 148 MG/DL (ref 70–130)
GLUCOSE BLDC GLUCOMTR-MCNC: 211 MG/DL (ref 70–130)
GLUCOSE BLDC GLUCOMTR-MCNC: 249 MG/DL (ref 70–130)
POTASSIUM BLD-SCNC: 3.8 MMOL/L (ref 3.5–5.2)
SODIUM BLD-SCNC: 142 MMOL/L (ref 136–145)

## 2018-03-13 PROCEDURE — 99233 SBSQ HOSP IP/OBS HIGH 50: CPT | Performed by: INTERNAL MEDICINE

## 2018-03-13 PROCEDURE — 82962 GLUCOSE BLOOD TEST: CPT

## 2018-03-13 PROCEDURE — 63710000001 INSULIN ASPART PER 5 UNITS: Performed by: INTERNAL MEDICINE

## 2018-03-13 PROCEDURE — 80048 BASIC METABOLIC PNL TOTAL CA: CPT | Performed by: INTERNAL MEDICINE

## 2018-03-13 PROCEDURE — 97162 PT EVAL MOD COMPLEX 30 MIN: CPT | Performed by: PHYSICAL THERAPIST

## 2018-03-13 RX ADMIN — TIMOLOL MALEATE 1 DROP: 2.5 SOLUTION/ DROPS OPHTHALMIC at 09:17

## 2018-03-13 RX ADMIN — CLOPIDOGREL 75 MG: 75 TABLET, FILM COATED ORAL at 09:15

## 2018-03-13 RX ADMIN — LOSARTAN POTASSIUM 50 MG: 50 TABLET, FILM COATED ORAL at 09:16

## 2018-03-13 RX ADMIN — NIFEDIPINE 90 MG: 60 TABLET, FILM COATED, EXTENDED RELEASE ORAL at 09:17

## 2018-03-13 RX ADMIN — LEVOTHYROXINE SODIUM 125 MCG: 125 TABLET ORAL at 09:16

## 2018-03-13 RX ADMIN — ASPIRIN 81 MG: 81 TABLET, CHEWABLE ORAL at 09:14

## 2018-03-13 RX ADMIN — INSULIN ASPART 3 UNITS: 100 INJECTION, SOLUTION INTRAVENOUS; SUBCUTANEOUS at 17:18

## 2018-03-13 RX ADMIN — METOPROLOL SUCCINATE 50 MG: 50 TABLET, FILM COATED, EXTENDED RELEASE ORAL at 09:16

## 2018-03-13 RX ADMIN — FUROSEMIDE 40 MG: 40 TABLET ORAL at 09:15

## 2018-03-13 RX ADMIN — SPIRONOLACTONE 25 MG: 25 TABLET ORAL at 09:17

## 2018-03-13 RX ADMIN — ISOSORBIDE MONONITRATE 120 MG: 30 TABLET ORAL at 09:16

## 2018-03-13 RX ADMIN — INSULIN DETEMIR 10 UNITS: 100 INJECTION, SOLUTION SUBCUTANEOUS at 09:10

## 2018-03-13 RX ADMIN — INSULIN ASPART 3 UNITS: 100 INJECTION, SOLUTION INTRAVENOUS; SUBCUTANEOUS at 12:16

## 2018-03-13 NOTE — PLAN OF CARE
Problem: Patient Care Overview  Goal: Plan of Care Review  Outcome: Ongoing (interventions implemented as appropriate)   03/13/18 9884   OTHER   Outcome Summary VSS. Zofran given x1 for nausea. Pt states her dinner didn't sit well with her. No other complaints throughout the shift. Plan for possible dc today or tomorrow. Will continue to monitor.      Goal: Individualization and Mutuality  Outcome: Ongoing (interventions implemented as appropriate)    Goal: Discharge Needs Assessment  Outcome: Ongoing (interventions implemented as appropriate)    Goal: Interprofessional Rounds/Family Conf  Outcome: Ongoing (interventions implemented as appropriate)      Problem: Fall Risk (Adult)  Goal: Identify Related Risk Factors and Signs and Symptoms  Outcome: Ongoing (interventions implemented as appropriate)    Goal: Absence of Fall  Outcome: Ongoing (interventions implemented as appropriate)      Problem: Skin Injury Risk (Adult)  Goal: Identify Related Risk Factors and Signs and Symptoms  Outcome: Ongoing (interventions implemented as appropriate)    Goal: Skin Health and Integrity  Outcome: Ongoing (interventions implemented as appropriate)

## 2018-03-13 NOTE — PROGRESS NOTES
Discharge Planning Assessment  [facility]     Patient Name: Alessia Madrigal  MRN: [id number]  Today's Date: 3/13/2018    Admit Date: 3/11/2018          Discharge Needs Assessment     Row Name 03/13/18 1356       Living Environment    Lives With alone    Current Living Arrangements home/apartment/condo    Primary Care Provided by self    Provides Primary Care For pet(s)   1 cat    Family Caregiver if Needed none    Quality of Family Relationships helpful;supportive    Able to Return to Prior Arrangements yes       Resource/Environmental Concerns    Resource/Environmental Concerns none       Transition Planning    Patient/Family Anticipates Transition to inpatient rehabilitation facility    Patient/Family Anticipated Services at Transition skilled nursing    Transportation Anticipated car, drives self;family or friend will provide       Discharge Needs Assessment    Concerns to be Addressed discharge planning    Concerns Comments Pt request skilled nursing facility at d/c.    Equipment Currently Used at Home walker, rolling;cane, straight;other (see comments)   elevated toilet seat    Anticipated Changes Related to Illness none    Equipment Needed After Discharge none    Outpatient/Agency/Support Group Needs skilled nursing facility    Discharge Facility/Level of Care Needs nursing facility, skilled    Current Discharge Risk lives alone            Discharge Plan     Row Name 03/13/18 8523       Plan    Plan Referral to [facility] pending.    Patient/Family in Agreement with Plan yes   Nephew José Madrigal     Plan Comments Spoke with Pt and her nephew José Madrigal ([phone]) at bedside.  CCP introduced self and role.  Pt confirmed information on face sheet.  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  Pt lives alone in a first floor apartment with her cat.  Pt denies past subacute rehab.  Pt uses a rolling walker and a cane at times when ambulating.  Pt states she has used home health in 1998 however  cannot recall the agency name.  Pt confirms pharmacy as [facility] [address].  Pt denies issues with affording her medications.  Pt requesting to go to [facility] upon discharge for skilled nursing & rehab.  CCP called referral to [facility] at 2:01 PM.  CCP will continue to follow…………….RENAN RN/CCP        Destination     Service Request Status Selected Specialties Address Phone Number Fax Number    [facility] Pending - Request Sent N/A [address] [address] [phone]      Durable Medical Equipment     No service coordination in this encounter.      Dialysis/Infusion     No service coordination in this encounter.      Home Medical Care     No service coordination in this encounter.      Social Care     No service coordination in this encounter.                Demographic Summary     Row Name 03/13/18 1349       General Information    Admission Type inpatient    Arrived From home    Referral Source physician    Reason for Consult discharge planning;facility placement    Preferred Language English     Used During This Interaction no       Contact Information    Permission Granted to Share Info With facility ;family/designee;    Contact Information Obtained for facility        Facility  Information    Name, Facility  [facility] ([phone])    Phone, Facility  [facility] ([phone])            Functional Status     Row Name 03/13/18 1359       Functional Status    Usual Activity Tolerance moderate    Current Activity Tolerance fair       Functional Status, IADL    Medications independent    Meal Preparation independent    Housekeeping independent    Laundry independent    Shopping independent       Mental Status Summary    Recent Changes in Mental Status/Cognitive Functioning no changes            Psychosocial     Row Name 03/13/18 5601        Values/Beliefs    Spiritual, Cultural Beliefs, Alevism Practices, Values that Affect Care no            Abuse/Neglect     Row Name 03/13/18 4713       Personal Safety    Feels Unsafe at Home or Work/School no    Feels Threatened by Someone no    Does Anyone Try to Keep You From Having Contact with Others or Doing Things Outside Your Home? no            Legal    No documentation.           Substance Abuse    No documentation.           Patient Forms    No documentation.         Kalyani Lamar RN

## 2018-03-13 NOTE — DISCHARGE PLACEMENT REQUEST
"Alessia Madrigal (89 y.o. Female)     Date of Birth Social Security Number Address Home Phone MRN    10/09/1928  [address] [address] [phone] [id number]    Druze Marital Status          [facility] Single       Admission Date Admission Type Admitting Provider Attending Provider Department, Room/Bed    3/11/18 Emergency Cameron Chisholm MD Dillon, William, MD [address] CVI, 2205/1    Discharge Date Discharge Disposition Discharge Destination                       Attending Provider:  Cameron Chisholm MD    Allergies:  Allopurinol, Clindamycin/lincomycin, Statins, Ampicillin, Penicillins, Pravastatin    Isolation:  None   Infection:  None   Code Status:  FULL    Ht:  167.6 cm (66\")   Wt:  95.8 kg (211 lb 3.2 oz)    Admission Cmt:  None   Principal Problem:  None                Active Insurance as of 3/11/2018     Primary Coverage     Payor Plan Insurance Group Employer/Plan Group    [facility] MEDICARE REPLACEMENT [facility] [id number]     Payor Plan Address Payor Plan Phone Number Effective From Effective To    [address] [address]  1/1/2016     [place] [address]       Subscriber Name Subscriber Birth Date Member ID       ALESSIA MADRIGAL 10/9/1928 [id number]                 Emergency Contacts      (Rel.) Home Phone Work Phone Mobile Phone    José Madrigal (Other) [phone] -- --    Abhilash Madrigal (Other) [phone] -- --              "

## 2018-03-13 NOTE — THERAPY EVALUATION
Acute Care - Physical Therapy Initial Evaluation  [facility]     Patient Name: Alessia Madrigal  : 10/9/1928  MRN: [id number]  Today's Date: 3/13/2018   Onset of Illness/Injury or Date of Surgery: 18  Date of Referral to PT: 18  Referring Physician: Jake      Admit Date: 3/11/2018    Visit Dx:     ICD-10-CM ICD-9-CM   1. Acute transmural inferior wall MI I21.19 410.40   2. Impaired functional mobility, balance, gait, and endurance Z74.09 V49.89     Patient Active Problem List   Diagnosis   • Actinic keratosis   • Anemia of chronic disorder   • Disorder of aorta   • Chronic coronary artery disease   • Chronic kidney disease, stage IV (severe)   • Constipation   • Cor pulmonale   • Diabetes mellitus   • High level of uric acid in blood   • Fatigue   • Hyperlipidemia   • Hypertension   • Hypothyroidism   • Iron deficiency   • Mitral valve insufficiency   • Pulmonary hypertension   • Swelling of lower extremity   • Shortness of breath   • Tricuspid valve insufficiency   • Uncontrolled type 2 diabetes mellitus   • Type 2 diabetes mellitus   • Vitamin D deficiency   • Secondary hyperparathyroidism, non-renal   • Pulmonary nodules/lesions, multiple   • Pleural effusion   • Paronychia of second finger of right hand   • Hyperuricemia   • Acute pain of both shoulders   • Acute transmural inferior wall MI     Past Medical History:   Diagnosis Date   • Arthritis 2014    CONT TYLENOL FOR PAIN/ JOSE DANIEL CHEW (INTERNAL MEDICINE)   • Cancer of uterus    • Carotid artery stenosis    • Chronic renal failure syndrome    • Coronary atherosclerosis    • Esophageal reflux    • Glaucoma    • Hypertension    • Osteoarthritis    • Sleep apnea     USING CPAP   • Spinal stenosis    • Thyroid disease    • Type 2 diabetes mellitus     INsulin begun    • Vitamin B12 deficiency     SHE HAS NOT HAD THIS CHECKED IN A WHILE. SHE HAD BEEN ON SHOTS.  CHECK HER B12 LEVELS TODAY.  BY JOSE DANIEL CHEW     Past Surgical  "History:   Procedure Laterality Date   • CARDIAC CATHETERIZATION N/A 3/11/2018    Procedure: Left Heart Cath;  Surgeon: Cameron Chisholm MD;  Location:  [facility];  Service: Cardiovascular   • CARDIAC CATHETERIZATION N/A 3/11/2018    Procedure: Stent ROSSI bypass graft;  Surgeon: Cameron Chisholm MD;  Location:  [facility];  Service: Cardiovascular   • CATARACT EXTRACTION Bilateral    • CATARACT EXTRACTION W/ INTRAOCULAR LENS IMPLANT Right 10/25/2016    Procedure: RT SUPERFICIAL KERATECTOMY ;  Surgeon: Arian Singh MD;  Location:  [facility];  Service:    • CHOLECYSTECTOMY     • COLONOSCOPY     • CORONARY ARTERY BYPASS GRAFT  1991    X3   • HYSTERECTOMY     • TRANSLUMINAL ATHERECTOMY PERONEAL ARTERY      PERCUTANEOUS TRANSLUMINAL ATHERECTOMY RENAL ARTERY        PT ASSESSMENT (last 72 hours)      Physical Therapy Evaluation     Row Name 03/13/18 1125          PT Evaluation Time/Intention    Subjective Information complains of;weakness   \"I feel very weak today\"  -JK     Document Type therapy note (daily note)  -JK     Mode of Treatment physical therapy;individual therapy  -JK     Patient Effort good  -JK     Symptoms Noted During/After Treatment none  -JK     Row Name 03/13/18 1123          General Information    Patient Profile Reviewed? yes  -JK     Onset of Illness/Injury or Date of Surgery 03/08/18  -JK     Referring Physician Jake  -JK     Patient Observations agree to therapy  -JK     Patient/Family Observations obese WF supine in bed with 2L O2 and heart monitor  -JK     Prior Level of Function independent:;all household mobility;gait;transfer;ADL's;community mobility  -JK     Equipment Currently Used at Home cane, straight;walker, rolling  -JK     Pertinent History of Current Functional Problem ambulated without AD primarily at home but used AD in community  -JK     Existing Precautions/Restrictions cardiac  -JK     Row Name 03/13/18 1120          " Relationship/Environment    Primary Source of Support/Comfort extended family  -     Lives With alone  -     Row Name 03/13/18 1125          Resource/Environmental Concerns    Current Living Arrangements home/apartment/condo   first floor apartment  -[facility] Name 03/13/18 1125          Cognitive Assessment/Intervention- PT/OT    Orientation Status (Cognition) oriented x 4  -K     Follows Commands (Cognition) WNL  -     Safety Deficit (Cognitive) ability to follow commands  -     Row Name 03/13/18 1125          Safety Issues, Functional Mobility    Impairments Affecting Function (Mobility) balance  -[facility] Name 03/13/18 1125          Bed Mobility Assessment/Treatment    Bed Mobility Assessment/Treatment supine-sit-supine  -     Supine-Sit-Supine [place] (Bed Mobility) supervision  -     Bed Mobility, Safety Issues decreased use of arms for pushing/pulling;decreased use of legs for bridging/pushing  -     Assistive Device (Bed Mobility) head of bed elevated  -     Row Name 03/13/18 1125          Transfer Assessment/Treatment    Transfer Assessment/Treatment sit-stand transfer  -     Sit-Stand [place] (Transfers) contact guard  -[facility] Name 03/13/18 1125          Sit-Stand Transfer    Assistive Device (Sit-Stand Transfers) walker, front-wheeled  -[facility] Name 03/13/18 1125          Gait/Stairs Assessment/Training    [place] Level (Gait) contact guard  -     Assistive Device (Gait) walker, front-wheeled  -     Distance in Feet (Gait) 100  -JK     Deviations/Abnormal Patterns (Gait) base of support, wide;shanda decreased   step length decreased  -     Row Name 03/13/18 1125          General ROM    GENERAL ROM COMMENTS B LEs grossly WFL for size  -[facility] Name 03/13/18 1125          General Assessment (Manual Muscle Testing)    Comment, General Manual Muscle Testing (MMT) Assessment B LEs grossly 3+/5  -[facility] Name 03/13/18 1125          Pain Assessment     "Additional Documentation Pain Scale 2: Numbers Pre/Post-Treatment (Group)  -CHRISTOPHER     Row Name 03/13/18 1125          Pain Scale 2: Numbers Pre/Post-Treatment    Pain Scale 2: Numbers, Pretreatment 0/10 - no pain  -JK     Pain Scale 2: Numbers, Post-Treatment 0/10 - no pain  -CHRISTOPHER     Row Name 03/13/18 1125          Plan of Care Review    Plan of Care Reviewed With patient  -CHRISTOPHER     Row Name 03/13/18 1125          Physical Therapy Clinical Impression    Date of Referral to PT 03/13/18  -JK     PT Diagnosis (PT Clinical Impression) impaired functional mobility, transfers, gait  -JK     Functional Level at Time of Evaluation (PT Clinical Impression) CGA with impaired tolerance to functional tasks  -JK     Patient/Family Goals Statement (PT Clinical Impression) \"I would like to go to rehab before going home\"  -JK     Criteria for Skilled Interventions Met (PT Clinical Impression) yes;treatment indicated  -JK     Pathology/Pathophysiology Noted (Describe Specifically for Each System) cardiovascular;musculoskeletal  -JK     Impairments Found (describe specific impairments) gait, locomotion, and balance;aerobic capacity/endurance  -JK     Functional Limitations in Following Categories (Describe Specific Limitations) self-care;home management;community/leisure  -JK     Rehab Potential (PT Clinical Summary) good, to achieve stated therapy goals  -JK     Predicted Duration of Therapy (PT) 3 days  -JK     Care Plan Review (PT) evaluation/treatment results reviewed  -CHRISTOPHER     Row Name 03/13/18 1125          Vital Signs    Pretreatment Heart Rate (beats/min) 77  -JK     Posttreatment Heart Rate (beats/min) 93  -JK     Pre SpO2 (%) 93  -JK     O2 Delivery Pre Treatment supplemental O2  -JK     Post SpO2 (%) 93  -JK     O2 Delivery Post Treatment room air  -JK     Pre Patient Position Supine  -JK     Intra Patient Position Sitting  -JK     Post Patient Position Supine  -JK     Row Name 03/13/18 1125          Physical Therapy Goals    Bed " Mobility Goal Selection (PT) bed mobility, PT goal 1  -JK     Transfer Goal Selection (PT) transfer, PT goal 1  -JK     Gait Training Goal Selection (PT) gait training, PT goal 1  -JK     Row Name 03/13/18 1125          Bed Mobility Goal 1 (PT)    Activity/Assistive Device (Bed Mobility Goal 1, PT) bed mobility activities, all;sit to supine/supine to sit  -JK     [place] Level/Cues Needed (Bed Mobility Goal 1, PT) supervision required  -JK     Time Frame (Bed Mobility Goal 1, PT) 3 days  -JK     Row Name 03/13/18 1125          Transfer Goal 1 (PT)    Activity/Assistive Device (Transfer Goal 1, PT) sit-to-stand/stand-to-sit  -JK     [place] Level/Cues Needed (Transfer Goal 1, PT) supervision required  -JK     Time Frame (Transfer Goal 1, PT) 3 days  -JK     Row Name 03/13/18 1125          Gait Training Goal 1 (PT)    Activity/Assistive Device (Gait Training Goal 1, PT) gait (walking locomotion);walker, rolling  -JK     [place] Level (Gait Training Goal 1, PT) supervision required  -JK     Distance (Gait Goal 1, PT) 200  -JK     Time Frame (Gait Training Goal 1, PT) 3 days  -JK     Row Name 03/13/18 1125          Positioning and Restraints    Pre-Treatment Position in bed  -JK     Post Treatment Position bed  -JK       User Key  (r) = Recorded By, (t) = Taken By, (c) = Cosigned By    Initials Name Provider Type     Capri Spear PT Physical Therapist          Physical Therapy Education     Title: PT OT SLP Therapies (Active)     Topic: Physical Therapy (Active)     Point: Mobility training (Done)    Learning Progress Summary     Learner Status Readiness Method Response Comment Documented by    Patient Done Acceptance E,JOSELITO CHOE,NR   03/13/18 1241                      User Key     Initials Effective Dates Name Provider Type Discipline     12/01/15 -  Capri Spear PT Physical Therapist PT                PT Recommendation and Plan  Planned Therapy Interventions (PT Eval): balance training, bed  mobility training, gait training, transfer training  Plan of Care Reviewed With: patient  Outcome Summary: Pt presents to PT following MI 3/8/18 and cath and stent 3/11/18. Pt has impaired transfers, bed mobility, gait, standing balance and tolerance to functional tasks. Pt completeing tasks with CGA, but feel pt would have difficulty completing ADLs, transfers and gait at independent level which she would need to do at discharge to home independently. Feel pt's poor tolerance to functional tasks would pt her at risk for fatigue and potentially increase risk for falls if returned home to function at independent level at this time and would benefit from continued skilled PT at inpatient rehab after discharge.  Plan of Care Reviewed With: patient         Time Calculation:         PT Charges     Row Name 03/13/18 1247             Time Calculation    Start Time 1100  -JK      Stop Time 1115  -JK      Time Calculation (min) 15 min  -JK      PT Goal Re-Cert Due Date 03/20/18  -JK        User Key  (r) = Recorded By, (t) = Taken By, (c) = Cosigned By    Initials Name Provider Type    CHRISTOPHER Spear PT Physical Therapist          Therapy Charges for Today     Code Description Service Date Service Provider Modifiers Qty    12982155416 HC PT EVAL MOD COMPLEXITY 2 3/13/2018 Capri Spear PT GP 1                 Capri Spear PT  3/13/2018

## 2018-03-13 NOTE — PROGRESS NOTES
"CC: Myocardial infarction    Interval History:   No chest pain or dyspnea.    Vital Signs  Temp:  [97.7 °F (36.5 °C)-98.5 °F (36.9 °C)] 97.8 °F (36.6 °C)  Heart Rate:  [] 77  Resp:  [18] 18  BP: (110-155)/(50-81) 126/71    Intake/Output Summary (Last 24 hours) at 03/13/18 0738  Last data filed at 03/12/18 1700   Gross per 24 hour   Intake              240 ml   Output              400 ml   Net             -160 ml     Flowsheet Rows    Flowsheet Row First Filed Value   Admission Height 162.6 cm (64\") Documented at 03/11/2018 1326   Admission Weight 95.8 kg (211 lb 3.2 oz) Documented at 03/11/2018 1522          PHYSICAL EXAM:  General: No acute distress  Resp:NL Rate, unlabored, clear  CV:NL rate and rhythm, NL PMI, Nl S1 and S2, no Mumur, no gallop, no rub, No JVD. Normal pedal pulses  ABD:Nl sounds, no masses or tenderness, nondistended, no quarding or rebound  Neuro: alert,cooperative and oriented  Extr: No edema or cyanosis, moves all extremities      Results Review:      Results from last 7 days  Lab Units 03/13/18  0351   SODIUM mmol/L 142   POTASSIUM mmol/L 3.8   CHLORIDE mmol/L 104   CO2 mmol/L 24.2   BUN mg/dL 21   CREATININE mg/dL 1.07*   GLUCOSE mg/dL 122*   CALCIUM mg/dL 8.7       Results from last 7 days  Lab Units 03/11/18  1321   TROPONIN T ng/mL 0.558*       Results from last 7 days  Lab Units 03/12/18  0509   WBC 10*3/mm3 8.48   HEMOGLOBIN g/dL 9.5*   HEMATOCRIT % 30.4*   PLATELETS 10*3/mm3 275       Results from last 7 days  Lab Units 03/11/18  1321   INR  0.99       Results from last 7 days  Lab Units 03/12/18  0509   CHOLESTEROL mg/dL 153           Results from last 7 days  Lab Units 03/12/18  0509   CHOLESTEROL mg/dL 153   TRIGLYCERIDES mg/dL 78   HDL CHOL mg/dL 45   LDL CHOL mg/dL 92     @LABRCNT(bnp)@  I reviewed the patient's new clinical results.  I personally viewed and interpreted the patient's EKG/Telemetry data        Medication Review:   Meds reviewed         Assessment/Plan  1.  " Acute inferior ST elevation infarct.  Status post drug eluding stent placed to the vein graft to the PDA.  Occluded vein graft to the diagonal patent left internal mammary graft to the left anterior descending.   she's doing well but hasn't been up moving around much she will consider going to rehabilitation for a while.  Plan to discharge in the morning if that's possible.  2.  Coronary disease previous cord bypass grafting.  Catheterization this admission occlusion of all native vessels.  Left internal mammary artery graft left interior descending patent retrograde fills the mid LAD and diagonals with severe disease, vein graft to diagonal occluded, vein graft to the right coronary artery now stented.  Ischemic areas were still the circumflex distribution and diagonal distribution.  Continue medical therapy.  Echocardiogram normal left ventricular ejection fraction with segmental wall motion of normality in the inferior wall.  3.  Diabetes mellitus  4.  Renal failure appears stable will recheck this in the morning.  5.  Hyperlipidemia intolerant of all statins.  6.  Hypertension.  Follow blood pressure.  7.  Mild to moderate mitral insufficiency noted on echocardiogram.        Griffin Joseph MD  03/13/18  7:38 AM

## 2018-03-13 NOTE — PROGRESS NOTES
Continued Stay Note  [facility]     Patient Name: Alessia Madrigal  MRN: [id number]  Today's Date: 3/13/2018    Admit Date: 3/11/2018          Discharge Plan     Row Name 03/13/18 1712       Plan    Plan [facility] approved.    Plan Comments Per Ana Cristina liaison for [facility], Pt is approved for rehab bed upon d/c.  Packet on [facility]CP desk ON CVU.  Per Pt, family will transport to rehab.  MARITZA Freitas/CCP              Discharge Codes    No documentation.           Kalyani Lamar RN

## 2018-03-13 NOTE — PLAN OF CARE
Problem: Patient Care Overview  Goal: Plan of Care Review   03/13/18 1241   OTHER   Outcome Summary Pt presents to PT following MI 3/8/18 and cath and stent 3/11/18. Pt has impaired transfers, bed mobility, gait, standing balance and tolerance to functional tasks. Pt is completing tasks with CGA, but feel pt would have difficulty completing ADLs, transfers and gait at independent level which she would need to do at discharge to home independently at this time. Feel pt's poor tolerance to functional tasks would pt her at risk for fatigue and potentially increase risk for falls if returned home to function at independent level at this time and would benefit from continued skilled PT at inpatient rehab after discharge.

## 2018-03-13 NOTE — PLAN OF CARE
Problem: Patient Care Overview  Goal: Plan of Care Review  Outcome: Ongoing (interventions implemented as appropriate)   03/13/18 2024   OTHER   Outcome Summary Pt VSS throughout shift, complaints of weakness, room air, 1L while sleeping, DC tomorrow to skilled nursing facility.      Goal: Individualization and Mutuality  Outcome: Ongoing (interventions implemented as appropriate)    Goal: Discharge Needs Assessment  Outcome: Ongoing (interventions implemented as appropriate)    Goal: Interprofessional Rounds/Family Conf  Outcome: Ongoing (interventions implemented as appropriate)      Problem: Fall Risk (Adult)  Goal: Identify Related Risk Factors and Signs and Symptoms  Outcome: Ongoing (interventions implemented as appropriate)    Goal: Absence of Fall  Outcome: Ongoing (interventions implemented as appropriate)      Problem: Skin Injury Risk (Adult)  Goal: Identify Related Risk Factors and Signs and Symptoms  Outcome: Ongoing (interventions implemented as appropriate)    Goal: Skin Health and Integrity  Outcome: Ongoing (interventions implemented as appropriate)

## 2018-03-14 VITALS
WEIGHT: 211.2 LBS | SYSTOLIC BLOOD PRESSURE: 110 MMHG | HEART RATE: 74 BPM | OXYGEN SATURATION: 94 % | HEIGHT: 66 IN | BODY MASS INDEX: 33.94 KG/M2 | RESPIRATION RATE: 18 BRPM | DIASTOLIC BLOOD PRESSURE: 56 MMHG | TEMPERATURE: 98.6 F

## 2018-03-14 LAB
GLUCOSE BLDC GLUCOMTR-MCNC: 157 MG/DL (ref 70–130)
GLUCOSE BLDC GLUCOMTR-MCNC: 229 MG/DL (ref 70–130)

## 2018-03-14 PROCEDURE — 63710000001 INSULIN ASPART PER 5 UNITS: Performed by: INTERNAL MEDICINE

## 2018-03-14 PROCEDURE — 82962 GLUCOSE BLOOD TEST: CPT

## 2018-03-14 PROCEDURE — 99238 HOSP IP/OBS DSCHRG MGMT 30/<: CPT | Performed by: INTERNAL MEDICINE

## 2018-03-14 RX ORDER — NITROGLYCERIN 0.4 MG/1
0.4 TABLET SUBLINGUAL
Qty: 25 TABLET | Refills: 12 | Status: SHIPPED | OUTPATIENT
Start: 2018-03-14 | End: 2019-08-22 | Stop reason: SDUPTHER

## 2018-03-14 RX ORDER — LOSARTAN POTASSIUM 50 MG/1
50 TABLET ORAL DAILY
Qty: 30 TABLET | Refills: 11 | Status: SHIPPED | OUTPATIENT
Start: 2018-03-14 | End: 2018-03-22 | Stop reason: SDUPTHER

## 2018-03-14 RX ORDER — ISOSORBIDE MONONITRATE 120 MG/1
120 TABLET, EXTENDED RELEASE ORAL DAILY
Qty: 30 TABLET | Refills: 11 | Status: SHIPPED | OUTPATIENT
Start: 2018-03-14 | End: 2018-07-17

## 2018-03-14 RX ADMIN — SPIRONOLACTONE 25 MG: 25 TABLET ORAL at 08:39

## 2018-03-14 RX ADMIN — METOPROLOL SUCCINATE 50 MG: 50 TABLET, FILM COATED, EXTENDED RELEASE ORAL at 08:35

## 2018-03-14 RX ADMIN — CLOPIDOGREL 75 MG: 75 TABLET, FILM COATED ORAL at 08:36

## 2018-03-14 RX ADMIN — INSULIN ASPART 2 UNITS: 100 INJECTION, SOLUTION INTRAVENOUS; SUBCUTANEOUS at 08:00

## 2018-03-14 RX ADMIN — ISOSORBIDE MONONITRATE 120 MG: 30 TABLET ORAL at 08:35

## 2018-03-14 RX ADMIN — LOSARTAN POTASSIUM 50 MG: 50 TABLET, FILM COATED ORAL at 08:36

## 2018-03-14 RX ADMIN — INSULIN DETEMIR 10 UNITS: 100 INJECTION, SOLUTION SUBCUTANEOUS at 08:36

## 2018-03-14 RX ADMIN — ASPIRIN 81 MG: 81 TABLET, CHEWABLE ORAL at 08:36

## 2018-03-14 RX ADMIN — LEVOTHYROXINE SODIUM 125 MCG: 125 TABLET ORAL at 08:36

## 2018-03-14 RX ADMIN — INSULIN ASPART 3 UNITS: 100 INJECTION, SOLUTION INTRAVENOUS; SUBCUTANEOUS at 12:01

## 2018-03-14 RX ADMIN — NIFEDIPINE 90 MG: 60 TABLET, FILM COATED, EXTENDED RELEASE ORAL at 08:36

## 2018-03-14 NOTE — PLAN OF CARE
Problem: Patient Care Overview  Goal: Plan of Care Review  Outcome: Ongoing (interventions implemented as appropriate)   03/14/18 0318   OTHER   Outcome Summary Pt VSS. Complaints of generalized weakness, mildly improving. 2L NC while sleeping. DC tomorrow to SNF.    Coping/Psychosocial   Plan of Care Reviewed With patient   Plan of Care Review   Progress no change     Goal: Individualization and Mutuality  Outcome: Ongoing (interventions implemented as appropriate)   03/14/18 0318   Individualization   Patient Specific Preferences Pt prefers her door open     Goal: Discharge Needs Assessment  Outcome: Ongoing (interventions implemented as appropriate)   03/13/18 1356   Discharge Needs Assessment   Patient/Family Anticipates Transition to inpatient rehabilitation facility   Patient/Family Anticipated Services at Transition skilled nursing   Discharge Facility/Level of Care Needs nursing facility, skilled   Current Discharge Risk lives alone       Problem: Fall Risk (Adult)  Goal: Identify Related Risk Factors and Signs and Symptoms  Outcome: Ongoing (interventions implemented as appropriate)   03/14/18 0318   Fall Risk (Adult)   Related Risk Factors (Fall Risk) age-related changes;bladder function altered;fatigue/slow reaction;gait/mobility problems;sleep pattern alteration;environment unfamiliar;sensory deficits   Signs and Symptoms (Fall Risk) presence of risk factors     Goal: Absence of Fall  Outcome: Ongoing (interventions implemented as appropriate)   03/14/18 0318   Fall Risk (Adult)   Absence of Fall making progress toward outcome       Problem: Skin Injury Risk (Adult)  Goal: Identify Related Risk Factors and Signs and Symptoms  Outcome: Ongoing (interventions implemented as appropriate)   03/14/18 0318   Skin Injury Risk (Adult)   Related Risk Factors (Skin Injury Risk) advanced age;body weight extremes;edema;mechanical forces     Goal: Skin Health and Integrity  Outcome: Ongoing (interventions implemented  as appropriate)   03/14/18 4162   Skin Injury Risk (Adult)   Skin Health and Integrity making progress toward outcome

## 2018-03-14 NOTE — PROGRESS NOTES
Case Management Discharge Note    Final Note: Pt d/c to [facility] SNF via private auto.     Destination - Selection Complete     Service Request Status Selected Specialties Address Phone Number Fax Number    [facility] Selected Skilled Nursing Facility [address] [id number] [phone]      Durable Medical Equipment     No service coordination in this encounter.      Dialysis/Infusion     No service coordination in this encounter.      Home Medical Care     No service coordination in this encounter.      Social Care     No service coordination in this encounter.        Other:  (private auto)    Final Discharge Disposition Code: 03 - skilled nursing facility (SNF)

## 2018-03-16 ENCOUNTER — TELEPHONE (OUTPATIENT)
Dept: CARDIOLOGY | Facility: CLINIC | Age: 83
End: 2018-03-16

## 2018-03-16 RX ORDER — FUROSEMIDE 40 MG/1
40 TABLET ORAL 2 TIMES DAILY
Qty: 60 TABLET | Refills: 3 | Status: SHIPPED | OUTPATIENT
Start: 2018-03-16

## 2018-03-16 NOTE — TELEPHONE ENCOUNTER
*for some reason this call was left in Dr. Chisholm's voicemail      [place] [facility] [place]'s furosemide.    Prior to hospital admission on 3/11. Dr. Joseph had her taking furosemide 40mg BID, but upon discharge it was increase to 80mg BID.    The patient is insisting that she should be taking 40mg BID.    The nurse requested a call back at [phone] & ask for the [address] [place] [address] Nurse.    Thanks!

## 2018-03-22 ENCOUNTER — OFFICE VISIT (OUTPATIENT)
Dept: CARDIOLOGY | Facility: CLINIC | Age: 83
End: 2018-03-22

## 2018-03-22 ENCOUNTER — TELEPHONE (OUTPATIENT)
Dept: INTERNAL MEDICINE | Facility: CLINIC | Age: 83
End: 2018-03-22

## 2018-03-22 VITALS
HEART RATE: 66 BPM | DIASTOLIC BLOOD PRESSURE: 52 MMHG | WEIGHT: 208 LBS | HEIGHT: 65 IN | BODY MASS INDEX: 34.66 KG/M2 | SYSTOLIC BLOOD PRESSURE: 120 MMHG

## 2018-03-22 DIAGNOSIS — I10 ESSENTIAL HYPERTENSION: ICD-10-CM

## 2018-03-22 DIAGNOSIS — I25.810 CORONARY ARTERY DISEASE INVOLVING CORONARY BYPASS GRAFT OF NATIVE HEART WITHOUT ANGINA PECTORIS: ICD-10-CM

## 2018-03-22 DIAGNOSIS — IMO0002 UNCONTROLLED TYPE 2 DIABETES MELLITUS WITH STAGE 4 CHRONIC KIDNEY DISEASE, WITH LONG-TERM CURRENT USE OF INSULIN: ICD-10-CM

## 2018-03-22 DIAGNOSIS — N18.4 CHRONIC KIDNEY DISEASE, STAGE IV (SEVERE) (HCC): ICD-10-CM

## 2018-03-22 DIAGNOSIS — Z95.820 S/P ANGIOPLASTY WITH STENT: Primary | ICD-10-CM

## 2018-03-22 DIAGNOSIS — E78.2 MIXED HYPERLIPIDEMIA: ICD-10-CM

## 2018-03-22 DIAGNOSIS — I34.0 NON-RHEUMATIC MITRAL REGURGITATION: ICD-10-CM

## 2018-03-22 PROCEDURE — 93000 ELECTROCARDIOGRAM COMPLETE: CPT | Performed by: NURSE PRACTITIONER

## 2018-03-22 PROCEDURE — 99214 OFFICE O/P EST MOD 30 MIN: CPT | Performed by: NURSE PRACTITIONER

## 2018-03-22 NOTE — TELEPHONE ENCOUNTER
Home health liaison called and stated that [facility] and was calling to see if they could get orders from [facility] for home health for nursing PT/OT.     Verbal orders given per .

## 2018-03-22 NOTE — ED PROVIDER NOTES
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 Karmen Fisher  No date: OTHER SURGICAL HISTORY      Commen No CVA tenderness. Extremities: No cyanosis, no edema or clubbing. Pulses are +2. Full range of motion is noted of the extremities without deformities. No tenderness. Neurologically intact. No rigidity.   Spasms have extinguished here in the emergency Follow-up as an outpatient with her primary care physician. Discharged in good condition.           Disposition and Plan     Clinical Impression:  Anxiety  (primary encounter diagnosis)  Spasm of muscle    Disposition:  Discharge  3/22/2018 12:01 pm    Fol

## 2018-03-22 NOTE — PROGRESS NOTES
Date of Office Visit: 2018  Encounter Provider: MILVIA Iniguez  Place of Service: [facility] CARDIOLOGY  Patient Name: Alessia Madrigal  :10/9/1928    Chief Complaint   Patient presents with   • Coronary Artery Disease   • Hypertension   • Shortness of Breath   • Edema   • Fatigue   :     HPI: Alessia Madrigal is a 89 y.o. female is a patient of Dr. Joseph. I am seeing her for the first time and have reviewed her records.  Her past medical history is significant of coronary artery disease status post CABG (), carotid artery stenosis, hypertension, sleep apnea on CPAP,renal stent placement. Chronic renal failure,  diabetes on insulin, osteoarthritis, glaucoma, GERD, hypothyroidism, and uterine cancer.   In  she had coronary bypass grafting with Dr. Thomas.  In , she had repeat cardiac catheterization which showed severe three-vessel coronary artery disease with occlusion offt anterior descending and right coronary artery and circumflex the left internal mammary graft left anterior descending was patent the vein graft to the diagonal was patent vein graft to the right coronary was patent and the circumflex filled by collaterals was only ischemic area.  He is also had previous left renal artery stents in his follow with nephrology for renal failure.  She is also followed by vascular services for mild carotid artery disease.   She had a normal stress test with myocardial perfusion SPECT on 2016.She was last seen in this office on 2017 to be established with the office.  That time, she was complaining of shortness of breath that she believed was gradually worsening over the past year.  She also complained that the spinal stenosis and arthritis makes it difficult for her to walk and pinpoint the exact cause of shortness of breath.    She had transient, mild chest pain on Thursday 3/8.  She reports that she took nitroglycerin sublingual 1 on   separate days.  Then on Saturday night 3/10 she had a more constant mild chest pain and new that she should get this checked out.  However, she waited to present to the ER until Sunday where she was admitted on 3/11/2018 with an acute inferior myocardial infarction.  She was taken to the cardiac catheterization lab and found to have occluded vein graft to the diagonal, patent left internal mammary graft to left anterior descending, severe disease of the vein graft to the posterior descending artery.  She also had severe three-vessel disease and normal left ventricular ejection fraction with inferior wall motion abnormality.  She had drug-eluting stent placed to the vein graft to the PDA.  Residual ischemic areas being the diagonal distribution circumflex distribution and mid left anterior descending. She had no evidence of dissection.  She had a transthoracic echocardiogram that revealed normal left ventricular systolic function with estimated EF of 57%, grade 2 diastolic dysfunction, mild to moderate mitral regurgitation, mild tricuspid valve regurgitation, and normal RVSP.  On 3/13/2018 her creatinine was 1.07 and her EGFR was 48.  Her hemoglobin A1c was elevated 8.7.  The lipid panel was unremarkable. She was discharged to a rehab facility.    She presents today for follow-up.  She is due to go home today from the rehabilitation facility.  She does live alone in only has 3 nephews that live in [place].  He reports that the nephews rarely check on how but that she has friends who live close by who will be good resources and help her around the house into the grocery.  She denies chest pain, palpitations, dizziness, lightheadedness, near syncope, syncope, or blood in the urine or stool.  She has not fallen.  She has some chronic fatigue and lower extremity edema.  The right lower extremity has always been more swollen than the left due to that P and the harvest leg.  She has some residual shortness of breath  "grades as \"a little\".  This has greatly improved since she was hospitalized.  She ambulates with a walker.  He has a sister that has been hospitalized for 2 weeks that is also a patient of Dr. Joseph.  She is a retired . She has a cat that has been over a friend's house that she is excited to get back home to.    Allergies   Allergen Reactions   • Allopurinol    • Clindamycin/Lincomycin Itching     Felt like she was burning and itching around the neck   • Statins Myalgia   • Ampicillin Rash   • Penicillins Rash   • Pravastatin Myalgia       Past Medical History:   Diagnosis Date   • Acute transmural inferior wall MI    • Arthritis 03/13/2014    CONT TYLENOL FOR PAIN/ JOSE DANIEL CHEW (INTERNAL MEDICINE)   • CAD (coronary artery disease)    • Cancer of uterus    • Carotid artery stenosis    • Chronic renal failure syndrome    • Coronary artery disease involving coronary bypass graft of native heart     with other forms of angina pectoris   • Coronary atherosclerosis    • Esophageal reflux    • Essential hypertension    • Glaucoma    • Hypertension    • Malaise and fatigue    • Osteoarthritis    • Sleep apnea     USING CPAP   • Spinal stenosis    • Thyroid disease    • Type 2 diabetes mellitus 1991    INsulin begun 2012   • Vitamin B12 deficiency     SHE HAS NOT HAD THIS CHECKED IN A WHILE. SHE HAD BEEN ON SHOTS.  CHECK HER B12 LEVELS TODAY.  BY JOSE DANIEL CHEW       Past Surgical History:   Procedure Laterality Date   • CARDIAC CATHETERIZATION N/A 3/11/2018    Procedure: Left Heart Cath;  Surgeon: Cameron Chisholm MD;  Location: [facility] CATH INVASIVE LOCATION;  Service: Cardiovascular   • CARDIAC CATHETERIZATION N/A 3/11/2018    Procedure: Stent ROSSI bypass graft;  Surgeon: Cameron Chisholm MD;  Location:  [facility] CATH INVASIVE LOCATION;  Service: Cardiovascular   • CATARACT EXTRACTION Bilateral    • CATARACT EXTRACTION W/ INTRAOCULAR LENS IMPLANT Right 10/25/2016    Procedure: RT SUPERFICIAL KERATECTOMY ;  " "Surgeon: Arian Singh MD;  Location: [facility] OR [facility];  Service:    • CHOLECYSTECTOMY     • COLONOSCOPY     • CORONARY ARTERY BYPASS GRAFT  1991    X3   • HYSTERECTOMY     • TRANSLUMINAL ATHERECTOMY PERONEAL ARTERY      PERCUTANEOUS TRANSLUMINAL ATHERECTOMY RENAL ARTERY         Family and social history reviewed.     Review of Systems   Constitution: Positive for malaise/fatigue.   Cardiovascular: Positive for dyspnea on exertion and leg swelling (R>L).   Respiratory: Positive for shortness of breath.    Hematologic/Lymphatic: Negative for bleeding problem.   Musculoskeletal: Positive for arthritis, falls and joint pain.     All other systems were reviewed and are negative          Objective:     Vitals:    03/22/18 1307   BP: 120/52   BP Location: Left arm   Patient Position: Sitting   Pulse: 66   Weight: 94.3 kg (208 lb)   Height: 163.8 cm (64.5\")     Body mass index is 35.15 kg/m².    PHYSICAL EXAM:  Physical Exam   Constitutional: She is oriented to person, place, and time. She appears well-developed and well-nourished. No distress.   HENT:   Head: Normocephalic.   Eyes: Conjunctivae are normal.   Glasses on    Neck: Normal range of motion. No JVD present.   Cardiovascular: Normal rate, regular rhythm, normal heart sounds and intact distal pulses.    No murmur heard.  Pulses:       Carotid pulses are 2+ on the right side, and 2+ on the left side.       Radial pulses are 2+ on the right side, and 2+ on the left side.        Posterior tibial pulses are 2+ on the right side, and 2+ on the left side.   Pulmonary/Chest: Effort normal and breath sounds normal. No respiratory distress. She has no wheezes. She has no rhonchi. She has no rales. She exhibits no tenderness.   Abdominal: Soft. Bowel sounds are normal. She exhibits no distension.   Musculoskeletal: Normal range of motion. She exhibits edema (R >L ).   Neurological: She is alert and oriented to person, place, and time.   Skin: Skin is warm, dry and " intact. No rash noted. She is not diaphoretic. No cyanosis.   Psychiatric: She has a normal mood and affect. Her behavior is normal. Judgment and thought content normal.         ECG 12 Lead  Date/Time: 3/22/2018 1:56 PM  Performed by: RHETT SANTOS  Authorized by: RHETT SANTOS   Rhythm: sinus rhythm  Rate: normal  BPM: 66  ST Segments: ST segments normal  T depression: II, III, aVF, V3, V4, V5 and V6  QRS axis: left  Clinical impression: abnormal ECG            Current Outpatient Prescriptions   Medication Sig Dispense Refill   • acetaminophen (TYLENOL) 500 MG tablet Take 500 mg by mouth every 4 (four) hours as needed for mild pain (1-3) (Back Pain). EVERY 4 TO 6 HOURS AS NEEDED     • aspirin 81 MG chewable tablet Chew 81 mg Daily.     • BETIMOL 0.5 % ophthalmic solution Administer 1 drop to both eyes Tonight.     • clopidogrel (PLAVIX) 75 MG tablet Take 1 tablet by mouth Daily. 90 tablet 1   • Coenzyme Q10 (CO Q-10) 300 MG capsule Take 300 mg by mouth daily.     • furosemide (LASIX) 40 MG tablet Take 1 tablet by mouth 2 (Two) Times a Day. 60 tablet 3   • insulin detemir (LEVEMIR) 100 UNIT/ML injection Inject 10 Units under the skin daily. (Patient taking differently: Inject 8 Units under the skin Every Night.) 5 pen 3   • insulin lispro (HumaLOG) 100 UNIT/ML patient supplied pump Inject 5 Units under the skin See Admin Instructions. Patient takes 5 units after each meal     • Insulin Pen Needle (BD PEN NEEDLE LISA U/F) 32G X 4 MM misc USE AS DIRECTED THREE TIMES A DAY. 300 each 3   • isosorbide mononitrate (IMDUR) 120 MG 24 hr tablet Take 1 tablet by mouth Daily. 30 tablet 11   • levothyroxine (SYNTHROID, LEVOTHROID) 125 MCG tablet TAKE 1 TABLET DAILY AS DIRECTED 90 tablet 3   • losartan (COZAAR) 50 MG tablet Take 1 tablet by mouth Daily. 90 tablet 3   • metoprolol succinate XL (TOPROL-XL) 50 MG 24 hr tablet Take 1 tablet by mouth Daily. (Patient taking differently: Take 50 mg by mouth 2 (Two) Times a Day.) 180  tablet 0   • NIFEdipine XL (PROCARDIA XL) 90 MG 24 hr tablet Take 1 tablet by mouth Daily. 90 tablet 3   • nitroglycerin (NITROSTAT) 0.4 MG SL tablet Place 1 tablet under the tongue Every 5 (Five) Minutes As Needed for Chest Pain. Take no more than 3 doses in 15 minutes. 25 tablet 12   • raNITIdine (ZANTAC) 150 MG tablet Take 150 mg by mouth 2 (Two) Times a Day.     • spironolactone (ALDACTONE) 25 MG tablet Take 1 tablet by mouth Daily. 90 tablet 3   • timolol (TIMOPTIC) 0.25 % ophthalmic solution Administer 1 drop to both eyes Tonight.     • travoprost, BAK free, (TRAVATAN) 0.004 % solution ophthalmic solution Administer 1 drop to both eyes every night. in affected eye(s)        No current facility-administered medications for this visit.      Assessment:       Diagnosis Plan   1. S/P angioplasty with stent  Ambulatory Referral to Cardiac Rehab   2. Coronary artery disease involving coronary bypass graft of native heart without angina pectoris  Ambulatory Referral to Cardiac Rehab   3. Essential hypertension     4. Mixed hyperlipidemia     5. Non-rheumatic mitral regurgitation     6. Uncontrolled type 2 diabetes mellitus with stage 4 chronic kidney disease, with long-term current use of insulin     7. Chronic kidney disease, stage IV (severe)          Orders Placed This Encounter   Procedures   • Ambulatory Referral to Cardiac Rehab     Referral Priority:   Routine     Referral Type:   Rehabilitation - Outpatient     Number of Visits Requested:   1   • ECG 12 Lead     This order was created via procedure documentation         Plan:    1.  89-year-old female with history of severe coronary artery disease with previous CABG in 1991.  She had a normal perfusion stress test in November 2016.  However, she had an Inferior STEMI  On 3/11/2018 and had cardiac catheterization that revealed occluded vein graft to the diagonal, patent left internal mammary graft to left anterior descending, severe disease of the vein graft  to the posterior descending artery.  She also had severe three-vessel disease and normal left ventricular ejection fraction with inferior wall motion abnormality.  She had drug-eluting stent placed to the vein graft to the PDA.  Residual ischemic areas being the diagonal distribution circumflex distribution and mid left anterior descending. She is being discharged today from the rehabilitation facility and will start home health services next week.  She will have home health for 2 weeks and then she will start cardiac rehabilitation as I am sending over her referral today.    Coronary Artery Disease  Assessment  • The patient has no angina    Plan  • Lifestyle modifications discussed include adhering to a heart healthy diet, avoidance of tobacco products, maintenance of a healthy weight, medication compliance, regular exercise and regular monitoring of cholesterol and blood pressure    Subjective - Objective  • There is a history of past MI on or around 3/11/2018  • There is a history of previous coronary artery bypass graft 1991  • There has been a previous stent procedure using ROSSI on or around 3/11/2018  • Current antiplatelet therapy includes aspirin 81 mg and clopidogrel 75 mg      2.  Hypertension-blood pressure today well controlled 120/55.  3.  Hyperlipidemia- no current therapy, Lipid panel on 3/12/2018 was unremarkable.  Total 153, triglycerides 78, HDL 45, LDL 92.  she has been intolerant of multiple statins in the past and considering her age it was deemed not to pursue any further treatment.  4.  Stage III renal failure-she is followed by Dr. Shields with nephrology  5.  Artery stenosis status post left renal artery stenting  6.  Mild carotid artery disease-is followed by vascular line 7 obstructive sleep apnea on CPAP  8. Diabetes Mellitus- hemoglobin A1c was 8.710 days ago in 2 months ago was 8.2.  She is on oral hypoglycemic and insulin.  She occasionally has low blood sugars as she is symptomatic in  the 80 and 90 range.  Follow up as scheduled with Dr. Joseph    Patient was instructed to call the office if new symptoms develop or report to nearest ER if heart attack or stroke is suspected.      It has been a pleasure to participate in this patient's care.      Thank you,  MILVIA Iniguez      **Obdulio Disclaimer:**  Much of this encounter note is an electronic transcription/translation of spoken language to printed text. The electronic translation of spoken language may permit erroneous, or at times, nonsensical words or phrases to be inadvertently transcribed. Although I have reviewed the note for such errors, some may still exist.

## 2018-03-23 ENCOUNTER — TELEPHONE (OUTPATIENT)
Dept: INTERNAL MEDICINE | Facility: CLINIC | Age: 83
End: 2018-03-23

## 2018-03-23 NOTE — TELEPHONE ENCOUNTER
Will be seeing her once a week for three weeks to get her educated on new medicines.     Home health has some questions about her cardiologist about her cholesterol medicine and parameters on her BP med's. ()

## 2018-03-26 ENCOUNTER — TELEPHONE (OUTPATIENT)
Dept: CARDIAC REHAB | Facility: HOSPITAL | Age: 83
End: 2018-03-26

## 2018-03-26 NOTE — TELEPHONE ENCOUNTER
[place] [facility] [place]  Called & spoke with patient.  Gave her an appt to start cardiac rehab here at [facility] location on April 16, 2018 at 1:30 pm, provided directions to the facility and told her what she should bring with her to that 1st appt including a list of her current medications, photo ID, & insurance card(s).  I did encourage the patient to call her insurance company to see if she has any deductibles or copays.  I made sure she has the number to call in case she needs to reschedule or cancel this appointment.  The reason for the delayed appointment is because patient states that home health told her they would follow her for 3 weeks.  Per insurance guidelines, she cannot do cardiac rehab while home health is following her. Thank you for referring this very nice lady!

## 2018-03-26 NOTE — TELEPHONE ENCOUNTER
Thank you Samaria Mera. I do appreciate these updates as I am new to this practice, new to ordering cardiac rehab, and I learn from your messages. AL

## 2018-04-02 ENCOUNTER — TELEPHONE (OUTPATIENT)
Dept: INTERNAL MEDICINE | Facility: CLINIC | Age: 83
End: 2018-04-02

## 2018-04-02 NOTE — TELEPHONE ENCOUNTER
Pt called and stated that she needs her handicap tag renewed.   She misplaced the one she has during hospital trips.

## 2018-04-16 ENCOUNTER — OFFICE VISIT (OUTPATIENT)
Dept: CARDIAC REHAB | Facility: HOSPITAL | Age: 83
End: 2018-04-16

## 2018-04-16 VITALS — HEIGHT: 65 IN | BODY MASS INDEX: 34.95 KG/M2 | WEIGHT: 209.8 LBS

## 2018-04-16 DIAGNOSIS — I21.3 ST ELEVATION MYOCARDIAL INFARCTION (STEMI), UNSPECIFIED ARTERY (HCC): Primary | ICD-10-CM

## 2018-04-16 DIAGNOSIS — Z95.5 STATUS POST INSERTION OF DRUG ELUTING CORONARY ARTERY STENT: ICD-10-CM

## 2018-04-16 PROCEDURE — 93797 PHYS/QHP OP CAR RHAB WO ECG: CPT

## 2018-04-16 NOTE — PROGRESS NOTES
Cardiac Rehab Initial Assessment      Name: Alessia Madrigal  :10/9/1928 Allergies:Allopurinol; Clindamycin/lincomycin; Statins; Ampicillin; Penicillins; and Pravastatin   MRN: [id number] 89 y.o. Physician: Summer Gordon MD   Primary Diagnosis:    Diagnosis Plan   1. ST elevation myocardial infarction (STEMI), unspecified artery     2. Status post insertion of drug eluting coronary artery stent      Event Date: 3/11/18 Specialist: Dr. Joseph   Secondary Diagnosis:  Risk Stratification:Moderate Risk Note Author: Cindi Bailey RN     Cardiovascular History: Comments was having chest pain for 3 days prior to coming into the hospital Had CABG 27 years ago     EXERCISE AT HOME  no  Was having therapy for shoulders was doing shoulder circles and other exercises to improve shoulder motion.  N/A    EF: 57%      Source: Echo 3/12/18          Ambulatory Status:Cane  Ambulatory Fall Risk Assessed on Initial Visit: yes 6 Minute Walk Pre- Cardiac Rehab:  Distance:792ft      RPE:3  Max. HR: 92       SPO2:94    MET: 2.1  MPH: 1.5 by calculation used Nu-Step had 238 steps.              RPD: 3  Resting BP: 130/70 LA, [id number] RA    Peak BP: 156/70  Recovery BP: 140/72  Comments: Walks with a cane.  Has back pain with walking used a Nu-Step for the walk test.  Tolerated Nu-Step without difficulty.      NUTRITION  Lipids:yes If yes, labs as follows;  Total: No components found for: CHOLESTEROL  HDL:   HDL Cholesterol   Date Value Ref Range Status   2018 45 40 - 60 mg/dL Final    Lipids continued:  LDL:  LDL Cholesterol    Date Value Ref Range Status   2018 92 0 - 100 mg/dL Final     Triglyceride: No components found for: TRIGLYCERIDE   Weight Management:                 Weight: 209.8  Height: 64.5                         BMI: Body mass index is 35.46 kg/m².  Waist Circumference: 48  inches   Alcohol Use: defer Diabetes:Yes,  Monitors BS at home- yes, Frequency: 1 - 2  times daily, Random BS: 188 this  morning    Last HGBA1C with date if applicable:No components found for: A1C         SOCIAL HISTORY  Social History     Social History   • Marital status: Single     Social History Main Topics   • Smoking status: Never Smoker   • Smokeless tobacco: Never Used   • Alcohol use No      Comment: caffeine use   • Drug use: Unknown   • Sexual activity: Defer     Other Topics Concern   • Not on file     Educational Level (choose one that applies) college graduate  Was a teacher - high school  Learning Barriers:Ready to Learn    Family Support:yes    Living Arrangement: lives alone    Risk Factors: Stress  Yes, Clinical Depression  No, Heredity  Yes If Yes brother and father and great--grandmother, Hyperlipidemia  Yes, Diabetes  Yes If Yes: Do you check blood glucose daily  Yes Today's glucose level 188, Exercise prior to event  No If Yes: Activity sedentary, Minutes per zia, Days per week na and Obesity  Yes   Will see Dr. Joseph this Thursday 4/19/18     Tobacco Adjunct: No  never      Comorbidities: Malignancy, Pulmonary disease, Renal disease, Diabetes Mellitus and CABG 27 years ago   Uterine Cancer 1998  CKD Stage between 3-4   BRITTANY with CPAP  Hypothyroid       PSYCHOSOCIAL  Clinical Depression: no    Stress: yes     Assess presence or absence of depression using a valid screening tool: yes      PHYSICAL ASSESSMENT  Influenza vaccine: yes  Pneumococcal vaccine: yes          Angina: no    Describe angina scale of 0 - 4: 0 = none  Describes she had a few twinges but no pain    Today are you having incisional pain? N/A. If, Yes, Scale: na        Today are you having any other pain? No. If, Yes, Scale: has spinal stenosis and degenerative arthritis, has no pain when sitting .  Also has arthritis of her hands and that has been bothering her a lot for last 3-4 months. Has knee pain from arthritis.      Diagnosed with Hypertension:yes    Heart Sounds: regular rhythm without murmur    Lung Sounds: normal air entry, lungs  clear to auscultation         Assessment: Uses a cane, has some ankle and foot swelling.   Orthopedic Problems: has spinal stenosis and degenerative arthritis, has no pain when sitting .  Also has arthritis of her hands and that has been bothering her a lot for last 3-4 months. Has knee pain from arthritis.       Are you being hurt, hit, or frightened by anyone at home or in your life? no    Are you being neglected by a caregiver? N/A Shoulder flexibility/Range of motion: Below average     Recommended arm activity: currently not able to lift overhead    Chair sit and reach within 10 -12 inches   Leg flexibility: Below average    Leg Strength/Balance/Five times sit to stand: 0 seconds.   Unable to perform uses a cane to help herself up  Chose one: Balance Dysfunction    Recommended stretching: Chair    Assessment:Balance dysfunction and below average on flexibility noted.    Family attends IA: no Time of arrival: 1330  Time of departure: 1445     Patient Goals: Would like to improve energy level to do chores at her apartment.  Needs to unpack some boxes from a move months ago.  Usually sits on a stool that is counter height to do her kitchen chores such as cooking and dishwashing.  Does her own grocery shopping.  Needs to be able to carry her groceries into the apartment, can use a cart or walker that has a tray to help carry things.  Wants to do her vacuuming and changing her linens and be able to do laundry.           4/16/2018  3:14 PM  Cindi Bailey RN

## 2018-04-18 ENCOUNTER — TREATMENT (OUTPATIENT)
Dept: CARDIAC REHAB | Facility: HOSPITAL | Age: 83
End: 2018-04-18

## 2018-04-18 DIAGNOSIS — I21.3 ST ELEVATION MYOCARDIAL INFARCTION (STEMI), UNSPECIFIED ARTERY (HCC): Primary | ICD-10-CM

## 2018-04-18 PROCEDURE — 93798 PHYS/QHP OP CAR RHAB W/ECG: CPT

## 2018-04-19 ENCOUNTER — OFFICE VISIT (OUTPATIENT)
Dept: CARDIOLOGY | Facility: CLINIC | Age: 83
End: 2018-04-19

## 2018-04-19 VITALS
WEIGHT: 213 LBS | BODY MASS INDEX: 36.37 KG/M2 | HEIGHT: 64 IN | HEART RATE: 63 BPM | SYSTOLIC BLOOD PRESSURE: 140 MMHG | DIASTOLIC BLOOD PRESSURE: 80 MMHG

## 2018-04-19 DIAGNOSIS — I10 ESSENTIAL HYPERTENSION: ICD-10-CM

## 2018-04-19 DIAGNOSIS — E11.59 TYPE 2 DIABETES MELLITUS WITH OTHER CIRCULATORY COMPLICATION, WITHOUT LONG-TERM CURRENT USE OF INSULIN (HCC): ICD-10-CM

## 2018-04-19 DIAGNOSIS — E78.2 MIXED HYPERLIPIDEMIA: ICD-10-CM

## 2018-04-19 DIAGNOSIS — N18.4 CHRONIC KIDNEY DISEASE, STAGE IV (SEVERE) (HCC): ICD-10-CM

## 2018-04-19 DIAGNOSIS — I25.810 CORONARY ARTERY DISEASE INVOLVING CORONARY BYPASS GRAFT OF NATIVE HEART WITHOUT ANGINA PECTORIS: Primary | ICD-10-CM

## 2018-04-19 PROCEDURE — 99214 OFFICE O/P EST MOD 30 MIN: CPT | Performed by: INTERNAL MEDICINE

## 2018-04-19 PROCEDURE — 93000 ELECTROCARDIOGRAM COMPLETE: CPT | Performed by: INTERNAL MEDICINE

## 2018-04-19 NOTE — PROGRESS NOTES
Date of Office Visit: 18  Encounter Provider: Griffin Joseph MD  Place of Service: [facility] CARDIOLOGY  Patient Name: Alessia Madrigal  :10/9/1928  Referral Provider:No ref. provider found      Chief Complaint   Patient presents with   • Coronary Artery Disease     History of Present Illness  Mrs Madrigal is a pleasant 89 yo female with history of coronary disease, previous colon bypass grafting, hypertension, diabetes mellitus, hyperlipidemia, renal failure, previous renal artery stenting.  In  she had coronary bypass grafting in  had repeat cardiac catheterization which showed severe three-vessel coronary disease with occlusion of left anterior descending and right coronary artery and circumflex the left internal mammary graft left anterior descending was patent the vein graft to the diagonal was patent vein graft to the right coronary was patent and the circumflex filled by collaterals was only ischemic area.  She's also had previous left renal artery stent follows with nephrology for renal failure.  She has carotid artery disease follows with vascular surgery just mild.    She then presented in 2018 with acute inferior ST elevation infarct.  Cardiac catheterization showed severe three-vessel coronary disease, normal left ventricular ejection fraction with inferior wall motion abnormality she had occluded vein graft to the diagonal patent left internal mammary graft left anterior descending severe disease in the vein graft to the posterior descending artery.  She had a 3.5 x 23 mm drug-eluting stent placed in that vein graft.  Residual ischemic area being the diagonal distribution circumflex distribution and mid left anterior descending treated medically.  Echocardiogram showed left ventricular ejection fraction of 57% with wall motion abnormality, grade 2 diastolic dysfunction, mild to moderate mitral insufficiency mild tricuspid insufficiency with normal  RV systolic pressure.  She now comes in for follow-up.  She disorders practitioner about 4 weeks ago and has been doing well she's been in cardiac rehabilitation.  She denies any chest pain or pressure.  Stools a little fatigued denies orthopnea or PND denies palpitations near syncope or syncope.      Coronary Artery Disease   Pertinent negatives include no dizziness, muscle weakness or weight gain. There is no history of past myocardial infarction.         Past Medical History:   Diagnosis Date   • Acute transmural inferior wall MI    • Arthritis 03/13/2014    CONT TYLENOL FOR PAIN/ [facility]W (INTERNAL MEDICINE)   • CAD (coronary artery disease)    • Cancer of uterus    • Carotid artery stenosis    • Chronic renal failure syndrome    • Coronary artery disease involving coronary bypass graft of native heart     with other forms of angina pectoris   • Coronary atherosclerosis    • Esophageal reflux    • Essential hypertension    • Glaucoma    • Hypertension    • Malaise and fatigue    • Osteoarthritis    • Sleep apnea     USING CPAP   • Spinal stenosis    • Thyroid disease    • Type 2 diabetes mellitus 1991    INsulin begun 2012   • Vitamin B12 deficiency     SHE HAS NOT HAD THIS CHECKED IN A WHILE. SHE HAD BEEN ON SHOTS.  CHECK HER B12 LEVELS TODAY.  BY JOSE DANIEL CHEW         Past Surgical History:   Procedure Laterality Date   • CARDIAC CATHETERIZATION N/A 3/11/2018    Procedure: Left Heart Cath;  Surgeon: Cameron Chisholm MD;  Location: [facility] CATH INVASIVE LOCATION;  Service: Cardiovascular   • CARDIAC CATHETERIZATION N/A 3/11/2018    Procedure: Stent ROSSI bypass graft;  Surgeon: Cameron Chisholm MD;  Location: [facility] CATH INVASIVE LOCATION;  Service: Cardiovascular   • CATARACT EXTRACTION Bilateral    • CATARACT EXTRACTION W/ INTRAOCULAR LENS IMPLANT Right 10/25/2016    Procedure: RT SUPERFICIAL KERATECTOMY ;  Surgeon: Arian Singh MD;  Location: [facility] OR [facility];  Service:    • CHOLECYSTECTOMY     •  COLONOSCOPY     • CORONARY ARTERY BYPASS GRAFT  1991    X3   • HYSTERECTOMY     • TRANSLUMINAL ATHERECTOMY PERONEAL ARTERY      PERCUTANEOUS TRANSLUMINAL ATHERECTOMY RENAL ARTERY         Current Outpatient Prescriptions on File Prior to Visit   Medication Sig Dispense Refill   • acetaminophen (TYLENOL) 500 MG tablet Take 500 mg by mouth every 4 (four) hours as needed for mild pain (1-3) (Back Pain). EVERY 4 TO 6 HOURS AS NEEDED     • aspirin 81 MG chewable tablet Chew 81 mg Daily.     • BETIMOL 0.5 % ophthalmic solution Administer 1 drop to both eyes Tonight.     • clopidogrel (PLAVIX) 75 MG tablet Take 1 tablet by mouth Daily. 90 tablet 1   • Coenzyme Q10 (CO Q-10) 300 MG capsule Take 300 mg by mouth daily.     • furosemide (LASIX) 40 MG tablet Take 1 tablet by mouth 2 (Two) Times a Day. 60 tablet 3   • insulin detemir (LEVEMIR) 100 UNIT/ML injection Inject 10 Units under the skin daily. (Patient taking differently: Inject 8 Units under the skin Every Night.) 5 pen 3   • insulin lispro (HumaLOG) 100 UNIT/ML patient supplied pump Inject 5 Units under the skin See Admin Instructions. Patient takes 5 units after each meal     • Insulin Pen Needle (BD PEN NEEDLE LISA U/F) 32G X 4 MM misc USE AS DIRECTED THREE TIMES A DAY. 300 each 3   • isosorbide mononitrate (IMDUR) 120 MG 24 hr tablet Take 1 tablet by mouth Daily. 30 tablet 11   • levothyroxine (SYNTHROID, LEVOTHROID) 125 MCG tablet TAKE 1 TABLET DAILY AS DIRECTED 90 tablet 3   • losartan (COZAAR) 50 MG tablet Take 1 tablet by mouth Daily. 90 tablet 3   • metoprolol succinate XL (TOPROL-XL) 50 MG 24 hr tablet Take 1 tablet by mouth Daily. (Patient taking differently: Take 50 mg by mouth 2 (Two) Times a Day.) 180 tablet 0   • NIFEdipine XL (PROCARDIA XL) 90 MG 24 hr tablet Take 1 tablet by mouth Daily. 90 tablet 3   • nitroglycerin (NITROSTAT) 0.4 MG SL tablet Place 1 tablet under the tongue Every 5 (Five) Minutes As Needed for Chest Pain. Take no more than 3 doses in  15 minutes. 25 tablet 12   • raNITIdine (ZANTAC) 150 MG tablet Take 150 mg by mouth 2 (Two) Times a Day.     • spironolactone (ALDACTONE) 25 MG tablet Take 1 tablet by mouth Daily. 90 tablet 3   • timolol (TIMOPTIC) 0.25 % ophthalmic solution Administer 1 drop to both eyes Tonight.     • travoprost, KAL free, (TRAVATAN) 0.004 % solution ophthalmic solution Administer 1 drop to both eyes every night. in affected eye(s)        No current facility-administered medications on file prior to visit.          Social History     Social History   • Marital status: Single     Spouse name: N/A   • Number of children: N/A   • Years of education: N/A     Occupational History   • Not on file.     Social History Main Topics   • Smoking status: Never Smoker   • Smokeless tobacco: Never Used   • Alcohol use No      Comment: caffeine use   • Drug use: Unknown   • Sexual activity: Defer     Other Topics Concern   • Not on file     Social History Narrative   • No narrative on file       Family History   Problem Relation Age of Onset   • Colon cancer Other    • Heart disease Other    • Hypertension Other    • Stroke Other      ISCHEMIC   • Colon cancer Mother    • Stroke Father    • Heart attack Father    • Heart disease Father    • Heart attack Brother    • Stroke Brother    • Heart disease Brother          Review of Systems   Constitution: Positive for weakness. Negative for decreased appetite, diaphoresis, fever, malaise/fatigue, weight gain and weight loss.   HENT: Negative for congestion, hearing loss, nosebleeds and tinnitus.    Eyes: Negative for blurred vision, double vision, vision loss in left eye, vision loss in right eye and visual disturbance.   Cardiovascular:        As noted in HPI   Respiratory:        As noted HPI   Endocrine: Negative for cold intolerance and heat intolerance.   Hematologic/Lymphatic: Negative for bleeding problem. Does not bruise/bleed easily.   Skin: Negative for color change, flushing, itching and  "rash.   Musculoskeletal: Positive for joint pain. Negative for arthritis, back pain, joint swelling, muscle weakness and myalgias.   Gastrointestinal: Negative for bloating, abdominal pain, constipation, diarrhea, dysphagia, heartburn, hematemesis, hematochezia, melena, nausea and vomiting.   Genitourinary: Negative for bladder incontinence, dysuria, frequency, nocturia and urgency.   Neurological: Negative for dizziness, focal weakness, headaches, light-headedness, loss of balance, numbness, paresthesias and vertigo.   Psychiatric/Behavioral: Negative for depression, memory loss and substance abuse.       Procedures      ECG 12 Lead  Date/Time: 4/19/2018 9:52 AM  Performed by: CEFERINO LEA  Authorized by: CEFERINO LEA   Comparison: compared with previous ECG   Similar to previous ECG  Rhythm: sinus rhythm  Rate: normal  T depression: II, III, aVF, V5 and V6  QRS axis: normal                  Objective:    /80 (BP Location: Left arm)   Pulse 63   Ht 162.6 cm (64\")   Wt 96.6 kg (213 lb)   BMI 36.56 kg/m²        Physical Exam  Physical Exam   Constitutional: She is oriented to person, place, and time. She appears well-developed and well-nourished. No distress.   HENT:   Head: Normocephalic.   Eyes: Conjunctivae are normal. Pupils are equal, round, and reactive to light. No scleral icterus.   Neck: Normal carotid pulses, no hepatojugular reflux and no JVD present. Carotid bruit is not present. No tracheal deviation, no edema and no erythema present. No thyromegaly present.   Cardiovascular: Normal rate, regular rhythm, S1 normal, S2 normal, normal heart sounds and intact distal pulses.   No extrasystoles are present. PMI is not displaced.  Exam reveals no gallop, no distant heart sounds and no friction rub.    No murmur heard.  Pulses:       Carotid pulses are 2+ on the right side with bruit, and 2+ on the left side with bruit.       Radial pulses are 2+ on the right side, and 2+ on the left side. "        Femoral pulses are 2+ on the right side, and 2+ on the left side.       Dorsalis pedis pulses are 2+ on the right side, and 2+ on the left side.        Posterior tibial pulses are 2+ on the right side, and 2+ on the left side.   Pulmonary/Chest: Effort normal and breath sounds normal. No respiratory distress. She has no decreased breath sounds. She has no wheezes. She has no rhonchi. She has no rales. She exhibits no tenderness.   Abdominal: Soft. Bowel sounds are normal. She exhibits no distension and no mass. There is no hepatosplenomegaly. There is no tenderness. There is no rebound and no guarding.   Musculoskeletal: She exhibits no edema, tenderness or deformity.   Neurological: She is alert and oriented to person, place, and time.   Skin: Skin is warm and dry. No rash noted. She is not diaphoretic. No cyanosis or erythema. No pallor. Nails show no clubbing.   Psychiatric: She has a normal mood and affect. Her speech is normal and behavior is normal. Judgment and thought content normal.           Assessment:   1.  89-year-old female with history of severe coronary disease previous corneal bypass grafting.  Catheterization 2012 patent left internal graft left anterior descending, patent vein graft to diagonal, patent vein graft to the right coronary artery ischemic area just the circumflex distribution normal left ventricular systolic function.  Perfusion stress test November 2016 no ischemia and normal left ventricular function. An acute inferior infarct March 2018 cardiac catheterization and echocardiogram preserved left ventricular ejection fraction in for wall hypokinesis.  Severe three-vessel coronary disease patent left internal mammary graft left anterior descending, occluded vein graft to the diagonal, severe disease of the vein graft to the posterior descending artery with drug-eluting stent placed to that vessel residual ischemia being the diagonal distribution, circumflex distribution and mid  left anterior descending. DAPT score of 3 high risk.  Coronary Artery Disease  Assessment  • The patient has no angina  • There is a new diagnosis of stable angina in the past 12 months    Plan  • Lifestyle modifications discussed include adhering to a heart healthy diet, avoidance of tobacco products, maintenance of a healthy weight, medication compliance, regular exercise and regular monitoring of cholesterol and blood pressure    Subjective - Objective  • There is a history of previous coronary artery bypass graft  • There has been a previous stent procedure using ROSSI  • Current antiplatelet therapy includes aspirin 81 mg and clopidogrel 75 mg  • The patient qualifies for cardiac rehabilitation, and has been referred to cardiac rehab      She is doing reasonably well now.  She's on multiple antianginals were never stop the isosorbide if she is stable we'll see us in follow-up in 6 months if not call back.  .  2.  Hypertension blood pressure adequately controlled continue same  3.  Hyperlipidemia intolerant of multiple statins and her age do not feel like I wouldn't pursue any further treatment.  4.  Renal failure stage 3 last GFR 35 follows with nephrology.  5.  Renal artery stenosis status post left renal artery stenting grade  6.  Mild carotid artery stenosis follows with vascular.  7.  Obstructive sleep apnea on CPAP.   8.  Diabetes mellitus her PCP.          Plan:

## 2018-04-20 ENCOUNTER — TREATMENT (OUTPATIENT)
Dept: CARDIAC REHAB | Facility: HOSPITAL | Age: 83
End: 2018-04-20

## 2018-04-20 DIAGNOSIS — Z95.5 STATUS POST INSERTION OF DRUG ELUTING CORONARY ARTERY STENT: ICD-10-CM

## 2018-04-20 DIAGNOSIS — I21.3 ST ELEVATION MYOCARDIAL INFARCTION (STEMI), UNSPECIFIED ARTERY (HCC): Primary | ICD-10-CM

## 2018-04-20 LAB — GLUCOSE BLDC GLUCOMTR-MCNC: 215 MG/DL (ref 70–130)

## 2018-04-20 PROCEDURE — 82962 GLUCOSE BLOOD TEST: CPT

## 2018-04-20 PROCEDURE — 93798 PHYS/QHP OP CAR RHAB W/ECG: CPT

## 2018-04-23 ENCOUNTER — TREATMENT (OUTPATIENT)
Dept: CARDIAC REHAB | Facility: HOSPITAL | Age: 83
End: 2018-04-23

## 2018-04-23 DIAGNOSIS — R91.1 SOLITARY PULMONARY NODULE: Primary | ICD-10-CM

## 2018-04-23 DIAGNOSIS — I21.3 ST ELEVATION MYOCARDIAL INFARCTION (STEMI), UNSPECIFIED ARTERY (HCC): Primary | ICD-10-CM

## 2018-04-23 PROCEDURE — 93798 PHYS/QHP OP CAR RHAB W/ECG: CPT

## 2018-04-25 ENCOUNTER — TREATMENT (OUTPATIENT)
Dept: CARDIAC REHAB | Facility: HOSPITAL | Age: 83
End: 2018-04-25

## 2018-04-25 DIAGNOSIS — I21.3 ST ELEVATION MYOCARDIAL INFARCTION (STEMI), UNSPECIFIED ARTERY (HCC): Primary | ICD-10-CM

## 2018-04-25 PROCEDURE — 93798 PHYS/QHP OP CAR RHAB W/ECG: CPT

## 2018-04-27 ENCOUNTER — TREATMENT (OUTPATIENT)
Dept: CARDIAC REHAB | Facility: HOSPITAL | Age: 83
End: 2018-04-27

## 2018-04-27 DIAGNOSIS — I21.3 ST ELEVATION MYOCARDIAL INFARCTION (STEMI), UNSPECIFIED ARTERY (HCC): Primary | ICD-10-CM

## 2018-04-27 PROCEDURE — 93798 PHYS/QHP OP CAR RHAB W/ECG: CPT

## 2018-04-30 ENCOUNTER — TREATMENT (OUTPATIENT)
Dept: CARDIAC REHAB | Facility: HOSPITAL | Age: 83
End: 2018-04-30

## 2018-04-30 DIAGNOSIS — I21.3 ST ELEVATION MYOCARDIAL INFARCTION (STEMI), UNSPECIFIED ARTERY (HCC): Primary | ICD-10-CM

## 2018-04-30 DIAGNOSIS — Z95.5 STATUS POST INSERTION OF DRUG ELUTING CORONARY ARTERY STENT: ICD-10-CM

## 2018-04-30 PROCEDURE — 93798 PHYS/QHP OP CAR RHAB W/ECG: CPT

## 2018-04-30 NOTE — PROGRESS NOTES
CARDIAC/PULMONARY REHAB NUTRITION EDUCATION/ASSESSMENT      89 y.o.         Height: 64  in    Weight: 213 lb.  lb     BMI: 36.5 IBW:  170-180 lb.        Diet Survey Score: 57  Cardiac Risk Factors: Diabetes,Famiky History,Stress,Sedentary Lifestyle,HTN, Renal Disease Weight Assessment: Obese    Desired Weight: less than 200 lb      Food records reviewed? Yes     Occupation:  RETIRED      Routine Exercise: mild     Who does the patient live with: Self  Who does the cooking at home:  Minimal cooking, mostly convenience foods         Pertinent Lab Values:   Total: No components found for: CHOLESTEROL  HDL:   HDL Cholesterol   Date Value Ref Range Status   03/12/2018 45 40 - 60 mg/dL Final     LDL:  LDL Cholesterol    Date Value Ref Range Status   03/12/2018 92 0 - 100 mg/dL Final     Triglyceride: No components found for: TRIGLYCERIDE  Last HGBA1C with date if applicable:No components found for: A1C  Glucose:   Glucose   Date Value Ref Range Status   03/13/2018 122 (H) 65 - 99 mg/dL Final    Nutritional Supplements: N/A    Pertinent Nutrition-Related Medications:  Insulin,Levemir 10 unitsdaily, Humalog 5 units after meals         Stated Problem Areas / Concerns: Alessia struggles with multiple health issues and diet considerations with Diabetes, Renal Disease and HTN. She expressed frustration with her health limitations as well as financial dificulties     Assessment / Recommendations: We reviewed AHA and ADA guidelines. We discussed the DASH diet and the merits of a plant based diet. We discussed meal strategies with diet considerations. Supportive written information was provided.  Motivation level toward diet compliance: weak       Goals:  Wt loss and better control of Diabetes. Plan: Carb conscious choices, fewer processed foods, more attention to portion control and high fiber choices.                                                                                                                                                              5:43 PM  4/30/2018  Sanaz Walter RD

## 2018-05-01 RX ORDER — RANITIDINE 150 MG/1
TABLET ORAL
Qty: 180 TABLET | Refills: 3 | Status: SHIPPED | OUTPATIENT
Start: 2018-05-01 | End: 2019-07-01 | Stop reason: SDUPTHER

## 2018-05-02 ENCOUNTER — TREATMENT (OUTPATIENT)
Dept: CARDIAC REHAB | Facility: HOSPITAL | Age: 83
End: 2018-05-02

## 2018-05-02 DIAGNOSIS — I21.3 ST ELEVATION MYOCARDIAL INFARCTION (STEMI), UNSPECIFIED ARTERY (HCC): Primary | ICD-10-CM

## 2018-05-02 PROCEDURE — 93798 PHYS/QHP OP CAR RHAB W/ECG: CPT

## 2018-05-04 ENCOUNTER — TREATMENT (OUTPATIENT)
Dept: CARDIAC REHAB | Facility: HOSPITAL | Age: 83
End: 2018-05-04

## 2018-05-04 DIAGNOSIS — Z95.5 STATUS POST INSERTION OF DRUG ELUTING CORONARY ARTERY STENT: ICD-10-CM

## 2018-05-04 DIAGNOSIS — I21.3 ST ELEVATION MYOCARDIAL INFARCTION (STEMI), UNSPECIFIED ARTERY (HCC): Primary | ICD-10-CM

## 2018-05-04 PROCEDURE — 93798 PHYS/QHP OP CAR RHAB W/ECG: CPT

## 2018-05-07 ENCOUNTER — TREATMENT (OUTPATIENT)
Dept: CARDIAC REHAB | Facility: HOSPITAL | Age: 83
End: 2018-05-07

## 2018-05-07 DIAGNOSIS — I21.3 ST ELEVATION MYOCARDIAL INFARCTION (STEMI), UNSPECIFIED ARTERY (HCC): Primary | ICD-10-CM

## 2018-05-07 DIAGNOSIS — Z95.5 STATUS POST INSERTION OF DRUG ELUTING CORONARY ARTERY STENT: ICD-10-CM

## 2018-05-07 PROCEDURE — 93798 PHYS/QHP OP CAR RHAB W/ECG: CPT

## 2018-05-09 ENCOUNTER — TREATMENT (OUTPATIENT)
Dept: CARDIAC REHAB | Facility: HOSPITAL | Age: 83
End: 2018-05-09

## 2018-05-09 DIAGNOSIS — I21.3 ST ELEVATION MYOCARDIAL INFARCTION (STEMI), UNSPECIFIED ARTERY (HCC): Primary | ICD-10-CM

## 2018-05-09 PROCEDURE — 93798 PHYS/QHP OP CAR RHAB W/ECG: CPT

## 2018-05-14 ENCOUNTER — TREATMENT (OUTPATIENT)
Dept: CARDIAC REHAB | Facility: HOSPITAL | Age: 83
End: 2018-05-14

## 2018-05-14 DIAGNOSIS — I21.3 ST ELEVATION MYOCARDIAL INFARCTION (STEMI), UNSPECIFIED ARTERY (HCC): Primary | ICD-10-CM

## 2018-05-14 PROCEDURE — 93798 PHYS/QHP OP CAR RHAB W/ECG: CPT

## 2018-05-16 ENCOUNTER — TREATMENT (OUTPATIENT)
Dept: CARDIAC REHAB | Facility: HOSPITAL | Age: 83
End: 2018-05-16

## 2018-05-16 DIAGNOSIS — I21.3 ST ELEVATION MYOCARDIAL INFARCTION (STEMI), UNSPECIFIED ARTERY (HCC): Primary | ICD-10-CM

## 2018-05-16 PROCEDURE — 93798 PHYS/QHP OP CAR RHAB W/ECG: CPT

## 2018-05-18 ENCOUNTER — TREATMENT (OUTPATIENT)
Dept: CARDIAC REHAB | Facility: HOSPITAL | Age: 83
End: 2018-05-18

## 2018-05-18 DIAGNOSIS — I21.3 ST ELEVATION MYOCARDIAL INFARCTION (STEMI), UNSPECIFIED ARTERY (HCC): Primary | ICD-10-CM

## 2018-05-18 PROCEDURE — 93798 PHYS/QHP OP CAR RHAB W/ECG: CPT

## 2018-05-21 ENCOUNTER — TREATMENT (OUTPATIENT)
Dept: CARDIAC REHAB | Facility: HOSPITAL | Age: 83
End: 2018-05-21

## 2018-05-21 DIAGNOSIS — I21.3 ST ELEVATION MYOCARDIAL INFARCTION (STEMI), UNSPECIFIED ARTERY (HCC): Primary | ICD-10-CM

## 2018-05-21 PROCEDURE — 93798 PHYS/QHP OP CAR RHAB W/ECG: CPT

## 2018-05-23 ENCOUNTER — TREATMENT (OUTPATIENT)
Dept: CARDIAC REHAB | Facility: HOSPITAL | Age: 83
End: 2018-05-23

## 2018-05-23 DIAGNOSIS — I21.3 ST ELEVATION MYOCARDIAL INFARCTION (STEMI), UNSPECIFIED ARTERY (HCC): Primary | ICD-10-CM

## 2018-05-23 DIAGNOSIS — Z95.5 STATUS POST INSERTION OF DRUG ELUTING CORONARY ARTERY STENT: ICD-10-CM

## 2018-05-23 PROCEDURE — 93798 PHYS/QHP OP CAR RHAB W/ECG: CPT

## 2018-05-25 ENCOUNTER — TREATMENT (OUTPATIENT)
Dept: CARDIAC REHAB | Facility: HOSPITAL | Age: 83
End: 2018-05-25

## 2018-05-25 DIAGNOSIS — I21.3 ST ELEVATION MYOCARDIAL INFARCTION (STEMI), UNSPECIFIED ARTERY (HCC): Primary | ICD-10-CM

## 2018-05-25 PROCEDURE — 93798 PHYS/QHP OP CAR RHAB W/ECG: CPT

## 2018-05-29 DIAGNOSIS — E55.9 VITAMIN D DEFICIENCY: ICD-10-CM

## 2018-05-29 DIAGNOSIS — I10 ESSENTIAL HYPERTENSION: ICD-10-CM

## 2018-05-29 RX ORDER — SPIRONOLACTONE 25 MG/1
25 TABLET ORAL DAILY
Qty: 90 TABLET | Refills: 3 | Status: SHIPPED | OUTPATIENT
Start: 2018-05-29 | End: 2019-08-09 | Stop reason: SDUPTHER

## 2018-05-29 RX ORDER — LOSARTAN POTASSIUM 50 MG/1
50 TABLET ORAL DAILY
Qty: 90 TABLET | Refills: 3 | Status: SHIPPED | OUTPATIENT
Start: 2018-05-29 | End: 2019-07-27 | Stop reason: SDUPTHER

## 2018-05-29 RX ORDER — METOPROLOL SUCCINATE 50 MG/1
50 TABLET, EXTENDED RELEASE ORAL DAILY
Qty: 90 TABLET | Refills: 3 | Status: SHIPPED | OUTPATIENT
Start: 2018-05-29 | End: 2019-07-08 | Stop reason: SDUPTHER

## 2018-05-29 RX ORDER — NIFEDIPINE 90 MG/1
90 TABLET, EXTENDED RELEASE ORAL DAILY
Qty: 90 TABLET | Refills: 3 | Status: SHIPPED | OUTPATIENT
Start: 2018-05-29 | End: 2019-07-27 | Stop reason: SDUPTHER

## 2018-05-30 ENCOUNTER — TREATMENT (OUTPATIENT)
Dept: CARDIAC REHAB | Facility: HOSPITAL | Age: 83
End: 2018-05-30

## 2018-05-30 DIAGNOSIS — I21.3 ST ELEVATION MYOCARDIAL INFARCTION (STEMI), UNSPECIFIED ARTERY (HCC): Primary | ICD-10-CM

## 2018-05-30 PROCEDURE — 93798 PHYS/QHP OP CAR RHAB W/ECG: CPT

## 2018-06-01 ENCOUNTER — TREATMENT (OUTPATIENT)
Dept: CARDIAC REHAB | Facility: HOSPITAL | Age: 83
End: 2018-06-01

## 2018-06-01 DIAGNOSIS — I21.3 ST ELEVATION MYOCARDIAL INFARCTION (STEMI), UNSPECIFIED ARTERY (HCC): Primary | ICD-10-CM

## 2018-06-01 PROCEDURE — 93798 PHYS/QHP OP CAR RHAB W/ECG: CPT

## 2018-06-04 ENCOUNTER — TREATMENT (OUTPATIENT)
Dept: CARDIAC REHAB | Facility: HOSPITAL | Age: 83
End: 2018-06-04

## 2018-06-04 DIAGNOSIS — Z95.5 STATUS POST INSERTION OF DRUG ELUTING CORONARY ARTERY STENT: ICD-10-CM

## 2018-06-04 DIAGNOSIS — I21.3 ST ELEVATION MYOCARDIAL INFARCTION (STEMI), UNSPECIFIED ARTERY (HCC): Primary | ICD-10-CM

## 2018-06-04 PROCEDURE — 93798 PHYS/QHP OP CAR RHAB W/ECG: CPT

## 2018-06-06 ENCOUNTER — TREATMENT (OUTPATIENT)
Dept: CARDIAC REHAB | Facility: HOSPITAL | Age: 83
End: 2018-06-06

## 2018-06-06 DIAGNOSIS — Z95.5 STATUS POST INSERTION OF DRUG ELUTING CORONARY ARTERY STENT: ICD-10-CM

## 2018-06-06 DIAGNOSIS — I21.3 ST ELEVATION MYOCARDIAL INFARCTION (STEMI), UNSPECIFIED ARTERY (HCC): Primary | ICD-10-CM

## 2018-06-06 PROCEDURE — 93798 PHYS/QHP OP CAR RHAB W/ECG: CPT

## 2018-06-08 ENCOUNTER — TREATMENT (OUTPATIENT)
Dept: CARDIAC REHAB | Facility: HOSPITAL | Age: 83
End: 2018-06-08

## 2018-06-08 DIAGNOSIS — I21.19 ACUTE TRANSMURAL INFERIOR WALL MI (HCC): Primary | ICD-10-CM

## 2018-06-08 PROCEDURE — 93798 PHYS/QHP OP CAR RHAB W/ECG: CPT

## 2018-06-11 ENCOUNTER — TREATMENT (OUTPATIENT)
Dept: CARDIAC REHAB | Facility: HOSPITAL | Age: 83
End: 2018-06-11

## 2018-06-11 DIAGNOSIS — I21.3 ST ELEVATION MYOCARDIAL INFARCTION (STEMI), UNSPECIFIED ARTERY (HCC): ICD-10-CM

## 2018-06-11 DIAGNOSIS — Z95.5 STATUS POST INSERTION OF DRUG ELUTING CORONARY ARTERY STENT: Primary | ICD-10-CM

## 2018-06-11 PROCEDURE — 93798 PHYS/QHP OP CAR RHAB W/ECG: CPT

## 2018-06-13 ENCOUNTER — TREATMENT (OUTPATIENT)
Dept: CARDIAC REHAB | Facility: HOSPITAL | Age: 83
End: 2018-06-13

## 2018-06-13 DIAGNOSIS — Z95.5 STATUS POST INSERTION OF DRUG ELUTING CORONARY ARTERY STENT: Primary | ICD-10-CM

## 2018-06-13 PROCEDURE — 93798 PHYS/QHP OP CAR RHAB W/ECG: CPT

## 2018-06-15 ENCOUNTER — TRANSCRIBE ORDERS (OUTPATIENT)
Dept: ADMINISTRATIVE | Facility: HOSPITAL | Age: 83
End: 2018-06-15

## 2018-06-15 ENCOUNTER — LAB (OUTPATIENT)
Dept: LAB | Facility: HOSPITAL | Age: 83
End: 2018-06-15
Attending: INTERNAL MEDICINE

## 2018-06-15 ENCOUNTER — TREATMENT (OUTPATIENT)
Dept: CARDIAC REHAB | Facility: HOSPITAL | Age: 83
End: 2018-06-15

## 2018-06-15 DIAGNOSIS — Z95.5 STATUS POST INSERTION OF DRUG ELUTING CORONARY ARTERY STENT: Primary | ICD-10-CM

## 2018-06-15 DIAGNOSIS — E21.1 HYPERPARATHYROIDISM DUE TO 1,25(0H)2D3 (HCC): ICD-10-CM

## 2018-06-15 DIAGNOSIS — E21.1 HYPERPARATHYROIDISM DUE TO 1,25(0H)2D3 (HCC): Primary | ICD-10-CM

## 2018-06-15 LAB
ALBUMIN SERPL-MCNC: 3.7 G/DL (ref 3.5–5.2)
ANION GAP SERPL CALCULATED.3IONS-SCNC: 13.8 MMOL/L
BUN BLD-MCNC: 32 MG/DL (ref 8–23)
BUN/CREAT SERPL: 25 (ref 7–25)
CALCIUM SPEC-SCNC: 9.3 MG/DL (ref 8.6–10.5)
CALCIUM SPEC-SCNC: 9.4 MG/DL (ref 8.6–10.5)
CHLORIDE SERPL-SCNC: 107 MMOL/L (ref 98–107)
CO2 SERPL-SCNC: 20.2 MMOL/L (ref 22–29)
CREAT BLD-MCNC: 1.28 MG/DL (ref 0.57–1)
GFR SERPL CREATININE-BSD FRML MDRD: 39 ML/MIN/1.73
GLUCOSE BLD-MCNC: 164 MG/DL (ref 65–99)
PHOSPHATE SERPL-MCNC: 3.4 MG/DL (ref 2.5–4.5)
POTASSIUM BLD-SCNC: 4.5 MMOL/L (ref 3.5–5.2)
PTH-INTACT SERPL-MCNC: 97 PG/ML (ref 15–65)
SODIUM BLD-SCNC: 141 MMOL/L (ref 136–145)

## 2018-06-15 PROCEDURE — 93798 PHYS/QHP OP CAR RHAB W/ECG: CPT

## 2018-06-15 PROCEDURE — 82310 ASSAY OF CALCIUM: CPT

## 2018-06-15 PROCEDURE — 80069 RENAL FUNCTION PANEL: CPT

## 2018-06-15 PROCEDURE — 36415 COLL VENOUS BLD VENIPUNCTURE: CPT

## 2018-06-15 PROCEDURE — 83970 ASSAY OF PARATHORMONE: CPT

## 2018-06-18 ENCOUNTER — TREATMENT (OUTPATIENT)
Dept: CARDIAC REHAB | Facility: HOSPITAL | Age: 83
End: 2018-06-18

## 2018-06-18 DIAGNOSIS — Z95.5 STATUS POST INSERTION OF DRUG ELUTING CORONARY ARTERY STENT: Primary | ICD-10-CM

## 2018-06-18 PROCEDURE — 93798 PHYS/QHP OP CAR RHAB W/ECG: CPT

## 2018-06-20 ENCOUNTER — TREATMENT (OUTPATIENT)
Dept: CARDIAC REHAB | Facility: HOSPITAL | Age: 83
End: 2018-06-20

## 2018-06-20 DIAGNOSIS — Z95.5 STATUS POST INSERTION OF DRUG ELUTING CORONARY ARTERY STENT: Primary | ICD-10-CM

## 2018-06-20 PROCEDURE — 93798 PHYS/QHP OP CAR RHAB W/ECG: CPT

## 2018-06-22 ENCOUNTER — TREATMENT (OUTPATIENT)
Dept: CARDIAC REHAB | Facility: HOSPITAL | Age: 83
End: 2018-06-22

## 2018-06-22 DIAGNOSIS — I21.3 ST ELEVATION MYOCARDIAL INFARCTION (STEMI), UNSPECIFIED ARTERY (HCC): ICD-10-CM

## 2018-06-22 DIAGNOSIS — Z95.5 STATUS POST INSERTION OF DRUG ELUTING CORONARY ARTERY STENT: Primary | ICD-10-CM

## 2018-06-22 DIAGNOSIS — I21.19 ACUTE TRANSMURAL INFERIOR WALL MI (HCC): ICD-10-CM

## 2018-06-22 PROCEDURE — 93798 PHYS/QHP OP CAR RHAB W/ECG: CPT

## 2018-06-27 ENCOUNTER — TREATMENT (OUTPATIENT)
Dept: CARDIAC REHAB | Facility: HOSPITAL | Age: 83
End: 2018-06-27

## 2018-06-27 DIAGNOSIS — Z95.5 STATUS POST INSERTION OF DRUG ELUTING CORONARY ARTERY STENT: Primary | ICD-10-CM

## 2018-06-27 PROCEDURE — 93798 PHYS/QHP OP CAR RHAB W/ECG: CPT

## 2018-06-29 ENCOUNTER — TREATMENT (OUTPATIENT)
Dept: CARDIAC REHAB | Facility: HOSPITAL | Age: 83
End: 2018-06-29

## 2018-06-29 DIAGNOSIS — Z95.5 STATUS POST INSERTION OF DRUG ELUTING CORONARY ARTERY STENT: Primary | ICD-10-CM

## 2018-06-29 PROCEDURE — 93798 PHYS/QHP OP CAR RHAB W/ECG: CPT

## 2018-07-02 ENCOUNTER — TREATMENT (OUTPATIENT)
Dept: CARDIAC REHAB | Facility: HOSPITAL | Age: 83
End: 2018-07-02

## 2018-07-02 DIAGNOSIS — Z95.5 STATUS POST INSERTION OF DRUG ELUTING CORONARY ARTERY STENT: Primary | ICD-10-CM

## 2018-07-02 PROCEDURE — 93798 PHYS/QHP OP CAR RHAB W/ECG: CPT

## 2018-07-03 ENCOUNTER — NURSE ONLY (OUTPATIENT)
Dept: LAB | Age: 83
End: 2018-07-03
Attending: NURSE PRACTITIONER
Payer: MEDICARE

## 2018-07-03 DIAGNOSIS — M81.0 SENILE OSTEOPOROSIS: Primary | ICD-10-CM

## 2018-07-03 LAB
BUN BLD-MCNC: 22 MG/DL (ref 8–20)
CALCIUM BLD-MCNC: 8.9 MG/DL (ref 8.3–10.3)
CHLORIDE: 108 MMOL/L (ref 101–111)
CO2: 24 MMOL/L (ref 22–32)
CREAT BLD-MCNC: 1.18 MG/DL (ref 0.55–1.02)
GLUCOSE BLD-MCNC: 88 MG/DL (ref 70–99)
POTASSIUM SERPL-SCNC: 3.9 MMOL/L (ref 3.6–5.1)
SODIUM SERPL-SCNC: 143 MMOL/L (ref 136–144)

## 2018-07-03 PROCEDURE — 36415 COLL VENOUS BLD VENIPUNCTURE: CPT

## 2018-07-03 PROCEDURE — 80048 BASIC METABOLIC PNL TOTAL CA: CPT

## 2018-07-06 ENCOUNTER — TREATMENT (OUTPATIENT)
Dept: CARDIAC REHAB | Facility: HOSPITAL | Age: 83
End: 2018-07-06

## 2018-07-06 DIAGNOSIS — Z95.5 STATUS POST INSERTION OF DRUG ELUTING CORONARY ARTERY STENT: Primary | ICD-10-CM

## 2018-07-06 DIAGNOSIS — I21.3 ST ELEVATION MYOCARDIAL INFARCTION (STEMI), UNSPECIFIED ARTERY (HCC): ICD-10-CM

## 2018-07-06 PROCEDURE — 93798 PHYS/QHP OP CAR RHAB W/ECG: CPT

## 2018-07-09 ENCOUNTER — TREATMENT (OUTPATIENT)
Dept: CARDIAC REHAB | Facility: HOSPITAL | Age: 83
End: 2018-07-09

## 2018-07-09 DIAGNOSIS — N18.4 TYPE 2 DIABETES MELLITUS WITH STAGE 4 CHRONIC KIDNEY DISEASE, WITH LONG-TERM CURRENT USE OF INSULIN (HCC): ICD-10-CM

## 2018-07-09 DIAGNOSIS — Z95.5 STATUS POST INSERTION OF DRUG ELUTING CORONARY ARTERY STENT: Primary | ICD-10-CM

## 2018-07-09 DIAGNOSIS — N18.4 CHRONIC KIDNEY DISEASE, STAGE IV (SEVERE) (HCC): ICD-10-CM

## 2018-07-09 DIAGNOSIS — E03.9 ACQUIRED HYPOTHYROIDISM: ICD-10-CM

## 2018-07-09 DIAGNOSIS — Z00.00 HEALTH CARE MAINTENANCE: Primary | ICD-10-CM

## 2018-07-09 DIAGNOSIS — Z79.4 TYPE 2 DIABETES MELLITUS WITH STAGE 4 CHRONIC KIDNEY DISEASE, WITH LONG-TERM CURRENT USE OF INSULIN (HCC): ICD-10-CM

## 2018-07-09 DIAGNOSIS — E61.1 IRON DEFICIENCY: ICD-10-CM

## 2018-07-09 DIAGNOSIS — I25.10 CHRONIC CORONARY ARTERY DISEASE: ICD-10-CM

## 2018-07-09 DIAGNOSIS — E55.9 VITAMIN D DEFICIENCY: ICD-10-CM

## 2018-07-09 DIAGNOSIS — I10 ESSENTIAL HYPERTENSION: ICD-10-CM

## 2018-07-09 DIAGNOSIS — E11.22 TYPE 2 DIABETES MELLITUS WITH STAGE 4 CHRONIC KIDNEY DISEASE, WITH LONG-TERM CURRENT USE OF INSULIN (HCC): ICD-10-CM

## 2018-07-09 DIAGNOSIS — I21.3 ST ELEVATION MYOCARDIAL INFARCTION (STEMI), UNSPECIFIED ARTERY (HCC): ICD-10-CM

## 2018-07-09 PROCEDURE — 93798 PHYS/QHP OP CAR RHAB W/ECG: CPT

## 2018-07-10 LAB
25(OH)D3+25(OH)D2 SERPL-MCNC: 19.8 NG/ML (ref 30–100)
ALBUMIN SERPL-MCNC: 4.4 G/DL (ref 3.5–5.2)
ALBUMIN/GLOB SERPL: 2 G/DL
ALP SERPL-CCNC: 88 U/L (ref 39–117)
ALT SERPL-CCNC: 6 U/L (ref 1–33)
AST SERPL-CCNC: 7 U/L (ref 1–32)
BASOPHILS # BLD AUTO: 0.04 10*3/MM3 (ref 0–0.2)
BASOPHILS NFR BLD AUTO: 0.6 % (ref 0–1.5)
BILIRUB SERPL-MCNC: 0.3 MG/DL (ref 0.1–1.2)
BUN SERPL-MCNC: 34 MG/DL (ref 8–23)
BUN/CREAT SERPL: 24.5 (ref 7–25)
CALCIUM SERPL-MCNC: 10.1 MG/DL (ref 8.6–10.5)
CHLORIDE SERPL-SCNC: 104 MMOL/L (ref 98–107)
CHOLEST SERPL-MCNC: 195 MG/DL (ref 0–200)
CO2 SERPL-SCNC: 21 MMOL/L (ref 22–29)
CREAT SERPL-MCNC: 1.39 MG/DL (ref 0.57–1)
DIFFERENTIAL COMMENT: NORMAL
EOSINOPHIL # BLD AUTO: 0.4 10*3/MM3 (ref 0–0.7)
EOSINOPHIL NFR BLD AUTO: 6.4 % (ref 0.3–6.2)
ERYTHROCYTE [DISTWIDTH] IN BLOOD BY AUTOMATED COUNT: 14.8 % (ref 11.7–13)
GLOBULIN SER CALC-MCNC: 2.2 GM/DL
GLUCOSE SERPL-MCNC: 143 MG/DL (ref 65–99)
HBA1C MFR BLD: 7.72 % (ref 4.8–5.6)
HCT VFR BLD AUTO: 38.6 % (ref 35.6–45.5)
HDLC SERPL-MCNC: 66 MG/DL (ref 40–60)
HGB BLD-MCNC: 11.3 G/DL (ref 11.9–15.5)
IMM GRANULOCYTES # BLD: 0 10*3/MM3 (ref 0–0.03)
IMM GRANULOCYTES NFR BLD: 0 % (ref 0–0.5)
LDLC SERPL CALC-MCNC: 111 MG/DL (ref 0–100)
LYMPHOCYTES # BLD AUTO: 1.1 10*3/MM3 (ref 0.9–4.8)
LYMPHOCYTES NFR BLD AUTO: 17.7 % (ref 19.6–45.3)
MCH RBC QN AUTO: 29.3 PG (ref 26.9–32)
MCHC RBC AUTO-ENTMCNC: 29.3 G/DL (ref 32.4–36.3)
MCV RBC AUTO: 100 FL (ref 80.5–98.2)
MONOCYTES # BLD AUTO: 0.51 10*3/MM3 (ref 0.2–1.2)
MONOCYTES NFR BLD AUTO: 8.2 % (ref 5–12)
NEUTROPHILS # BLD AUTO: 4.18 10*3/MM3 (ref 1.9–8.1)
NEUTROPHILS NFR BLD AUTO: 67.1 % (ref 42.7–76)
PLATELET # BLD AUTO: 272 10*3/MM3 (ref 140–500)
PLATELET BLD QL SMEAR: NORMAL
POTASSIUM SERPL-SCNC: 4.8 MMOL/L (ref 3.5–5.2)
PROT SERPL-MCNC: 6.6 G/DL (ref 6–8.5)
RBC # BLD AUTO: 3.86 10*6/MM3 (ref 3.9–5.2)
RBC MORPH BLD: NORMAL
SODIUM SERPL-SCNC: 142 MMOL/L (ref 136–145)
TRIGL SERPL-MCNC: 90 MG/DL (ref 0–150)
TSH SERPL DL<=0.005 MIU/L-ACNC: 0.36 MIU/ML (ref 0.27–4.2)
VLDLC SERPL CALC-MCNC: 18 MG/DL (ref 5–40)
WBC # BLD AUTO: 6.23 10*3/MM3 (ref 4.5–10.7)

## 2018-07-11 ENCOUNTER — TREATMENT (OUTPATIENT)
Dept: CARDIAC REHAB | Facility: HOSPITAL | Age: 83
End: 2018-07-11

## 2018-07-11 DIAGNOSIS — Z95.5 STATUS POST INSERTION OF DRUG ELUTING CORONARY ARTERY STENT: Primary | ICD-10-CM

## 2018-07-11 LAB
ALBUMIN/CREAT UR: 18.6 MG/G CREAT (ref 0–30)
CREAT UR-MCNC: 109.8 MG/DL
MICROALBUMIN UR-MCNC: 20.4 UG/ML

## 2018-07-11 PROCEDURE — 93798 PHYS/QHP OP CAR RHAB W/ECG: CPT

## 2018-07-13 ENCOUNTER — TREATMENT (OUTPATIENT)
Dept: CARDIAC REHAB | Facility: HOSPITAL | Age: 83
End: 2018-07-13

## 2018-07-13 DIAGNOSIS — Z95.5 STATUS POST INSERTION OF DRUG ELUTING CORONARY ARTERY STENT: Primary | ICD-10-CM

## 2018-07-13 PROCEDURE — 93798 PHYS/QHP OP CAR RHAB W/ECG: CPT

## 2018-07-16 ENCOUNTER — TREATMENT (OUTPATIENT)
Dept: CARDIAC REHAB | Facility: HOSPITAL | Age: 83
End: 2018-07-16

## 2018-07-16 DIAGNOSIS — Z95.5 STATUS POST INSERTION OF DRUG ELUTING CORONARY ARTERY STENT: Primary | ICD-10-CM

## 2018-07-16 PROCEDURE — 93798 PHYS/QHP OP CAR RHAB W/ECG: CPT

## 2018-07-17 ENCOUNTER — OFFICE VISIT (OUTPATIENT)
Dept: INTERNAL MEDICINE | Facility: CLINIC | Age: 83
End: 2018-07-17

## 2018-07-17 VITALS
HEART RATE: 78 BPM | DIASTOLIC BLOOD PRESSURE: 62 MMHG | BODY MASS INDEX: 34.66 KG/M2 | RESPIRATION RATE: 18 BRPM | WEIGHT: 203 LBS | SYSTOLIC BLOOD PRESSURE: 124 MMHG | HEIGHT: 64 IN | OXYGEN SATURATION: 98 %

## 2018-07-17 DIAGNOSIS — Z00.00 HEALTH MAINTENANCE EXAMINATION: Primary | ICD-10-CM

## 2018-07-17 DIAGNOSIS — Z00.00 MEDICARE ANNUAL WELLNESS VISIT, SUBSEQUENT: ICD-10-CM

## 2018-07-17 DIAGNOSIS — I25.10 CHRONIC CORONARY ARTERY DISEASE: ICD-10-CM

## 2018-07-17 DIAGNOSIS — I10 ESSENTIAL HYPERTENSION: ICD-10-CM

## 2018-07-17 DIAGNOSIS — IMO0002 UNCONTROLLED TYPE 2 DIABETES MELLITUS WITH STAGE 4 CHRONIC KIDNEY DISEASE, WITH LONG-TERM CURRENT USE OF INSULIN: ICD-10-CM

## 2018-07-17 DIAGNOSIS — E03.9 ACQUIRED HYPOTHYROIDISM: ICD-10-CM

## 2018-07-17 DIAGNOSIS — Z12.31 ENCOUNTER FOR SCREENING MAMMOGRAM FOR BREAST CANCER: ICD-10-CM

## 2018-07-17 DIAGNOSIS — N18.4 CHRONIC KIDNEY DISEASE, STAGE IV (SEVERE) (HCC): ICD-10-CM

## 2018-07-17 PROCEDURE — 99397 PER PM REEVAL EST PAT 65+ YR: CPT | Performed by: INTERNAL MEDICINE

## 2018-07-17 PROCEDURE — G0439 PPPS, SUBSEQ VISIT: HCPCS | Performed by: INTERNAL MEDICINE

## 2018-07-17 NOTE — PROGRESS NOTES
Medicare Annual Wellness Visit and CPE    Chief Complaint:  Annual Exam (SUB AWV & CPE)      History of Present Illness    Alessia Madrigal is a 89 y.o. female who presents for an Annual Wellness Visit and CPE. In addition, we addressed the following health issues:    Mammo: 10/1/2015, states she has been too busy to get it done.   Pap: N/A  DEXA: Years ago, she can't take any of the meds.  C-scope: 2014  Flu shot: Yearly  Prevnar:3/26/2015  Pneumovax:Pt had this years ago  Dental Exam: Every 6 months. UTD.  Eye Exam: She goes every 2-3 months, UTD,  retired. Sees Sheri Ellis now.  Exercise: Very limited exercise.     Advanced Care Planning:  has an advance directive - a copy HAS NOT been provided   Immunization History   Administered Date(s) Administered   • Flu Vaccine High Dose PF 65YR+ 09/29/2016, 01/10/2018   • Influenza, Quadrivalent 09/15/2014   • Pneumococcal Conjugate (PCV7) 03/26/2015     She has had some discomfort in her left groin.  It started prior to going into the hospital in March.  It has started to radiate to her knee.  It's not all the time.  She notices it most when she is lying down.  She hasn't noticed it while walking. She plans to see Dr. TANESHA Alan for her knee and shoulders. She will talk to him about her hip/groin as well.  She hasn't noticed any erythema or rash there.   She was in the hospital in March for a heart attack.  She had a stent put in.  She has been undergoing cardiac rehab.      Review of Systems   Constitution: Positive for malaise/fatigue. Negative for chills and fever.   HENT: Negative for hearing loss, hoarse voice and sore throat.    Eyes: Negative for blurred vision, double vision and visual disturbance.   Cardiovascular: Positive for leg swelling. Negative for chest pain and palpitations.   Respiratory: Positive for shortness of breath (occasionally). Negative for cough.    Endocrine: Negative for polydipsia and polyuria.   Hematologic/Lymphatic: Does not  bruise/bleed easily.   Skin: Negative for rash and suspicious lesions.   Musculoskeletal: Positive for arthritis and joint pain (groin, knee, shoulders). Negative for myalgias.   Gastrointestinal: Negative for bloating, abdominal pain, change in bowel habit, constipation, diarrhea, dysphagia, hematochezia, melena, nausea and vomiting.   Genitourinary: Negative for dysuria and hematuria.   Neurological: Negative for dizziness, headaches and light-headedness.   Psychiatric/Behavioral: Negative for depression. The patient is not nervous/anxious.        Past Medical History:   Diagnosis Date   • Acute transmural inferior wall MI (CMS/[facility])    • Arthritis 03/13/2014    CONT TYLENOL FOR PAIN/ JOSE DANIEL CHEW (INTERNAL MEDICINE)   • CAD (coronary artery disease)    • Cancer of uterus (CMS/[facility])    • Carotid artery stenosis    • Chronic renal failure syndrome    • Coronary artery disease involving coronary bypass graft of native heart     with other forms of angina pectoris   • Coronary atherosclerosis    • Esophageal reflux    • Essential hypertension    • Glaucoma    • Hypertension    • Malaise and fatigue    • Osteoarthritis    • Sleep apnea     USING CPAP   • Spinal stenosis    • Thyroid disease    • Type 2 diabetes mellitus (CMS/[facility]) 1991    INsulin begun 2012   • Vitamin B12 deficiency     SHE HAS NOT HAD THIS CHECKED IN A WHILE. SHE HAD BEEN ON SHOTS.  CHECK HER B12 LEVELS TODAY.  BY JOSE DANIEL CHEW       Past Surgical History:   Procedure Laterality Date   • CARDIAC CATHETERIZATION N/A 3/11/2018    Procedure: Left Heart Cath;  Surgeon: Cameron Chisholm MD;  Location: [facility] CATH INVASIVE LOCATION;  Service: Cardiovascular   • CARDIAC CATHETERIZATION N/A 3/11/2018    Procedure: Stent ROSSI bypass graft;  Surgeon: Cameron Chisholm MD;  Location: [facility] CATH INVASIVE LOCATION;  Service: Cardiovascular   • CATARACT EXTRACTION Bilateral    • CATARACT EXTRACTION W/ INTRAOCULAR LENS IMPLANT Right 10/25/2016    Procedure: RT  SUPERFICIAL KERATECTOMY ;  Surgeon: Arian Singh MD;  Location: [facility] OR [facility] [place];  Service:    • CHOLECYSTECTOMY     • COLONOSCOPY     • CORONARY ARTERY BYPASS GRAFT  1991    X3   • HYSTERECTOMY     • TRANSLUMINAL ATHERECTOMY PERONEAL ARTERY      PERCUTANEOUS TRANSLUMINAL ATHERECTOMY RENAL ARTERY       Social History     Social History   • Marital status: Single     Spouse name: N/A   • Number of children: N/A   • Years of education: N/A     Occupational History   • Not on file.     Social History Main Topics   • Smoking status: Never Smoker   • Smokeless tobacco: Never Used   • Alcohol use No      Comment: caffeine use   • Drug use: Unknown   • Sexual activity: Defer     Other Topics Concern   • Not on file     Social History Narrative   • No narrative on file       Family History   Problem Relation Age of Onset   • Colon cancer Other    • Heart disease Other    • Hypertension Other    • Stroke Other         ISCHEMIC   • Colon cancer Mother    • Stroke Father    • Heart attack Father    • Heart disease Father    • Heart attack Brother    • Stroke Brother    • Heart disease Brother        Allergies   Allergen Reactions   • Allopurinol    • Clindamycin/Lincomycin Itching     Felt like she was burning and itching around the neck   • Statins Myalgia   • Ampicillin Rash   • Penicillins Rash   • Pravastatin Myalgia       Current Outpatient Prescriptions on File Prior to Visit   Medication Sig Dispense Refill   • acetaminophen (TYLENOL) 500 MG tablet Take 500 mg by mouth every 4 (four) hours as needed for mild pain (1-3) (Back Pain). EVERY 4 TO 6 HOURS AS NEEDED     • aspirin 81 MG chewable tablet Chew 81 mg Daily.     • BETIMOL 0.5 % ophthalmic solution Administer 1 drop to both eyes Tonight.     • clopidogrel (PLAVIX) 75 MG tablet Take 1 tablet by mouth Daily. 90 tablet 1   • Coenzyme Q10 (CO Q-10) 300 MG capsule Take 300 mg by mouth daily.     • furosemide (LASIX) 40 MG tablet Take 1 tablet by mouth 2 (Two)  Times a Day. 60 tablet 3   • insulin detemir (LEVEMIR) 100 UNIT/ML injection Inject 10 Units under the skin daily. (Patient taking differently: Inject 8 Units under the skin Every Night.) 5 pen 3   • insulin lispro (HumaLOG) 100 UNIT/ML patient supplied pump Inject 5 Units under the skin See Admin Instructions. Patient takes 5 units after each meal     • Insulin Pen Needle (BD PEN NEEDLE LISA U/F) 32G X 4 MM misc USE AS DIRECTED THREE TIMES A DAY. 300 each 3   • levothyroxine (SYNTHROID, LEVOTHROID) 125 MCG tablet TAKE 1 TABLET DAILY AS DIRECTED 90 tablet 3   • losartan (COZAAR) 50 MG tablet Take 1 tablet by mouth Daily. 90 tablet 3   • metoprolol succinate XL (TOPROL-XL) 50 MG 24 hr tablet Take 1 tablet by mouth Daily. 90 tablet 3   • NIFEdipine XL (PROCARDIA XL) 90 MG 24 hr tablet Take 1 tablet by mouth Daily. 90 tablet 3   • nitroglycerin (NITROSTAT) 0.4 MG SL tablet Place 1 tablet under the tongue Every 5 (Five) Minutes As Needed for Chest Pain. Take no more than 3 doses in 15 minutes. 25 tablet 12   • raNITIdine (ZANTAC) 150 MG tablet TAKE 1 TABLET EVERY 12 HOURS AS NEEDED 180 tablet 3   • spironolactone (ALDACTONE) 25 MG tablet Take 1 tablet by mouth Daily. 90 tablet 3   • timolol (TIMOPTIC) 0.25 % ophthalmic solution Administer 1 drop to both eyes Tonight.     • travoprost, BAK free, (TRAVATAN) 0.004 % solution ophthalmic solution Administer 1 drop to both eyes every night. in affected eye(s)      • [DISCONTINUED] isosorbide mononitrate (IMDUR) 120 MG 24 hr tablet Take 1 tablet by mouth Daily. 30 tablet 11     No current facility-administered medications on file prior to visit.        Patient Active Problem List   Diagnosis   • Actinic keratosis   • Anemia of chronic disorder   • Disorder of aorta (CMS/HCC)   • Chronic coronary artery disease   • Chronic kidney disease, stage IV (severe) (CMS/HCC)   • Constipation   • Cor pulmonale (CMS/HCC)   • Diabetes mellitus (CMS/HCC)   • High level of uric acid in blood  "  • Fatigue   • Hypertension   • Hypothyroidism   • Iron deficiency   • Mitral valve insufficiency   • Pulmonary hypertension   • Swelling of lower extremity   • Shortness of breath   • Tricuspid valve insufficiency   • Uncontrolled type 2 diabetes mellitus (CMS/HCC)   • Type 2 diabetes mellitus (CMS/HCC)   • Vitamin D deficiency   • Secondary hyperparathyroidism, non-renal (CMS/HCC)   • Pulmonary nodules/lesions, multiple   • Pleural effusion   • Paronychia of second finger of right hand   • Hyperuricemia   • Acute pain of both shoulders   • Acute transmural inferior wall MI (CMS/HCC)       Health Risk Assessment/Depression Screen/Functional and Cognitive Screening:   The patient has completed a Health Risk Assessment & Depression screen. These have been reviewed with them and have been scanned as a separate documents.    Age-appropriate Screening Schedule:  Refer to the list below for future screening recommendations based on patient's age. Orders for these recommended tests are listed in the plan section. The patient has been provided with a written plan.     I have reviewed their problem list, past medical history, family history, social history, and surgical history.     Vitals:    07/17/18 1102   BP: 124/62   Pulse: 78   Resp: 18   SpO2: 98%   Weight: 92.1 kg (203 lb)   Height: 162.6 cm (64.02\")   PainSc: 4  Comment: Arthritis and degenerative spine.       Body mass index is 34.83 kg/m².    Physical Exam   Constitutional: She is oriented to person, place, and time. She appears well-developed and well-nourished. No distress.   HENT:   Head: Normocephalic and atraumatic.   Nose: Nose normal.   Mouth/Throat: Oropharynx is clear and moist.   Eyes: Conjunctivae and EOM are normal. Pupils are equal, round, and reactive to light. No scleral icterus.   Neck: Normal range of motion. Neck supple. No thyromegaly present.   Cardiovascular: Normal rate, regular rhythm and normal heart sounds.  Exam reveals no gallop and no " friction rub.    No murmur heard.  Pulses:       Carotid pulses are 2+ on the right side, and 2+ on the left side.       Femoral pulses are 2+ on the right side, and 2+ on the left side.       Dorsalis pedis pulses are 2+ on the right side, and 2+ on the left side.        Posterior tibial pulses are 2+ on the right side, and 2+ on the left side.   Pulmonary/Chest: Effort normal and breath sounds normal. No respiratory distress. She has no wheezes. She has no rales. Right breast exhibits no mass and no nipple discharge. Left breast exhibits no mass and no nipple discharge.   Abdominal: Soft. Bowel sounds are normal. She exhibits no distension and no mass. There is no tenderness.   Musculoskeletal: Normal range of motion. She exhibits edema (BLE).     Vascular Status -  Her right foot exhibits no edema. Her left foot exhibits no edema.  Skin Integrity  -  Her right foot skin is intact.Her left foot skin is intact..  Lymphadenopathy:     She has no cervical adenopathy.     She has no axillary adenopathy.        Right: No inguinal and no supraclavicular adenopathy present.        Left: No inguinal and no supraclavicular adenopathy present.   Neurological: She is alert and oriented to person, place, and time. She has normal reflexes. No cranial nerve deficit.   Skin: Skin is warm and dry.   Psychiatric: She has a normal mood and affect. Her speech is normal and behavior is normal. Judgment and thought content normal. Cognition and memory are normal.   Vitals reviewed.      Results for orders placed or performed in visit on 07/09/18   Comprehensive Metabolic Panel   Result Value Ref Range    Glucose 143 (H) 65 - 99 mg/dL    BUN 34 (H) 8 - 23 mg/dL    Creatinine 1.39 (H) 0.57 - 1.00 mg/dL    eGFR Non African Am 36 (L) >60 mL/min/1.73    eGFR African Am 43 (L) >60 mL/min/1.73    BUN/Creatinine Ratio 24.5 7.0 - 25.0    Sodium 142 136 - 145 mmol/L    Potassium 4.8 3.5 - 5.2 mmol/L    Chloride 104 98 - 107 mmol/L    Total CO2  21.0 (L) 22.0 - 29.0 mmol/L    Calcium 10.1 8.6 - 10.5 mg/dL    Total Protein 6.6 6.0 - 8.5 g/dL    Albumin 4.40 3.50 - 5.20 g/dL    Globulin 2.2 gm/dL    A/G Ratio 2.0 g/dL    Total Bilirubin 0.3 0.1 - 1.2 mg/dL    Alkaline Phosphatase 88 39 - 117 U/L    AST (SGOT) 7 1 - 32 U/L    ALT (SGPT) 6 1 - 33 U/L   Lipid Panel   Result Value Ref Range    Total Cholesterol 195 0 - 200 mg/dL    Triglycerides 90 0 - 150 mg/dL    HDL Cholesterol 66 (H) 40 - 60 mg/dL    VLDL Cholesterol 18 5 - 40 mg/dL    LDL Cholesterol  111 (H) 0 - 100 mg/dL   TSH Rfx On Abnormal To Free T4   Result Value Ref Range    TSH 0.364 0.27 - 4.2 mIU/mL   Vitamin D 25 Hydroxy   Result Value Ref Range    25 Hydroxy, Vitamin D 19.8 (L) 30.0 - 100.0 ng/ml   Hemoglobin A1c   Result Value Ref Range    Hemoglobin A1C 7.72 (H) 4.80 - 5.60 %   Microalbumin / Creatinine Urine Ratio - Urine, Clean Catch   Result Value Ref Range    Creatinine, Urine 109.8 Not Estab. mg/dL    Microalbumin, Urine 20.4 Not Estab. ug/mL    Microalbumin/Creatinine Ratio 18.6 0.0 - 30.0 mg/g creat   CBC & Differential   Result Value Ref Range    WBC 6.23 4.50 - 10.70 10*3/mm3    RBC 3.86 (L) 3.90 - 5.20 10*6/mm3    Hemoglobin 11.3 (L) 11.9 - 15.5 g/dL    Hematocrit 38.6 35.6 - 45.5 %    .0 (H) 80.5 - 98.2 fL    MCH 29.3 26.9 - 32.0 pg    MCHC 29.3 (L) 32.4 - 36.3 g/dL    RDW 14.8 (H) 11.7 - 13.0 %    Platelets 272 140 - 500 10*3/mm3    Neutrophil Rel % 67.1 42.7 - 76.0 %    Lymphocyte Rel % 17.7 (L) 19.6 - 45.3 %    Monocyte Rel % 8.2 5.0 - 12.0 %    Eosinophil Rel % 6.4 (H) 0.3 - 6.2 %    Basophil Rel % 0.6 0.0 - 1.5 %    Neutrophils Absolute 4.18 1.90 - 8.10 10*3/mm3    Lymphocytes Absolute 1.10 0.90 - 4.80 10*3/mm3    Monocytes Absolute 0.51 0.20 - 1.20 10*3/mm3    Eosinophils Absolute 0.40 0.00 - 0.70 10*3/mm3    Basophils Absolute 0.04 0.00 - 0.20 10*3/mm3    Immature Granulocyte Rel % 0.0 0.0 - 0.5 %    Immature Grans Absolute 0.00 0.00 - 0.03 10*3/mm3   Manual  Differential   Result Value Ref Range    Differential Comment Comment     Comment Comment     Plt Comment Comment        Assessment & Plan:    Diagnoses and all orders for this visit:    Health maintenance examination    Medicare annual wellness visit, subsequent    Chronic kidney disease, stage IV (severe) (CMS/[facility])    Uncontrolled type 2 diabetes mellitus with stage 4 chronic kidney disease, with long-term current use of insulin (CMS/[facility])    Acquired hypothyroidism    Essential hypertension    Chronic coronary artery disease    Encounter for screening mammogram for breast cancer  -     Mammo Screening Bilateral With CAD; Future        Discussion/Summary  Alessia is here for routine CPE and AWV. She is still interested in doing mammograms.  She is overdue for this.  I have put a new order in.   Overall, she is doing remarkably well at 89 despite having had a recent MI and stent placement.  Her creatinine is stable.   Her DM is better.  For her age, her A1c is adequate.  bp is controlled.  Continue all current meds.        Follow Up:  Return in about 6 months (around 1/17/2019) for Next scheduled follow up, with nonfasting labs prior.     An After Visit Summary and PPPS with all of these plans were given to the patient.

## 2018-07-18 ENCOUNTER — APPOINTMENT (OUTPATIENT)
Dept: CARDIAC REHAB | Facility: HOSPITAL | Age: 83
End: 2018-07-18

## 2018-07-30 ENCOUNTER — HOSPITAL ENCOUNTER (OUTPATIENT)
Dept: MAMMOGRAPHY | Facility: HOSPITAL | Age: 83
Discharge: HOME | End: 2018-07-30
Admitting: INTERNAL MEDICINE

## 2018-07-30 DIAGNOSIS — Z12.31 ENCOUNTER FOR SCREENING MAMMOGRAM FOR BREAST CANCER: ICD-10-CM

## 2018-07-30 PROCEDURE — 77067 SCR MAMMO BI INCL CAD: CPT

## 2018-08-02 ENCOUNTER — HOSPITAL ENCOUNTER (EMERGENCY)
Facility: HOSPITAL | Age: 83
Discharge: HOME | End: 2018-08-02
Attending: EMERGENCY MEDICINE | Admitting: EMERGENCY MEDICINE

## 2018-08-02 ENCOUNTER — APPOINTMENT (OUTPATIENT)
Dept: GENERAL RADIOLOGY | Facility: HOSPITAL | Age: 83
End: 2018-08-02

## 2018-08-02 VITALS
BODY MASS INDEX: 32.44 KG/M2 | WEIGHT: 190 LBS | HEART RATE: 65 BPM | TEMPERATURE: 98.3 F | SYSTOLIC BLOOD PRESSURE: 149 MMHG | HEIGHT: 64 IN | OXYGEN SATURATION: 99 % | DIASTOLIC BLOOD PRESSURE: 71 MMHG | RESPIRATION RATE: 20 BRPM

## 2018-08-02 DIAGNOSIS — M94.0 COSTOCHONDRITIS: Primary | ICD-10-CM

## 2018-08-02 LAB
ALBUMIN SERPL-MCNC: 4.2 G/DL (ref 3.5–5.2)
ALBUMIN/GLOB SERPL: 1.5 G/DL
ALP SERPL-CCNC: 93 U/L (ref 39–117)
ALT SERPL W P-5'-P-CCNC: 7 U/L (ref 1–33)
ANION GAP SERPL CALCULATED.3IONS-SCNC: 15.6 MMOL/L
AST SERPL-CCNC: 11 U/L (ref 1–32)
BASOPHILS # BLD AUTO: 0.04 10*3/MM3 (ref 0–0.2)
BASOPHILS NFR BLD AUTO: 0.4 % (ref 0–1.5)
BILIRUB SERPL-MCNC: 0.3 MG/DL (ref 0.1–1.2)
BUN BLD-MCNC: 33 MG/DL (ref 8–23)
BUN/CREAT SERPL: 24.4 (ref 7–25)
CALCIUM SPEC-SCNC: 9.9 MG/DL (ref 8.6–10.5)
CHLORIDE SERPL-SCNC: 102 MMOL/L (ref 98–107)
CO2 SERPL-SCNC: 22.4 MMOL/L (ref 22–29)
CREAT BLD-MCNC: 1.35 MG/DL (ref 0.57–1)
DEPRECATED RDW RBC AUTO: 52.3 FL (ref 37–54)
EOSINOPHIL # BLD AUTO: 0.29 10*3/MM3 (ref 0–0.7)
EOSINOPHIL NFR BLD AUTO: 3 % (ref 0.3–6.2)
ERYTHROCYTE [DISTWIDTH] IN BLOOD BY AUTOMATED COUNT: 14.3 % (ref 11.7–13)
GFR SERPL CREATININE-BSD FRML MDRD: 37 ML/MIN/1.73
GLOBULIN UR ELPH-MCNC: 2.8 GM/DL
GLUCOSE BLD-MCNC: 272 MG/DL (ref 65–99)
HCT VFR BLD AUTO: 40 % (ref 35.6–45.5)
HGB BLD-MCNC: 12.6 G/DL (ref 11.9–15.5)
IMM GRANULOCYTES # BLD: 0.03 10*3/MM3 (ref 0–0.03)
IMM GRANULOCYTES NFR BLD: 0.3 % (ref 0–0.5)
LYMPHOCYTES # BLD AUTO: 1.41 10*3/MM3 (ref 0.9–4.8)
LYMPHOCYTES NFR BLD AUTO: 14.5 % (ref 19.6–45.3)
MCH RBC QN AUTO: 31.6 PG (ref 26.9–32)
MCHC RBC AUTO-ENTMCNC: 31.5 G/DL (ref 32.4–36.3)
MCV RBC AUTO: 100.3 FL (ref 80.5–98.2)
MONOCYTES # BLD AUTO: 0.7 10*3/MM3 (ref 0.2–1.2)
MONOCYTES NFR BLD AUTO: 7.2 % (ref 5–12)
NEUTROPHILS # BLD AUTO: 7.29 10*3/MM3 (ref 1.9–8.1)
NEUTROPHILS NFR BLD AUTO: 74.9 % (ref 42.7–76)
PLATELET # BLD AUTO: 322 10*3/MM3 (ref 140–500)
PMV BLD AUTO: 11 FL (ref 6–12)
POTASSIUM BLD-SCNC: 4.8 MMOL/L (ref 3.5–5.2)
PROT SERPL-MCNC: 7 G/DL (ref 6–8.5)
RBC # BLD AUTO: 3.99 10*6/MM3 (ref 3.9–5.2)
SODIUM BLD-SCNC: 140 MMOL/L (ref 136–145)
TROPONIN T SERPL-MCNC: <0.01 NG/ML (ref 0–0.03)
TROPONIN T SERPL-MCNC: <0.01 NG/ML (ref 0–0.03)
WBC NRBC COR # BLD: 9.73 10*3/MM3 (ref 4.5–10.7)

## 2018-08-02 PROCEDURE — 93005 ELECTROCARDIOGRAM TRACING: CPT

## 2018-08-02 PROCEDURE — 84484 ASSAY OF TROPONIN QUANT: CPT | Performed by: EMERGENCY MEDICINE

## 2018-08-02 PROCEDURE — 99283 EMERGENCY DEPT VISIT LOW MDM: CPT

## 2018-08-02 PROCEDURE — 93010 ELECTROCARDIOGRAM REPORT: CPT | Performed by: INTERNAL MEDICINE

## 2018-08-02 PROCEDURE — 93005 ELECTROCARDIOGRAM TRACING: CPT | Performed by: EMERGENCY MEDICINE

## 2018-08-02 PROCEDURE — 80053 COMPREHEN METABOLIC PANEL: CPT | Performed by: PHYSICIAN ASSISTANT

## 2018-08-02 PROCEDURE — 84484 ASSAY OF TROPONIN QUANT: CPT | Performed by: PHYSICIAN ASSISTANT

## 2018-08-02 PROCEDURE — 85025 COMPLETE CBC W/AUTO DIFF WBC: CPT | Performed by: PHYSICIAN ASSISTANT

## 2018-08-02 PROCEDURE — 71045 X-RAY EXAM CHEST 1 VIEW: CPT

## 2018-08-02 RX ORDER — SODIUM CHLORIDE 0.9 % (FLUSH) 0.9 %
10 SYRINGE (ML) INJECTION AS NEEDED
Status: DISCONTINUED | OUTPATIENT
Start: 2018-08-02 | End: 2018-08-02 | Stop reason: HOSPADM

## 2018-08-02 NOTE — ED PROVIDER NOTES
" EMERGENCY DEPARTMENT ENCOUNTER    CHIEF COMPLAINT  Chief Complaint: Chest pain  History given by: Pt  History limited by: Nothing  Room Number: 15/15  PMD: Summer Gordon MD  Cardiology: Dr. Joseph    HPI:  Pt is a 89 y.o. female who presents complaining of \"sharp\" brief episodes of central chest pain that last less than a minute in duration for the last two days. She reports she developed LUE pain this morning. Pt reports a hx of cardiac stent placement in 03/2018, and states her current sx are similar, but her sx that preceded her stent placement were longer lasting. Pt reports she is currently on ASA and plavix. Pt reports a hx of CKD.     Duration:  Episodes lasting less than a minute  Onset: gradual  Timing: intermittent  Location: central chest  Radiation: none stated  Quality: \"Sharp\"  Intensity/Severity: moderate  Progression: unchanged  Associated Symptoms: LUE pain  Aggravating Factors: none  Alleviating Factors: none  Previous Episodes: Pt states she had cardiac stent placement in 03/2018 with Dr. Chisholm and states her current sx are similar to her sx prior to her stent placement, but shorter in duration.   Treatment before arrival: Pt states she is on ASA and plavix    PAST MEDICAL HISTORY  Active Ambulatory Problems     Diagnosis Date Noted   • Actinic keratosis 02/02/2016   • Anemia of chronic disorder 02/02/2016   • Disorder of aorta (CMS/[facility]) 02/02/2016   • Chronic coronary artery disease 02/02/2016   • Chronic kidney disease, stage IV (severe) (CMS/[facility]) 02/02/2016   • Constipation 02/02/2016   • Cor pulmonale (CMS/[facility]) 02/02/2016   • Diabetes mellitus (CMS/[facility]) 02/02/2016   • High level of uric acid in blood 02/02/2016   • Fatigue 02/02/2016   • Hypertension 02/02/2016   • Hypothyroidism 02/02/2016   • Iron deficiency 02/02/2016   • Mitral valve insufficiency 02/02/2016   • Pulmonary hypertension 02/02/2016   • Swelling of lower extremity 02/02/2016   • Shortness of breath 02/02/2016   • " Tricuspid valve insufficiency 02/02/2016   • Uncontrolled type 2 diabetes mellitus (CMS/[facility]) 02/02/2016   • Type 2 diabetes mellitus (CMS/[facility]) 02/02/2016   • Vitamin D deficiency 03/25/2016   • Secondary hyperparathyroidism, non-renal (CMS/[facility]) 03/25/2016   • Pulmonary nodules/lesions, multiple 05/27/2016   • Pleural effusion 05/27/2016   • Paronychia of second finger of right hand 09/29/2016   • Hyperuricemia 12/29/2016   • Acute pain of both shoulders 01/10/2018   • Acute transmural inferior wall MI (CMS/[facility]) 03/11/2018     Resolved Ambulatory Problems     Diagnosis Date Noted   • Hyperlipidemia 02/02/2016     Past Medical History:   Diagnosis Date   • Acute transmural inferior wall MI (CMS/[facility])    • Arthritis 03/13/2014   • CAD (coronary artery disease)    • Cancer of uterus (CMS/[facility])    • Carotid artery stenosis    • Chronic renal failure syndrome    • Coronary artery disease involving coronary bypass graft of native heart    • Coronary atherosclerosis    • Endometrial cancer (CMS/[facility])    • Esophageal reflux    • Essential hypertension    • Glaucoma    • Hypertension    • Malaise and fatigue    • Osteoarthritis    • Sleep apnea    • Spinal stenosis    • Thyroid disease    • Type 2 diabetes mellitus (CMS/[facility]) 1991   • Vitamin B12 deficiency        PAST SURGICAL HISTORY  Past Surgical History:   Procedure Laterality Date   • CARDIAC CATHETERIZATION N/A 3/11/2018    Procedure: Left Heart Cath;  Surgeon: Cameron Chisholm MD;  Location: [facility] CATH INVASIVE LOCATION;  Service: Cardiovascular   • CARDIAC CATHETERIZATION N/A 3/11/2018    Procedure: Stent ROSSI bypass graft;  Surgeon: Cameron Chisholm MD;  Location: [facility] CATH INVASIVE LOCATION;  Service: Cardiovascular   • CATARACT EXTRACTION Bilateral    • CATARACT EXTRACTION W/ INTRAOCULAR LENS IMPLANT Right 10/25/2016    Procedure: RT SUPERFICIAL KERATECTOMY ;  Surgeon: Arian Singh MD;  Location: [facility] OR [facility] [place];  Service:    • CHOLECYSTECTOMY     •  COLONOSCOPY     • CORONARY ARTERY BYPASS GRAFT  1991    X3   • HYSTERECTOMY     • OOPHORECTOMY     • TRANSLUMINAL ATHERECTOMY PERONEAL ARTERY      PERCUTANEOUS TRANSLUMINAL ATHERECTOMY RENAL ARTERY       FAMILY HISTORY  Family History   Problem Relation Age of Onset   • Colon cancer Other    • Heart disease Other    • Hypertension Other    • Stroke Other         ISCHEMIC   • Colon cancer Mother    • Stroke Father    • Heart attack Father    • Heart disease Father    • Heart attack Brother    • Stroke Brother    • Heart disease Brother    • Breast cancer Maternal Grandmother        SOCIAL HISTORY  Social History     Social History   • Marital status: Single     Spouse name: N/A   • Number of children: N/A   • Years of education: N/A     Occupational History   • Not on file.     Social History Main Topics   • Smoking status: Never Smoker   • Smokeless tobacco: Never Used   • Alcohol use No      Comment: caffeine use   • Drug use: Unknown   • Sexual activity: Defer     Other Topics Concern   • Not on file     Social History Narrative   • No narrative on file       ALLERGIES  Allopurinol; Clindamycin/lincomycin; Statins; Ampicillin; Penicillins; and Pravastatin    REVIEW OF SYSTEMS  Review of Systems   Constitutional: Negative for fever.   HENT: Negative for sore throat.    Eyes: Negative.    Respiratory: Negative for cough and shortness of breath.    Cardiovascular: Positive for chest pain.   Gastrointestinal: Negative for abdominal pain, diarrhea and vomiting.   Genitourinary: Negative for dysuria.   Musculoskeletal: Positive for myalgias (LUE). Negative for neck pain.   Skin: Negative for rash.   Allergic/Immunologic: Negative.    Neurological: Negative for weakness, numbness and headaches.   Hematological: Negative.    Psychiatric/Behavioral: Negative.    All other systems reviewed and are negative.      PHYSICAL EXAM  ED Triage Vitals [08/02/18 1031]   Temp Heart Rate Resp BP SpO2   98.3 °F (36.8 °C) 101 20 -- 95 %       Temp src Heart Rate Source Patient Position BP Location FiO2 (%)   Tympanic Monitor -- -- --       Physical Exam   Constitutional: She is oriented to person, place, and time. No distress.   HENT:   Head: Normocephalic and atraumatic.   Mouth/Throat: Oropharynx is clear and moist.   Eyes: Pupils are equal, round, and reactive to light. EOM are normal.   Neck: Normal range of motion. Neck supple.   Cardiovascular: Normal rate, regular rhythm and normal heart sounds.    Pulmonary/Chest: Effort normal and breath sounds normal. No respiratory distress. She exhibits tenderness (L chest wall tenderness on palpation).   Abdominal: Soft. Bowel sounds are normal. There is no tenderness. There is no rebound and no guarding.   Musculoskeletal: Normal range of motion. She exhibits edema (1+ BLE). She exhibits no tenderness (calf).   Neurological: She is alert and oriented to person, place, and time. She has normal sensation and normal strength.   Skin: Skin is warm and dry. No rash noted.   Psychiatric: Mood and affect normal.   Nursing note and vitals reviewed.      LAB RESULTS  Lab Results (last 24 hours)     Procedure Component Value Units Date/Time    Comprehensive Metabolic Panel [[id number]]  (Abnormal) Collected:  08/02/18 1052    Specimen:  Blood Updated:  08/02/18 1136     Glucose 272 (H) mg/dL      BUN 33 (H) mg/dL      Creatinine 1.35 (H) mg/dL      Sodium 140 mmol/L      Potassium 4.8 mmol/L      Chloride 102 mmol/L      CO2 22.4 mmol/L      Calcium 9.9 mg/dL      Total Protein 7.0 g/dL      Albumin 4.20 g/dL      ALT (SGPT) 7 U/L      AST (SGOT) 11 U/L      Alkaline Phosphatase 93 U/L      Total Bilirubin 0.3 mg/dL      eGFR Non African Amer 37 (L) mL/min/1.73      Globulin 2.8 gm/dL      A/G Ratio 1.5 g/dL      BUN/Creatinine Ratio 24.4     Anion Gap 15.6 mmol/L     Narrative:       The MDRD GFR formula is only valid for adults with stable renal function between ages 18 and 70.    CBC & Differential [[id number]]  Collected:  08/02/18 1052    Specimen:  Blood Updated:  08/02/18 1101    Narrative:       The following orders were created for panel order CBC & Differential.  Procedure                               Abnormality         Status                     ---------                               -----------         ------                     CBC Auto Differential[[id number]]        Abnormal            Final result                 Please view results for these tests on the individual orders.    Troponin [[id number]]  (Normal) Collected:  08/02/18 1052    Specimen:  Blood Updated:  08/02/18 1134     Troponin T <0.010 ng/mL     Narrative:       Troponin T Reference Ranges:  Less than 0.03 ng/mL:    Negative for AMI  0.03 to 0.09 ng/mL:      Indeterminant for AMI  Greater than 0.09 ng/mL: Positive for AMI    CBC Auto Differential [[id number]]  (Abnormal) Collected:  08/02/18 1052    Specimen:  Blood Updated:  08/02/18 1101     WBC 9.73 10*3/mm3      RBC 3.99 10*6/mm3      Hemoglobin 12.6 g/dL      Hematocrit 40.0 %      .3 (H) fL      MCH 31.6 pg      MCHC 31.5 (L) g/dL      RDW 14.3 (H) %      RDW-SD 52.3 fl      MPV 11.0 fL      Platelets 322 10*3/mm3      Neutrophil % 74.9 %      Lymphocyte % 14.5 (L) %      Monocyte % 7.2 %      Eosinophil % 3.0 %      Basophil % 0.4 %      Immature Grans % 0.3 %      Neutrophils, Absolute 7.29 10*3/mm3      Lymphocytes, Absolute 1.41 10*3/mm3      Monocytes, Absolute 0.70 10*3/mm3      Eosinophils, Absolute 0.29 10*3/mm3      Basophils, Absolute 0.04 10*3/mm3      Immature Grans, Absolute 0.03 10*3/mm3     Troponin [[id number]]  (Normal) Collected:  08/02/18 1325    Specimen:  Blood Updated:  08/02/18 1409     Troponin T <0.010 ng/mL     Narrative:       Troponin T Reference Ranges:  Less than 0.03 ng/mL:    Negative for AMI  0.03 to 0.09 ng/mL:      Indeterminant for AMI  Greater than 0.09 ng/mL: Positive for AMI          I ordered the above labs and reviewed the  results    RADIOLOGY  XR Chest 1 View   Preliminary Result       No active disease in the chest.               I ordered the above noted radiological studies. Interpreted by radiologist. Reviewed by me in PACS.       PROCEDURES  Procedures    EKG    EKG time: 1036  Rhythm/Rate: nsr, 85  No Acute Ischemia  Non-Specific ST-T changes    unchanged compared to prior on 03/12/2018    Interpreted Contemporaneously by me.  Independently viewed by me      PROGRESS AND CONSULTS  ED Course as of Aug 02 1508   Thu Aug 02, 2018   1044 Patient with diffuse intermittent left chest pain for the past 2 days.  Patient had a stent placed in March by Dr. Chisholm.  [EE]      ED Course User Index  [EE] ROBIN Walker     1251 - Rechecked pt. Pt is resting comfortably in NAD. Stated the results of her lab work including her negative troponin as well as her negative CXR. D/w pt the plan to acquire a second troponin.     1256 - Repeat troponin ordered.     1412 - Rechecked pt. Pt is resting comfortably in NAD. Stated the results of her second negative troponin. D/w pt the plan to discharge home with a follow up with Dr. Joseph. Instructed pt to take tylenol for pain. Pt understands and agrees with plan. All questions answered.       MEDICAL DECISION MAKING  Results were reviewed/discussed with the patient and they were also made aware of online access. Pt also made aware that some labs, such as cultures, will not be resulted during ER visit and follow up with PMD is necessary.     MDM  Number of Diagnoses or Management Options  Costochondritis:      Amount and/or Complexity of Data Reviewed  Clinical lab tests: ordered and reviewed (First and second troponin - negative)  Tests in the radiology section of CPT®: ordered and reviewed (CXR - no active disease)  Tests in the medicine section of CPT®: reviewed and ordered (See EKG procedure note.)  Decide to obtain previous medical records or to obtain history from someone other than the  patient: yes  Review and summarize past medical records: yes (Pt had a stent placed by Dr. Chisholm in 03/2018)           DIAGNOSIS  Final diagnoses:   Costochondritis       DISPOSITION  DISCHARGE    Patient discharged in stable condition.    Reviewed implications of results, diagnosis, meds, responsibility to follow up, warning signs and symptoms of possible worsening, potential complications and reasons to return to ER.    Patient/Family voiced understanding of above instructions.    Discussed plan for discharge, as there is no emergent indication for admission. Patient referred to primary care provider for BP management due to today's BP. Pt/family is agreeable and understands need for follow up and repeat testing.  Pt is aware that discharge does not mean that nothing is wrong but it indicates no emergency is present that requires admission and they must continue care with follow-up as given below or physician of their choice.     FOLLOW-UP  Griffin Joseph MD  [address] [address]  [phone]    In 1 week  If symptoms worsen         Medication List      Changed    insulin detemir 100 UNIT/ML injection  Commonly known as:  LEVEMIR  Inject 10 Units under the skin daily.  What changed:  how much to take  when to take this              Latest Documented Vital Signs:  As of 3:08 PM  BP- 149/71 HR- 65 Temp- 98.3 °F (36.8 °C) (Tympanic) O2 sat- 99%    --  Documentation assistance provided by rickey Alan for Dr. Truong.  Information recorded by the scribe was done at my direction and has been verified and validated by me.       Yony Alan  08/02/18 [id number]       Pato Truong MD  08/02/18 1971

## 2018-08-03 ENCOUNTER — TELEPHONE (OUTPATIENT)
Dept: SOCIAL WORK | Facility: HOSPITAL | Age: 83
End: 2018-08-03

## 2018-08-16 ENCOUNTER — OFFICE VISIT (OUTPATIENT)
Dept: ORTHOPEDIC SURGERY | Facility: CLINIC | Age: 83
End: 2018-08-16

## 2018-08-16 VITALS — BODY MASS INDEX: 34.93 KG/M2 | HEIGHT: 64 IN | WEIGHT: 204.6 LBS | TEMPERATURE: 98.1 F

## 2018-08-16 DIAGNOSIS — M17.12 PRIMARY OSTEOARTHRITIS OF LEFT KNEE: Primary | ICD-10-CM

## 2018-08-16 DIAGNOSIS — M16.12 PRIMARY OSTEOARTHRITIS OF LEFT HIP: ICD-10-CM

## 2018-08-16 DIAGNOSIS — R10.32 GROIN PAIN, LEFT: ICD-10-CM

## 2018-08-16 PROCEDURE — 20610 DRAIN/INJ JOINT/BURSA W/O US: CPT | Performed by: ORTHOPAEDIC SURGERY

## 2018-08-16 PROCEDURE — 73562 X-RAY EXAM OF KNEE 3: CPT | Performed by: ORTHOPAEDIC SURGERY

## 2018-08-16 PROCEDURE — 73502 X-RAY EXAM HIP UNI 2-3 VIEWS: CPT | Performed by: ORTHOPAEDIC SURGERY

## 2018-08-16 RX ORDER — METHYLPREDNISOLONE ACETATE 80 MG/ML
80 INJECTION, SUSPENSION INTRA-ARTICULAR; INTRALESIONAL; INTRAMUSCULAR; SOFT TISSUE
Status: COMPLETED | OUTPATIENT
Start: 2018-08-16 | End: 2018-08-16

## 2018-08-16 RX ORDER — LIDOCAINE HYDROCHLORIDE 10 MG/ML
2 INJECTION, SOLUTION EPIDURAL; INFILTRATION; INTRACAUDAL; PERINEURAL
Status: COMPLETED | OUTPATIENT
Start: 2018-08-16 | End: 2018-08-16

## 2018-08-16 RX ORDER — CALCITRIOL 0.25 UG/1
0.25 CAPSULE, LIQUID FILLED ORAL DAILY
COMMUNITY
Start: 2018-07-27 | End: 2019-01-22

## 2018-08-16 RX ADMIN — LIDOCAINE HYDROCHLORIDE 2 ML: 10 INJECTION, SOLUTION EPIDURAL; INFILTRATION; INTRACAUDAL; PERINEURAL at 14:09

## 2018-08-16 RX ADMIN — METHYLPREDNISOLONE ACETATE 80 MG: 80 INJECTION, SUSPENSION INTRA-ARTICULAR; INTRALESIONAL; INTRAMUSCULAR; SOFT TISSUE at 14:09

## 2018-08-16 NOTE — PROGRESS NOTES
8/16/2018    Alessia Madrigal is here today for worsening knee pain. Pt has undergone injection of the knee in the past with good resolution of symptoms. Pt is requesting a repeat injection.     KNEE Injection Procedure Note:    Large Joint Arthrocentesis  Date/Time: 8/16/2018 2:09 PM  Consent given by: patient  Site marked: site marked  Timeout: Immediately prior to procedure a time out was called to verify the correct patient, procedure, equipment, support staff and site/side marked as required   Supporting Documentation  Indications: pain and joint swelling   Procedure Details  Location: knee - L knee  Preparation: Patient was prepped and draped in the usual sterile fashion  Needle gauge: 21 g.  Approach: anterolateral  Medications administered: 80 mg methylPREDNISolone acetate 80 MG/ML; 2 mL lidocaine PF 1% 1 %  Patient tolerance: patient tolerated the procedure well with no immediate complications      IEW X-RAYS 3 VIEWS OF THE LEFT KNEE ap LATERAL SUNRISE WERE ORDERED AND REVIEWED FOR EVALUATION OF KNEE PAIN WHICH SHOWS MODERATELY SEVERE VALGUS OSTEOPOROSIS WITH LATERAL JOINT SPACE NARROWING.  iN COMPARISON TO PREVIOUS FILMS ARE UNCHANGED    X-RAYS 2 VIEWS ap LATERAL OF THE LEFT HIP ORDERED AND REVIEWED SHOW SOME OSTEOPENIA OF THE LATERAL ASPECT OF THE FEMORAL HEAD MILD JOINT SPACE NARROWING AND OSTEOPHYTE FORMATION CONSISTENT WITH MILD-TO-MODERATE OSTEOARTHRITIS.  tHERE ARE NO PREVIOUS FILMS FOR COMPARISON.    At the conclusion of the injection I discussed the importance of continued quad strengthening exercises on a daily basis. I will see the patient back if the symptoms should fail to improve or worsen.    Fermín Alan MD  8/16/2018

## 2018-08-17 ENCOUNTER — OFFICE VISIT (OUTPATIENT)
Dept: CARDIOLOGY | Facility: CLINIC | Age: 83
End: 2018-08-17

## 2018-08-17 VITALS
HEART RATE: 66 BPM | BODY MASS INDEX: 34.79 KG/M2 | DIASTOLIC BLOOD PRESSURE: 74 MMHG | WEIGHT: 203.8 LBS | SYSTOLIC BLOOD PRESSURE: 118 MMHG | HEIGHT: 64 IN

## 2018-08-17 DIAGNOSIS — E11.59 TYPE 2 DIABETES MELLITUS WITH OTHER CIRCULATORY COMPLICATION, WITHOUT LONG-TERM CURRENT USE OF INSULIN (HCC): ICD-10-CM

## 2018-08-17 DIAGNOSIS — N18.4 CHRONIC KIDNEY DISEASE, STAGE IV (SEVERE) (HCC): ICD-10-CM

## 2018-08-17 DIAGNOSIS — E78.2 MIXED HYPERLIPIDEMIA: ICD-10-CM

## 2018-08-17 DIAGNOSIS — I25.810 CORONARY ARTERY DISEASE INVOLVING CORONARY BYPASS GRAFT OF NATIVE HEART WITHOUT ANGINA PECTORIS: Primary | ICD-10-CM

## 2018-08-17 DIAGNOSIS — I10 ESSENTIAL HYPERTENSION: ICD-10-CM

## 2018-08-17 PROCEDURE — 93000 ELECTROCARDIOGRAM COMPLETE: CPT | Performed by: INTERNAL MEDICINE

## 2018-08-17 PROCEDURE — 99214 OFFICE O/P EST MOD 30 MIN: CPT | Performed by: INTERNAL MEDICINE

## 2018-08-17 NOTE — PROGRESS NOTES
Date of Office Visit: 18  Encounter Provider: Griffin Joseph MD  Place of Service: [facility] CARDIOLOGY  Patient Name: Alessia Madrigal  :10/9/1928  Referral Provider:No ref. provider found      Chief Complaint   Patient presents with   • Coronary Artery Disease     History of Present Illness  Mrs Madrigal is a pleasant 89 yo female with history of coronary disease, previous colon bypass grafting, hypertension, diabetes mellitus, hyperlipidemia, renal failure, previous renal artery stenting.  In  she had coronary bypass grafting in  had repeat cardiac catheterization which showed severe three-vessel coronary disease with occlusion of left anterior descending and right coronary artery and circumflex the left internal mammary graft left anterior descending was patent the vein graft to the diagonal was patent vein graft to the right coronary was patent and the circumflex filled by collaterals was only ischemic area.  She's also had previous left renal artery stent follows with nephrology for renal failure.  She has carotid artery disease follows with vascular surgery just mild.    She then presented in 2018 with acute inferior ST elevation infarct.  Cardiac catheterization showed severe three-vessel coronary disease, normal left ventricular ejection fraction with inferior wall motion abnormality she had occluded vein graft to the diagonal patent left internal mammary graft left anterior descending severe disease in the vein graft to the posterior descending artery.  She had a 3.5 x 23 mm drug-eluting stent placed in that vein graft.  Residual ischemic area being the diagonal distribution circumflex distribution and mid left anterior descending treated medically.  Echocardiogram showed left ventricular ejection fraction of 57% with wall motion abnormality, grade 2 diastolic dysfunction, mild to moderate mitral insufficiency mild tricuspid insufficiency with normal  RV systolic pressure.  She now comes in for follow-up. She had an episode of chest pain just a few weeks ago where she went to the emergency room said was similar but not as bad as before with her heart there ruled out sent her home she's had none since then No shortness of breath, othopnea or PND. No palpitations, near syncope or syncope. No stroke type symptoms like paralysis, paresthesia, abrupt vision loss and dysarthria. No bleeding like blood in the stool or dark stools.  She did complete cardiac rehabilitation center messed up her knee a little bit she had to have a steroid injection yesterday for that overall she feels like she's doing well.  She does still live at home by herself but is able to get all her needs met.      Coronary Artery Disease   Pertinent negatives include no dizziness, muscle weakness or weight gain. There is no history of past myocardial infarction.         Past Medical History:   Diagnosis Date   • Acute transmural inferior wall MI (CMS/HCC)    • Arthritis 03/13/2014    CONT TYLENOL FOR PAIN/ JOSE DANIEL CHEW (INTERNAL MEDICINE)   • CAD (coronary artery disease)    • Cancer of uterus (CMS/HCC)    • Carotid artery stenosis    • Chronic renal failure syndrome    • Coronary artery disease involving coronary bypass graft of native heart     with other forms of angina pectoris   • Coronary atherosclerosis    • Endometrial cancer (CMS/HCC)    • Esophageal reflux    • Essential hypertension    • Glaucoma    • Hypertension    • Malaise and fatigue    • Osteoarthritis    • Sleep apnea     USING CPAP   • Spinal stenosis    • Thyroid disease    • Type 2 diabetes mellitus (CMS/HCC) 1991    INsulin begun 2012   • Vitamin B12 deficiency     SHE HAS NOT HAD THIS CHECKED IN A WHILE. SHE HAD BEEN ON SHOTS.  CHECK HER B12 LEVELS TODAY.  BY JOSE DANIEL CHEW         Past Surgical History:   Procedure Laterality Date   • CARDIAC CATHETERIZATION N/A 3/11/2018    Procedure: Left Heart Cath;  Surgeon: Cameron  MD Phan;  Location:  [facility] CATH INVASIVE [facility];  Service: Cardiovascular   • CARDIAC CATHETERIZATION N/A 3/11/2018    Procedure: Stent ROSSI bypass graft;  Surgeon: Cameron Chisholm MD;  Location:  [facility] CATH INVASIVE [facility];  Service: Cardiovascular   • CATARACT EXTRACTION Bilateral    • CATARACT EXTRACTION W/ INTRAOCULAR LENS IMPLANT Right 10/25/2016    Procedure: RT SUPERFICIAL KERATECTOMY ;  Surgeon: Arian Singh MD;  Location:  [facility] OR [facility] [place];  Service:    • CHOLECYSTECTOMY     • COLONOSCOPY     • CORONARY ARTERY BYPASS GRAFT  1991    X3   • HYSTERECTOMY     • OOPHORECTOMY     • TRANSLUMINAL ATHERECTOMY PERONEAL ARTERY      PERCUTANEOUS TRANSLUMINAL ATHERECTOMY RENAL ARTERY         Current Outpatient Prescriptions on File Prior to Visit   Medication Sig Dispense Refill   • acetaminophen (TYLENOL) 500 MG tablet Take 500 mg by mouth every 4 (four) hours as needed for mild pain (1-3) (Back Pain). EVERY 4 TO 6 HOURS AS NEEDED     • aspirin 81 MG chewable tablet Chew 81 mg Daily.     • BETIMOL 0.5 % ophthalmic solution Administer 1 drop to both eyes Tonight.     • calcitriol (ROCALTROL) 0.25 MCG capsule      • clopidogrel (PLAVIX) 75 MG tablet Take 1 tablet by mouth Daily. 90 tablet 1   • Coenzyme Q10 (CO Q-10) 300 MG capsule Take 300 mg by mouth daily.     • furosemide (LASIX) 40 MG tablet Take 1 tablet by mouth 2 (Two) Times a Day. 60 tablet 3   • insulin detemir (LEVEMIR) 100 UNIT/ML injection Inject 10 Units under the skin daily. (Patient taking differently: Inject 8 Units under the skin Every Night.) 5 pen 3   • insulin lispro (HumaLOG) 100 UNIT/ML patient supplied pump Inject 5 Units under the skin See Admin Instructions. Patient takes 5 units after each meal     • Insulin Pen Needle (BD PEN NEEDLE LISA U/F) 32G X 4 MM misc USE AS DIRECTED THREE TIMES A DAY. 300 each 3   • levothyroxine (SYNTHROID, LEVOTHROID) 125 MCG tablet TAKE 1 TABLET DAILY AS DIRECTED 90 tablet 3   • losartan (COZAAR) 50 MG  tablet Take 1 tablet by mouth Daily. 90 tablet 3   • metoprolol succinate XL (TOPROL-XL) 50 MG 24 hr tablet Take 1 tablet by mouth Daily. 90 tablet 3   • NIFEdipine XL (PROCARDIA XL) 90 MG 24 hr tablet Take 1 tablet by mouth Daily. 90 tablet 3   • nitroglycerin (NITROSTAT) 0.4 MG SL tablet Place 1 tablet under the tongue Every 5 (Five) Minutes As Needed for Chest Pain. Take no more than 3 doses in 15 minutes. 25 tablet 12   • raNITIdine (ZANTAC) 150 MG tablet TAKE 1 TABLET EVERY 12 HOURS AS NEEDED 180 tablet 3   • spironolactone (ALDACTONE) 25 MG tablet Take 1 tablet by mouth Daily. 90 tablet 3   • timolol (TIMOPTIC) 0.25 % ophthalmic solution Administer 1 drop to both eyes Tonight.     • travoprost, BAK free, (TRAVATAN) 0.004 % solution ophthalmic solution Administer 1 drop to both eyes every night. in affected eye(s)        Current Facility-Administered Medications on File Prior to Visit   Medication Dose Route Frequency Provider Last Rate Last Dose   • [COMPLETED] lidocaine PF 1% (XYLOCAINE) injection 2 mL  2 mL  One-Time Injection Fermín Alan MD   2 mL at 08/16/18 1409   • [COMPLETED] methylPREDNISolone acetate (DEPO-medrol) injection 80 mg  80 mg  One-Time Injection Fermín Alan MD   80 mg at 08/16/18 1409         Social History     Social History   • Marital status: Single     Spouse name: N/A   • Number of children: N/A   • Years of education: N/A     Occupational History   • Not on file.     Social History Main Topics   • Smoking status: Never Smoker   • Smokeless tobacco: Never Used   • Alcohol use No      Comment: caffeine use   • Drug use: Unknown   • Sexual activity: Defer     Other Topics Concern   • Not on file     Social History Narrative   • No narrative on file       Family History   Problem Relation Age of Onset   • Colon cancer Other    • Heart disease Other    • Hypertension Other    • Stroke Other         ISCHEMIC   • Colon cancer Mother    • Stroke Father    • Heart attack Father    •  Heart disease Father    • Heart attack Brother    • Stroke Brother    • Heart disease Brother    • Breast cancer Maternal Grandmother          Review of Systems   Constitution: Negative for decreased appetite, diaphoresis, fever, weakness, malaise/fatigue, weight gain and weight loss.   HENT: Negative for congestion, hearing loss, nosebleeds and tinnitus.    Eyes: Negative for blurred vision, double vision, vision loss in left eye, vision loss in right eye and visual disturbance.   Cardiovascular:        As noted in HPI   Respiratory:        As noted HPI   Endocrine: Negative for cold intolerance and heat intolerance.   Hematologic/Lymphatic: Negative for bleeding problem. Does not bruise/bleed easily.   Skin: Negative for color change, flushing, itching and rash.   Musculoskeletal: Positive for joint pain. Negative for arthritis, back pain, joint swelling, muscle weakness and myalgias.   Gastrointestinal: Negative for bloating, abdominal pain, constipation, diarrhea, dysphagia, heartburn, hematemesis, hematochezia, melena, nausea and vomiting.   Genitourinary: Negative for bladder incontinence, dysuria, frequency, nocturia and urgency.   Neurological: Negative for dizziness, focal weakness, headaches, light-headedness, loss of balance, numbness, paresthesias and vertigo.   Psychiatric/Behavioral: Negative for depression, memory loss and substance abuse.       Procedures      ECG 12 Lead  Date/Time: 8/17/2018 11:02 AM  Performed by: CEFERINO LEA  Authorized by: CEFERINO LEA   Comparison: compared with previous ECG   Similar to previous ECG  Rhythm: sinus rhythm  Rate: normal  QRS axis: normal  Clinical impression comment: Diffuse T wave abnormality                  Objective:    There were no vitals taken for this visit.       Physical Exam  Physical Exam   Constitutional: She is oriented to person, place, and time. She appears well-developed and well-nourished. No distress.   HENT:   Head: Normocephalic.    Eyes: Pupils are equal, round, and reactive to light. Conjunctivae are normal. No scleral icterus.   Neck: Normal carotid pulses, no hepatojugular reflux and no JVD present. Carotid bruit is not present. No tracheal deviation, no edema and no erythema present. No thyromegaly present.   Cardiovascular: Normal rate, regular rhythm, S1 normal, S2 normal and intact distal pulses.   No extrasystoles are present. PMI is not displaced.  Exam reveals no gallop, no distant heart sounds and no friction rub.    Murmur heard.   Systolic murmur is present with a grade of 2/6  at the upper right sternal border  Pulses:       Carotid pulses are 2+ on the right side with bruit, and 2+ on the left side with bruit.       Radial pulses are 2+ on the right side, and 2+ on the left side.        Femoral pulses are 2+ on the right side, and 2+ on the left side.       Dorsalis pedis pulses are 2+ on the right side, and 2+ on the left side.        Posterior tibial pulses are 2+ on the right side, and 2+ on the left side.   Pulmonary/Chest: Effort normal and breath sounds normal. No respiratory distress. She has no decreased breath sounds. She has no wheezes. She has no rhonchi. She has no rales. She exhibits no tenderness.   Abdominal: Soft. Bowel sounds are normal. She exhibits no distension and no mass. There is no hepatosplenomegaly. There is no tenderness. There is no rebound and no guarding.   Musculoskeletal: She exhibits no edema, tenderness or deformity.   Neurological: She is alert and oriented to person, place, and time.   Skin: Skin is warm and dry. No rash noted. She is not diaphoretic. No cyanosis or erythema. No pallor. Nails show no clubbing.   Psychiatric: She has a normal mood and affect. Her speech is normal and behavior is normal. Judgment and thought content normal.           Assessment:   1.  89-year-old female with history of severe coronary disease previous corneal bypass grafting.  Catheterization 2012 patent left  internal graft left anterior descending, patent vein graft to diagonal, patent vein graft to the right coronary artery ischemic area just the circumflex distribution normal left ventricular systolic function.  Perfusion stress test November 2016 no ischemia and normal left ventricular function. An acute inferior infarct March 2018 cardiac catheterization and echocardiogram preserved left ventricular ejection fraction in for wall hypokinesis.  Severe three-vessel coronary disease patent left internal mammary graft left anterior descending, occluded vein graft to the diagonal, severe disease of the vein graft to the posterior descending artery with drug-eluting stent placed to that vessel residual ischemia being the diagonal distribution, circumflex distribution and mid left anterior descending. DAPT score of 3 high risk.  Coronary Artery Disease  Assessment  • The patient has no angina  • There is a new diagnosis of stable angina in the past 12 months     Plan  • Lifestyle modifications discussed include adhering to a heart healthy diet, avoidance of tobacco products, maintenance of a healthy weight, medication compliance, regular exercise and regular monitoring of cholesterol and blood pressure     Subjective - Objective  • There is a history of previous coronary artery bypass graft  • There has been a previous stent procedure using ROSSI  • Current antiplatelet therapy includes aspirin 81 mg and clopidogrel 75 mg  • The patient qualifies for cardiac rehabilitation, and has been referred to cardiac rehab  One episode of atypical chest pain but no recurrence she is relatively stable she'll continue the same see us again in follow-up in a year.  She is to call if she has problems.  .  2.  Hypertension blood pressure adequately controlled continue same  3.  Hyperlipidemia intolerant of multiple statins and her age do not feel like I wouldn't pursue any further treatment.  4.  Renal failure stage 3 last GFR 37 in 8/18  follows with nephrology.  5.  Renal artery stenosis status post left renal artery stenting grade  6.  Mild carotid artery stenosis follows with vascular.  She tells me today that vascular study didn't need to see her again in follow-up unless issues.  7.  Obstructive sleep apnea on CPAP.   8.  Diabetes mellitus her PCP.           Plan:

## 2018-09-14 ENCOUNTER — TELEPHONE (OUTPATIENT)
Dept: CARDIOLOGY | Facility: CLINIC | Age: 83
End: 2018-09-14

## 2018-09-14 NOTE — TELEPHONE ENCOUNTER
Pt left a message on my voicemail re: you wanted her to continue Niacin at last visit, & was needing a refill sent to [facility]. I however looked at your last office note & it states that due to her age, you were ok with her not being on anything. Can you clear this up for me. Pt # [phone]. dmk

## 2018-10-18 RX ORDER — CLOPIDOGREL BISULFATE 75 MG/1
75 TABLET ORAL DAILY
Qty: 90 TABLET | Refills: 3 | Status: SHIPPED | OUTPATIENT
Start: 2018-10-18 | End: 2019-01-01 | Stop reason: SDUPTHER

## 2018-11-06 ENCOUNTER — TELEPHONE (OUTPATIENT)
Dept: ORTHOPEDIC SURGERY | Facility: CLINIC | Age: 83
End: 2018-11-06

## 2018-11-06 ENCOUNTER — OFFICE VISIT (OUTPATIENT)
Dept: ORTHOPEDIC SURGERY | Facility: CLINIC | Age: 83
End: 2018-11-06

## 2018-11-06 VITALS — WEIGHT: 198 LBS | HEIGHT: 66 IN | TEMPERATURE: 98.9 F | BODY MASS INDEX: 31.82 KG/M2

## 2018-11-06 DIAGNOSIS — S69.92XA WRIST INJURY, LEFT, INITIAL ENCOUNTER: Primary | ICD-10-CM

## 2018-11-06 PROCEDURE — 73110 X-RAY EXAM OF WRIST: CPT | Performed by: ORTHOPAEDIC SURGERY

## 2018-11-06 PROCEDURE — 99213 OFFICE O/P EST LOW 20 MIN: CPT | Performed by: ORTHOPAEDIC SURGERY

## 2018-11-06 NOTE — PROGRESS NOTES
New left Wrist      Patient: Alessia Madrigal        YOB: 1928        Chief Complaints: left wrist pain  Chief Complaint   Patient presents with   • Left Wrist - Pain, Establish Care           History of Present Illness: This is a 89-year-old female who presents complaining of left wrist injury she states Thursday morning she does awoken it was hurting she did go to urgent care put her in a splint splint is helping somewhat no history injury change in activity that she can recall symptoms are severe aching with swelling worse with activity better with anti-inflammatories ice and rest she is retired past medical history marked for diabetes hepatitis.  Disease sleep apnea uterine cancer kidney disease rheumatoid arthritis and hypothyroidism    HPI      Allergies:   Allergies   Allergen Reactions   • Allopurinol    • Clindamycin/Lincomycin Itching     Felt like she was burning and itching around the neck   • Statins Myalgia   • Ampicillin Rash   • Penicillins Rash   • Pravastatin Myalgia       Medications:   Home Medications:  Current Outpatient Prescriptions on File Prior to Visit   Medication Sig   • acetaminophen (TYLENOL) 500 MG tablet Take 500 mg by mouth every 4 (four) hours as needed for mild pain (1-3) (Back Pain). EVERY 4 TO 6 HOURS AS NEEDED   • aspirin 81 MG chewable tablet Chew 81 mg Daily.   • BETIMOL 0.5 % ophthalmic solution Administer 1 drop to both eyes Tonight.   • calcitriol (ROCALTROL) 0.25 MCG capsule Take 0.25 mcg by mouth Daily.   • clopidogrel (PLAVIX) 75 MG tablet Take 1 tablet by mouth Daily.   • Coenzyme Q10 (CO Q-10) 200 MG capsule Take 300 mg by mouth daily.   • furosemide (LASIX) 40 MG tablet Take 1 tablet by mouth 2 (Two) Times a Day.   • insulin detemir (LEVEMIR) 100 UNIT/ML injection Inject 10 Units under the skin daily. (Patient taking differently: Inject 8 Units under the skin Every Night.)   • insulin lispro (HumaLOG) 100 UNIT/ML patient supplied pump Inject 5 Units  under the skin See Admin Instructions. Patient takes 5 units after each meal   • Insulin Pen Needle (BD PEN NEEDLE LISA U/F) 32G X 4 MM misc USE AS DIRECTED THREE TIMES A DAY.   • levothyroxine (SYNTHROID, LEVOTHROID) 125 MCG tablet TAKE 1 TABLET DAILY AS DIRECTED   • losartan (COZAAR) 50 MG tablet Take 1 tablet by mouth Daily.   • MethylPREDNISolone (MEDROL) 4 MG tablet follow package directions   • metoprolol succinate XL (TOPROL-XL) 50 MG 24 hr tablet Take 1 tablet by mouth Daily.   • NIFEdipine XL (PROCARDIA XL) 90 MG 24 hr tablet Take 1 tablet by mouth Daily.   • nitroglycerin (NITROSTAT) 0.4 MG SL tablet Place 1 tablet under the tongue Every 5 (Five) Minutes As Needed for Chest Pain. Take no more than 3 doses in 15 minutes.   • raNITIdine (ZANTAC) 150 MG tablet TAKE 1 TABLET EVERY 12 HOURS AS NEEDED   • spironolactone (ALDACTONE) 25 MG tablet Take 1 tablet by mouth Daily.   • timolol (TIMOPTIC) 0.25 % ophthalmic solution Administer 1 drop to both eyes Tonight.   • travoprost, BAK free, (TRAVATAN) 0.004 % solution ophthalmic solution Administer 1 drop to both eyes every night. in affected eye(s)      No current facility-administered medications on file prior to visit.      Current Medications:  Scheduled Meds:  Continuous Infusions:  No current facility-administered medications for this visit.   PRN Meds:.    Past Medical History:   Diagnosis Date   • Acute transmural inferior wall MI (CMS/HCC)    • Arthritis 03/13/2014    CONT TYLENOL FOR PAIN/ JOSE DANIEL CHEW (INTERNAL MEDICINE)   • CAD (coronary artery disease)    • Cancer of uterus (CMS/HCC)    • Carotid artery stenosis    • Chronic renal failure syndrome    • Coronary artery disease involving coronary bypass graft of native heart     with other forms of angina pectoris   • Coronary atherosclerosis    • Endometrial cancer (CMS/HCC)    • Esophageal reflux    • Essential hypertension    • Glaucoma    • Hypertension    • Malaise and fatigue    • Osteoarthritis     • Sleep apnea     USING CPAP   • Spinal stenosis    • Thyroid disease    • Type 2 diabetes mellitus (CMS/HCC) 1991    INsulin begun 2012   • Vitamin B12 deficiency     SHE HAS NOT HAD THIS CHECKED IN A WHILE. SHE HAD BEEN ON SHOTS.  CHECK HER B12 LEVELS TODAY.  BY JOSE DANIEL CHEW        Past Surgical History:   Procedure Laterality Date   • CARDIAC CATHETERIZATION N/A 3/11/2018    Procedure: Left Heart Cath;  Surgeon: Cameron Chisholm MD;  Location: [facility] CATH INVASIVE LOCATION;  Service: Cardiovascular   • CARDIAC CATHETERIZATION N/A 3/11/2018    Procedure: Stent ROSSI bypass graft;  Surgeon: Cameron Chisholm MD;  Location: [facility] CATH INVASIVE LOCATION;  Service: Cardiovascular   • CATARACT EXTRACTION Bilateral    • CATARACT EXTRACTION W/ INTRAOCULAR LENS IMPLANT Right 10/25/2016    Procedure: RT SUPERFICIAL KERATECTOMY ;  Surgeon: Arian Singh MD;  Location: [facility] OR [facility] [place];  Service:    • CHOLECYSTECTOMY     • COLONOSCOPY     • CORONARY ARTERY BYPASS GRAFT  1991    X3   • HYSTERECTOMY     • OOPHORECTOMY     • TRANSLUMINAL ATHERECTOMY PERONEAL ARTERY      PERCUTANEOUS TRANSLUMINAL ATHERECTOMY RENAL ARTERY        Social History     Occupational History   • Not on file.     Social History Main Topics   • Smoking status: Never Smoker   • Smokeless tobacco: Never Used   • Alcohol use No      Comment: caffeine use   • Drug use: Unknown   • Sexual activity: Defer    Social History     Social History Narrative   • No narrative on file        Family History   Problem Relation Age of Onset   • Colon cancer Other    • Heart disease Other    • Hypertension Other    • Stroke Other         ISCHEMIC   • Colon cancer Mother    • Stroke Father    • Heart attack Father    • Heart disease Father    • Heart attack Brother    • Stroke Brother    • Heart disease Brother    • Breast cancer Maternal Grandmother              Review of Systems: Review of systems are remarkable for the multiple pertinent positives listed in the  "chart by the patient the remainder are negative    Review of Systems      Physical Exam: 89 y.o. female  General Appearance:    Alert, cooperative, in no acute distress                 Vitals:    11/06/18 1419   Temp: 98.9 °F (37.2 °C)   Weight: 89.8 kg (198 lb)   Height: 167.6 cm (66\")      Patient is alert and read ×3 no acute distress appears her above-listed at height weight and age.  Affect is normal respiratory rate is normal unlabored. Heart rate regular rate rhythm, sclera, dentition and hearing are normal for the purpose of this exam.        Ortho Exam physical exam her left wrist she is a little bit of swelling at the base of her thumb near her basilar joint she has thenar atrophy notable swelling in the wrist she can flex and extend to about 40° with symptoms negative Tinel's           Radiology:   AP, Lateral of the left wrist were ordered/reviewed to evaluate pain. I have compared to previous films/  I she does have some degenerative changes seen at the radial scaphoid and basilar joint don't appreciate an obvious fracture        Assessment/Plan: Left wrist pain I think this is more going to be related to her degenerative changes which I discussed with her I think the splint is to right weight her treat this we'll reevaluate her in 3 weeks if she is not appreciably better I'll get an MRI                                "

## 2018-11-07 DIAGNOSIS — E21.1 SECONDARY HYPERPARATHYROIDISM, NON-RENAL (HCC): ICD-10-CM

## 2018-11-07 DIAGNOSIS — IMO0002 UNCONTROLLED TYPE 2 DIABETES MELLITUS: ICD-10-CM

## 2018-11-07 DIAGNOSIS — E78.5 HYPERLIPIDEMIA: ICD-10-CM

## 2018-11-07 DIAGNOSIS — R53.82 CHRONIC FATIGUE: ICD-10-CM

## 2018-11-07 DIAGNOSIS — E55.9 VITAMIN D DEFICIENCY: ICD-10-CM

## 2018-11-07 DIAGNOSIS — E03.9 HYPOTHYROIDISM: ICD-10-CM

## 2018-11-07 DIAGNOSIS — I10 ESSENTIAL HYPERTENSION: ICD-10-CM

## 2018-11-07 RX ORDER — LEVOTHYROXINE SODIUM 0.12 MG/1
TABLET ORAL
Qty: 90 TABLET | Refills: 1 | Status: SHIPPED | OUTPATIENT
Start: 2018-11-07 | End: 2019-05-01 | Stop reason: SDUPTHER

## 2018-12-03 ENCOUNTER — OFFICE VISIT (OUTPATIENT)
Dept: ORTHOPEDIC SURGERY | Facility: CLINIC | Age: 83
End: 2018-12-03

## 2018-12-03 VITALS — HEIGHT: 66 IN | BODY MASS INDEX: 31.82 KG/M2 | TEMPERATURE: 98.3 F | WEIGHT: 198 LBS

## 2018-12-03 DIAGNOSIS — M25.532 LEFT WRIST PAIN: Primary | ICD-10-CM

## 2018-12-03 PROCEDURE — 99212 OFFICE O/P EST SF 10 MIN: CPT | Performed by: ORTHOPAEDIC SURGERY

## 2018-12-03 NOTE — PROGRESS NOTES
Left Wrist F/U        Patient: Alessia Madrigal        YOB: 1928        Chief Complaints: left wrist pain  Chief Complaint   Patient presents with   • Left Wrist - Follow-up         History of Present Illness: Patient's here follow-up left wrist pain she had swelling and pain in the wrist with no real history of injury her pain is better as long as she's been in the brace it does allow her some activity afterwards which she seems to be tolerating overall improved    HPI      Allergies:   Allergies   Allergen Reactions   • Allopurinol    • Clindamycin/Lincomycin Itching     Felt like she was burning and itching around the neck   • Statins Myalgia   • Ampicillin Rash   • Penicillins Rash   • Pravastatin Myalgia       Medications:   Home Medications:  Current Outpatient Medications on File Prior to Visit   Medication Sig   • acetaminophen (TYLENOL) 500 MG tablet Take 500 mg by mouth every 4 (four) hours as needed for mild pain (1-3) (Back Pain). EVERY 4 TO 6 HOURS AS NEEDED   • aspirin 81 MG chewable tablet Chew 81 mg Daily.   • BETIMOL 0.5 % ophthalmic solution Administer 1 drop to both eyes Tonight.   • calcitriol (ROCALTROL) 0.25 MCG capsule Take 0.25 mcg by mouth Daily.   • clopidogrel (PLAVIX) 75 MG tablet Take 1 tablet by mouth Daily.   • Coenzyme Q10 (CO Q-10) 200 MG capsule Take 300 mg by mouth daily.   • furosemide (LASIX) 40 MG tablet Take 1 tablet by mouth 2 (Two) Times a Day.   • insulin detemir (LEVEMIR) 100 UNIT/ML injection Inject 10 Units under the skin daily. (Patient taking differently: Inject 8 Units under the skin Every Night.)   • insulin lispro (HumaLOG) 100 UNIT/ML patient supplied pump Inject 5 Units under the skin See Admin Instructions. Patient takes 5 units after each meal   • Insulin Pen Needle (BD PEN NEEDLE LISA U/F) 32G X 4 MM misc USE AS DIRECTED THREE TIMES A DAY.   • levothyroxine  (SYNTHROID, LEVOTHROID) 125 MCG tablet TAKE 1 TABLET DAILY AS DIRECTED   • losartan (COZAAR) 50 MG tablet Take 1 tablet by mouth Daily.   • MethylPREDNISolone (MEDROL) 4 MG tablet follow package directions   • metoprolol succinate XL (TOPROL-XL) 50 MG 24 hr tablet Take 1 tablet by mouth Daily.   • NIFEdipine XL (PROCARDIA XL) 90 MG 24 hr tablet Take 1 tablet by mouth Daily.   • nitroglycerin (NITROSTAT) 0.4 MG SL tablet Place 1 tablet under the tongue Every 5 (Five) Minutes As Needed for Chest Pain. Take no more than 3 doses in 15 minutes.   • ONE TOUCH ULTRA TEST test strip TEST FOUR TIMES A DAY   • raNITIdine (ZANTAC) 150 MG tablet TAKE 1 TABLET EVERY 12 HOURS AS NEEDED   • spironolactone (ALDACTONE) 25 MG tablet Take 1 tablet by mouth Daily.   • timolol (TIMOPTIC) 0.25 % ophthalmic solution Administer 1 drop to both eyes Tonight.   • travoprost, BAK free, (TRAVATAN) 0.004 % solution ophthalmic solution Administer 1 drop to both eyes every night. in affected eye(s)      No current facility-administered medications on file prior to visit.      Current Medications:  Scheduled Meds:  Continuous Infusions:  No current facility-administered medications for this visit.        PRN Meds:.    Past Medical History:   Diagnosis Date   • Acute transmural inferior wall MI (CMS/HCC)    • Arthritis 03/13/2014    CONT TYLENOL FOR PAIN/ JOSE DANIEL CHEW (INTERNAL MEDICINE)   • CAD (coronary artery disease)    • Cancer of uterus (CMS/[facility])    • Carotid artery stenosis    • Chronic renal failure syndrome    • Coronary artery disease involving coronary bypass graft of native heart     with other forms of angina pectoris   • Coronary atherosclerosis    • Endometrial cancer (CMS/HCC)    • Esophageal reflux    • Essential hypertension    • Glaucoma    • Hypertension    • Malaise and fatigue    • Osteoarthritis    • Sleep apnea     USING CPAP   • Spinal stenosis    • Thyroid disease    • Type 2 diabetes mellitus (CMS/[facility]) 1991    INsulin  "begun 2012   • Vitamin B12 deficiency     SHE HAS NOT HAD THIS CHECKED IN A WHILE. SHE HAD BEEN ON SHOTS.  CHECK HER B12 LEVELS TODAY.  BY JOSE DANIEL CHEW        Past Surgical History:   Procedure Laterality Date   • CATARACT EXTRACTION Bilateral    • CHOLECYSTECTOMY     • COLONOSCOPY     • CORONARY ARTERY BYPASS GRAFT  1991    X3   • HYSTERECTOMY     • OOPHORECTOMY     • TRANSLUMINAL ATHERECTOMY PERONEAL ARTERY      PERCUTANEOUS TRANSLUMINAL ATHERECTOMY RENAL ARTERY        Social History     Occupational History   • Not on file   Tobacco Use   • Smoking status: Never Smoker   • Smokeless tobacco: Never Used   Substance and Sexual Activity   • Alcohol use: No     Comment: caffeine use   • Drug use: Not on file   • Sexual activity: Defer    Social History     Social History Narrative   • Not on file        Family History   Problem Relation Age of Onset   • Colon cancer Other    • Heart disease Other    • Hypertension Other    • Stroke Other         ISCHEMIC   • Colon cancer Mother    • Stroke Father    • Heart attack Father    • Heart disease Father    • Heart attack Brother    • Stroke Brother    • Heart disease Brother    • Breast cancer Maternal Grandmother              Physical Exam: 89 y.o. female  General Appearance:    Alert, cooperative, in no acute distress                 Vitals:    12/03/18 1539   Temp: 98.3 °F (36.8 °C)   Weight: 89.8 kg (198 lb)   Height: 167.6 cm (66\")      Patient is alert and read ×3 no acute distress appears her above-listed at height weight and age.  Affect is normal respiratory rate is normal unlabored. Heart rate regular rate rhythm, sclera, dentition and hearing are normal for the purpose of this exam.              Physical Exam:  She does have some swelling about the wrist but it is better  she does have better flexion extension no redness no real palpable tenderness no x-rays were done                                     Assessment/Plan:    Right wrist pain her increase in " symptoms may be due to her arthritis could've been gout is a long shot but it did resolve with conservative care overall she's doing fine she can use the brace as needed and follow-up as needed

## 2018-12-11 ENCOUNTER — LAB REQUISITION (OUTPATIENT)
Dept: LAB | Facility: HOSPITAL | Age: 83
End: 2018-12-11
Attending: INTERNAL MEDICINE
Payer: MEDICARE

## 2018-12-11 DIAGNOSIS — I10 ESSENTIAL HYPERTENSION: ICD-10-CM

## 2018-12-11 DIAGNOSIS — E21.1 SECONDARY HYPERPARATHYROIDISM, NON-RENAL (HCC): ICD-10-CM

## 2018-12-11 DIAGNOSIS — IMO0002 UNCONTROLLED TYPE 2 DIABETES MELLITUS: ICD-10-CM

## 2018-12-11 DIAGNOSIS — E78.5 HYPERLIPIDEMIA: ICD-10-CM

## 2018-12-11 DIAGNOSIS — E55.9 VITAMIN D DEFICIENCY: ICD-10-CM

## 2018-12-11 DIAGNOSIS — R53.82 CHRONIC FATIGUE: ICD-10-CM

## 2018-12-11 DIAGNOSIS — E03.9 HYPOTHYROIDISM: ICD-10-CM

## 2018-12-11 DIAGNOSIS — R41.82 ALTERED MENTAL STATUS: ICD-10-CM

## 2018-12-11 PROCEDURE — 87086 URINE CULTURE/COLONY COUNT: CPT | Performed by: INTERNAL MEDICINE

## 2018-12-11 PROCEDURE — 87077 CULTURE AEROBIC IDENTIFY: CPT | Performed by: INTERNAL MEDICINE

## 2018-12-11 PROCEDURE — 81015 MICROSCOPIC EXAM OF URINE: CPT | Performed by: INTERNAL MEDICINE

## 2019-01-01 ENCOUNTER — OFFICE VISIT (OUTPATIENT)
Dept: INTERNAL MEDICINE | Facility: CLINIC | Age: 84
End: 2019-01-01

## 2019-01-01 ENCOUNTER — LAB (OUTPATIENT)
Dept: INTERNAL MEDICINE | Facility: CLINIC | Age: 84
End: 2019-01-01

## 2019-01-01 ENCOUNTER — CLINICAL SUPPORT (OUTPATIENT)
Dept: INTERNAL MEDICINE | Facility: CLINIC | Age: 84
End: 2019-01-01

## 2019-01-01 ENCOUNTER — TELEPHONE (OUTPATIENT)
Dept: INTERNAL MEDICINE | Facility: CLINIC | Age: 84
End: 2019-01-01

## 2019-01-01 VITALS
SYSTOLIC BLOOD PRESSURE: 120 MMHG | DIASTOLIC BLOOD PRESSURE: 74 MMHG | HEIGHT: 64 IN | BODY MASS INDEX: 35 KG/M2 | WEIGHT: 205 LBS

## 2019-01-01 DIAGNOSIS — N18.4 TYPE 2 DIABETES MELLITUS WITH STAGE 4 CHRONIC KIDNEY DISEASE, WITH LONG-TERM CURRENT USE OF INSULIN (HCC): ICD-10-CM

## 2019-01-01 DIAGNOSIS — I10 ESSENTIAL HYPERTENSION: Primary | ICD-10-CM

## 2019-01-01 DIAGNOSIS — I77.9 DISORDER OF AORTA (HCC): ICD-10-CM

## 2019-01-01 DIAGNOSIS — E21.1 SECONDARY HYPERPARATHYROIDISM, NON-RENAL (HCC): ICD-10-CM

## 2019-01-01 DIAGNOSIS — Z23 NEED FOR IMMUNIZATION AGAINST INFLUENZA: Primary | ICD-10-CM

## 2019-01-01 DIAGNOSIS — E03.9 ACQUIRED HYPOTHYROIDISM: ICD-10-CM

## 2019-01-01 DIAGNOSIS — E11.22 TYPE 2 DIABETES MELLITUS WITH STAGE 4 CHRONIC KIDNEY DISEASE, WITH LONG-TERM CURRENT USE OF INSULIN (HCC): ICD-10-CM

## 2019-01-01 DIAGNOSIS — E11.9 TYPE 2 DIABETES MELLITUS WITHOUT COMPLICATION, WITH LONG-TERM CURRENT USE OF INSULIN (HCC): ICD-10-CM

## 2019-01-01 DIAGNOSIS — I10 ESSENTIAL HYPERTENSION: ICD-10-CM

## 2019-01-01 DIAGNOSIS — E03.9 ACQUIRED HYPOTHYROIDISM: Primary | ICD-10-CM

## 2019-01-01 DIAGNOSIS — N18.4 CHRONIC KIDNEY DISEASE, STAGE IV (SEVERE) (HCC): ICD-10-CM

## 2019-01-01 DIAGNOSIS — Z79.4 TYPE 2 DIABETES MELLITUS WITHOUT COMPLICATION, WITH LONG-TERM CURRENT USE OF INSULIN (HCC): ICD-10-CM

## 2019-01-01 DIAGNOSIS — Z79.4 TYPE 2 DIABETES MELLITUS WITH STAGE 4 CHRONIC KIDNEY DISEASE, WITH LONG-TERM CURRENT USE OF INSULIN (HCC): ICD-10-CM

## 2019-01-01 LAB
ALBUMIN SERPL-MCNC: 4 G/DL (ref 3.5–5.2)
ALBUMIN/GLOB SERPL: 1.7 G/DL
ALP SERPL-CCNC: 109 U/L (ref 39–117)
ALT SERPL-CCNC: 9 U/L (ref 1–33)
AST SERPL-CCNC: 11 U/L (ref 1–32)
BASOPHILS # BLD AUTO: 0.05 10*3/MM3 (ref 0–0.2)
BASOPHILS NFR BLD AUTO: 0.8 % (ref 0–1.5)
BILIRUB SERPL-MCNC: 0.4 MG/DL (ref 0.2–1.2)
BUN SERPL-MCNC: 37 MG/DL (ref 8–23)
BUN/CREAT SERPL: 24.5 (ref 7–25)
CALCIUM SERPL-MCNC: 9.6 MG/DL (ref 8.2–9.6)
CHLORIDE SERPL-SCNC: 109 MMOL/L (ref 98–107)
CHOLEST SERPL-MCNC: 208 MG/DL (ref 0–200)
CO2 SERPL-SCNC: 20.9 MMOL/L (ref 22–29)
CREAT SERPL-MCNC: 1.51 MG/DL (ref 0.57–1)
DEPRECATED RDW RBC AUTO: 45 FL (ref 37–54)
EOSINOPHIL # BLD AUTO: 0.45 10*3/MM3 (ref 0–0.4)
EOSINOPHIL NFR BLD AUTO: 6.8 % (ref 0.3–6.2)
ERYTHROCYTE [DISTWIDTH] IN BLOOD BY AUTOMATED COUNT: 12.6 % (ref 12.3–15.4)
GLOBULIN SER CALC-MCNC: 2.3 GM/DL
GLUCOSE SERPL-MCNC: 138 MG/DL (ref 65–99)
HBA1C MFR BLD: 7.8 % (ref 4.8–5.6)
HCT VFR BLD AUTO: 38.7 % (ref 34–46.6)
HDLC SERPL-MCNC: 57 MG/DL (ref 40–60)
HGB BLD-MCNC: 13.4 G/DL (ref 12–15.9)
LDLC SERPL CALC-MCNC: 135 MG/DL (ref 0–100)
LYMPHOCYTES # BLD AUTO: 1.23 10*3/MM3 (ref 0.7–3.1)
LYMPHOCYTES NFR BLD AUTO: 18.6 % (ref 19.6–45.3)
MCH RBC QN AUTO: 34.1 PG (ref 26.6–33)
MCHC RBC AUTO-ENTMCNC: 34.6 G/DL (ref 31.5–35.7)
MCV RBC AUTO: 98.5 FL (ref 79–97)
MONOCYTES # BLD AUTO: 0.66 10*3/MM3 (ref 0.1–0.9)
MONOCYTES NFR BLD AUTO: 10 % (ref 5–12)
NEUTROPHILS # BLD AUTO: 4.23 10*3/MM3 (ref 1.7–7)
NEUTROPHILS NFR BLD AUTO: 63.8 % (ref 42.7–76)
PLATELET # BLD AUTO: 271 10*3/MM3 (ref 140–450)
PMV BLD AUTO: 11.8 FL (ref 6–12)
POTASSIUM SERPL-SCNC: 5.3 MMOL/L (ref 3.5–5.2)
PROT SERPL-MCNC: 6.3 G/DL (ref 6–8.5)
RBC # BLD AUTO: 3.93 10*6/MM3 (ref 3.77–5.28)
SODIUM SERPL-SCNC: 143 MMOL/L (ref 136–145)
TRIGL SERPL-MCNC: 79 MG/DL (ref 0–150)
VLDLC SERPL-MCNC: 15.8 MG/DL
WBC NRBC COR # BLD: 6.62 10*3/MM3 (ref 3.4–10.8)

## 2019-01-01 PROCEDURE — 90653 IIV ADJUVANT VACCINE IM: CPT | Performed by: INTERNAL MEDICINE

## 2019-01-01 PROCEDURE — 99214 OFFICE O/P EST MOD 30 MIN: CPT | Performed by: INTERNAL MEDICINE

## 2019-01-01 PROCEDURE — 36415 COLL VENOUS BLD VENIPUNCTURE: CPT | Performed by: INTERNAL MEDICINE

## 2019-01-01 PROCEDURE — G0008 ADMIN INFLUENZA VIRUS VAC: HCPCS | Performed by: INTERNAL MEDICINE

## 2019-01-01 PROCEDURE — 85025 COMPLETE CBC W/AUTO DIFF WBC: CPT | Performed by: INTERNAL MEDICINE

## 2019-01-01 RX ORDER — CLOPIDOGREL BISULFATE 75 MG/1
TABLET ORAL
Qty: 90 TABLET | Refills: 4 | Status: SHIPPED | OUTPATIENT
Start: 2019-01-01

## 2019-01-01 RX ORDER — METOPROLOL SUCCINATE 50 MG/1
TABLET, EXTENDED RELEASE ORAL
Qty: 90 TABLET | Refills: 4 | Status: ON HOLD | OUTPATIENT
Start: 2019-01-01 | End: 2020-01-01 | Stop reason: SDUPTHER

## 2019-01-01 RX ORDER — LEVOTHYROXINE SODIUM 0.15 MG/1
150 TABLET ORAL DAILY
Qty: 90 TABLET | Refills: 3 | Status: SHIPPED | OUTPATIENT
Start: 2019-01-01

## 2019-01-01 RX ORDER — LEVOTHYROXINE SODIUM 150 MCG
150 TABLET ORAL DAILY
Qty: 90 TABLET | Refills: 3 | Status: SHIPPED | OUTPATIENT
Start: 2019-01-01 | End: 2019-01-01 | Stop reason: SDUPTHER

## 2019-01-16 DIAGNOSIS — E61.1 IRON DEFICIENCY: ICD-10-CM

## 2019-01-16 DIAGNOSIS — I10 ESSENTIAL HYPERTENSION: Primary | ICD-10-CM

## 2019-01-16 DIAGNOSIS — E55.9 VITAMIN D DEFICIENCY: ICD-10-CM

## 2019-01-16 DIAGNOSIS — E03.9 ACQUIRED HYPOTHYROIDISM: ICD-10-CM

## 2019-01-16 DIAGNOSIS — E11.22 TYPE 2 DIABETES MELLITUS WITH STAGE 4 CHRONIC KIDNEY DISEASE, WITH LONG-TERM CURRENT USE OF INSULIN (HCC): ICD-10-CM

## 2019-01-16 DIAGNOSIS — N18.4 CHRONIC KIDNEY DISEASE, STAGE IV (SEVERE) (HCC): ICD-10-CM

## 2019-01-16 DIAGNOSIS — N18.4 TYPE 2 DIABETES MELLITUS WITH STAGE 4 CHRONIC KIDNEY DISEASE, WITH LONG-TERM CURRENT USE OF INSULIN (HCC): ICD-10-CM

## 2019-01-16 DIAGNOSIS — Z79.4 TYPE 2 DIABETES MELLITUS WITH STAGE 4 CHRONIC KIDNEY DISEASE, WITH LONG-TERM CURRENT USE OF INSULIN (HCC): ICD-10-CM

## 2019-01-17 LAB
ALBUMIN SERPL-MCNC: 4 G/DL (ref 3.5–5.2)
ALBUMIN/GLOB SERPL: 1.5 G/DL
ALP SERPL-CCNC: 105 U/L (ref 39–117)
ALT SERPL-CCNC: 6 U/L (ref 1–33)
AST SERPL-CCNC: 7 U/L (ref 1–32)
BASOPHILS # BLD AUTO: 0.05 10*3/MM3 (ref 0–0.2)
BASOPHILS NFR BLD AUTO: 0.7 % (ref 0–1.5)
BILIRUB SERPL-MCNC: 0.3 MG/DL (ref 0.1–1.2)
BUN SERPL-MCNC: 33 MG/DL (ref 8–23)
BUN/CREAT SERPL: 26 (ref 7–25)
CALCIUM SERPL-MCNC: 9.9 MG/DL (ref 8.2–9.6)
CHLORIDE SERPL-SCNC: 105 MMOL/L (ref 98–107)
CO2 SERPL-SCNC: 24.7 MMOL/L (ref 22–29)
CREAT SERPL-MCNC: 1.27 MG/DL (ref 0.57–1)
EOSINOPHIL # BLD AUTO: 0.43 10*3/MM3 (ref 0–0.7)
EOSINOPHIL NFR BLD AUTO: 5.8 % (ref 0.3–6.2)
ERYTHROCYTE [DISTWIDTH] IN BLOOD BY AUTOMATED COUNT: 13.2 % (ref 11.7–13)
GLOBULIN SER CALC-MCNC: 2.6 GM/DL
GLUCOSE SERPL-MCNC: 141 MG/DL (ref 65–99)
HBA1C MFR BLD: 8.7 % (ref 4.8–5.6)
HCT VFR BLD AUTO: 42.3 % (ref 35.6–45.5)
HGB BLD-MCNC: 14 G/DL (ref 11.9–15.5)
IMM GRANULOCYTES # BLD AUTO: 0.04 10*3/MM3 (ref 0–0.03)
IMM GRANULOCYTES NFR BLD AUTO: 0.5 % (ref 0–0.5)
LYMPHOCYTES # BLD AUTO: 1.28 10*3/MM3 (ref 0.9–4.8)
LYMPHOCYTES NFR BLD AUTO: 17.2 % (ref 19.6–45.3)
MCH RBC QN AUTO: 33.7 PG (ref 26.9–32)
MCHC RBC AUTO-ENTMCNC: 33.1 G/DL (ref 32.4–36.3)
MCV RBC AUTO: 101.9 FL (ref 80.5–98.2)
MONOCYTES # BLD AUTO: 0.77 10*3/MM3 (ref 0.2–1.2)
MONOCYTES NFR BLD AUTO: 10.3 % (ref 5–12)
NEUTROPHILS # BLD AUTO: 4.91 10*3/MM3 (ref 1.9–8.1)
NEUTROPHILS NFR BLD AUTO: 66 % (ref 42.7–76)
PLATELET # BLD AUTO: 319 10*3/MM3 (ref 140–500)
POTASSIUM SERPL-SCNC: 4.1 MMOL/L (ref 3.5–5.2)
PROT SERPL-MCNC: 6.6 G/DL (ref 6–8.5)
RBC # BLD AUTO: 4.15 10*6/MM3 (ref 3.9–5.2)
SODIUM SERPL-SCNC: 143 MMOL/L (ref 136–145)
TSH SERPL DL<=0.005 MIU/L-ACNC: 0.29 MIU/ML (ref 0.27–4.2)
WBC # BLD AUTO: 7.44 10*3/MM3 (ref 4.5–10.7)

## 2019-01-22 ENCOUNTER — OFFICE VISIT (OUTPATIENT)
Dept: INTERNAL MEDICINE | Facility: CLINIC | Age: 84
End: 2019-01-22

## 2019-01-22 VITALS
WEIGHT: 198 LBS | RESPIRATION RATE: 18 BRPM | HEART RATE: 69 BPM | HEIGHT: 66 IN | DIASTOLIC BLOOD PRESSURE: 78 MMHG | BODY MASS INDEX: 31.82 KG/M2 | OXYGEN SATURATION: 97 % | SYSTOLIC BLOOD PRESSURE: 132 MMHG

## 2019-01-22 DIAGNOSIS — I10 ESSENTIAL HYPERTENSION: Primary | ICD-10-CM

## 2019-01-22 DIAGNOSIS — E03.9 ACQUIRED HYPOTHYROIDISM: ICD-10-CM

## 2019-01-22 DIAGNOSIS — I25.10 CHRONIC CORONARY ARTERY DISEASE: ICD-10-CM

## 2019-01-22 DIAGNOSIS — E11.65 UNCONTROLLED TYPE 2 DIABETES MELLITUS WITH HYPERGLYCEMIA (HCC): ICD-10-CM

## 2019-01-22 DIAGNOSIS — N18.4 CHRONIC KIDNEY DISEASE, STAGE IV (SEVERE) (HCC): ICD-10-CM

## 2019-01-22 DIAGNOSIS — Z23 NEED FOR INFLUENZA VACCINATION: ICD-10-CM

## 2019-01-22 PROCEDURE — 99214 OFFICE O/P EST MOD 30 MIN: CPT | Performed by: INTERNAL MEDICINE

## 2019-01-22 PROCEDURE — G0008 ADMIN INFLUENZA VIRUS VAC: HCPCS | Performed by: INTERNAL MEDICINE

## 2019-01-22 PROCEDURE — 90662 IIV NO PRSV INCREASED AG IM: CPT | Performed by: INTERNAL MEDICINE

## 2019-01-22 NOTE — PROGRESS NOTES
Chief Complaint  Alessia Madrigal is a 89 y.o. female who presents for Hypertension; Hypothyroidism; Diabetes; and Follow-up (6 month )  .    History of Present Illness   Alessia is here for routine follow up on her HTN, Hypothyroid, DM, CKD, CAD.  She has had some chest pains that she hasn't done anything about.  She's never sure what to do about it.    She does check her bs.  She is using levemir but this is being removed from the preferred list.  Lantus solostar is preferred now.  She has not been as faithful with her levemir or her humalog since she was here last.  She finds herself pretty cold in the evening.  She is wondering about her thyroid.  She hasn't had a flu shot yet.  No other new concerns.       Review of Systems   Constitution: Negative for malaise/fatigue, weight gain and weight loss.   Cardiovascular: Positive for chest pain (occ) and leg swelling (chronic). Negative for dyspnea on exertion and palpitations.   Respiratory: Negative for cough and shortness of breath.    Endocrine: Positive for cold intolerance. Negative for polydipsia and polyuria.   Neurological: Negative for dizziness and light-headedness.       Patient Active Problem List   Diagnosis   • Actinic keratosis   • Anemia of chronic disorder   • Disorder of aorta (CMS/HCC)   • Chronic coronary artery disease   • Chronic kidney disease, stage IV (severe) (CMS/HCC)   • Constipation   • Cor pulmonale (CMS/HCC)   • Diabetes mellitus (CMS/HCC)   • High level of uric acid in blood   • Fatigue   • Hypertension   • Hypothyroidism   • Iron deficiency   • Mitral valve insufficiency   • Pulmonary hypertension (CMS/HCC)   • Swelling of lower extremity   • Shortness of breath   • Tricuspid valve insufficiency   • Uncontrolled type 2 diabetes mellitus (CMS/HCC)   • Type 2 diabetes mellitus (CMS/HCC)   • Vitamin D deficiency   • Secondary hyperparathyroidism, non-renal (CMS/HCC)   • Pulmonary nodules/lesions, multiple   • Pleural effusion   •  Paronychia of second finger of right hand   • Hyperuricemia   • Acute pain of both shoulders   • Acute transmural inferior wall MI (CMS/HCC)   • Wrist injury, left, initial encounter       Past Medical History:   Diagnosis Date   • Acute transmural inferior wall MI (CMS/HCC)    • Arthritis 03/13/2014    CONT TYLENOL FOR PAIN/ JOSE DANIEL CHEW (INTERNAL MEDICINE)   • CAD (coronary artery disease)    • Cancer of uterus (CMS/HCC)    • Carotid artery stenosis    • Chronic renal failure syndrome    • Coronary artery disease involving coronary bypass graft of native heart     with other forms of angina pectoris   • Coronary atherosclerosis    • Endometrial cancer (CMS/HCC)    • Esophageal reflux    • Essential hypertension    • Glaucoma    • Hypertension    • Malaise and fatigue    • Osteoarthritis    • Sleep apnea     USING CPAP   • Spinal stenosis    • Thyroid disease    • Type 2 diabetes mellitus (CMS/HCC) 1991    INsulin begun 2012   • Vitamin B12 deficiency     SHE HAS NOT HAD THIS CHECKED IN A WHILE. SHE HAD BEEN ON SHOTS.  CHECK HER B12 LEVELS TODAY.  BY JOSE DANIEL CHEW       Past Surgical History:   Procedure Laterality Date   • CARDIAC CATHETERIZATION N/A 3/11/2018    Procedure: Left Heart Cath;  Surgeon: Cameron Chisholm MD;  Location: [facility] INVASIVE LOCATION;  Service: Cardiovascular   • CARDIAC CATHETERIZATION N/A 3/11/2018    Procedure: Stent ROSSI bypass graft;  Surgeon: Cameron Chisholm MD;  Location: [facility] CATH INVASIVE LOCATION;  Service: Cardiovascular   • CATARACT EXTRACTION Bilateral    • CATARACT EXTRACTION W/ INTRAOCULAR LENS IMPLANT Right 10/25/2016    Procedure: RT SUPERFICIAL KERATECTOMY ;  Surgeon: Arian Singh MD;  Location: [facility] OR [facility] [place];  Service:    • CHOLECYSTECTOMY     • COLONOSCOPY     • CORONARY ARTERY BYPASS GRAFT  1991    X3   • HYSTERECTOMY     • OOPHORECTOMY     • TRANSLUMINAL ATHERECTOMY PERONEAL ARTERY      PERCUTANEOUS TRANSLUMINAL ATHERECTOMY RENAL ARTERY       Family  History   Problem Relation Age of Onset   • Colon cancer Other    • Heart disease Other    • Hypertension Other    • Stroke Other         ISCHEMIC   • Colon cancer Mother    • Stroke Father    • Heart attack Father    • Heart disease Father    • Heart attack Brother    • Stroke Brother    • Heart disease Brother    • Breast cancer Maternal Grandmother        Social History     Socioeconomic History   • Marital status: Single     Spouse name: Not on file   • Number of children: Not on file   • Years of education: Not on file   • Highest education level: Not on file   Social Needs   • Financial resource strain: Not on file   • Food insecurity - worry: Not on file   • Food insecurity - inability: Not on file   • Transportation needs - medical: Not on file   • Transportation needs - non-medical: Not on file   Occupational History   • Not on file   Tobacco Use   • Smoking status: Never Smoker   • Smokeless tobacco: Never Used   Substance and Sexual Activity   • Alcohol use: No     Comment: caffeine use   • Drug use: Not on file   • Sexual activity: Defer   Other Topics Concern   • Not on file   Social History Narrative   • Not on file       Current Outpatient Medications on File Prior to Visit   Medication Sig Dispense Refill   • acetaminophen (TYLENOL) 500 MG tablet Take 500 mg by mouth every 4 (four) hours as needed for mild pain (1-3) (Back Pain). EVERY 4 TO 6 HOURS AS NEEDED     • aspirin 81 MG chewable tablet Chew 81 mg Daily.     • clopidogrel (PLAVIX) 75 MG tablet Take 1 tablet by mouth Daily. 90 tablet 3   • Coenzyme Q10 (CO Q-10) 200 MG capsule Take 300 mg by mouth daily.     • furosemide (LASIX) 40 MG tablet Take 1 tablet by mouth 2 (Two) Times a Day. 60 tablet 3   • insulin lispro (HumaLOG) 100 UNIT/ML patient supplied pump Inject 5 Units under the skin See Admin Instructions. Patient takes 5 units after each meal     • Insulin Pen Needle (BD PEN NEEDLE LISA U/F) 32G X 4 MM misc USE AS DIRECTED THREE TIMES A  "DAY. 300 each 3   • levothyroxine (SYNTHROID, LEVOTHROID) 125 MCG tablet TAKE 1 TABLET DAILY AS DIRECTED 90 tablet 1   • losartan (COZAAR) 50 MG tablet Take 1 tablet by mouth Daily. 90 tablet 3   • metoprolol succinate XL (TOPROL-XL) 50 MG 24 hr tablet Take 1 tablet by mouth Daily. 90 tablet 3   • NIFEdipine XL (PROCARDIA XL) 90 MG 24 hr tablet Take 1 tablet by mouth Daily. 90 tablet 3   • nitroglycerin (NITROSTAT) 0.4 MG SL tablet Place 1 tablet under the tongue Every 5 (Five) Minutes As Needed for Chest Pain. Take no more than 3 doses in 15 minutes. 25 tablet 12   • ONE TOUCH ULTRA TEST test strip TEST FOUR TIMES A  each 2   • raNITIdine (ZANTAC) 150 MG tablet TAKE 1 TABLET EVERY 12 HOURS AS NEEDED 180 tablet 3   • spironolactone (ALDACTONE) 25 MG tablet Take 1 tablet by mouth Daily. 90 tablet 3   • timolol (TIMOPTIC) 0.25 % ophthalmic solution Administer 1 drop to both eyes Tonight.     • travoprost, BAK free, (TRAVATAN) 0.004 % solution ophthalmic solution Administer 1 drop to both eyes every night. in affected eye(s)      • [DISCONTINUED] BETIMOL 0.5 % ophthalmic solution Administer 1 drop to both eyes Tonight.     • [DISCONTINUED] calcitriol (ROCALTROL) 0.25 MCG capsule Take 0.25 mcg by mouth Daily.     • [DISCONTINUED] insulin detemir (LEVEMIR) 100 UNIT/ML injection Inject 10 Units under the skin into the appropriate area as directed Daily. 5 pen 3   • [DISCONTINUED] MethylPREDNISolone (MEDROL) 4 MG tablet follow package directions 1 each 0     No current facility-administered medications on file prior to visit.        Allergies   Allergen Reactions   • Allopurinol    • Clindamycin/Lincomycin Itching     Felt like she was burning and itching around the neck   • Statins Myalgia   • Ampicillin Rash   • Penicillins Rash   • Pravastatin Myalgia       Vitals:    01/22/19 1136   BP: 132/78   Pulse: 69   Resp: 18   SpO2: 97%   Weight: 89.8 kg (198 lb)   Height: 167.6 cm (65.98\")       Body mass index is 31.97 " kg/m².    Objective   Physical Exam   Constitutional: She is oriented to person, place, and time. She appears well-developed and well-nourished. No distress.   HENT:   Head: Normocephalic and atraumatic.   Eyes: Conjunctivae are normal. No scleral icterus.   Neck: Normal range of motion. Neck supple.   Cardiovascular: Normal rate, regular rhythm and normal heart sounds. Exam reveals no friction rub.   No murmur heard.  Pulmonary/Chest: Effort normal and breath sounds normal. No stridor. No respiratory distress. She has no wheezes.   Musculoskeletal: She exhibits edema (BLE).   Lymphadenopathy:     She has no cervical adenopathy.   Neurological: She is alert and oriented to person, place, and time. No cranial nerve deficit.   Psychiatric: She has a normal mood and affect. Her behavior is normal. Judgment and thought content normal.       Results for orders placed or performed in visit on 01/16/19   Comprehensive Metabolic Panel   Result Value Ref Range    Glucose 141 (H) 65 - 99 mg/dL    BUN 33 (H) 8 - 23 mg/dL    Creatinine 1.27 (H) 0.57 - 1.00 mg/dL    eGFR Non African Am 40 (L) >60 mL/min/1.73    eGFR  Am 48 (L) >60 mL/min/1.73    BUN/Creatinine Ratio 26.0 (H) 7.0 - 25.0    Sodium 143 136 - 145 mmol/L    Potassium 4.1 3.5 - 5.2 mmol/L    Chloride 105 98 - 107 mmol/L    Total CO2 24.7 22.0 - 29.0 mmol/L    Calcium 9.9 (H) 8.2 - 9.6 mg/dL    Total Protein 6.6 6.0 - 8.5 g/dL    Albumin 4.00 3.50 - 5.20 g/dL    Globulin 2.6 gm/dL    A/G Ratio 1.5 g/dL    Total Bilirubin 0.3 0.1 - 1.2 mg/dL    Alkaline Phosphatase 105 39 - 117 U/L    AST (SGOT) 7 1 - 32 U/L    ALT (SGPT) 6 1 - 33 U/L   TSH Rfx On Abnormal To Free T4   Result Value Ref Range    TSH 0.292 0.27 - 4.2 mIU/mL   Hemoglobin A1c   Result Value Ref Range    Hemoglobin A1C 8.70 (H) 4.80 - 5.60 %   CBC & Differential   Result Value Ref Range    WBC 7.44 4.50 - 10.70 10*3/mm3    RBC 4.15 3.90 - 5.20 10*6/mm3    Hemoglobin 14.0 11.9 - 15.5 g/dL    Hematocrit  42.3 35.6 - 45.5 %    .9 (H) 80.5 - 98.2 fL    MCH 33.7 (H) 26.9 - 32.0 pg    MCHC 33.1 32.4 - 36.3 g/dL    RDW 13.2 (H) 11.7 - 13.0 %    Platelets 319 140 - 500 10*3/mm3    Neutrophil Rel % 66.0 42.7 - 76.0 %    Lymphocyte Rel % 17.2 (L) 19.6 - 45.3 %    Monocyte Rel % 10.3 5.0 - 12.0 %    Eosinophil Rel % 5.8 0.3 - 6.2 %    Basophil Rel % 0.7 0.0 - 1.5 %    Neutrophils Absolute 4.91 1.90 - 8.10 10*3/mm3    Lymphocytes Absolute 1.28 0.90 - 4.80 10*3/mm3    Monocytes Absolute 0.77 0.20 - 1.20 10*3/mm3    Eosinophils Absolute 0.43 0.00 - 0.70 10*3/mm3    Basophils Absolute 0.05 0.00 - 0.20 10*3/mm3    Immature Granulocyte Rel % 0.5 0.0 - 0.5 %    Immature Grans Absolute 0.04 (H) 0.00 - 0.03 10*3/mm3       Assessment/Plan   Diagnoses and all orders for this visit:    Essential hypertension    Uncontrolled type 2 diabetes mellitus with hyperglycemia (CMS/[facility])    Acquired hypothyroidism    Chronic kidney disease, stage IV (severe) (CMS/[facility])    Chronic coronary artery disease    Need for influenza vaccination  -     Fluzone High Dose =>65Years    Other orders  -     Insulin Glargine (LANTUS SOLOSTAR) 100 UNIT/ML injection pen; Inject 10 Units under the skin into the appropriate area as directed Every Night.        Discussion/Summary  Alessia is here for routine follow up.  Her A1c is worse but she admittedly has forgotten to take her levemir and humalog on multiple occasions.  We talked about ways to help her remember like setting an alarm or putting it by her toothbrush etc.  She is going to work on this.  Her thyroid is controlled.  Kidney function is stable. She has a follow up set up with cardiology but not until August.  Advised that if she continues to have chest pains to call for an appt sooner.  I am switching her levemir to lantus due to change in coverage.  Continue all other meds.    Return in about 6 months (around 7/22/2019) for Annual physical, with fasting labs prior.

## 2019-01-30 ENCOUNTER — NURSE ONLY (OUTPATIENT)
Dept: LAB | Age: 84
End: 2019-01-30
Attending: NURSE PRACTITIONER
Payer: MEDICARE

## 2019-01-30 DIAGNOSIS — E55.9 AVITAMINOSIS D: Primary | ICD-10-CM

## 2019-01-30 DIAGNOSIS — M81.0 SENILE OSTEOPOROSIS: ICD-10-CM

## 2019-01-30 LAB
ANION GAP SERPL CALC-SCNC: 6 MMOL/L (ref 0–18)
BUN BLD-MCNC: 29 MG/DL (ref 8–20)
BUN/CREAT SERPL: 20.7 (ref 10–20)
CALCIUM BLD-MCNC: 9.1 MG/DL (ref 8.3–10.3)
CHLORIDE SERPL-SCNC: 108 MMOL/L (ref 101–111)
CO2 SERPL-SCNC: 29 MMOL/L (ref 22–32)
CREAT BLD-MCNC: 1.4 MG/DL (ref 0.55–1.02)
GLUCOSE BLD-MCNC: 91 MG/DL (ref 70–99)
OSMOLALITY SERPL CALC.SUM OF ELEC: 301 MOSM/KG (ref 275–295)
POTASSIUM SERPL-SCNC: 4.2 MMOL/L (ref 3.6–5.1)
SODIUM SERPL-SCNC: 143 MMOL/L (ref 136–144)
VIT D+METAB SERPL-MCNC: 53.9 NG/ML (ref 30–100)

## 2019-01-30 PROCEDURE — 80048 BASIC METABOLIC PNL TOTAL CA: CPT

## 2019-01-30 PROCEDURE — 82306 VITAMIN D 25 HYDROXY: CPT

## 2019-01-30 PROCEDURE — 36415 COLL VENOUS BLD VENIPUNCTURE: CPT

## 2019-04-16 ENCOUNTER — HOSPITAL ENCOUNTER (OUTPATIENT)
Dept: CT IMAGING | Facility: HOSPITAL | Age: 84
Discharge: HOME | End: 2019-04-16
Attending: THORACIC SURGERY (CARDIOTHORACIC VASCULAR SURGERY) | Admitting: THORACIC SURGERY (CARDIOTHORACIC VASCULAR SURGERY)

## 2019-04-16 DIAGNOSIS — R91.1 SOLITARY PULMONARY NODULE: ICD-10-CM

## 2019-04-16 DIAGNOSIS — R91.1 SOLITARY PULMONARY NODULE: Primary | ICD-10-CM

## 2019-04-16 PROCEDURE — 71250 CT THORAX DX C-: CPT

## 2019-05-01 DIAGNOSIS — E21.1 SECONDARY HYPERPARATHYROIDISM, NON-RENAL (HCC): ICD-10-CM

## 2019-05-01 DIAGNOSIS — E03.9 HYPOTHYROIDISM: ICD-10-CM

## 2019-05-01 DIAGNOSIS — E78.5 HYPERLIPIDEMIA: ICD-10-CM

## 2019-05-01 DIAGNOSIS — I10 ESSENTIAL HYPERTENSION: ICD-10-CM

## 2019-05-01 DIAGNOSIS — IMO0002 UNCONTROLLED TYPE 2 DIABETES MELLITUS: ICD-10-CM

## 2019-05-01 DIAGNOSIS — E55.9 VITAMIN D DEFICIENCY: ICD-10-CM

## 2019-05-01 DIAGNOSIS — R53.82 CHRONIC FATIGUE: ICD-10-CM

## 2019-05-02 RX ORDER — LEVOTHYROXINE SODIUM 0.12 MG/1
TABLET ORAL
Qty: 90 TABLET | Refills: 1 | Status: SHIPPED | OUTPATIENT
Start: 2019-05-02 | End: 2019-07-30 | Stop reason: SDUPTHER

## 2019-05-08 ENCOUNTER — OFFICE VISIT (OUTPATIENT)
Dept: SURGERY | Facility: CLINIC | Age: 84
End: 2019-05-08

## 2019-05-08 VITALS
SYSTOLIC BLOOD PRESSURE: 156 MMHG | DIASTOLIC BLOOD PRESSURE: 61 MMHG | HEIGHT: 65 IN | HEART RATE: 67 BPM | BODY MASS INDEX: 33.46 KG/M2 | OXYGEN SATURATION: 95 %

## 2019-05-08 DIAGNOSIS — R91.1 SOLITARY PULMONARY NODULE: Primary | ICD-10-CM

## 2019-05-08 PROCEDURE — 99213 OFFICE O/P EST LOW 20 MIN: CPT | Performed by: THORACIC SURGERY (CARDIOTHORACIC VASCULAR SURGERY)

## 2019-05-08 NOTE — PROGRESS NOTES
Subjective   Patient ID: Alessia Madrigal is a 89 y.o. female is here today for follow-up.    History of Present Illness  Dear Colleague,  Alessia Madrigal was seen in our office today for further follow-up of pulmonary nodule first identified in May 2016.  Patient has ongoing problems with allergies sinus congestion and postnasal drip.  She also has sleep apnea and uses a CPAP.  Despite all of her medical problems she still drives herself around and is very active.  She has cough related to the postnasal drip.  There is no hemoptysis.  She has no hoarseness or change in her voice.  She has no pleuritic pain.  She does get short of breath with moderate exertion.    The following portions of the patient's history were reviewed and updated as appropriate: allergies, current medications, past family history, past medical history, past social history, past surgical history and problem list.  Review of Systems   Constitution: Negative.   HENT: Negative.    Eyes: Negative.    Cardiovascular: Negative.    Respiratory: Positive for cough (allergy) and shortness of breath.    Endocrine: Negative.    Hematologic/Lymphatic: Negative.    Skin: Negative.    Musculoskeletal: Negative.    Gastrointestinal: Negative.    Genitourinary: Negative.    Neurological: Negative.    Psychiatric/Behavioral: Negative.    Allergic/Immunologic: Negative.      Patient Active Problem List   Diagnosis   • Actinic keratosis   • Anemia of chronic disorder   • Disorder of aorta (CMS/HCC)   • Chronic coronary artery disease   • Chronic kidney disease, stage IV (severe) (CMS/HCC)   • Constipation   • Cor pulmonale (CMS/HCC)   • Diabetes mellitus (CMS/HCC)   • High level of uric acid in blood   • Fatigue   • Hypertension   • Hypothyroidism   • Iron deficiency   • Mitral valve insufficiency   • Pulmonary hypertension (CMS/HCC)   • Swelling of lower extremity   • Shortness of breath   • Tricuspid valve insufficiency   • Uncontrolled type 2 diabetes  mellitus (CMS/HCC)   • Type 2 diabetes mellitus (CMS/HCC)   • Vitamin D deficiency   • Secondary hyperparathyroidism, non-renal (CMS/HCC)   • Pulmonary nodules/lesions, multiple   • Pleural effusion   • Paronychia of second finger of right hand   • Hyperuricemia   • Acute pain of both shoulders   • Acute transmural inferior wall MI (CMS/HCC)   • Wrist injury, left, initial encounter     Past Medical History:   Diagnosis Date   • Acute transmural inferior wall MI (CMS/HCC)    • Arthritis 03/13/2014    CONT TYLENOL FOR PAIN/ [facility] (INTERNAL MEDICINE)   • CAD (coronary artery disease)    • Cancer of uterus (CMS/HCC)    • Carotid artery stenosis    • Chronic renal failure syndrome    • Coronary artery disease involving coronary bypass graft of native heart     with other forms of angina pectoris   • Coronary atherosclerosis    • Endometrial cancer (CMS/HCC)    • Esophageal reflux    • Essential hypertension    • Glaucoma    • Hypertension    • Malaise and fatigue    • Osteoarthritis    • Sleep apnea     USING CPAP   • Spinal stenosis    • Thyroid disease    • Type 2 diabetes mellitus (CMS/HCC) 1991    INsulin begun 2012   • Vitamin B12 deficiency     SHE HAS NOT HAD THIS CHECKED IN A WHILE. SHE HAD BEEN ON SHOTS.  CHECK HER B12 LEVELS TODAY.  BY [facility]     Past Surgical History:   Procedure Laterality Date   • CARDIAC CATHETERIZATION N/A 3/11/2018    Procedure: Left Heart Cath;  Surgeon: Cameron Chisholm MD;  Location: [facility] CATH INVASIVE LOCATION;  Service: Cardiovascular   • CARDIAC CATHETERIZATION N/A 3/11/2018    Procedure: Stent ROSSI bypass graft;  Surgeon: Cameron Chisholm MD;  Location: [facility] CATH INVASIVE LOCATION;  Service: Cardiovascular   • CATARACT EXTRACTION Bilateral    • CATARACT EXTRACTION W/ INTRAOCULAR LENS IMPLANT Right 10/25/2016    Procedure: RT SUPERFICIAL KERATECTOMY ;  Surgeon: Arian Singh MD;  [place] [facility] [place] [facility] [place]:    • CHOLECYSTECTOMY     • COLONOSCOPY      • CORONARY ARTERY BYPASS GRAFT  1991    X3   • HYSTERECTOMY     • OOPHORECTOMY     • TRANSLUMINAL ATHERECTOMY PERONEAL ARTERY      PERCUTANEOUS TRANSLUMINAL ATHERECTOMY RENAL ARTERY     Family History   Problem Relation Age of Onset   • Colon cancer Other    • Heart disease Other    • Hypertension Other    • Stroke Other         ISCHEMIC   • Colon cancer Mother    • Stroke Father    • Heart attack Father    • Heart disease Father    • Heart attack Brother    • Stroke Brother    • Heart disease Brother    • Breast cancer Maternal Grandmother      Social History     Socioeconomic History   • Marital status: Single     Spouse name: Not on file   • Number of children: Not on file   • Years of education: Not on file   • Highest education level: Not on file   Tobacco Use   • Smoking status: Never Smoker   • Smokeless tobacco: Never Used   Substance and Sexual Activity   • Alcohol use: No     Comment: caffeine use   • Sexual activity: Defer       Current Outpatient Medications:   •  aspirin 81 MG chewable tablet, Chew 81 mg Daily., Disp: , Rfl:   •  clopidogrel (PLAVIX) 75 MG tablet, Take 1 tablet by mouth Daily., Disp: 90 tablet, Rfl: 3  •  Coenzyme Q10 (CO Q-10) 200 MG capsule, Take 300 mg by mouth daily., Disp: , Rfl:   •  furosemide (LASIX) 40 MG tablet, Take 1 tablet by mouth 2 (Two) Times a Day., Disp: 60 tablet, Rfl: 3  •  Insulin Glargine (LANTUS SOLOSTAR) 100 UNIT/ML injection pen, Inject 10 Units under the skin into the appropriate area as directed Every Night., Disp: 9 mL, Rfl: 1  •  insulin lispro (HUMALOG) 100 UNIT/ML injection, Inject 5 Units under the skin into the appropriate area as directed 3 (Three) Times a Day Before Meals., Disp: 15 mL, Rfl: 1  •  Insulin Pen Needle (BD PEN NEEDLE LISA U/F) 32G X 4 MM misc, USE AS DIRECTED THREE TIMES A DAY., Disp: 300 each, Rfl: 1  •  levothyroxine (SYNTHROID, LEVOTHROID) 125 MCG tablet, TAKE 1 TABLET DAILY AS DIRECTED, Disp: 90 tablet, Rfl: 1  •  losartan (COZAAR)  50 MG tablet, Take 1 tablet by mouth Daily., Disp: 90 tablet, Rfl: 3  •  metoprolol succinate XL (TOPROL-XL) 50 MG 24 hr tablet, Take 1 tablet by mouth Daily., Disp: 90 tablet, Rfl: 3  •  NIFEdipine XL (PROCARDIA XL) 90 MG 24 hr tablet, Take 1 tablet by mouth Daily., Disp: 90 tablet, Rfl: 3  •  ONE TOUCH ULTRA TEST test strip, TEST FOUR TIMES A DAY, Disp: 100 each, Rfl: 2  •  raNITIdine (ZANTAC) 150 MG tablet, TAKE 1 TABLET EVERY 12 HOURS AS NEEDED, Disp: 180 tablet, Rfl: 3  •  spironolactone (ALDACTONE) 25 MG tablet, Take 1 tablet by mouth Daily., Disp: 90 tablet, Rfl: 3  •  timolol (TIMOPTIC) 0.25 % ophthalmic solution, Administer 1 drop to both eyes Tonight., Disp: , Rfl:   •  travoprost, BAK free, (TRAVATAN) 0.004 % solution ophthalmic solution, Administer 1 drop to both eyes every night. in affected eye(s) , Disp: , Rfl:   •  acetaminophen (TYLENOL) 500 MG tablet, Take 500 mg by mouth every 4 (four) hours as needed for mild pain (1-3) (Back Pain). EVERY 4 TO 6 HOURS AS NEEDED, Disp: , Rfl:   •  mupirocin (BACTROBAN) 2 % ointment, Apply  topically to the appropriate area as directed 3 (Three) Times a Day., Disp: 15 g, Rfl: 0  •  nitroglycerin (NITROSTAT) 0.4 MG SL tablet, Place 1 tablet under the tongue Every 5 (Five) Minutes As Needed for Chest Pain. Take no more than 3 doses in 15 minutes., Disp: 25 tablet, Rfl: 12  Allergies   Allergen Reactions   • Allopurinol    • Clindamycin/Lincomycin Itching     Felt like she was burning and itching around the neck   • Statins Myalgia   • Ampicillin Rash   • Penicillins Rash   • Pravastatin Myalgia        Objective   Vitals:    05/08/19 1049   BP: 156/61   Pulse: 67   SpO2: 95%     Physical Exam   Constitutional: She is oriented to person, place, and time. She appears well-developed and well-nourished.   HENT:   Head: Normocephalic.   Eyes: Conjunctivae, EOM and lids are normal. Pupils are equal, round, and reactive to light.   Neck: Trachea normal and normal range of  motion. Neck supple. No hepatojugular reflux and no JVD present. Carotid bruit is not present. No thyroid mass and no thyromegaly present.   Cardiovascular: Normal rate, regular rhythm, S1 normal, S2 normal, normal heart sounds and normal pulses.  No extrasystoles are present. PMI is not displaced.   Pulmonary/Chest: Effort normal and breath sounds normal.   Abdominal: Soft. Normal appearance and bowel sounds are normal. She exhibits no mass. There is no hepatosplenomegaly. There is no tenderness. No hernia.   Musculoskeletal: Normal range of motion. She exhibits edema.   Neurological: She is alert and oriented to person, place, and time. She has normal strength and normal reflexes. No cranial nerve deficit or sensory deficit. She displays a negative Romberg sign.   Skin: Skin is warm, dry and intact.   Psychiatric: She has a normal mood and affect. Her speech is normal and behavior is normal. Judgment and thought content normal. Cognition and memory are normal.     Independent Review of Radiographic Studies:    CT of the chest performed April 16, 2019 was independently reviewed and compared to the previous CT scans.  There is pleural parenchymal scarring in the basis of both lower lobes.  There is a moderate sized sliding hiatal hernia.  There is no suspicious hilar or mediastinal adenopathy.  There are no new infiltrates nodules or masses.  There is no pleural effusion.  There is no change compared to previous CT scans.    Assessment/Plan       CT scan has remained stable over the last 3 years.  No new abnormalities have been identified.  I do not believe that further follow-up is required in this 89-year-old lady.  I will turn her care back to her primary care physician but will be glad to see her anytime in the future.  Thank you for allowing us to participate in the care of Mrs. Madrigal.        Diagnoses and all orders for this visit:    Solitary pulmonary nodule

## 2019-06-05 ENCOUNTER — NURSE ONLY (OUTPATIENT)
Dept: LAB | Age: 84
End: 2019-06-05
Payer: MEDICARE

## 2019-06-05 DIAGNOSIS — R69 DIAGNOSIS UNKNOWN: Primary | ICD-10-CM

## 2019-06-05 PROCEDURE — 80061 LIPID PANEL: CPT

## 2019-06-05 PROCEDURE — 85025 COMPLETE CBC W/AUTO DIFF WBC: CPT

## 2019-06-05 PROCEDURE — 36415 COLL VENOUS BLD VENIPUNCTURE: CPT

## 2019-06-05 PROCEDURE — 80053 COMPREHEN METABOLIC PANEL: CPT

## 2019-06-26 ENCOUNTER — OFFICE VISIT (OUTPATIENT)
Dept: INTERNAL MEDICINE | Facility: CLINIC | Age: 84
End: 2019-06-26

## 2019-06-26 VITALS
SYSTOLIC BLOOD PRESSURE: 130 MMHG | HEIGHT: 64 IN | DIASTOLIC BLOOD PRESSURE: 60 MMHG | WEIGHT: 195 LBS | BODY MASS INDEX: 33.29 KG/M2

## 2019-06-26 DIAGNOSIS — E11.22 TYPE 2 DIABETES MELLITUS WITH STAGE 4 CHRONIC KIDNEY DISEASE, WITH LONG-TERM CURRENT USE OF INSULIN (HCC): ICD-10-CM

## 2019-06-26 DIAGNOSIS — N18.4 TYPE 2 DIABETES MELLITUS WITH STAGE 4 CHRONIC KIDNEY DISEASE, WITH LONG-TERM CURRENT USE OF INSULIN (HCC): ICD-10-CM

## 2019-06-26 DIAGNOSIS — E03.9 ACQUIRED HYPOTHYROIDISM: ICD-10-CM

## 2019-06-26 DIAGNOSIS — N18.4 CHRONIC KIDNEY DISEASE, STAGE IV (SEVERE) (HCC): ICD-10-CM

## 2019-06-26 DIAGNOSIS — Z79.4 TYPE 2 DIABETES MELLITUS WITH STAGE 4 CHRONIC KIDNEY DISEASE, WITH LONG-TERM CURRENT USE OF INSULIN (HCC): ICD-10-CM

## 2019-06-26 DIAGNOSIS — I10 ESSENTIAL HYPERTENSION: ICD-10-CM

## 2019-06-26 DIAGNOSIS — I27.20 PULMONARY HYPERTENSION (HCC): Primary | Chronic | ICD-10-CM

## 2019-06-26 DIAGNOSIS — I25.10 CHRONIC CORONARY ARTERY DISEASE: ICD-10-CM

## 2019-06-26 LAB
BASOPHILS # BLD AUTO: ABNORMAL 10*3/UL
BASOPHILS # BLD MANUAL: 0 10*3/MM3 (ref 0–0.2)
BASOPHILS NFR BLD MANUAL: 0 % (ref 0–1.5)
DIFFERENTIAL COMMENT: ABNORMAL
EOSINOPHIL # BLD AUTO: ABNORMAL 10*3/UL
EOSINOPHIL # BLD AUTO: ABNORMAL 10*3/UL
EOSINOPHIL # BLD MANUAL: 0 % (ref 0.3–6.2)
EOSINOPHIL # BLD MANUAL: 0 10*3/MM3 (ref 0–0.4)
ERYTHROCYTE [DISTWIDTH] IN BLOOD BY AUTOMATED COUNT: 13.1 % (ref 12.3–15.4)
HBA1C MFR BLD: 8.6 % (ref 4.8–5.6)
HCT VFR BLD AUTO: 42.9 % (ref 34–46.6)
HGB BLD-MCNC: 13.8 G/DL (ref 12–15.9)
LABORATORY COMMENT REPORT: ABNORMAL
LYMPHOCYTES # BLD AUTO: ABNORMAL 10*3/UL
LYMPHOCYTES # BLD MANUAL: 1.22 10*3/MM3 (ref 0.7–3.1)
LYMPHOCYTES NFR BLD AUTO: ABNORMAL %
LYMPHOCYTES NFR BLD MANUAL: 13 % (ref 19.6–45.3)
MCH RBC QN AUTO: 33.8 PG (ref 26.6–33)
MCHC RBC AUTO-ENTMCNC: 32.2 G/DL (ref 31.5–35.7)
MCV RBC AUTO: 105.1 FL (ref 79–97)
MONOCYTES # BLD MANUAL: 1.03 10*3/MM3 (ref 0.1–0.9)
MONOCYTES NFR BLD AUTO: ABNORMAL %
MONOCYTES NFR BLD MANUAL: 11 % (ref 5–12)
NEUTROPHILS # BLD MANUAL: 7.11 10*3/MM3 (ref 1.7–7)
NEUTROPHILS NFR BLD AUTO: ABNORMAL %
NEUTROPHILS NFR BLD MANUAL: 76 % (ref 42.7–76)
PLATELET # BLD AUTO: 321 10*3/MM3 (ref 140–450)
PLT COMMENT: ABNORMAL
RBC # BLD AUTO: 4.08 10*6/MM3 (ref 3.77–5.28)
T4 FREE SERPL-MCNC: 1.74 NG/DL (ref 0.93–1.7)
TSH SERPL-ACNC: 0.41 MIU/ML (ref 0.27–4.2)
WBC # BLD AUTO: 9.36 10*3/MM3 (ref 3.4–10.8)

## 2019-06-26 PROCEDURE — 99214 OFFICE O/P EST MOD 30 MIN: CPT | Performed by: INTERNAL MEDICINE

## 2019-06-26 RX ORDER — ERGOCALCIFEROL 1.25 MG/1
50000 CAPSULE ORAL
COMMUNITY
End: 2020-01-01 | Stop reason: HOSPADM

## 2019-06-26 NOTE — PROGRESS NOTES
Subjective   Alessia Madrigal is a 89 y.o. female. Presents with a chief complaint of NIDDM, HTN, CAD, unstable gait, hypothyroidism, CKD stage 4, here for follow up and evaluation. She is having a lot of difficulty with cold intolerance.     History of Present Illness here for follow up on her diabetes    The following portions of the patient's history were reviewed and updated as appropriate: allergies, current medications, past family history, past medical history, past social history, past surgical history and problem list.    Review of Systems   Constitutional: Positive for fatigue.   HENT: Negative.    Eyes: Negative.    Respiratory: Positive for shortness of breath.    Cardiovascular: Negative.    Gastrointestinal: Negative.    Endocrine: Positive for cold intolerance.   Genitourinary: Negative.    Musculoskeletal: Positive for arthralgias and gait problem.   Skin: Negative.    Allergic/Immunologic: Negative.    Hematological: Negative.    Psychiatric/Behavioral: Negative.        Objective   Physical Exam   Constitutional: She appears well-developed and well-nourished.   HENT:   Head: Normocephalic and atraumatic.   Eyes: EOM are normal. Pupils are equal, round, and reactive to light.   Neck: Normal range of motion. Neck supple.   Cardiovascular: Normal rate, regular rhythm and normal heart sounds.   Pulmonary/Chest: Effort normal and breath sounds normal.   Abdominal: Soft. Bowel sounds are normal.   Musculoskeletal:   Uses a walker at all times   Neurological: She is alert.   Skin: Skin is warm.   Psychiatric: She has a normal mood and affect.   Nursing note and vitals reviewed.        Assessment/Plan   Alessia was seen today for establish care, hypertension, hypothyroidism and diabetes.    Diagnoses and all orders for this visit:    Pulmonary hypertension (CMS/HCC)  Comments:  stable for now    Essential hypertension  Comments:  well controlled  Orders:  -     CBC & Differential; Future  -      Comprehensive Metabolic Panel; Future    Chronic coronary artery disease  Comments:  stable for now    Type 2 diabetes mellitus with stage 4 chronic kidney disease, with long-term current use of insulin (CMS/[facility])  Comments:  check an A1c  Orders:  -     Hemoglobin A1c; Future    Chronic kidney disease, stage IV (severe) (CMS/[facility])  Comments:  check a CMP    Acquired hypothyroidism  Comments:  check a TSH  Orders:  -     TSH; Future

## 2019-06-27 LAB — T3FREE SERPL-MCNC: 1.8 PG/ML (ref 2–4.4)

## 2019-06-28 LAB
ALBUMIN SERPL-MCNC: 4.1 G/DL (ref 3.2–4.6)
ALBUMIN/GLOB SERPL: 1.4 {RATIO} (ref 1.2–2.2)
ALP SERPL-CCNC: 109 IU/L (ref 39–117)
ALT SERPL-CCNC: 9 IU/L (ref 0–32)
AST SERPL-CCNC: 15 IU/L (ref 0–40)
BILIRUB SERPL-MCNC: 0.3 MG/DL (ref 0–1.2)
BUN SERPL-MCNC: 39 MG/DL (ref 10–36)
BUN/CREAT SERPL: 25 (ref 12–28)
CALCIUM SERPL-MCNC: 9.6 MG/DL (ref 8.7–10.3)
CHLORIDE SERPL-SCNC: 102 MMOL/L (ref 96–106)
CO2 SERPL-SCNC: 17 MMOL/L (ref 20–29)
CREAT SERPL-MCNC: 1.58 MG/DL (ref 0.57–1)
GLOBULIN SER CALC-MCNC: 3 G/DL (ref 1.5–4.5)
GLUCOSE SERPL-MCNC: 211 MG/DL (ref 65–99)
POTASSIUM SERPL-SCNC: 4.5 MMOL/L (ref 3.5–5.2)
PROT SERPL-MCNC: 7.1 G/DL (ref 6–8.5)
SODIUM SERPL-SCNC: 142 MMOL/L (ref 134–144)

## 2019-07-01 ENCOUNTER — TELEPHONE (OUTPATIENT)
Dept: INTERNAL MEDICINE | Facility: CLINIC | Age: 84
End: 2019-07-01

## 2019-07-01 RX ORDER — RANITIDINE 150 MG/1
150 TABLET ORAL 2 TIMES DAILY
Qty: 180 TABLET | Refills: 3 | Status: SHIPPED | OUTPATIENT
Start: 2019-07-01 | End: 2020-01-01 | Stop reason: RX

## 2019-07-01 NOTE — TELEPHONE ENCOUNTER
----- Message from Heaven Aquino sent at 7/1/2019 12:34 PM EDT -----  Contact: Pt   Pt is calling for a refill     FOR- 90 days   raNITIdine (ZANTAC) 150 MG tablet  Insulin Pen Needle (BD PEN NEEDLE LISA U/F) 32G X 4 MM misc- 3 times a day     Patient requests RX SENT TO   [facility] - [address] [phone] [facility] [phone] FX      Caller# [phone]     Please and thank you.

## 2019-07-08 RX ORDER — METOPROLOL SUCCINATE 50 MG/1
TABLET, EXTENDED RELEASE ORAL
Qty: 90 TABLET | Refills: 0 | Status: SHIPPED | OUTPATIENT
Start: 2019-07-08 | End: 2019-01-01 | Stop reason: SDUPTHER

## 2019-07-08 NOTE — TELEPHONE ENCOUNTER
----- Message from Isha Cortes sent at 7/8/2019  3:35 PM EDT -----  Contact: Patient   Patient called requesting script for the following be sent to local pharmacy    ONE TOUCH ULTRA TEST test strips, #100; tests TID.    Also, please review labs so we can send copy to her.  Please advise.      Patient:  [phone]    [facility] [address] AT [facility] [address]  - [phone]  - [phone] FX

## 2019-07-15 ENCOUNTER — TELEPHONE (OUTPATIENT)
Dept: INTERNAL MEDICINE | Facility: CLINIC | Age: 84
End: 2019-07-15

## 2019-07-15 DIAGNOSIS — E11.9 DIABETES MELLITUS WITHOUT COMPLICATION (HCC): Primary | ICD-10-CM

## 2019-07-15 NOTE — TELEPHONE ENCOUNTER
----- Message from Isha Cortes sent at 7/15/2019  2:12 PM EDT -----  Contact: Patient   Patient called last week inquiring about her labs from June.  Please review her A1C, cmp, T3,T4 were all abnormal , mshe is on LANTUS and HUMALOG. Any changes needed to her meds?    Patient:  [phone]  [address] [id number]

## 2019-07-30 ENCOUNTER — TELEPHONE (OUTPATIENT)
Dept: INTERNAL MEDICINE | Facility: CLINIC | Age: 84
End: 2019-07-30

## 2019-07-30 DIAGNOSIS — I10 ESSENTIAL HYPERTENSION: ICD-10-CM

## 2019-07-30 DIAGNOSIS — E11.9 TYPE 2 DIABETES MELLITUS WITHOUT COMPLICATION, WITH LONG-TERM CURRENT USE OF INSULIN (HCC): ICD-10-CM

## 2019-07-30 DIAGNOSIS — R53.82 CHRONIC FATIGUE: ICD-10-CM

## 2019-07-30 DIAGNOSIS — E03.9 HYPOTHYROIDISM: ICD-10-CM

## 2019-07-30 DIAGNOSIS — E78.5 HYPERLIPIDEMIA: ICD-10-CM

## 2019-07-30 DIAGNOSIS — IMO0002 UNCONTROLLED TYPE 2 DIABETES MELLITUS: ICD-10-CM

## 2019-07-30 DIAGNOSIS — Z79.4 TYPE 2 DIABETES MELLITUS WITHOUT COMPLICATION, WITH LONG-TERM CURRENT USE OF INSULIN (HCC): ICD-10-CM

## 2019-07-30 DIAGNOSIS — I10 ESSENTIAL HYPERTENSION: Primary | ICD-10-CM

## 2019-07-30 DIAGNOSIS — E21.1 SECONDARY HYPERPARATHYROIDISM, NON-RENAL (HCC): ICD-10-CM

## 2019-07-30 DIAGNOSIS — E55.9 VITAMIN D DEFICIENCY: ICD-10-CM

## 2019-07-30 DIAGNOSIS — I25.10 CHRONIC CORONARY ARTERY DISEASE: ICD-10-CM

## 2019-07-30 RX ORDER — NIFEDIPINE 90 MG/1
TABLET, EXTENDED RELEASE ORAL
Qty: 90 TABLET | Refills: 1 | Status: SHIPPED | OUTPATIENT
Start: 2019-07-30 | End: 2020-01-01

## 2019-07-30 RX ORDER — LEVOTHYROXINE SODIUM 0.12 MG/1
125 TABLET ORAL DAILY
Qty: 90 TABLET | Refills: 1 | Status: SHIPPED | OUTPATIENT
Start: 2019-07-30 | End: 2019-01-01

## 2019-07-30 RX ORDER — LOSARTAN POTASSIUM 50 MG/1
TABLET ORAL
Qty: 90 TABLET | Refills: 1 | Status: SHIPPED | OUTPATIENT
Start: 2019-07-30 | End: 2020-01-01

## 2019-07-30 NOTE — TELEPHONE ENCOUNTER
----- Message from Heaven Aquino sent at 7/30/2019  2:52 PM EDT -----  Contact: pt  Pt is calling states this is the 3rd time, she has called for a mailed copy of 6/26 lab work.  Please advise.   She is also requesting that before next office visit she have labs done, if possible please.    Pt#[phone]

## 2019-07-30 NOTE — TELEPHONE ENCOUNTER
Labs mailed to pt, labs ordered per her request so she can discuss it with , I called her and made her lab valeria 09/26. Advised pt to come fasting that day. Pt understood.

## 2019-08-09 DIAGNOSIS — I10 ESSENTIAL HYPERTENSION: ICD-10-CM

## 2019-08-09 DIAGNOSIS — E55.9 VITAMIN D DEFICIENCY: ICD-10-CM

## 2019-08-09 RX ORDER — SPIRONOLACTONE 25 MG/1
TABLET ORAL
Qty: 90 TABLET | Refills: 3 | Status: SHIPPED | OUTPATIENT
Start: 2019-08-09 | End: 2020-01-01

## 2019-08-13 ENCOUNTER — NURSE ONLY (OUTPATIENT)
Dept: LAB | Age: 84
End: 2019-08-13
Attending: NURSE PRACTITIONER
Payer: MEDICARE

## 2019-08-13 DIAGNOSIS — E55.9 VITAMIN D INSUFFICIENCY: Primary | ICD-10-CM

## 2019-08-13 LAB
ANION GAP SERPL CALC-SCNC: 6 MMOL/L (ref 0–18)
BUN BLD-MCNC: 24 MG/DL (ref 7–18)
BUN/CREAT SERPL: 15.1 (ref 10–20)
CALCIUM BLD-MCNC: 9.2 MG/DL (ref 8.5–10.1)
CHLORIDE SERPL-SCNC: 108 MMOL/L (ref 98–112)
CO2 SERPL-SCNC: 29 MMOL/L (ref 21–32)
CREAT BLD-MCNC: 1.59 MG/DL (ref 0.55–1.02)
GLUCOSE BLD-MCNC: 85 MG/DL (ref 70–99)
OSMOLALITY SERPL CALC.SUM OF ELEC: 299 MOSM/KG (ref 275–295)
POTASSIUM SERPL-SCNC: 4 MMOL/L (ref 3.5–5.1)
SODIUM SERPL-SCNC: 143 MMOL/L (ref 136–145)
VIT D+METAB SERPL-MCNC: 47.3 NG/ML (ref 30–100)

## 2019-08-13 PROCEDURE — 80048 BASIC METABOLIC PNL TOTAL CA: CPT

## 2019-08-13 PROCEDURE — 82306 VITAMIN D 25 HYDROXY: CPT

## 2019-08-13 PROCEDURE — 36415 COLL VENOUS BLD VENIPUNCTURE: CPT

## 2019-08-22 ENCOUNTER — OFFICE VISIT (OUTPATIENT)
Dept: CARDIOLOGY | Facility: CLINIC | Age: 84
End: 2019-08-22

## 2019-08-22 VITALS
HEIGHT: 64 IN | SYSTOLIC BLOOD PRESSURE: 160 MMHG | HEART RATE: 66 BPM | BODY MASS INDEX: 34.93 KG/M2 | DIASTOLIC BLOOD PRESSURE: 88 MMHG | WEIGHT: 204.6 LBS

## 2019-08-22 DIAGNOSIS — E11.59 TYPE 2 DIABETES MELLITUS WITH OTHER CIRCULATORY COMPLICATION, WITHOUT LONG-TERM CURRENT USE OF INSULIN (HCC): ICD-10-CM

## 2019-08-22 DIAGNOSIS — I34.0 NON-RHEUMATIC MITRAL REGURGITATION: ICD-10-CM

## 2019-08-22 DIAGNOSIS — N18.4 CHRONIC KIDNEY DISEASE, STAGE IV (SEVERE) (HCC): ICD-10-CM

## 2019-08-22 DIAGNOSIS — I25.810 CORONARY ARTERY DISEASE INVOLVING CORONARY BYPASS GRAFT OF NATIVE HEART WITHOUT ANGINA PECTORIS: Primary | ICD-10-CM

## 2019-08-22 DIAGNOSIS — I10 ESSENTIAL HYPERTENSION: ICD-10-CM

## 2019-08-22 DIAGNOSIS — E78.2 MIXED HYPERLIPIDEMIA: ICD-10-CM

## 2019-08-22 PROCEDURE — 99214 OFFICE O/P EST MOD 30 MIN: CPT | Performed by: INTERNAL MEDICINE

## 2019-08-22 PROCEDURE — 93000 ELECTROCARDIOGRAM COMPLETE: CPT | Performed by: INTERNAL MEDICINE

## 2019-08-22 RX ORDER — NITROGLYCERIN 0.4 MG/1
0.4 TABLET SUBLINGUAL
Qty: 25 TABLET | Refills: 2 | Status: SHIPPED | OUTPATIENT
Start: 2019-08-22 | End: 2020-01-01

## 2019-08-22 NOTE — PROGRESS NOTES
Date of Office Visit: 19  Encounter Provider: Griffin Joseph MD  Place of Service: [facility] CARDIOLOGY  Patient Name: Alessia Madrigal  :10/9/1928  Referral Provider:No ref. provider found      Chief Complaint   Patient presents with   • Follow-up     History of Present Illness  Mrs Madrigal is a pleasant 89 yo female with history of coronary disease, previous colon bypass grafting, hypertension, diabetes mellitus, hyperlipidemia, renal failure, previous renal artery stenting.  In  she had coronary bypass grafting in  had repeat cardiac catheterization which showed severe three-vessel coronary disease with occlusion of left anterior descending and right coronary artery and circumflex the left internal mammary graft left anterior descending was patent the vein graft to the diagonal was patent vein graft to the right coronary was patent and the circumflex filled by collaterals was only ischemic area.  She's also had previous left renal artery stent follows with nephrology for renal failure.  She has carotid artery disease follows with vascular surgery just mild.    She then presented in 2018 with acute inferior ST elevation infarct.  Cardiac catheterization showed severe three-vessel coronary disease, normal left ventricular ejection fraction with inferior wall motion abnormality she had occluded vein graft to the diagonal patent left internal mammary graft left anterior descending severe disease in the vein graft to the posterior descending artery.  She had a 3.5 x 23 mm drug-eluting stent placed in that vein graft.  Residual ischemic area being the diagonal distribution circumflex distribution and mid left anterior descending treated medically.  Echocardiogram showed left ventricular ejection fraction of 57% with wall motion abnormality, grade 2 diastolic dysfunction, mild to moderate mitral insufficiency mild tricuspid insufficiency with normal RV systolic  pressure.  She now comes in for follow-up. The patient denies chest pain, pressure and heaviness. No shortness of breath, othopnea or PND. No palpitations, near syncope or syncope. No stroke type symptoms like paralysis, paresthesia, abrupt vision loss and dysarthria. No bleeding like blood in the stool or dark stools. .She is been doing reasonably well she has good days and bad days.  About 3 or 4 weeks ago she was having these twinges in her chest that have ultimately this resolved she is a little concerned about that her shortness of breath is probably at about her baseline no palpitations no near syncope or syncope no orthopnea but she does sleep with CPAP.  No blood in her stool or black stool and no stroke type symptoms.  Unfortunately she still lives in apartment alone she can afford to go to assisted living.        Coronary Artery Disease   Pertinent negatives include no dizziness, muscle weakness or weight gain. There is no history of past myocardial infarction.         Past Medical History:   Diagnosis Date   • Acute transmural inferior wall MI (CMS/HCC)    • Arthritis 03/13/2014    CONT TYLENOL FOR PAIN/ JOSE DANIEL CHEW (INTERNAL MEDICINE)   • CAD (coronary artery disease)    • Cancer of uterus (CMS/HCC)    • Carotid artery stenosis    • Chronic renal failure syndrome    • Coronary artery disease involving coronary bypass graft of native heart     with other forms of angina pectoris   • Coronary atherosclerosis    • Endometrial cancer (CMS/HCC)    • Esophageal reflux    • Essential hypertension    • Glaucoma    • Hypertension    • Malaise and fatigue    • Osteoarthritis    • Sleep apnea     USING CPAP   • Spinal stenosis    • Thyroid disease    • Type 2 diabetes mellitus (CMS/HCC) 1991    INsulin begun 2012   • Vitamin B12 deficiency     SHE HAS NOT HAD THIS CHECKED IN A WHILE. SHE HAD BEEN ON SHOTS.  CHECK HER B12 LEVELS TODAY.  BY JOSE DANIEL CHEW         Past Surgical History:   Procedure Laterality Date    • CARDIAC CATHETERIZATION N/A 3/11/2018    Procedure: Left Heart Cath;  Surgeon: Cameron Chisholm MD;  Location:  [facility];  Service: Cardiovascular   • CARDIAC CATHETERIZATION N/A 3/11/2018    Procedure: Stent ROSSI bypass graft;  Surgeon: Cameron Chisholm MD;  Location:  [facility];  Service: Cardiovascular   • CATARACT EXTRACTION Bilateral    • CATARACT EXTRACTION W/ INTRAOCULAR LENS IMPLANT Right 10/25/2016    Procedure: RT SUPERFICIAL KERATECTOMY ;  Surgeon: Arian Singh MD;  Location:  [facility];  Service:    • CHOLECYSTECTOMY     • COLONOSCOPY     • CORONARY ARTERY BYPASS GRAFT  1991    X3   • HYSTERECTOMY     • OOPHORECTOMY     • TRANSLUMINAL ATHERECTOMY PERONEAL ARTERY      PERCUTANEOUS TRANSLUMINAL ATHERECTOMY RENAL ARTERY         Current Outpatient Medications on File Prior to Visit   Medication Sig Dispense Refill   • acetaminophen (TYLENOL) 500 MG tablet Take 500 mg by mouth every 4 (four) hours as needed for mild pain (1-3) (Back Pain). EVERY 4 TO 6 HOURS AS NEEDED     • aspirin 81 MG chewable tablet Chew 81 mg Daily.     • clopidogrel (PLAVIX) 75 MG tablet Take 1 tablet by mouth Daily. 90 tablet 3   • Coenzyme Q10 (CO Q-10) 200 MG capsule Take 300 mg by mouth daily.     • furosemide (LASIX) 40 MG tablet Take 1 tablet by mouth 2 (Two) Times a Day. 60 tablet 3   • glucose blood (ONE TOUCH ULTRA TEST) test strip Test Three times daily. DX E11.9 100 each 2   • Insulin Glargine (LANTUS SOLOSTAR) 100 UNIT/ML injection pen Inject 15 Units under the skin into the appropriate area as directed Every Night. 15 mL 1   • insulin lispro (HUMALOG) 100 UNIT/ML injection Inject 5 Units under the skin into the appropriate area as directed 3 (Three) Times a Day Before Meals. 15 mL 1   • Insulin Pen Needle (BD PEN NEEDLE LISA U/F) 32G X 4 MM misc USE AS DIRECTED THREE TIMES A DAY. 300 each 1   • levothyroxine (SYNTHROID, LEVOTHROID) 125 MCG tablet Take 1 tablet by mouth  Daily. as directed 90 tablet 1   • losartan (COZAAR) 50 MG tablet TAKE 1 TABLET DAILY 90 tablet 1   • metoprolol succinate XL (TOPROL-XL) 50 MG 24 hr tablet TAKE 1 TABLET DAILY 90 tablet 0   • mupirocin (BACTROBAN) 2 % ointment Apply  topically to the appropriate area as directed 3 (Three) Times a Day. 15 g 0   • NIFEdipine XL (PROCARDIA XL) 90 MG 24 hr tablet TAKE 1 TABLET DAILY 90 tablet 1   • nitroglycerin (NITROSTAT) 0.4 MG SL tablet Place 1 tablet under the tongue Every 5 (Five) Minutes As Needed for Chest Pain. Take no more than 3 doses in 15 minutes. 25 tablet 12   • raNITIdine (ZANTAC) 150 MG tablet Take 1 tablet by mouth 2 (Two) Times a Day. 180 tablet 3   • spironolactone (ALDACTONE) 25 MG tablet TAKE 1 TABLET DAILY 90 tablet 3   • timolol (TIMOPTIC) 0.25 % ophthalmic solution Administer 1 drop to both eyes Tonight.     • travoprost, BAK free, (TRAVATAN) 0.004 % solution ophthalmic solution Administer 1 drop to both eyes every night. in affected eye(s)      • vitamin D (ERGOCALCIFEROL) 19272 units capsule capsule Take 50,000 Units by mouth Every 7 (Seven) Days.       No current facility-administered medications on file prior to visit.          Social History     Socioeconomic History   • Marital status: Single     Spouse name: Not on file   • Number of children: Not on file   • Years of education: Not on file   • Highest education level: Not on file   Tobacco Use   • Smoking status: Never Smoker   • Smokeless tobacco: Never Used   Substance and Sexual Activity   • Alcohol use: No     Comment: caffeine use   • Sexual activity: Defer   Lifestyle   • Physical activity:     Days per week: Patient refused     Minutes per session: Patient refused   • Stress: Not on file       Family History   Problem Relation Age of Onset   • Colon cancer Other    • Heart disease Other    • Hypertension Other    • Stroke Other         ISCHEMIC   • Colon cancer Mother    • Stroke Father    • Heart attack Father    • Heart disease  Father    • Heart attack Brother    • Stroke Brother    • Heart disease Brother    • Breast cancer Maternal Grandmother          Review of Systems   Constitution: Negative for decreased appetite, diaphoresis, fever, weakness, malaise/fatigue, weight gain and weight loss.   HENT: Negative for congestion, hearing loss, nosebleeds and tinnitus.    Eyes: Negative for blurred vision, double vision, vision loss in left eye, vision loss in right eye and visual disturbance.   Cardiovascular:        As noted in HPI   Respiratory:        As noted HPI   Endocrine: Negative for cold intolerance and heat intolerance.   Hematologic/Lymphatic: Negative for bleeding problem. Does not bruise/bleed easily.   Skin: Negative for color change, flushing, itching and rash.   Musculoskeletal: Positive for joint pain. Negative for arthritis, back pain, joint swelling, muscle weakness and myalgias.   Gastrointestinal: Negative for bloating, abdominal pain, constipation, diarrhea, dysphagia, heartburn, hematemesis, hematochezia, melena, nausea and vomiting.   Genitourinary: Negative for bladder incontinence, dysuria, frequency, nocturia and urgency.   Neurological: Negative for dizziness, focal weakness, headaches, light-headedness, loss of balance, numbness, paresthesias and vertigo.   Psychiatric/Behavioral: Negative for depression, memory loss and substance abuse.       Procedures      ECG 12 Lead  Date/Time: 8/22/2019 1:05 PM  Performed by: Griffin Joseph MD  Authorized by: Griffin Joseph MD   Comparison: compared with previous ECG   Similar to previous ECG  Rhythm: sinus rhythm  Rate: normal  QRS axis: normal  Other findings: non-specific ST-T wave changes                Objective:    There were no vitals taken for this visit.       Physical Exam  Physical Exam   Constitutional: She is oriented to person, place, and time. She appears well-developed and well-nourished. No distress.   HENT:   Head: Normocephalic.   Eyes:  Conjunctivae are normal. Pupils are equal, round, and reactive to light. No scleral icterus.   Neck: Normal carotid pulses, no hepatojugular reflux and no JVD present. Carotid bruit is not present. No tracheal deviation, no edema and no erythema present. No thyromegaly present.   Cardiovascular: Normal rate, regular rhythm, S1 normal, S2 normal and intact distal pulses.  No extrasystoles are present. PMI is not displaced. Exam reveals no gallop, no distant heart sounds and no friction rub.   Murmur heard.   Systolic murmur is present with a grade of 2/6 at the upper right sternal border.  Pulses:       Carotid pulses are 2+ on the right side with bruit, and 2+ on the left side with bruit.       Radial pulses are 2+ on the right side, and 2+ on the left side.        Femoral pulses are 2+ on the right side, and 2+ on the left side.       Dorsalis pedis pulses are 2+ on the right side, and 2+ on the left side.        Posterior tibial pulses are 2+ on the right side, and 2+ on the left side.   Pulmonary/Chest: Effort normal and breath sounds normal. No respiratory distress. She has no decreased breath sounds. She has no wheezes. She has no rhonchi. She has no rales. She exhibits no tenderness.   Abdominal: Soft. Bowel sounds are normal. She exhibits no distension and no mass. There is no hepatosplenomegaly. There is no tenderness. There is no rebound and no guarding.   Musculoskeletal: She exhibits edema (1+ bilateral tibial R>L). She exhibits no tenderness or deformity.   Neurological: She is alert and oriented to person, place, and time.   Skin: Skin is warm and dry. No rash noted. She is not diaphoretic. No cyanosis or erythema. No pallor. Nails show no clubbing.   Psychiatric: She has a normal mood and affect. Her speech is normal and behavior is normal. Judgment and thought content normal.           Assessment:   1.  89-year-old female with history of severe coronary disease previous coronary artery bypass  grafting.  Catheterization 2012 patent left internal graft left anterior descending, patent vein graft to diagonal, patent vein graft to the right coronary artery ischemic area just the circumflex distribution normal left ventricular systolic function.  Perfusion stress test November 2016 no ischemia and normal left ventricular function. An acute inferior infarct March 2018 cardiac catheterization and echocardiogram preserved left ventricular ejection fraction in for wall hypokinesis.  Severe three-vessel coronary disease patent left internal mammary graft left anterior descending, occluded vein graft to the diagonal, severe disease of the vein graft to the posterior descending artery with drug-eluting stent placed to that vessel residual ischemia being the diagonal distribution, circumflex distribution and mid left anterior descending. DAPT score of 3 high risk.  Coronary Artery Disease  Assessment  • The patient has no angina  • There is a new diagnosis of stable angina in the past 12 months     Plan  • Lifestyle modifications discussed include adhering to a heart healthy diet, avoidance of tobacco products, maintenance of a healthy weight, medication compliance, regular exercise and regular monitoring of cholesterol and blood pressure     Subjective - Objective  • There is a history of previous coronary artery bypass graft  • There has been a previous stent procedure using ROSSI  • Current antiplatelet therapy includes aspirin 81 mg and clopidogrel 75 mg  • The patient qualifies for cardiac rehabilitation, and has been referred to cardiac rehab  She appears relatively stable.  She will continue the same see us again in follow-up in a year and call back if problems.  .  2.  Hypertension blood pressure adequately controlled continue same  3.  Hyperlipidemia intolerant of multiple statins and her age do not feel like I wouldn't pursue any further treatment.  4.  Renal failure stage 3 last GFR 37 in 8/18 follows with  nephrology.  5.  Renal artery stenosis status post left renal artery stenting grade  6.  Mild carotid artery stenosis follows with vascular.  We would be very conservative  7.  Obstructive sleep apnea on CPAP.   8.  Diabetes mellitus her PCP.            Plan:

## 2019-09-30 NOTE — PROGRESS NOTES
Subjective   Alessia Madrigal is a 89 y.o. female.     History of Present Illness     The following portions of the patient's history were reviewed and updated as appropriate: allergies, current medications, past family history, past medical history, past social history, past surgical history and problem list.    Review of Systems    Objective   Physical Exam      Assessment/Plan   Alessia was seen today for hypertension and diabetes.    Diagnoses and all orders for this visit:    Essential hypertension    Disorder of aorta (CMS/HCC)    Type 2 diabetes mellitus with stage 4 chronic kidney disease, with long-term current use of insulin (CMS/[facility])    Secondary hyperparathyroidism, non-renal (CMS/HCC)    Acquired hypothyroidism    Chronic kidney disease, stage IV (severe) (CMS/HCC)    Other orders  -     SYNTHROID 150 MCG tablet; Take 1 tablet by mouth Daily.

## 2019-10-02 NOTE — TELEPHONE ENCOUNTER
----- Message from Heaven Aquino sent at 10/2/2019  1:19 PM EDT -----  Contact: pt  Pt is asking for a generic for  SYNTHROID 150 MCG tablet   Sent to mail order.    [facility] - [address] [phone]  - [phone] FX    Pt#[phone]

## 2019-11-04 ENCOUNTER — HOSPITAL ENCOUNTER (EMERGENCY)
Facility: HOSPITAL | Age: 84
Discharge: HOME | End: 2019-11-04
Attending: EMERGENCY MEDICINE
Payer: MEDICARE

## 2019-11-04 ENCOUNTER — APPOINTMENT (OUTPATIENT)
Dept: GENERAL RADIOLOGY | Facility: HOSPITAL | Age: 84
End: 2019-11-04
Attending: NURSE PRACTITIONER
Payer: MEDICARE

## 2019-11-04 VITALS
OXYGEN SATURATION: 92 % | WEIGHT: 167.56 LBS | RESPIRATION RATE: 18 BRPM | HEART RATE: 77 BPM | TEMPERATURE: 97 F | BODY MASS INDEX: 29.69 KG/M2 | DIASTOLIC BLOOD PRESSURE: 84 MMHG | HEIGHT: 63 IN | SYSTOLIC BLOOD PRESSURE: 145 MMHG

## 2019-11-04 DIAGNOSIS — M79.604 RIGHT LEG PAIN: Primary | ICD-10-CM
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
## 2019-11-04 PROCEDURE — 73600 X-RAY EXAM OF ANKLE: CPT | Performed by: NURSE PRACTITIONER

## 2019-11-04 PROCEDURE — 99284 EMERGENCY DEPT VISIT MOD MDM: CPT

## 2019-11-04 PROCEDURE — 73502 X-RAY EXAM HIP UNI 2-3 VIEWS: CPT | Performed by: NURSE PRACTITIONER

## 2019-11-04 PROCEDURE — 73560 X-RAY EXAM OF KNEE 1 OR 2: CPT | Performed by: NURSE PRACTITIONER

## 2019-11-05 NOTE — ED PROVIDER NOTES
I reviewed that chart and discussed the case. I have examined the patient and noted a 77-year-old female who is DNR, whose family only wants comfort measures, sent from the memory care unit due to an unwitnessed fall.   Patient states that she remembers th

## 2019-11-05 NOTE — ED PROVIDER NOTES
Patient Seen in: [facility] Emergency Department      History   Patient presents with:  Fall (musculoskeletal, neurologic)    Stated Complaint: Fall on buttocks, no LOC, hx dementia. HPI  80year old female presents via ems for fall.   Pt's POA i no LOC, hx dementia. Other systems are as noted in HPI. Constitutional and vital signs reviewed. All other systems reviewed and negative except as noted above.     Physical Exam     ED Triage Vitals [11/04/19 1940]   BP (!) 160/115   Pulse 66   Resp no deformity, no laceration and normal pulse. Tenderness. Lateral malleolus tenderness found. Skin:     General: Skin is warm and dry. Capillary Refill: Capillary refill takes less than 2 seconds. Neurological:      Mental Status: She is alert.  Me RIGHT ( CPT=73502)  TECHNIQUE:  Unilateral 2 to 3 views of the hip and pelvis if performed. COMPARISON:  None. INDICATIONS:  Fall on buttocks, no LOC, hx dementia.   PATIENT STATED HISTORY: (As transcribed by Technologist)   Fall on buttocks, no LOC, hx d

## 2019-11-05 NOTE — ED NOTES
RN spoke with Jessica Manning at [address] [place] [address] to inform her that patient will be returning tonight.

## 2019-11-05 NOTE — ED NOTES
RN called [facility] to given report. Received voicemail x2. Voicemail left for the \"house nurse\" at [facility] stating all xrays were negative for acute fracture or dislocation and patient is coming back home this evening.

## 2019-11-05 NOTE — ED INITIAL ASSESSMENT (HPI)
Pt to ED via EMS for a fall at her nursing facility. Pt landed on her buttocks. No LOC. No anticoagulation. Pt c/o pain to buttocks.

## 2020-01-01 ENCOUNTER — TRANSCRIBE ORDERS (OUTPATIENT)
Dept: ADMINISTRATIVE | Facility: HOSPITAL | Age: 85
End: 2020-01-01

## 2020-01-01 ENCOUNTER — APPOINTMENT (OUTPATIENT)
Dept: CARDIOLOGY | Facility: HOSPITAL | Age: 85
End: 2020-01-01

## 2020-01-01 ENCOUNTER — READMISSION MANAGEMENT (OUTPATIENT)
Dept: CALL CENTER | Facility: HOSPITAL | Age: 85
End: 2020-01-01

## 2020-01-01 ENCOUNTER — APPOINTMENT (OUTPATIENT)
Dept: GENERAL RADIOLOGY | Facility: HOSPITAL | Age: 85
End: 2020-01-01

## 2020-01-01 ENCOUNTER — OFFICE VISIT (OUTPATIENT)
Dept: CARDIOLOGY | Facility: CLINIC | Age: 85
End: 2020-01-01

## 2020-01-01 ENCOUNTER — HOSPITAL ENCOUNTER (INPATIENT)
Facility: HOSPITAL | Age: 85
LOS: 5 days | Discharge: HOSPICE | End: 2020-09-19
Attending: EMERGENCY MEDICINE | Admitting: HOSPITALIST

## 2020-01-01 ENCOUNTER — TELEPHONE (OUTPATIENT)
Dept: INTERNAL MEDICINE | Facility: CLINIC | Age: 85
End: 2020-01-01

## 2020-01-01 ENCOUNTER — HOSPITAL ENCOUNTER (INPATIENT)
Facility: HOSPITAL | Age: 85
LOS: 2 days | Discharge: HOME | End: 2020-05-09
Attending: EMERGENCY MEDICINE | Admitting: INTERNAL MEDICINE

## 2020-01-01 ENCOUNTER — OFFICE VISIT (OUTPATIENT)
Dept: ORTHOPEDIC SURGERY | Facility: CLINIC | Age: 85
End: 2020-01-01

## 2020-01-01 ENCOUNTER — TRANSITIONAL CARE MANAGEMENT TELEPHONE ENCOUNTER (OUTPATIENT)
Dept: CALL CENTER | Facility: HOSPITAL | Age: 85
End: 2020-01-01

## 2020-01-01 ENCOUNTER — HOSPITAL ENCOUNTER (INPATIENT)
Facility: HOSPITAL | Age: 85
LOS: 3 days | Discharge: HOME HEALTH | End: 2020-09-10
Attending: EMERGENCY MEDICINE | Admitting: INTERNAL MEDICINE

## 2020-01-01 ENCOUNTER — OFFICE VISIT (OUTPATIENT)
Dept: INTERNAL MEDICINE | Facility: CLINIC | Age: 85
End: 2020-01-01

## 2020-01-01 ENCOUNTER — APPOINTMENT (OUTPATIENT)
Dept: CT IMAGING | Facility: HOSPITAL | Age: 85
End: 2020-01-01

## 2020-01-01 ENCOUNTER — RESULTS ENCOUNTER (OUTPATIENT)
Dept: INTERNAL MEDICINE | Facility: CLINIC | Age: 85
End: 2020-01-01

## 2020-01-01 ENCOUNTER — TELEPHONE (OUTPATIENT)
Dept: ORTHOPEDIC SURGERY | Facility: CLINIC | Age: 85
End: 2020-01-01

## 2020-01-01 ENCOUNTER — HOSPITAL ENCOUNTER (INPATIENT)
Facility: HOSPITAL | Age: 85
LOS: 1 days | End: 2020-09-20
Attending: HOSPITALIST | Admitting: HOSPITALIST

## 2020-01-01 ENCOUNTER — NURSE TRIAGE (OUTPATIENT)
Dept: CALL CENTER | Facility: HOSPITAL | Age: 85
End: 2020-01-01

## 2020-01-01 ENCOUNTER — LAB (OUTPATIENT)
Dept: LAB | Facility: HOSPITAL | Age: 85
End: 2020-01-01

## 2020-01-01 ENCOUNTER — HOSPITAL ENCOUNTER (INPATIENT)
Facility: HOSPITAL | Age: 85
LOS: 2 days | Discharge: HOME | End: 2020-06-17
Attending: EMERGENCY MEDICINE | Admitting: INTERNAL MEDICINE

## 2020-01-01 VITALS
SYSTOLIC BLOOD PRESSURE: 140 MMHG | BODY MASS INDEX: 33.8 KG/M2 | HEART RATE: 75 BPM | WEIGHT: 200 LBS | DIASTOLIC BLOOD PRESSURE: 78 MMHG | OXYGEN SATURATION: 95 %

## 2020-01-01 VITALS
SYSTOLIC BLOOD PRESSURE: 118 MMHG | BODY MASS INDEX: 33.29 KG/M2 | HEART RATE: 70 BPM | HEIGHT: 64 IN | OXYGEN SATURATION: 96 % | DIASTOLIC BLOOD PRESSURE: 62 MMHG | WEIGHT: 195 LBS | TEMPERATURE: 98 F | RESPIRATION RATE: 16 BRPM

## 2020-01-01 VITALS
WEIGHT: 202 LBS | BODY MASS INDEX: 34.49 KG/M2 | SYSTOLIC BLOOD PRESSURE: 142 MMHG | DIASTOLIC BLOOD PRESSURE: 72 MMHG | HEIGHT: 64 IN | HEART RATE: 73 BPM

## 2020-01-01 VITALS — WEIGHT: 200 LBS | BODY MASS INDEX: 33.32 KG/M2 | HEIGHT: 65 IN

## 2020-01-01 VITALS
RESPIRATION RATE: 15 BRPM | WEIGHT: 197 LBS | HEART RATE: 67 BPM | OXYGEN SATURATION: 95 % | BODY MASS INDEX: 33.63 KG/M2 | SYSTOLIC BLOOD PRESSURE: 148 MMHG | HEIGHT: 64 IN | DIASTOLIC BLOOD PRESSURE: 76 MMHG | TEMPERATURE: 98.1 F

## 2020-01-01 VITALS
TEMPERATURE: 100 F | OXYGEN SATURATION: 95 % | HEIGHT: 64 IN | BODY MASS INDEX: 31.76 KG/M2 | RESPIRATION RATE: 14 BRPM | WEIGHT: 186 LBS | HEART RATE: 89 BPM | DIASTOLIC BLOOD PRESSURE: 57 MMHG | SYSTOLIC BLOOD PRESSURE: 115 MMHG

## 2020-01-01 VITALS — WEIGHT: 197.5 LBS | BODY MASS INDEX: 33.72 KG/M2 | HEIGHT: 64 IN

## 2020-01-01 VITALS
OXYGEN SATURATION: 99 % | HEART RATE: 75 BPM | BODY MASS INDEX: 34.15 KG/M2 | TEMPERATURE: 97.8 F | WEIGHT: 200 LBS | SYSTOLIC BLOOD PRESSURE: 117 MMHG | RESPIRATION RATE: 18 BRPM | HEIGHT: 64 IN | DIASTOLIC BLOOD PRESSURE: 59 MMHG

## 2020-01-01 VITALS
BODY MASS INDEX: 31.86 KG/M2 | HEART RATE: 70 BPM | SYSTOLIC BLOOD PRESSURE: 120 MMHG | RESPIRATION RATE: 16 BRPM | DIASTOLIC BLOOD PRESSURE: 64 MMHG | HEIGHT: 64 IN | TEMPERATURE: 95.9 F | WEIGHT: 186.6 LBS | OXYGEN SATURATION: 88 %

## 2020-01-01 VITALS — WEIGHT: 205 LBS | HEIGHT: 64 IN | TEMPERATURE: 97.3 F | BODY MASS INDEX: 35 KG/M2

## 2020-01-01 DIAGNOSIS — E21.1 SECONDARY HYPERPARATHYROIDISM, NON-RENAL (HCC): ICD-10-CM

## 2020-01-01 DIAGNOSIS — I25.708 CORONARY ARTERY DISEASE INVOLVING CORONARY BYPASS GRAFT OF NATIVE HEART WITH OTHER FORMS OF ANGINA PECTORIS (HCC): ICD-10-CM

## 2020-01-01 DIAGNOSIS — I27.20 PULMONARY HYPERTENSION (HCC): ICD-10-CM

## 2020-01-01 DIAGNOSIS — N18.4 TYPE 2 DIABETES MELLITUS WITH STAGE 4 CHRONIC KIDNEY DISEASE, WITH LONG-TERM CURRENT USE OF INSULIN (HCC): ICD-10-CM

## 2020-01-01 DIAGNOSIS — I10 ESSENTIAL HYPERTENSION: ICD-10-CM

## 2020-01-01 DIAGNOSIS — J18.9 COMMUNITY ACQUIRED PNEUMONIA, UNSPECIFIED LATERALITY: Primary | ICD-10-CM

## 2020-01-01 DIAGNOSIS — I20.9 ANGINA PECTORIS (HCC): ICD-10-CM

## 2020-01-01 DIAGNOSIS — E11.69 TYPE 2 DIABETES MELLITUS WITH OTHER SPECIFIED COMPLICATION, WITH LONG-TERM CURRENT USE OF INSULIN (HCC): ICD-10-CM

## 2020-01-01 DIAGNOSIS — E55.9 VITAMIN D DEFICIENCY: ICD-10-CM

## 2020-01-01 DIAGNOSIS — I34.0 NON-RHEUMATIC MITRAL REGURGITATION: ICD-10-CM

## 2020-01-01 DIAGNOSIS — E78.2 MIXED HYPERLIPIDEMIA: ICD-10-CM

## 2020-01-01 DIAGNOSIS — E11.59 TYPE 2 DIABETES MELLITUS WITH OTHER CIRCULATORY COMPLICATION, WITHOUT LONG-TERM CURRENT USE OF INSULIN (HCC): ICD-10-CM

## 2020-01-01 DIAGNOSIS — R07.9 CHEST PAIN, UNSPECIFIED TYPE: ICD-10-CM

## 2020-01-01 DIAGNOSIS — Z79.4 TYPE 2 DIABETES MELLITUS WITH STAGE 4 CHRONIC KIDNEY DISEASE, WITH LONG-TERM CURRENT USE OF INSULIN (HCC): ICD-10-CM

## 2020-01-01 DIAGNOSIS — E11.22 TYPE 2 DIABETES MELLITUS WITH STAGE 4 CHRONIC KIDNEY DISEASE, WITH LONG-TERM CURRENT USE OF INSULIN (HCC): ICD-10-CM

## 2020-01-01 DIAGNOSIS — N18.4 CHRONIC KIDNEY DISEASE, STAGE IV (SEVERE) (HCC): ICD-10-CM

## 2020-01-01 DIAGNOSIS — I77.9 DISORDER OF AORTA (HCC): ICD-10-CM

## 2020-01-01 DIAGNOSIS — N18.30 CHRONIC KIDNEY DISEASE, STAGE III (MODERATE) (HCC): Primary | ICD-10-CM

## 2020-01-01 DIAGNOSIS — N18.30 CKD (CHRONIC KIDNEY DISEASE) STAGE 3, GFR 30-59 ML/MIN (HCC): ICD-10-CM

## 2020-01-01 DIAGNOSIS — I21.4 NON-STEMI (NON-ST ELEVATED MYOCARDIAL INFARCTION) (HCC): Primary | ICD-10-CM

## 2020-01-01 DIAGNOSIS — M17.12 OSTEOARTHRITIS OF LEFT KNEE, UNSPECIFIED OSTEOARTHRITIS TYPE: Primary | ICD-10-CM

## 2020-01-01 DIAGNOSIS — I50.9 ACUTE ON CHRONIC CONGESTIVE HEART FAILURE, UNSPECIFIED HEART FAILURE TYPE (HCC): ICD-10-CM

## 2020-01-01 DIAGNOSIS — Z09 HOSPITAL DISCHARGE FOLLOW-UP: ICD-10-CM

## 2020-01-01 DIAGNOSIS — I25.708 CORONARY ARTERY DISEASE OF BYPASS GRAFT OF NATIVE HEART WITH STABLE ANGINA PECTORIS (HCC): Primary | ICD-10-CM

## 2020-01-01 DIAGNOSIS — R06.09 DOE (DYSPNEA ON EXERTION): ICD-10-CM

## 2020-01-01 DIAGNOSIS — I20.0 UNSTABLE ANGINA (HCC): Primary | ICD-10-CM

## 2020-01-01 DIAGNOSIS — I25.810 CORONARY ARTERY DISEASE INVOLVING CORONARY BYPASS GRAFT OF NATIVE HEART WITHOUT ANGINA PECTORIS: Primary | ICD-10-CM

## 2020-01-01 DIAGNOSIS — D72.829 LEUKOCYTOSIS, UNSPECIFIED TYPE: ICD-10-CM

## 2020-01-01 DIAGNOSIS — I10 ESSENTIAL HYPERTENSION: Chronic | ICD-10-CM

## 2020-01-01 DIAGNOSIS — N18.30 CHRONIC KIDNEY DISEASE, STAGE III (MODERATE) (HCC): ICD-10-CM

## 2020-01-01 DIAGNOSIS — N18.9 CHRONIC KIDNEY DISEASE, UNSPECIFIED CKD STAGE: ICD-10-CM

## 2020-01-01 DIAGNOSIS — E11.69 TYPE 2 DIABETES MELLITUS WITH OTHER SPECIFIED COMPLICATION, UNSPECIFIED WHETHER LONG TERM INSULIN USE (HCC): Primary | ICD-10-CM

## 2020-01-01 DIAGNOSIS — R77.8 ELEVATED TROPONIN: ICD-10-CM

## 2020-01-01 DIAGNOSIS — Z79.4 TYPE 2 DIABETES MELLITUS WITH OTHER SPECIFIED COMPLICATION, WITH LONG-TERM CURRENT USE OF INSULIN (HCC): ICD-10-CM

## 2020-01-01 DIAGNOSIS — Z09 HOSPITAL DISCHARGE FOLLOW-UP: Primary | ICD-10-CM

## 2020-01-01 DIAGNOSIS — I27.81 COR PULMONALE (HCC): ICD-10-CM

## 2020-01-01 DIAGNOSIS — R07.9 ACUTE CHEST PAIN: ICD-10-CM

## 2020-01-01 DIAGNOSIS — I10 POORLY-CONTROLLED HYPERTENSION: Primary | ICD-10-CM

## 2020-01-01 DIAGNOSIS — I21.4 NSTEMI (NON-ST ELEVATED MYOCARDIAL INFARCTION) (HCC): ICD-10-CM

## 2020-01-01 LAB
25(OH)D3 SERPL-MCNC: 25.1 NG/ML (ref 30–100)
ALBUMIN SERPL-MCNC: 2.8 G/DL (ref 3.5–5.2)
ALBUMIN SERPL-MCNC: 2.8 G/DL (ref 3.5–5.2)
ALBUMIN SERPL-MCNC: 3.1 G/DL (ref 3.5–5.2)
ALBUMIN SERPL-MCNC: 3.6 G/DL (ref 3.5–5.2)
ALBUMIN SERPL-MCNC: 3.8 G/DL (ref 3.5–5.2)
ALBUMIN SERPL-MCNC: 3.9 G/DL (ref 3.5–5.2)
ALBUMIN SERPL-MCNC: 4.1 G/DL (ref 3.5–5.2)
ALBUMIN SERPL-MCNC: 4.1 G/DL (ref 3.5–5.2)
ALBUMIN/GLOB SERPL: 1 G/DL
ALBUMIN/GLOB SERPL: 1.1 G/DL
ALBUMIN/GLOB SERPL: 1.5 G/DL
ALBUMIN/GLOB SERPL: 1.6 G/DL
ALP SERPL-CCNC: 106 U/L (ref 39–117)
ALP SERPL-CCNC: 112 U/L (ref 39–117)
ALP SERPL-CCNC: 81 U/L (ref 39–117)
ALP SERPL-CCNC: 83 U/L (ref 39–117)
ALP SERPL-CCNC: 96 U/L (ref 39–117)
ALP SERPL-CCNC: 97 U/L (ref 39–117)
ALT SERPL W P-5'-P-CCNC: 10 U/L (ref 1–33)
ALT SERPL W P-5'-P-CCNC: 20 U/L (ref 1–33)
ALT SERPL W P-5'-P-CCNC: 44 U/L (ref 1–33)
ALT SERPL W P-5'-P-CCNC: 49 U/L (ref 1–33)
ALT SERPL W P-5'-P-CCNC: 7 U/L (ref 1–33)
ALT SERPL W P-5'-P-CCNC: <5 U/L (ref 1–33)
ANION GAP SERPL CALCULATED.3IONS-SCNC: 10.1 MMOL/L (ref 5–15)
ANION GAP SERPL CALCULATED.3IONS-SCNC: 10.3 MMOL/L (ref 5–15)
ANION GAP SERPL CALCULATED.3IONS-SCNC: 11.2 MMOL/L (ref 5–15)
ANION GAP SERPL CALCULATED.3IONS-SCNC: 11.3 MMOL/L (ref 5–15)
ANION GAP SERPL CALCULATED.3IONS-SCNC: 11.6 MMOL/L (ref 5–15)
ANION GAP SERPL CALCULATED.3IONS-SCNC: 11.9 MMOL/L (ref 5–15)
ANION GAP SERPL CALCULATED.3IONS-SCNC: 12.6 MMOL/L (ref 5–15)
ANION GAP SERPL CALCULATED.3IONS-SCNC: 14 MMOL/L (ref 5–15)
ANION GAP SERPL CALCULATED.3IONS-SCNC: 14.7 MMOL/L (ref 5–15)
ANION GAP SERPL CALCULATED.3IONS-SCNC: 14.9 MMOL/L (ref 5–15)
ANION GAP SERPL CALCULATED.3IONS-SCNC: 16.1 MMOL/L (ref 5–15)
ANION GAP SERPL CALCULATED.3IONS-SCNC: 18.4 MMOL/L (ref 5–15)
ANION GAP SERPL CALCULATED.3IONS-SCNC: 19.6 MMOL/L (ref 5–15)
ANION GAP SERPL CALCULATED.3IONS-SCNC: 9.4 MMOL/L (ref 5–15)
ANION GAP SERPL CALCULATED.3IONS-SCNC: 9.5 MMOL/L (ref 5–15)
ANION GAP SERPL CALCULATED.3IONS-SCNC: 9.8 MMOL/L (ref 5–15)
AORTIC DIMENSIONLESS INDEX: 0.6 (DI)
AORTIC DIMENSIONLESS INDEX: 0.8 (DI)
APTT PPP: 109.3 SECONDS (ref 22.7–35.4)
APTT PPP: 120.8 SECONDS (ref 22.7–35.4)
APTT PPP: 25.1 SECONDS (ref 22.7–35.4)
APTT PPP: 25.7 SECONDS (ref 22.7–35.4)
APTT PPP: 50.6 SECONDS (ref 22.7–35.4)
APTT PPP: 79 SECONDS (ref 22.7–35.4)
ARTERIAL PATENCY WRIST A: NORMAL
AST SERPL-CCNC: 11 U/L (ref 1–32)
AST SERPL-CCNC: 117 U/L (ref 1–32)
AST SERPL-CCNC: 13 U/L (ref 1–32)
AST SERPL-CCNC: 18 U/L (ref 1–32)
AST SERPL-CCNC: 196 U/L (ref 1–32)
AST SERPL-CCNC: 9 U/L (ref 1–32)
ATMOSPHERIC PRESS: 751 MMHG
B PARAPERT DNA SPEC QL NAA+PROBE: NOT DETECTED
B PARAPERT DNA SPEC QL NAA+PROBE: NOT DETECTED
B PERT DNA SPEC QL NAA+PROBE: NOT DETECTED
B PERT DNA SPEC QL NAA+PROBE: NOT DETECTED
BACTERIA SPEC AEROBE CULT: NORMAL
BACTERIA SPEC AEROBE CULT: NORMAL
BACTERIA UR QL AUTO: NORMAL /HPF
BASE EXCESS BLDA CALC-SCNC: 0.1 MMOL/L (ref 0–2)
BASOPHILS # BLD AUTO: 0.04 10*3/MM3 (ref 0–0.2)
BASOPHILS # BLD AUTO: 0.04 10*3/MM3 (ref 0–0.2)
BASOPHILS # BLD AUTO: 0.05 10*3/MM3 (ref 0–0.2)
BASOPHILS # BLD AUTO: 0.06 10*3/MM3 (ref 0–0.2)
BASOPHILS # BLD AUTO: 0.06 10*3/MM3 (ref 0–0.2)
BASOPHILS # BLD AUTO: 0.07 10*3/MM3 (ref 0–0.2)
BASOPHILS NFR BLD AUTO: 0.2 % (ref 0–1.5)
BASOPHILS NFR BLD AUTO: 0.2 % (ref 0–1.5)
BASOPHILS NFR BLD AUTO: 0.3 % (ref 0–1.5)
BASOPHILS NFR BLD AUTO: 0.5 % (ref 0–1.5)
BASOPHILS NFR BLD AUTO: 0.6 % (ref 0–1.5)
BASOPHILS NFR BLD AUTO: 0.7 % (ref 0–1.5)
BASOPHILS NFR BLD AUTO: 0.7 % (ref 0–1.5)
BASOPHILS NFR BLD AUTO: 0.8 % (ref 0–1.5)
BASOPHILS NFR BLD AUTO: 0.9 % (ref 0–1.5)
BASOPHILS NFR BLD AUTO: 1 % (ref 0–1.5)
BDY SITE: NORMAL
BH CV ECHO MEAS - ACS: 1.6 CM
BH CV ECHO MEAS - AO MAX PG (FULL): 1 MMHG
BH CV ECHO MEAS - AO MAX PG (FULL): 5.5 MMHG
BH CV ECHO MEAS - AO MAX PG: 4.7 MMHG
BH CV ECHO MEAS - AO MAX PG: 7.3 MMHG
BH CV ECHO MEAS - AO MEAN PG (FULL): 0.8 MMHG
BH CV ECHO MEAS - AO MEAN PG (FULL): 2 MMHG
BH CV ECHO MEAS - AO MEAN PG: 3 MMHG
BH CV ECHO MEAS - AO MEAN PG: 3 MMHG
BH CV ECHO MEAS - AO ROOT AREA (BSA CORRECTED): 1.4
BH CV ECHO MEAS - AO ROOT AREA (BSA CORRECTED): 1.5
BH CV ECHO MEAS - AO ROOT AREA: 5.5 CM^2
BH CV ECHO MEAS - AO ROOT AREA: 6.2 CM^2
BH CV ECHO MEAS - AO ROOT DIAM: 2.7 CM
BH CV ECHO MEAS - AO ROOT DIAM: 2.8 CM
BH CV ECHO MEAS - AO V2 MAX: 108.6 CM/SEC
BH CV ECHO MEAS - AO V2 MAX: 135 CM/SEC
BH CV ECHO MEAS - AO V2 MEAN: 83.5 CM/SEC
BH CV ECHO MEAS - AO V2 MEAN: 85.3 CM/SEC
BH CV ECHO MEAS - AO V2 VTI: 22.9 CM
BH CV ECHO MEAS - AO V2 VTI: 25.4 CM
BH CV ECHO MEAS - ASC AORTA: 2.9 CM
BH CV ECHO MEAS - AVA(I,A): 1.7 CM^2
BH CV ECHO MEAS - AVA(I,A): 2.2 CM^2
BH CV ECHO MEAS - AVA(I,D): 1.7 CM^2
BH CV ECHO MEAS - AVA(I,D): 2.2 CM^2
BH CV ECHO MEAS - AVA(V,A): 1.5 CM^2
BH CV ECHO MEAS - AVA(V,A): 2.3 CM^2
BH CV ECHO MEAS - AVA(V,D): 1.5 CM^2
BH CV ECHO MEAS - AVA(V,D): 2.3 CM^2
BH CV ECHO MEAS - BSA(HAYCOCK): 2 M^2
BH CV ECHO MEAS - BSA(HAYCOCK): 2 M^2
BH CV ECHO MEAS - BSA: 1.9 M^2
BH CV ECHO MEAS - BSA: 1.9 M^2
BH CV ECHO MEAS - BZI_BMI: 31.9 KILOGRAMS/M^2
BH CV ECHO MEAS - BZI_BMI: 33.5 KILOGRAMS/M^2
BH CV ECHO MEAS - BZI_METRIC_HEIGHT: 162.6 CM
BH CV ECHO MEAS - BZI_METRIC_HEIGHT: 162.6 CM
BH CV ECHO MEAS - BZI_METRIC_WEIGHT: 84.4 KG
BH CV ECHO MEAS - BZI_METRIC_WEIGHT: 88.5 KG
BH CV ECHO MEAS - EDV(CUBED): 166.4 ML
BH CV ECHO MEAS - EDV(CUBED): 76.6 ML
BH CV ECHO MEAS - EDV(MOD-SP2): 57 ML
BH CV ECHO MEAS - EDV(MOD-SP2): 62 ML
BH CV ECHO MEAS - EDV(MOD-SP4): 71 ML
BH CV ECHO MEAS - EDV(MOD-SP4): 80 ML
BH CV ECHO MEAS - EDV(TEICH): 147.4 ML
BH CV ECHO MEAS - EDV(TEICH): 80.7 ML
BH CV ECHO MEAS - EF(CUBED): 52.2 %
BH CV ECHO MEAS - EF(CUBED): 54.6 %
BH CV ECHO MEAS - EF(MOD-BP): 39.9 %
BH CV ECHO MEAS - EF(MOD-BP): 51.3 %
BH CV ECHO MEAS - EF(MOD-SP2): 30.6 %
BH CV ECHO MEAS - EF(MOD-SP2): 50.9 %
BH CV ECHO MEAS - EF(MOD-SP4): 47.5 %
BH CV ECHO MEAS - EF(MOD-SP4): 50.7 %
BH CV ECHO MEAS - EF(TEICH): 43.7 %
BH CV ECHO MEAS - EF(TEICH): 46.7 %
BH CV ECHO MEAS - ESV(CUBED): 34.8 ML
BH CV ECHO MEAS - ESV(CUBED): 79.5 ML
BH CV ECHO MEAS - ESV(MOD-SP2): 28 ML
BH CV ECHO MEAS - ESV(MOD-SP2): 43 ML
BH CV ECHO MEAS - ESV(MOD-SP4): 35 ML
BH CV ECHO MEAS - ESV(MOD-SP4): 42 ML
BH CV ECHO MEAS - ESV(TEICH): 43 ML
BH CV ECHO MEAS - ESV(TEICH): 83.1 ML
BH CV ECHO MEAS - FS: 21.8 %
BH CV ECHO MEAS - FS: 23.1 %
BH CV ECHO MEAS - IVS/LVPW: 1
BH CV ECHO MEAS - IVS/LVPW: 1.1
BH CV ECHO MEAS - IVSD: 1 CM
BH CV ECHO MEAS - IVSD: 1.2 CM
BH CV ECHO MEAS - LAT PEAK E' VEL: 4.8 CM/SEC
BH CV ECHO MEAS - LAT PEAK E' VEL: 6 CM/SEC
BH CV ECHO MEAS - LV DIASTOLIC VOL/BSA (35-75): 36.7 ML/M^2
BH CV ECHO MEAS - LV DIASTOLIC VOL/BSA (35-75): 42.2 ML/M^2
BH CV ECHO MEAS - LV MASS(C)D: 139.3 GRAMS
BH CV ECHO MEAS - LV MASS(C)D: 257 GRAMS
BH CV ECHO MEAS - LV MASS(C)DI: 135.5 GRAMS/M^2
BH CV ECHO MEAS - LV MASS(C)DI: 72 GRAMS/M^2
BH CV ECHO MEAS - LV MAX PG: 1.7 MMHG
BH CV ECHO MEAS - LV MAX PG: 3.7 MMHG
BH CV ECHO MEAS - LV MEAN PG: 1 MMHG
BH CV ECHO MEAS - LV MEAN PG: 2.2 MMHG
BH CV ECHO MEAS - LV SYSTOLIC VOL/BSA (12-30): 18.1 ML/M^2
BH CV ECHO MEAS - LV SYSTOLIC VOL/BSA (12-30): 22.1 ML/M^2
BH CV ECHO MEAS - LV V1 MAX: 66 CM/SEC
BH CV ECHO MEAS - LV V1 MAX: 96 CM/SEC
BH CV ECHO MEAS - LV V1 MEAN: 49 CM/SEC
BH CV ECHO MEAS - LV V1 MEAN: 69.6 CM/SEC
BH CV ECHO MEAS - LV V1 VTI: 14 CM
BH CV ECHO MEAS - LV V1 VTI: 19.5 CM
BH CV ECHO MEAS - LVIDD: 4.2 CM
BH CV ECHO MEAS - LVIDD: 5.5 CM
BH CV ECHO MEAS - LVIDS: 3.3 CM
BH CV ECHO MEAS - LVIDS: 4.3 CM
BH CV ECHO MEAS - LVLD AP2: 5.8 CM
BH CV ECHO MEAS - LVLD AP2: 6.3 CM
BH CV ECHO MEAS - LVLD AP4: 6.1 CM
BH CV ECHO MEAS - LVLD AP4: 6.6 CM
BH CV ECHO MEAS - LVLS AP2: 5.1 CM
BH CV ECHO MEAS - LVLS AP2: 5.8 CM
BH CV ECHO MEAS - LVLS AP4: 5.2 CM
BH CV ECHO MEAS - LVLS AP4: 6.1 CM
BH CV ECHO MEAS - LVOT AREA (M): 2.5 CM^2
BH CV ECHO MEAS - LVOT AREA (M): 3.1 CM^2
BH CV ECHO MEAS - LVOT AREA: 2.6 CM^2
BH CV ECHO MEAS - LVOT AREA: 3.1 CM^2
BH CV ECHO MEAS - LVOT DIAM: 1.8 CM
BH CV ECHO MEAS - LVOT DIAM: 2 CM
BH CV ECHO MEAS - LVPWD: 0.98 CM
BH CV ECHO MEAS - LVPWD: 1.1 CM
BH CV ECHO MEAS - MED PEAK E' VEL: 4.5 CM/SEC
BH CV ECHO MEAS - MED PEAK E' VEL: 6.5 CM/SEC
BH CV ECHO MEAS - MV A DUR: 0.12 SEC
BH CV ECHO MEAS - MV A MAX VEL: 71.1 CM/SEC
BH CV ECHO MEAS - MV A MAX VEL: 87.8 CM/SEC
BH CV ECHO MEAS - MV DEC SLOPE: 242.4 CM/SEC^2
BH CV ECHO MEAS - MV DEC SLOPE: 389 CM/SEC^2
BH CV ECHO MEAS - MV DEC TIME: 130 SEC
BH CV ECHO MEAS - MV DEC TIME: 264 SEC
BH CV ECHO MEAS - MV E MAX VEL: 66.5 CM/SEC
BH CV ECHO MEAS - MV E MAX VEL: 83.9 CM/SEC
BH CV ECHO MEAS - MV E/A: 0.76
BH CV ECHO MEAS - MV E/A: 1.2
BH CV ECHO MEAS - MV MAX PG: 3.8 MMHG
BH CV ECHO MEAS - MV MAX PG: 3.9 MMHG
BH CV ECHO MEAS - MV MEAN PG: 1.6 MMHG
BH CV ECHO MEAS - MV MEAN PG: 2 MMHG
BH CV ECHO MEAS - MV P1/2T MAX VEL: 102 CM/SEC
BH CV ECHO MEAS - MV P1/2T MAX VEL: 66.3 CM/SEC
BH CV ECHO MEAS - MV P1/2T: 76.8 MSEC
BH CV ECHO MEAS - MV P1/2T: 80.1 MSEC
BH CV ECHO MEAS - MV V2 MAX: 97.6 CM/SEC
BH CV ECHO MEAS - MV V2 MAX: 98.3 CM/SEC
BH CV ECHO MEAS - MV V2 MEAN: 59.3 CM/SEC
BH CV ECHO MEAS - MV V2 MEAN: 64.8 CM/SEC
BH CV ECHO MEAS - MV V2 VTI: 24.8 CM
BH CV ECHO MEAS - MV V2 VTI: 25.7 CM
BH CV ECHO MEAS - MVA P1/2T LCG: 2.2 CM^2
BH CV ECHO MEAS - MVA P1/2T LCG: 3.3 CM^2
BH CV ECHO MEAS - MVA(P1/2T): 2.7 CM^2
BH CV ECHO MEAS - MVA(P1/2T): 2.9 CM^2
BH CV ECHO MEAS - MVA(VTI): 1.8 CM^2
BH CV ECHO MEAS - MVA(VTI): 2 CM^2
BH CV ECHO MEAS - PA ACC TIME: 0.12 SEC
BH CV ECHO MEAS - PA MAX PG (FULL): 1.7 MMHG
BH CV ECHO MEAS - PA MAX PG: 2.8 MMHG
BH CV ECHO MEAS - PA PR(ACCEL): 26.8 MMHG
BH CV ECHO MEAS - PA V2 MAX: 84.2 CM/SEC
BH CV ECHO MEAS - PULM DIAS VEL: 30.1 CM/SEC
BH CV ECHO MEAS - PULM S/D: 0.93
BH CV ECHO MEAS - PULM SYS VEL: 28.1 CM/SEC
BH CV ECHO MEAS - PVA(V,A): 2.2 CM^2
BH CV ECHO MEAS - PVA(V,D): 2.2 CM^2
BH CV ECHO MEAS - QP/QS: 0.56
BH CV ECHO MEAS - RAP SYSTOLE: 3 MMHG
BH CV ECHO MEAS - RAP SYSTOLE: 8 MMHG
BH CV ECHO MEAS - RV BASE (<4.1) - OBSOLETE: 2.9 CM
BH CV ECHO MEAS - RV LENGTH (<8.5) - OBSOLETE: 5.7 CM
BH CV ECHO MEAS - RV MAX PG: 1.2 MMHG
BH CV ECHO MEAS - RV MEAN PG: 1 MMHG
BH CV ECHO MEAS - RV V1 MAX: 54.1 CM/SEC
BH CV ECHO MEAS - RV V1 MEAN: 34.3 CM/SEC
BH CV ECHO MEAS - RV V1 VTI: 7.1 CM
BH CV ECHO MEAS - RVOT AREA: 3.5 CM^2
BH CV ECHO MEAS - RVOT DIAM: 2.1 CM
BH CV ECHO MEAS - RVSP: 30 MMHG
BH CV ECHO MEAS - RVSP: 48 MMHG
BH CV ECHO MEAS - SI(AO): 65.5 ML/M^2
BH CV ECHO MEAS - SI(AO): 82.5 ML/M^2
BH CV ECHO MEAS - SI(CUBED): 21.6 ML/M^2
BH CV ECHO MEAS - SI(CUBED): 45.8 ML/M^2
BH CV ECHO MEAS - SI(LVOT): 23.2 ML/M^2
BH CV ECHO MEAS - SI(LVOT): 26.2 ML/M^2
BH CV ECHO MEAS - SI(MOD-SP2): 10 ML/M^2
BH CV ECHO MEAS - SI(MOD-SP2): 15 ML/M^2
BH CV ECHO MEAS - SI(MOD-SP4): 18.6 ML/M^2
BH CV ECHO MEAS - SI(MOD-SP4): 20 ML/M^2
BH CV ECHO MEAS - SI(TEICH): 19.5 ML/M^2
BH CV ECHO MEAS - SI(TEICH): 33.9 ML/M^2
BH CV ECHO MEAS - SV(AO): 126.8 ML
BH CV ECHO MEAS - SV(AO): 156.4 ML
BH CV ECHO MEAS - SV(CUBED): 41.8 ML
BH CV ECHO MEAS - SV(CUBED): 86.9 ML
BH CV ECHO MEAS - SV(LVOT): 44 ML
BH CV ECHO MEAS - SV(LVOT): 50.7 ML
BH CV ECHO MEAS - SV(MOD-SP2): 19 ML
BH CV ECHO MEAS - SV(MOD-SP2): 29 ML
BH CV ECHO MEAS - SV(MOD-SP4): 36 ML
BH CV ECHO MEAS - SV(MOD-SP4): 38 ML
BH CV ECHO MEAS - SV(RVOT): 24.7 ML
BH CV ECHO MEAS - SV(TEICH): 37.7 ML
BH CV ECHO MEAS - SV(TEICH): 64.4 ML
BH CV ECHO MEAS - TAPSE (>1.6): 1.1 CM2
BH CV ECHO MEAS - TAPSE (>1.6): 1.7 CM2
BH CV ECHO MEAS - TR MAX VEL: 256.7 CM/SEC
BH CV ECHO MEAS - TR MAX VEL: 318 CM/SEC
BH CV ECHO MEASUREMENTS AVERAGE E/E' RATIO: 12.67
BH CV ECHO MEASUREMENTS AVERAGE E/E' RATIO: 14.85
BH CV VAS BP RIGHT ARM: NORMAL MMHG
BH CV XLRA - RV BASE: 2.9 CM
BH CV XLRA - RV BASE: 2.9 CM
BH CV XLRA - RV LENGTH: 5.7 CM
BH CV XLRA - RV LENGTH: 6.1 CM
BH CV XLRA - RV MID: 1.5 CM
BH CV XLRA - RV MID: 3 CM
BH CV XLRA - TDI S': 12.3 CM/SEC
BH CV XLRA - TDI S': 6.7 CM/SEC
BILIRUB SERPL-MCNC: 0.2 MG/DL (ref 0.2–1.2)
BILIRUB SERPL-MCNC: 0.3 MG/DL (ref 0–1.2)
BILIRUB SERPL-MCNC: 0.4 MG/DL (ref 0–1.2)
BILIRUB SERPL-MCNC: 0.5 MG/DL (ref 0.2–1.2)
BILIRUB SERPL-MCNC: 0.5 MG/DL (ref 0–1.2)
BILIRUB SERPL-MCNC: 0.5 MG/DL (ref 0–1.2)
BILIRUB UR QL STRIP: NEGATIVE
BUN BLD-MCNC: 29 MG/DL (ref 8–23)
BUN BLD-MCNC: 33 MG/DL (ref 8–23)
BUN BLD-MCNC: 33 MG/DL (ref 8–23)
BUN BLD-MCNC: 41 MG/DL (ref 8–23)
BUN BLD-MCNC: 42 MG/DL (ref 8–23)
BUN SERPL-MCNC: 20 MG/DL (ref 8–23)
BUN SERPL-MCNC: 22 MG/DL (ref 8–23)
BUN SERPL-MCNC: 23 MG/DL (ref 8–23)
BUN SERPL-MCNC: 27 MG/DL (ref 8–23)
BUN SERPL-MCNC: 27 MG/DL (ref 8–23)
BUN SERPL-MCNC: 31 MG/DL (ref 8–23)
BUN SERPL-MCNC: 50 MG/DL (ref 8–23)
BUN SERPL-MCNC: 66 MG/DL (ref 8–23)
BUN SERPL-MCNC: 71 MG/DL (ref 8–23)
BUN SERPL-MCNC: 72 MG/DL (ref 8–23)
BUN SERPL-MCNC: 76 MG/DL (ref 8–23)
BUN/CREAT SERPL: 14.6 (ref 7–25)
BUN/CREAT SERPL: 15.9 (ref 7–25)
BUN/CREAT SERPL: 16.4 (ref 7–25)
BUN/CREAT SERPL: 17.1 (ref 7–25)
BUN/CREAT SERPL: 17.6 (ref 7–25)
BUN/CREAT SERPL: 20.9 (ref 7–25)
BUN/CREAT SERPL: 21.6 (ref 7–25)
BUN/CREAT SERPL: 22 (ref 7–25)
BUN/CREAT SERPL: 23.2 (ref 7–25)
BUN/CREAT SERPL: 23.7 (ref 7–25)
BUN/CREAT SERPL: 24 (ref 7–25)
BUN/CREAT SERPL: 24.6 (ref 7–25)
BUN/CREAT SERPL: 27.3 (ref 7–25)
BUN/CREAT SERPL: 27.6 (ref 7–25)
BUN/CREAT SERPL: 27.7 (ref 7–25)
BUN/CREAT SERPL: 30.2 (ref 7–25)
C PNEUM DNA NPH QL NAA+NON-PROBE: NOT DETECTED
C PNEUM DNA NPH QL NAA+NON-PROBE: NOT DETECTED
CALCIUM SPEC-SCNC: 8.4 MG/DL (ref 8.2–9.6)
CALCIUM SPEC-SCNC: 8.8 MG/DL (ref 8.2–9.6)
CALCIUM SPEC-SCNC: 8.8 MG/DL (ref 8.2–9.6)
CALCIUM SPEC-SCNC: 9 MG/DL (ref 8.2–9.6)
CALCIUM SPEC-SCNC: 9.1 MG/DL (ref 8.2–9.6)
CALCIUM SPEC-SCNC: 9.2 MG/DL (ref 8.2–9.6)
CALCIUM SPEC-SCNC: 9.3 MG/DL (ref 8.2–9.6)
CALCIUM SPEC-SCNC: 9.4 MG/DL (ref 8.2–9.6)
CALCIUM SPEC-SCNC: 9.5 MG/DL (ref 8.2–9.6)
CALCIUM SPEC-SCNC: 9.6 MG/DL (ref 8.2–9.6)
CALCIUM SPEC-SCNC: 9.6 MG/DL (ref 8.2–9.6)
CALCIUM SPEC-SCNC: 9.7 MG/DL (ref 8.2–9.6)
CALCIUM SPEC-SCNC: 9.7 MG/DL (ref 8.2–9.6)
CALCIUM SPEC-SCNC: 9.8 MG/DL (ref 8.2–9.6)
CHLORIDE SERPL-SCNC: 101 MMOL/L (ref 98–107)
CHLORIDE SERPL-SCNC: 104 MMOL/L (ref 98–107)
CHLORIDE SERPL-SCNC: 105 MMOL/L (ref 98–107)
CHLORIDE SERPL-SCNC: 106 MMOL/L (ref 98–107)
CHLORIDE SERPL-SCNC: 108 MMOL/L (ref 98–107)
CHLORIDE SERPL-SCNC: 108 MMOL/L (ref 98–107)
CHLORIDE SERPL-SCNC: 94 MMOL/L (ref 98–107)
CHLORIDE SERPL-SCNC: 95 MMOL/L (ref 98–107)
CHLORIDE SERPL-SCNC: 96 MMOL/L (ref 98–107)
CHLORIDE SERPL-SCNC: 97 MMOL/L (ref 98–107)
CHLORIDE SERPL-SCNC: 97 MMOL/L (ref 98–107)
CHOLEST SERPL-MCNC: 138 MG/DL (ref 0–200)
CLARITY UR: CLEAR
CO2 SERPL-SCNC: 16.6 MMOL/L (ref 22–29)
CO2 SERPL-SCNC: 19.4 MMOL/L (ref 22–29)
CO2 SERPL-SCNC: 20.3 MMOL/L (ref 22–29)
CO2 SERPL-SCNC: 22.1 MMOL/L (ref 22–29)
CO2 SERPL-SCNC: 22.4 MMOL/L (ref 22–29)
CO2 SERPL-SCNC: 22.5 MMOL/L (ref 22–29)
CO2 SERPL-SCNC: 22.9 MMOL/L (ref 22–29)
CO2 SERPL-SCNC: 23.6 MMOL/L (ref 22–29)
CO2 SERPL-SCNC: 24 MMOL/L (ref 22–29)
CO2 SERPL-SCNC: 24.1 MMOL/L (ref 22–29)
CO2 SERPL-SCNC: 24.2 MMOL/L (ref 22–29)
CO2 SERPL-SCNC: 24.4 MMOL/L (ref 22–29)
CO2 SERPL-SCNC: 24.7 MMOL/L (ref 22–29)
CO2 SERPL-SCNC: 24.8 MMOL/L (ref 22–29)
CO2 SERPL-SCNC: 26.7 MMOL/L (ref 22–29)
CO2 SERPL-SCNC: 28.9 MMOL/L (ref 22–29)
COLOR UR: ABNORMAL
CREAT BLD-MCNC: 1.25 MG/DL (ref 0.57–1)
CREAT BLD-MCNC: 1.34 MG/DL (ref 0.57–1)
CREAT BLD-MCNC: 1.39 MG/DL (ref 0.57–1)
CREAT BLD-MCNC: 1.39 MG/DL (ref 0.57–1)
CREAT BLD-MCNC: 1.48 MG/DL (ref 0.57–1)
CREAT SERPL-MCNC: 1.26 MG/DL (ref 0.57–1)
CREAT SERPL-MCNC: 1.34 MG/DL (ref 0.57–1)
CREAT SERPL-MCNC: 1.48 MG/DL (ref 0.57–1)
CREAT SERPL-MCNC: 1.53 MG/DL (ref 0.57–1)
CREAT SERPL-MCNC: 1.57 MG/DL (ref 0.57–1)
CREAT SERPL-MCNC: 1.58 MG/DL (ref 0.57–1)
CREAT SERPL-MCNC: 2.32 MG/DL (ref 0.57–1)
CREAT SERPL-MCNC: 2.57 MG/DL (ref 0.57–1)
CREAT SERPL-MCNC: 2.78 MG/DL (ref 0.57–1)
CREAT SERPL-MCNC: 3 MG/DL (ref 0.57–1)
CREAT SERPL-MCNC: 3 MG/DL (ref 0.57–1)
CREAT UR-MCNC: 47.8 MG/DL
CREAT UR-MCNC: 73.5 MG/DL
D-LACTATE SERPL-SCNC: 1.9 MMOL/L (ref 0.5–2)
D-LACTATE SERPL-SCNC: 3.1 MMOL/L (ref 0.5–2)
D-LACTATE SERPL-SCNC: 5.8 MMOL/L (ref 0.5–2)
DEPRECATED RDW RBC AUTO: 42.5 FL (ref 37–54)
DEPRECATED RDW RBC AUTO: 43.1 FL (ref 37–54)
DEPRECATED RDW RBC AUTO: 43.5 FL (ref 37–54)
DEPRECATED RDW RBC AUTO: 43.8 FL (ref 37–54)
DEPRECATED RDW RBC AUTO: 44 FL (ref 37–54)
DEPRECATED RDW RBC AUTO: 44.2 FL (ref 37–54)
DEPRECATED RDW RBC AUTO: 45.1 FL (ref 37–54)
DEPRECATED RDW RBC AUTO: 45.1 FL (ref 37–54)
DEPRECATED RDW RBC AUTO: 45.8 FL (ref 37–54)
DEPRECATED RDW RBC AUTO: 46.1 FL (ref 37–54)
DEPRECATED RDW RBC AUTO: 46.2 FL (ref 37–54)
DEPRECATED RDW RBC AUTO: 48.5 FL (ref 37–54)
DEPRECATED RDW RBC AUTO: 48.9 FL (ref 37–54)
EOSINOPHIL # BLD AUTO: 0 10*3/MM3 (ref 0–0.4)
EOSINOPHIL # BLD AUTO: 0 10*3/MM3 (ref 0–0.4)
EOSINOPHIL # BLD AUTO: 0.18 10*3/MM3 (ref 0–0.4)
EOSINOPHIL # BLD AUTO: 0.27 10*3/MM3 (ref 0–0.4)
EOSINOPHIL # BLD AUTO: 0.31 10*3/MM3 (ref 0–0.4)
EOSINOPHIL # BLD AUTO: 0.37 10*3/MM3 (ref 0–0.4)
EOSINOPHIL # BLD AUTO: 0.38 10*3/MM3 (ref 0–0.4)
EOSINOPHIL # BLD AUTO: 0.4 10*3/MM3 (ref 0–0.4)
EOSINOPHIL # BLD AUTO: 0.43 10*3/MM3 (ref 0–0.4)
EOSINOPHIL # BLD AUTO: 0.51 10*3/MM3 (ref 0–0.4)
EOSINOPHIL NFR BLD AUTO: 0 % (ref 0.3–6.2)
EOSINOPHIL NFR BLD AUTO: 0 % (ref 0.3–6.2)
EOSINOPHIL NFR BLD AUTO: 1.8 % (ref 0.3–6.2)
EOSINOPHIL NFR BLD AUTO: 2.3 % (ref 0.3–6.2)
EOSINOPHIL NFR BLD AUTO: 4.2 % (ref 0.3–6.2)
EOSINOPHIL NFR BLD AUTO: 4.6 % (ref 0.3–6.2)
EOSINOPHIL NFR BLD AUTO: 4.8 % (ref 0.3–6.2)
EOSINOPHIL NFR BLD AUTO: 5 % (ref 0.3–6.2)
EOSINOPHIL NFR BLD AUTO: 5.5 % (ref 0.3–6.2)
EOSINOPHIL NFR BLD AUTO: 5.7 % (ref 0.3–6.2)
ERYTHROCYTE [DISTWIDTH] IN BLOOD BY AUTOMATED COUNT: 12 % (ref 12.3–15.4)
ERYTHROCYTE [DISTWIDTH] IN BLOOD BY AUTOMATED COUNT: 12.1 % (ref 12.3–15.4)
ERYTHROCYTE [DISTWIDTH] IN BLOOD BY AUTOMATED COUNT: 12.3 % (ref 12.3–15.4)
ERYTHROCYTE [DISTWIDTH] IN BLOOD BY AUTOMATED COUNT: 12.6 % (ref 12.3–15.4)
ERYTHROCYTE [DISTWIDTH] IN BLOOD BY AUTOMATED COUNT: 12.7 % (ref 12.3–15.4)
ERYTHROCYTE [DISTWIDTH] IN BLOOD BY AUTOMATED COUNT: 12.8 % (ref 12.3–15.4)
ERYTHROCYTE [DISTWIDTH] IN BLOOD BY AUTOMATED COUNT: 12.9 % (ref 12.3–15.4)
ERYTHROCYTE [DISTWIDTH] IN BLOOD BY AUTOMATED COUNT: 13 % (ref 12.3–15.4)
ERYTHROCYTE [DISTWIDTH] IN BLOOD BY AUTOMATED COUNT: 13 % (ref 12.3–15.4)
FLUAV H1 2009 PAND RNA NPH QL NAA+PROBE: NOT DETECTED
FLUAV H1 2009 PAND RNA NPH QL NAA+PROBE: NOT DETECTED
FLUAV H1 HA GENE NPH QL NAA+PROBE: NOT DETECTED
FLUAV H1 HA GENE NPH QL NAA+PROBE: NOT DETECTED
FLUAV H3 RNA NPH QL NAA+PROBE: NOT DETECTED
FLUAV H3 RNA NPH QL NAA+PROBE: NOT DETECTED
FLUAV SUBTYP SPEC NAA+PROBE: NOT DETECTED
FLUAV SUBTYP SPEC NAA+PROBE: NOT DETECTED
FLUBV RNA ISLT QL NAA+PROBE: NOT DETECTED
FLUBV RNA ISLT QL NAA+PROBE: NOT DETECTED
GAS FLOW AIRWAY: 4.5 LPM
GFR SERPL CREATININE-BSD FRML MDRD: 15 ML/MIN/1.73
GFR SERPL CREATININE-BSD FRML MDRD: 15 ML/MIN/1.73
GFR SERPL CREATININE-BSD FRML MDRD: 16 ML/MIN/1.73
GFR SERPL CREATININE-BSD FRML MDRD: 17 ML/MIN/1.73
GFR SERPL CREATININE-BSD FRML MDRD: 20 ML/MIN/1.73
GFR SERPL CREATININE-BSD FRML MDRD: 31 ML/MIN/1.73
GFR SERPL CREATININE-BSD FRML MDRD: 31 ML/MIN/1.73
GFR SERPL CREATININE-BSD FRML MDRD: 32 ML/MIN/1.73
GFR SERPL CREATININE-BSD FRML MDRD: 33 ML/MIN/1.73
GFR SERPL CREATININE-BSD FRML MDRD: 33 ML/MIN/1.73
GFR SERPL CREATININE-BSD FRML MDRD: 36 ML/MIN/1.73
GFR SERPL CREATININE-BSD FRML MDRD: 36 ML/MIN/1.73
GFR SERPL CREATININE-BSD FRML MDRD: 37 ML/MIN/1.73
GFR SERPL CREATININE-BSD FRML MDRD: 37 ML/MIN/1.73
GFR SERPL CREATININE-BSD FRML MDRD: 40 ML/MIN/1.73
GFR SERPL CREATININE-BSD FRML MDRD: 40 ML/MIN/1.73
GLOBULIN UR ELPH-MCNC: 2.3 GM/DL
GLOBULIN UR ELPH-MCNC: 2.5 GM/DL
GLOBULIN UR ELPH-MCNC: 2.6 GM/DL
GLOBULIN UR ELPH-MCNC: 2.7 GM/DL
GLOBULIN UR ELPH-MCNC: 2.8 GM/DL
GLOBULIN UR ELPH-MCNC: 2.8 GM/DL
GLUCOSE BLD-MCNC: 118 MG/DL (ref 65–99)
GLUCOSE BLD-MCNC: 153 MG/DL (ref 65–99)
GLUCOSE BLD-MCNC: 162 MG/DL (ref 65–99)
GLUCOSE BLD-MCNC: 203 MG/DL (ref 65–99)
GLUCOSE BLD-MCNC: 99 MG/DL (ref 65–99)
GLUCOSE BLDC GLUCOMTR-MCNC: 102 MG/DL (ref 70–130)
GLUCOSE BLDC GLUCOMTR-MCNC: 104 MG/DL (ref 70–130)
GLUCOSE BLDC GLUCOMTR-MCNC: 107 MG/DL (ref 70–130)
GLUCOSE BLDC GLUCOMTR-MCNC: 111 MG/DL (ref 70–130)
GLUCOSE BLDC GLUCOMTR-MCNC: 113 MG/DL (ref 70–130)
GLUCOSE BLDC GLUCOMTR-MCNC: 116 MG/DL (ref 70–130)
GLUCOSE BLDC GLUCOMTR-MCNC: 122 MG/DL (ref 70–130)
GLUCOSE BLDC GLUCOMTR-MCNC: 122 MG/DL (ref 70–130)
GLUCOSE BLDC GLUCOMTR-MCNC: 127 MG/DL (ref 70–130)
GLUCOSE BLDC GLUCOMTR-MCNC: 129 MG/DL (ref 70–130)
GLUCOSE BLDC GLUCOMTR-MCNC: 129 MG/DL (ref 70–130)
GLUCOSE BLDC GLUCOMTR-MCNC: 133 MG/DL (ref 70–130)
GLUCOSE BLDC GLUCOMTR-MCNC: 138 MG/DL (ref 70–130)
GLUCOSE BLDC GLUCOMTR-MCNC: 139 MG/DL (ref 70–130)
GLUCOSE BLDC GLUCOMTR-MCNC: 139 MG/DL (ref 70–130)
GLUCOSE BLDC GLUCOMTR-MCNC: 160 MG/DL (ref 70–130)
GLUCOSE BLDC GLUCOMTR-MCNC: 162 MG/DL (ref 70–130)
GLUCOSE BLDC GLUCOMTR-MCNC: 164 MG/DL (ref 70–130)
GLUCOSE BLDC GLUCOMTR-MCNC: 165 MG/DL (ref 70–130)
GLUCOSE BLDC GLUCOMTR-MCNC: 173 MG/DL (ref 70–130)
GLUCOSE BLDC GLUCOMTR-MCNC: 177 MG/DL (ref 70–130)
GLUCOSE BLDC GLUCOMTR-MCNC: 182 MG/DL (ref 70–130)
GLUCOSE BLDC GLUCOMTR-MCNC: 195 MG/DL (ref 70–130)
GLUCOSE BLDC GLUCOMTR-MCNC: 217 MG/DL (ref 70–130)
GLUCOSE BLDC GLUCOMTR-MCNC: 221 MG/DL (ref 70–130)
GLUCOSE BLDC GLUCOMTR-MCNC: 229 MG/DL (ref 70–130)
GLUCOSE BLDC GLUCOMTR-MCNC: 236 MG/DL (ref 70–130)
GLUCOSE BLDC GLUCOMTR-MCNC: 260 MG/DL (ref 70–130)
GLUCOSE BLDC GLUCOMTR-MCNC: 262 MG/DL (ref 70–130)
GLUCOSE BLDC GLUCOMTR-MCNC: 293 MG/DL (ref 70–130)
GLUCOSE BLDC GLUCOMTR-MCNC: 323 MG/DL (ref 70–130)
GLUCOSE BLDC GLUCOMTR-MCNC: 400 MG/DL (ref 70–130)
GLUCOSE BLDC GLUCOMTR-MCNC: 409 MG/DL (ref 70–130)
GLUCOSE BLDC GLUCOMTR-MCNC: 414 MG/DL (ref 70–130)
GLUCOSE BLDC GLUCOMTR-MCNC: 64 MG/DL (ref 70–130)
GLUCOSE BLDC GLUCOMTR-MCNC: 74 MG/DL (ref 70–130)
GLUCOSE BLDC GLUCOMTR-MCNC: 80 MG/DL (ref 70–130)
GLUCOSE BLDC GLUCOMTR-MCNC: 81 MG/DL (ref 70–130)
GLUCOSE BLDC GLUCOMTR-MCNC: 84 MG/DL (ref 70–130)
GLUCOSE BLDC GLUCOMTR-MCNC: 90 MG/DL (ref 70–130)
GLUCOSE BLDC GLUCOMTR-MCNC: 93 MG/DL (ref 70–130)
GLUCOSE SERPL-MCNC: 102 MG/DL (ref 65–99)
GLUCOSE SERPL-MCNC: 135 MG/DL (ref 65–99)
GLUCOSE SERPL-MCNC: 143 MG/DL (ref 65–99)
GLUCOSE SERPL-MCNC: 154 MG/DL (ref 65–99)
GLUCOSE SERPL-MCNC: 158 MG/DL (ref 65–99)
GLUCOSE SERPL-MCNC: 194 MG/DL (ref 65–99)
GLUCOSE SERPL-MCNC: 287 MG/DL (ref 65–99)
GLUCOSE SERPL-MCNC: 383 MG/DL (ref 65–99)
GLUCOSE SERPL-MCNC: 78 MG/DL (ref 65–99)
GLUCOSE SERPL-MCNC: 85 MG/DL (ref 65–99)
GLUCOSE SERPL-MCNC: 90 MG/DL (ref 65–99)
GLUCOSE UR STRIP-MCNC: NEGATIVE MG/DL
HADV DNA SPEC NAA+PROBE: NOT DETECTED
HADV DNA SPEC NAA+PROBE: NOT DETECTED
HBA1C MFR BLD: 6.6 % (ref 4.8–5.6)
HBA1C MFR BLD: 7.7 % (ref 4.8–5.6)
HBA1C MFR BLD: 8 % (ref 4.8–5.6)
HCO3 BLDA-SCNC: 24.4 MMOL/L (ref 22–28)
HCOV 229E RNA SPEC QL NAA+PROBE: NOT DETECTED
HCOV 229E RNA SPEC QL NAA+PROBE: NOT DETECTED
HCOV HKU1 RNA SPEC QL NAA+PROBE: NOT DETECTED
HCOV HKU1 RNA SPEC QL NAA+PROBE: NOT DETECTED
HCOV NL63 RNA SPEC QL NAA+PROBE: NOT DETECTED
HCOV NL63 RNA SPEC QL NAA+PROBE: NOT DETECTED
HCOV OC43 RNA SPEC QL NAA+PROBE: NOT DETECTED
HCOV OC43 RNA SPEC QL NAA+PROBE: NOT DETECTED
HCT VFR BLD AUTO: 34.8 % (ref 34–46.6)
HCT VFR BLD AUTO: 36.3 % (ref 34–46.6)
HCT VFR BLD AUTO: 36.9 % (ref 34–46.6)
HCT VFR BLD AUTO: 37.1 % (ref 34–46.6)
HCT VFR BLD AUTO: 37.1 % (ref 34–46.6)
HCT VFR BLD AUTO: 38.7 % (ref 34–46.6)
HCT VFR BLD AUTO: 40.2 % (ref 34–46.6)
HCT VFR BLD AUTO: 40.9 % (ref 34–46.6)
HCT VFR BLD AUTO: 41 % (ref 34–46.6)
HCT VFR BLD AUTO: 41.1 % (ref 34–46.6)
HCT VFR BLD AUTO: 41.2 % (ref 34–46.6)
HCT VFR BLD AUTO: 42.4 % (ref 34–46.6)
HCT VFR BLD AUTO: 43.3 % (ref 34–46.6)
HDLC SERPL-MCNC: 39 MG/DL (ref 40–60)
HGB BLD-MCNC: 11.4 G/DL (ref 12–15.9)
HGB BLD-MCNC: 12.3 G/DL (ref 12–15.9)
HGB BLD-MCNC: 12.5 G/DL (ref 12–15.9)
HGB BLD-MCNC: 12.8 G/DL (ref 12–15.9)
HGB BLD-MCNC: 12.8 G/DL (ref 12–15.9)
HGB BLD-MCNC: 13 G/DL (ref 12–15.9)
HGB BLD-MCNC: 13.3 G/DL (ref 12–15.9)
HGB BLD-MCNC: 13.5 G/DL (ref 12–15.9)
HGB BLD-MCNC: 13.6 G/DL (ref 12–15.9)
HGB BLD-MCNC: 13.7 G/DL (ref 12–15.9)
HGB BLD-MCNC: 13.7 G/DL (ref 12–15.9)
HGB BLD-MCNC: 14 G/DL (ref 12–15.9)
HGB BLD-MCNC: 14.3 G/DL (ref 12–15.9)
HGB BLD-MCNC: 14.5 G/DL (ref 12–15.9)
HGB BLD-MCNC: 14.9 G/DL (ref 12–15.9)
HGB UR QL STRIP.AUTO: NEGATIVE
HMPV RNA NPH QL NAA+NON-PROBE: NOT DETECTED
HMPV RNA NPH QL NAA+NON-PROBE: NOT DETECTED
HOLD SPECIMEN: NORMAL
HPIV1 RNA SPEC QL NAA+PROBE: NOT DETECTED
HPIV1 RNA SPEC QL NAA+PROBE: NOT DETECTED
HPIV2 RNA SPEC QL NAA+PROBE: NOT DETECTED
HPIV2 RNA SPEC QL NAA+PROBE: NOT DETECTED
HPIV3 RNA NPH QL NAA+PROBE: NOT DETECTED
HPIV3 RNA NPH QL NAA+PROBE: NOT DETECTED
HPIV4 P GENE NPH QL NAA+PROBE: NOT DETECTED
HPIV4 P GENE NPH QL NAA+PROBE: NOT DETECTED
HYALINE CASTS UR QL AUTO: NORMAL /LPF
IMM GRANULOCYTES # BLD AUTO: 0.04 10*3/MM3 (ref 0–0.05)
IMM GRANULOCYTES # BLD AUTO: 0.05 10*3/MM3 (ref 0–0.05)
IMM GRANULOCYTES # BLD AUTO: 0.06 10*3/MM3 (ref 0–0.05)
IMM GRANULOCYTES # BLD AUTO: 0.07 10*3/MM3 (ref 0–0.05)
IMM GRANULOCYTES # BLD AUTO: 0.07 10*3/MM3 (ref 0–0.05)
IMM GRANULOCYTES # BLD AUTO: 0.08 10*3/MM3 (ref 0–0.05)
IMM GRANULOCYTES # BLD AUTO: 0.11 10*3/MM3 (ref 0–0.05)
IMM GRANULOCYTES # BLD AUTO: 0.15 10*3/MM3 (ref 0–0.05)
IMM GRANULOCYTES # BLD AUTO: 0.17 10*3/MM3 (ref 0–0.05)
IMM GRANULOCYTES # BLD AUTO: 0.26 10*3/MM3 (ref 0–0.05)
IMM GRANULOCYTES NFR BLD AUTO: 0.5 % (ref 0–0.5)
IMM GRANULOCYTES NFR BLD AUTO: 0.5 % (ref 0–0.5)
IMM GRANULOCYTES NFR BLD AUTO: 0.7 % (ref 0–0.5)
IMM GRANULOCYTES NFR BLD AUTO: 0.7 % (ref 0–0.5)
IMM GRANULOCYTES NFR BLD AUTO: 0.9 % (ref 0–0.5)
IMM GRANULOCYTES NFR BLD AUTO: 0.9 % (ref 0–0.5)
IMM GRANULOCYTES NFR BLD AUTO: 1 % (ref 0–0.5)
IMM GRANULOCYTES NFR BLD AUTO: 1.7 % (ref 0–0.5)
INR PPP: 0.96 (ref 0.9–1.1)
INR PPP: 1.02 (ref 0.9–1.1)
INR PPP: 1.02 (ref 0.9–1.1)
KETONES UR QL STRIP: ABNORMAL
LACTATE HOLD SPECIMEN: NORMAL
LDLC SERPL CALC-MCNC: 84 MG/DL (ref 0–100)
LDLC/HDLC SERPL: 2.14 {RATIO}
LEFT ATRIUM VOLUME INDEX: 33 ML/M2
LEFT ATRIUM VOLUME INDEX: 38 ML/M2
LEUKOCYTE ESTERASE UR QL STRIP.AUTO: ABNORMAL
LV EF 2D ECHO EST: 51 %
LYMPHOCYTES # BLD AUTO: 1.06 10*3/MM3 (ref 0.7–3.1)
LYMPHOCYTES # BLD AUTO: 1.08 10*3/MM3 (ref 0.7–3.1)
LYMPHOCYTES # BLD AUTO: 1.1 10*3/MM3 (ref 0.7–3.1)
LYMPHOCYTES # BLD AUTO: 1.11 10*3/MM3 (ref 0.7–3.1)
LYMPHOCYTES # BLD AUTO: 1.16 10*3/MM3 (ref 0.7–3.1)
LYMPHOCYTES # BLD AUTO: 1.17 10*3/MM3 (ref 0.7–3.1)
LYMPHOCYTES # BLD AUTO: 1.32 10*3/MM3 (ref 0.7–3.1)
LYMPHOCYTES # BLD AUTO: 1.34 10*3/MM3 (ref 0.7–3.1)
LYMPHOCYTES # BLD AUTO: 1.56 10*3/MM3 (ref 0.7–3.1)
LYMPHOCYTES # BLD AUTO: 2.05 10*3/MM3 (ref 0.7–3.1)
LYMPHOCYTES # BLD MANUAL: 0.31 10*3/MM3 (ref 0.7–3.1)
LYMPHOCYTES NFR BLD AUTO: 10.3 % (ref 19.6–45.3)
LYMPHOCYTES NFR BLD AUTO: 14.1 % (ref 19.6–45.3)
LYMPHOCYTES NFR BLD AUTO: 15.1 % (ref 19.6–45.3)
LYMPHOCYTES NFR BLD AUTO: 17.4 % (ref 19.6–45.3)
LYMPHOCYTES NFR BLD AUTO: 17.6 % (ref 19.6–45.3)
LYMPHOCYTES NFR BLD AUTO: 20.2 % (ref 19.6–45.3)
LYMPHOCYTES NFR BLD AUTO: 21.3 % (ref 19.6–45.3)
LYMPHOCYTES NFR BLD AUTO: 4.2 % (ref 19.6–45.3)
LYMPHOCYTES NFR BLD AUTO: 5.8 % (ref 19.6–45.3)
LYMPHOCYTES NFR BLD AUTO: 9.5 % (ref 19.6–45.3)
LYMPHOCYTES NFR BLD MANUAL: 1 % (ref 19.6–45.3)
LYMPHOCYTES NFR BLD MANUAL: 4 % (ref 5–12)
M PNEUMO IGG SER IA-ACNC: NOT DETECTED
M PNEUMO IGG SER IA-ACNC: NOT DETECTED
MAGNESIUM SERPL-MCNC: 1.6 MG/DL (ref 1.7–2.3)
MAGNESIUM SERPL-MCNC: 1.7 MG/DL (ref 1.7–2.3)
MAGNESIUM SERPL-MCNC: 1.8 MG/DL (ref 1.7–2.3)
MAGNESIUM SERPL-MCNC: 1.9 MG/DL (ref 1.7–2.3)
MAGNESIUM SERPL-MCNC: 2 MG/DL (ref 1.7–2.3)
MAGNESIUM SERPL-MCNC: 2.1 MG/DL (ref 1.7–2.3)
MAGNESIUM SERPL-MCNC: 2.2 MG/DL (ref 1.7–2.3)
MAXIMAL PREDICTED HEART RATE: 129 BPM
MAXIMAL PREDICTED HEART RATE: 129 BPM
MCH RBC QN AUTO: 32.4 PG (ref 26.6–33)
MCH RBC QN AUTO: 32.5 PG (ref 26.6–33)
MCH RBC QN AUTO: 32.7 PG (ref 26.6–33)
MCH RBC QN AUTO: 33 PG (ref 26.6–33)
MCH RBC QN AUTO: 33.1 PG (ref 26.6–33)
MCH RBC QN AUTO: 33.3 PG (ref 26.6–33)
MCH RBC QN AUTO: 33.3 PG (ref 26.6–33)
MCH RBC QN AUTO: 33.4 PG (ref 26.6–33)
MCH RBC QN AUTO: 33.5 PG (ref 26.6–33)
MCHC RBC AUTO-ENTMCNC: 32.3 G/DL (ref 31.5–35.7)
MCHC RBC AUTO-ENTMCNC: 32.8 G/DL (ref 31.5–35.7)
MCHC RBC AUTO-ENTMCNC: 33.3 G/DL (ref 31.5–35.7)
MCHC RBC AUTO-ENTMCNC: 33.6 G/DL (ref 31.5–35.7)
MCHC RBC AUTO-ENTMCNC: 33.6 G/DL (ref 31.5–35.7)
MCHC RBC AUTO-ENTMCNC: 33.9 G/DL (ref 31.5–35.7)
MCHC RBC AUTO-ENTMCNC: 33.9 G/DL (ref 31.5–35.7)
MCHC RBC AUTO-ENTMCNC: 34.2 G/DL (ref 31.5–35.7)
MCHC RBC AUTO-ENTMCNC: 34.4 G/DL (ref 31.5–35.7)
MCHC RBC AUTO-ENTMCNC: 34.4 G/DL (ref 31.5–35.7)
MCHC RBC AUTO-ENTMCNC: 34.5 G/DL (ref 31.5–35.7)
MCHC RBC AUTO-ENTMCNC: 34.5 G/DL (ref 31.5–35.7)
MCHC RBC AUTO-ENTMCNC: 34.7 G/DL (ref 31.5–35.7)
MCHC RBC AUTO-ENTMCNC: 35.1 G/DL (ref 31.5–35.7)
MCHC RBC AUTO-ENTMCNC: 35.4 G/DL (ref 31.5–35.7)
MCV RBC AUTO: 101.2 FL (ref 79–97)
MCV RBC AUTO: 102.2 FL (ref 79–97)
MCV RBC AUTO: 93 FL (ref 79–97)
MCV RBC AUTO: 94 FL (ref 79–97)
MCV RBC AUTO: 95.6 FL (ref 79–97)
MCV RBC AUTO: 96.2 FL (ref 79–97)
MCV RBC AUTO: 96.5 FL (ref 79–97)
MCV RBC AUTO: 96.8 FL (ref 79–97)
MCV RBC AUTO: 96.9 FL (ref 79–97)
MCV RBC AUTO: 96.9 FL (ref 79–97)
MCV RBC AUTO: 97.3 FL (ref 79–97)
MCV RBC AUTO: 97.4 FL (ref 79–97)
MCV RBC AUTO: 99.3 FL (ref 79–97)
MODALITY: NORMAL
MONOCYTES # BLD AUTO: 0.51 10*3/MM3 (ref 0.1–0.9)
MONOCYTES # BLD AUTO: 0.61 10*3/MM3 (ref 0.1–0.9)
MONOCYTES # BLD AUTO: 0.75 10*3/MM3 (ref 0.1–0.9)
MONOCYTES # BLD AUTO: 0.76 10*3/MM3 (ref 0.1–0.9)
MONOCYTES # BLD AUTO: 0.81 10*3/MM3 (ref 0.1–0.9)
MONOCYTES # BLD AUTO: 0.83 10*3/MM3 (ref 0.1–0.9)
MONOCYTES # BLD AUTO: 0.84 10*3/MM3 (ref 0.1–0.9)
MONOCYTES # BLD AUTO: 0.91 10*3/MM3 (ref 0.1–0.9)
MONOCYTES # BLD AUTO: 1.2 10*3/MM3 (ref 0.1–0.9)
MONOCYTES # BLD AUTO: 1.22 10*3/MM3 (ref 0.1–0.9)
MONOCYTES # BLD AUTO: 1.72 10*3/MM3 (ref 0.1–0.9)
MONOCYTES NFR BLD AUTO: 10.4 % (ref 5–12)
MONOCYTES NFR BLD AUTO: 10.8 % (ref 5–12)
MONOCYTES NFR BLD AUTO: 11.8 % (ref 5–12)
MONOCYTES NFR BLD AUTO: 12.2 % (ref 5–12)
MONOCYTES NFR BLD AUTO: 4 % (ref 5–12)
MONOCYTES NFR BLD AUTO: 6.2 % (ref 5–12)
MONOCYTES NFR BLD AUTO: 6.7 % (ref 5–12)
MONOCYTES NFR BLD AUTO: 7.2 % (ref 5–12)
MONOCYTES NFR BLD AUTO: 7.3 % (ref 5–12)
MONOCYTES NFR BLD AUTO: 8.4 % (ref 5–12)
MYELOCYTES NFR BLD MANUAL: 1 % (ref 0–0)
NEUTROPHILS # BLD AUTO: 28.77 10*3/MM3 (ref 1.7–7)
NEUTROPHILS # BLD AUTO: 5.17 10*3/MM3 (ref 1.7–7)
NEUTROPHILS # BLD AUTO: 8.16 10*3/MM3 (ref 1.7–7)
NEUTROPHILS NFR BLD AUTO: 18.09 10*3/MM3 (ref 1.7–7)
NEUTROPHILS NFR BLD AUTO: 22.46 10*3/MM3 (ref 1.7–7)
NEUTROPHILS NFR BLD AUTO: 4.48 10*3/MM3 (ref 1.7–7)
NEUTROPHILS NFR BLD AUTO: 4.68 10*3/MM3 (ref 1.7–7)
NEUTROPHILS NFR BLD AUTO: 5.03 10*3/MM3 (ref 1.7–7)
NEUTROPHILS NFR BLD AUTO: 6.05 10*3/MM3 (ref 1.7–7)
NEUTROPHILS NFR BLD AUTO: 6.16 10*3/MM3 (ref 1.7–7)
NEUTROPHILS NFR BLD AUTO: 60.6 % (ref 42.7–76)
NEUTROPHILS NFR BLD AUTO: 61 % (ref 42.7–76)
NEUTROPHILS NFR BLD AUTO: 62.7 % (ref 42.7–76)
NEUTROPHILS NFR BLD AUTO: 65.4 % (ref 42.7–76)
NEUTROPHILS NFR BLD AUTO: 70.8 % (ref 42.7–76)
NEUTROPHILS NFR BLD AUTO: 73.7 % (ref 42.7–76)
NEUTROPHILS NFR BLD AUTO: 79.6 % (ref 42.7–76)
NEUTROPHILS NFR BLD AUTO: 79.8 % (ref 42.7–76)
NEUTROPHILS NFR BLD AUTO: 87.9 % (ref 42.7–76)
NEUTROPHILS NFR BLD AUTO: 89.3 % (ref 42.7–76)
NEUTROPHILS NFR BLD AUTO: 9.35 10*3/MM3 (ref 1.7–7)
NEUTROPHILS NFR BLD MANUAL: 94 % (ref 42.7–76)
NITRITE UR QL STRIP: NEGATIVE
NRBC BLD AUTO-RTO: 0 /100 WBC (ref 0–0.2)
NRBC BLD AUTO-RTO: 0.2 /100 WBC (ref 0–0.2)
NT-PROBNP SERPL-MCNC: 3961 PG/ML (ref 5–1800)
NT-PROBNP SERPL-MCNC: 586.6 PG/ML (ref 5–1800)
NT-PROBNP SERPL-MCNC: 7491 PG/ML (ref 0–1800)
PCO2 BLDA: 37.5 MM HG (ref 35–45)
PH BLDA: 7.42 PH UNITS (ref 7.35–7.45)
PH UR STRIP.AUTO: <=5 [PH] (ref 5–8)
PHOSPHATE SERPL-MCNC: 3.2 MG/DL (ref 2.5–4.5)
PHOSPHATE SERPL-MCNC: 5.9 MG/DL (ref 2.5–4.5)
PLAT MORPH BLD: NORMAL
PLATELET # BLD AUTO: 198 10*3/MM3 (ref 140–450)
PLATELET # BLD AUTO: 229 10*3/MM3 (ref 140–450)
PLATELET # BLD AUTO: 233 10*3/MM3 (ref 140–450)
PLATELET # BLD AUTO: 238 10*3/MM3 (ref 140–450)
PLATELET # BLD AUTO: 240 10*3/MM3 (ref 140–450)
PLATELET # BLD AUTO: 256 10*3/MM3 (ref 140–450)
PLATELET # BLD AUTO: 259 10*3/MM3 (ref 140–450)
PLATELET # BLD AUTO: 260 10*3/MM3 (ref 140–450)
PLATELET # BLD AUTO: 266 10*3/MM3 (ref 140–450)
PLATELET # BLD AUTO: 270 10*3/MM3 (ref 140–450)
PLATELET # BLD AUTO: 270 10*3/MM3 (ref 140–450)
PLATELET # BLD AUTO: 271 10*3/MM3 (ref 140–450)
PLATELET # BLD AUTO: 281 10*3/MM3 (ref 140–450)
PLATELET # BLD AUTO: 288 10*3/MM3 (ref 140–450)
PLATELET # BLD AUTO: 339 10*3/MM3 (ref 140–450)
PMV BLD AUTO: 10.9 FL (ref 6–12)
PMV BLD AUTO: 10.9 FL (ref 6–12)
PMV BLD AUTO: 11.2 FL (ref 6–12)
PMV BLD AUTO: 11.3 FL (ref 6–12)
PMV BLD AUTO: 11.4 FL (ref 6–12)
PMV BLD AUTO: 11.5 FL (ref 6–12)
PMV BLD AUTO: 11.5 FL (ref 6–12)
PMV BLD AUTO: 11.7 FL (ref 6–12)
PMV BLD AUTO: 11.7 FL (ref 6–12)
PMV BLD AUTO: 12 FL (ref 6–12)
PMV BLD AUTO: 12.1 FL (ref 6–12)
PMV BLD AUTO: 12.3 FL (ref 6–12)
PMV BLD AUTO: 12.5 FL (ref 6–12)
PO2 BLDA: 88.4 MM HG (ref 80–100)
POTASSIUM BLD-SCNC: 3.8 MMOL/L (ref 3.5–5.2)
POTASSIUM BLD-SCNC: 3.9 MMOL/L (ref 3.5–5.2)
POTASSIUM BLD-SCNC: 4.5 MMOL/L (ref 3.5–5.2)
POTASSIUM BLD-SCNC: 4.7 MMOL/L (ref 3.5–5.2)
POTASSIUM BLD-SCNC: 4.9 MMOL/L (ref 3.5–5.2)
POTASSIUM SERPL-SCNC: 3.5 MMOL/L (ref 3.5–5.2)
POTASSIUM SERPL-SCNC: 4 MMOL/L (ref 3.5–5.2)
POTASSIUM SERPL-SCNC: 4.2 MMOL/L (ref 3.5–5.2)
POTASSIUM SERPL-SCNC: 4.2 MMOL/L (ref 3.5–5.2)
POTASSIUM SERPL-SCNC: 4.3 MMOL/L (ref 3.5–5.2)
POTASSIUM SERPL-SCNC: 4.3 MMOL/L (ref 3.5–5.2)
POTASSIUM SERPL-SCNC: 4.5 MMOL/L (ref 3.5–5.2)
POTASSIUM SERPL-SCNC: 4.6 MMOL/L (ref 3.5–5.2)
POTASSIUM SERPL-SCNC: 5 MMOL/L (ref 3.5–5.2)
POTASSIUM SERPL-SCNC: 5.1 MMOL/L (ref 3.5–5.2)
POTASSIUM SERPL-SCNC: 5.3 MMOL/L (ref 3.5–5.2)
POTASSIUM SERPL-SCNC: 5.7 MMOL/L (ref 3.5–5.2)
PROCALCITONIN SERPL-MCNC: 0.11 NG/ML (ref 0–0.25)
PROCALCITONIN SERPL-MCNC: 14.38 NG/ML (ref 0–0.25)
PROT SERPL-MCNC: 5.6 G/DL (ref 6–8.5)
PROT SERPL-MCNC: 5.8 G/DL (ref 6–8.5)
PROT SERPL-MCNC: 5.9 G/DL (ref 6–8.5)
PROT SERPL-MCNC: 6.4 G/DL (ref 6–8.5)
PROT SERPL-MCNC: 6.7 G/DL (ref 6–8.5)
PROT SERPL-MCNC: 6.9 G/DL (ref 6–8.5)
PROT UR QL STRIP: ABNORMAL
PROT UR-MCNC: 9 MG/DL
PROT/CREAT UR: 122.4 MG/G CREA (ref 0–200)
PROTHROMBIN TIME: 12.7 SECONDS (ref 11.7–14.2)
PROTHROMBIN TIME: 13.1 SECONDS (ref 11.7–14.2)
PROTHROMBIN TIME: 13.3 SECONDS (ref 11.7–14.2)
PTH-INTACT SERPL-MCNC: 93.5 PG/ML (ref 15–65)
RBC # BLD AUTO: 3.44 10*6/MM3 (ref 3.77–5.28)
RBC # BLD AUTO: 3.73 10*6/MM3 (ref 3.77–5.28)
RBC # BLD AUTO: 3.79 10*6/MM3 (ref 3.77–5.28)
RBC # BLD AUTO: 3.83 10*6/MM3 (ref 3.77–5.28)
RBC # BLD AUTO: 3.88 10*6/MM3 (ref 3.77–5.28)
RBC # BLD AUTO: 4 10*6/MM3 (ref 3.77–5.28)
RBC # BLD AUTO: 4.01 10*6/MM3 (ref 3.77–5.28)
RBC # BLD AUTO: 4.14 10*6/MM3 (ref 3.77–5.28)
RBC # BLD AUTO: 4.15 10*6/MM3 (ref 3.77–5.28)
RBC # BLD AUTO: 4.15 10*6/MM3 (ref 3.77–5.28)
RBC # BLD AUTO: 4.16 10*6/MM3 (ref 3.77–5.28)
RBC # BLD AUTO: 4.24 10*6/MM3 (ref 3.77–5.28)
RBC # BLD AUTO: 4.27 10*6/MM3 (ref 3.77–5.28)
RBC # BLD AUTO: 4.36 10*6/MM3 (ref 3.77–5.28)
RBC # BLD AUTO: 4.5 10*6/MM3 (ref 3.77–5.28)
RBC # UR: NORMAL /HPF
RBC MORPH BLD: NORMAL
REF LAB TEST METHOD: NORMAL
RHINOVIRUS RNA SPEC NAA+PROBE: NOT DETECTED
RHINOVIRUS RNA SPEC NAA+PROBE: NOT DETECTED
RSV RNA NPH QL NAA+NON-PROBE: NOT DETECTED
RSV RNA NPH QL NAA+NON-PROBE: NOT DETECTED
SAO2 % BLDCOA: 97 % (ref 92–99)
SARS-COV-2 RNA NPH QL NAA+NON-PROBE: NOT DETECTED
SARS-COV-2 RNA NPH QL NAA+NON-PROBE: NOT DETECTED
SODIUM BLD-SCNC: 139 MMOL/L (ref 136–145)
SODIUM BLD-SCNC: 140 MMOL/L (ref 136–145)
SODIUM BLD-SCNC: 141 MMOL/L (ref 136–145)
SODIUM BLD-SCNC: 142 MMOL/L (ref 136–145)
SODIUM BLD-SCNC: 143 MMOL/L (ref 136–145)
SODIUM SERPL-SCNC: 129 MMOL/L (ref 136–145)
SODIUM SERPL-SCNC: 133 MMOL/L (ref 136–145)
SODIUM SERPL-SCNC: 133 MMOL/L (ref 136–145)
SODIUM SERPL-SCNC: 134 MMOL/L (ref 136–145)
SODIUM SERPL-SCNC: 136 MMOL/L (ref 136–145)
SODIUM SERPL-SCNC: 139 MMOL/L (ref 136–145)
SODIUM SERPL-SCNC: 140 MMOL/L (ref 136–145)
SODIUM SERPL-SCNC: 142 MMOL/L (ref 136–145)
SODIUM SERPL-SCNC: 143 MMOL/L (ref 136–145)
SODIUM UR-SCNC: 48 MMOL/L
SP GR UR STRIP: 1.02 (ref 1–1.03)
SQUAMOUS #/AREA URNS HPF: NORMAL /HPF
STRESS TARGET HR: 110 BPM
STRESS TARGET HR: 110 BPM
TOTAL RATE: 16 BREATHS/MINUTE
TRIGL SERPL-MCNC: 77 MG/DL (ref 0–150)
TROPONIN T SERPL-MCNC: 0.01 NG/ML (ref 0–0.03)
TROPONIN T SERPL-MCNC: 0.01 NG/ML (ref 0–0.03)
TROPONIN T SERPL-MCNC: 0.02 NG/ML (ref 0–0.03)
TROPONIN T SERPL-MCNC: 0.03 NG/ML (ref 0–0.03)
TROPONIN T SERPL-MCNC: 0.04 NG/ML (ref 0–0.03)
TROPONIN T SERPL-MCNC: 0.09 NG/ML (ref 0–0.03)
TROPONIN T SERPL-MCNC: 0.2 NG/ML (ref 0–0.03)
TROPONIN T SERPL-MCNC: 0.21 NG/ML (ref 0–0.03)
TROPONIN T SERPL-MCNC: 0.23 NG/ML (ref 0–0.03)
TROPONIN T SERPL-MCNC: 0.24 NG/ML (ref 0–0.03)
TROPONIN T SERPL-MCNC: 0.41 NG/ML (ref 0–0.03)
TROPONIN T SERPL-MCNC: 1.16 NG/ML (ref 0–0.03)
TROPONIN T SERPL-MCNC: 4.09 NG/ML (ref 0–0.03)
TROPONIN T SERPL-MCNC: 5.5 NG/ML (ref 0–0.03)
TROPONIN T SERPL-MCNC: 6.2 NG/ML (ref 0–0.03)
TSH SERPL DL<=0.05 MIU/L-ACNC: 0.08 UIU/ML (ref 0.27–4.2)
TSH SERPL DL<=0.05 MIU/L-ACNC: 0.14 UIU/ML (ref 0.27–4.2)
URATE SERPL-MCNC: 15.2 MG/DL (ref 2.4–5.7)
URATE SERPL-MCNC: 6.3 MG/DL (ref 2.4–5.7)
UROBILINOGEN UR QL STRIP: ABNORMAL
VLDLC SERPL-MCNC: 15.4 MG/DL (ref 5–40)
WBC # BLD AUTO: 10.16 10*3/MM3 (ref 3.4–10.8)
WBC # BLD AUTO: 11.72 10*3/MM3 (ref 3.4–10.8)
WBC # BLD AUTO: 20.26 10*3/MM3 (ref 3.4–10.8)
WBC # BLD AUTO: 25.57 10*3/MM3 (ref 3.4–10.8)
WBC # BLD AUTO: 28.43 10*3/MM3 (ref 3.4–10.8)
WBC # BLD AUTO: 30.61 10*3/MM3 (ref 3.4–10.8)
WBC # BLD AUTO: 7.33 10*3/MM3 (ref 3.4–10.8)
WBC # BLD AUTO: 7.48 10*3/MM3 (ref 3.4–10.8)
WBC # BLD AUTO: 7.7 10*3/MM3 (ref 3.4–10.8)
WBC # BLD AUTO: 8.22 10*3/MM3 (ref 3.4–10.8)
WBC MORPH BLD: NORMAL
WBC NRBC COR # BLD: 10.25 10*3/MM3 (ref 3.4–10.8)
WBC NRBC COR # BLD: 6.72 10*3/MM3 (ref 3.4–10.8)
WBC NRBC COR # BLD: 7.3 10*3/MM3 (ref 3.4–10.8)
WBC NRBC COR # BLD: 7.32 10*3/MM3 (ref 3.4–10.8)
WBC NRBC COR # BLD: 8.05 10*3/MM3 (ref 3.4–10.8)
WBC UR QL AUTO: NORMAL /HPF
WHOLE BLOOD HOLD SPECIMEN: NORMAL

## 2020-01-01 PROCEDURE — 4A023N7 MEASUREMENT OF CARDIAC SAMPLING AND PRESSURE, LEFT HEART, PERCUTANEOUS APPROACH: ICD-10-PCS | Performed by: INTERNAL MEDICINE

## 2020-01-01 PROCEDURE — 25010000002 LORAZEPAM PER 2 MG: Performed by: NURSE PRACTITIONER

## 2020-01-01 PROCEDURE — 36600 WITHDRAWAL OF ARTERIAL BLOOD: CPT

## 2020-01-01 PROCEDURE — 36415 COLL VENOUS BLD VENIPUNCTURE: CPT

## 2020-01-01 PROCEDURE — 93010 ELECTROCARDIOGRAM REPORT: CPT | Performed by: INTERNAL MEDICINE

## 2020-01-01 PROCEDURE — 99213 OFFICE O/P EST LOW 20 MIN: CPT | Performed by: NURSE PRACTITIONER

## 2020-01-01 PROCEDURE — 83880 ASSAY OF NATRIURETIC PEPTIDE: CPT | Performed by: EMERGENCY MEDICINE

## 2020-01-01 PROCEDURE — 63710000001 INSULIN LISPRO (HUMAN) PER 5 UNITS: Performed by: NURSE PRACTITIONER

## 2020-01-01 PROCEDURE — 80061 LIPID PANEL: CPT | Performed by: INTERNAL MEDICINE

## 2020-01-01 PROCEDURE — 63710000001 INSULIN GLARGINE PER 5 UNITS: Performed by: INTERNAL MEDICINE

## 2020-01-01 PROCEDURE — 99232 SBSQ HOSP IP/OBS MODERATE 35: CPT | Performed by: NURSE PRACTITIONER

## 2020-01-01 PROCEDURE — 99285 EMERGENCY DEPT VISIT HI MDM: CPT

## 2020-01-01 PROCEDURE — 25010000002 HYDROMORPHONE 1 MG/ML SOLUTION: Performed by: NURSE PRACTITIONER

## 2020-01-01 PROCEDURE — 25010000002 HEPARIN (PORCINE) PER 1000 UNITS: Performed by: INTERNAL MEDICINE

## 2020-01-01 PROCEDURE — 82962 GLUCOSE BLOOD TEST: CPT

## 2020-01-01 PROCEDURE — 84484 ASSAY OF TROPONIN QUANT: CPT | Performed by: INTERNAL MEDICINE

## 2020-01-01 PROCEDURE — 99214 OFFICE O/P EST MOD 30 MIN: CPT | Performed by: INTERNAL MEDICINE

## 2020-01-01 PROCEDURE — 85610 PROTHROMBIN TIME: CPT | Performed by: EMERGENCY MEDICINE

## 2020-01-01 PROCEDURE — 25010000002 FUROSEMIDE PER 20 MG: Performed by: INTERNAL MEDICINE

## 2020-01-01 PROCEDURE — 25010000002 LORAZEPAM PER 2 MG: Performed by: HOSPITALIST

## 2020-01-01 PROCEDURE — B2131ZZ FLUOROSCOPY OF MULTIPLE CORONARY ARTERY BYPASS GRAFTS USING LOW OSMOLAR CONTRAST: ICD-10-PCS | Performed by: INTERNAL MEDICINE

## 2020-01-01 PROCEDURE — 85025 COMPLETE CBC W/AUTO DIFF WBC: CPT | Performed by: INTERNAL MEDICINE

## 2020-01-01 PROCEDURE — 97535 SELF CARE MNGMENT TRAINING: CPT

## 2020-01-01 PROCEDURE — 93005 ELECTROCARDIOGRAM TRACING: CPT | Performed by: INTERNAL MEDICINE

## 2020-01-01 PROCEDURE — 84443 ASSAY THYROID STIM HORMONE: CPT | Performed by: HOSPITALIST

## 2020-01-01 PROCEDURE — 83970 ASSAY OF PARATHORMONE: CPT

## 2020-01-01 PROCEDURE — G0378 HOSPITAL OBSERVATION PER HR: HCPCS

## 2020-01-01 PROCEDURE — 99238 HOSP IP/OBS DSCHRG MGMT 30/<: CPT | Performed by: NURSE PRACTITIONER

## 2020-01-01 PROCEDURE — 82803 BLOOD GASES ANY COMBINATION: CPT

## 2020-01-01 PROCEDURE — 80053 COMPREHEN METABOLIC PANEL: CPT | Performed by: HOSPITALIST

## 2020-01-01 PROCEDURE — 63710000001 INSULIN LISPRO (HUMAN) PER 5 UNITS: Performed by: HOSPITALIST

## 2020-01-01 PROCEDURE — 93005 ELECTROCARDIOGRAM TRACING: CPT | Performed by: EMERGENCY MEDICINE

## 2020-01-01 PROCEDURE — 80048 BASIC METABOLIC PNL TOTAL CA: CPT | Performed by: INTERNAL MEDICINE

## 2020-01-01 PROCEDURE — 99223 1ST HOSP IP/OBS HIGH 75: CPT | Performed by: INTERNAL MEDICINE

## 2020-01-01 PROCEDURE — 80053 COMPREHEN METABOLIC PANEL: CPT | Performed by: EMERGENCY MEDICINE

## 2020-01-01 PROCEDURE — 97110 THERAPEUTIC EXERCISES: CPT

## 2020-01-01 PROCEDURE — 36415 COLL VENOUS BLD VENIPUNCTURE: CPT | Performed by: EMERGENCY MEDICINE

## 2020-01-01 PROCEDURE — 99232 SBSQ HOSP IP/OBS MODERATE 35: CPT | Performed by: INTERNAL MEDICINE

## 2020-01-01 PROCEDURE — 93306 TTE W/DOPPLER COMPLETE: CPT | Performed by: INTERNAL MEDICINE

## 2020-01-01 PROCEDURE — 25010000002 FUROSEMIDE PER 20 MG: Performed by: NURSE PRACTITIONER

## 2020-01-01 PROCEDURE — C1769 GUIDE WIRE: HCPCS | Performed by: INTERNAL MEDICINE

## 2020-01-01 PROCEDURE — 85025 COMPLETE CBC W/AUTO DIFF WBC: CPT | Performed by: EMERGENCY MEDICINE

## 2020-01-01 PROCEDURE — 93005 ELECTROCARDIOGRAM TRACING: CPT

## 2020-01-01 PROCEDURE — 71045 X-RAY EXAM CHEST 1 VIEW: CPT

## 2020-01-01 PROCEDURE — 84156 ASSAY OF PROTEIN URINE: CPT

## 2020-01-01 PROCEDURE — 83036 HEMOGLOBIN GLYCOSYLATED A1C: CPT | Performed by: INTERNAL MEDICINE

## 2020-01-01 PROCEDURE — B2111ZZ FLUOROSCOPY OF MULTIPLE CORONARY ARTERIES USING LOW OSMOLAR CONTRAST: ICD-10-PCS | Performed by: INTERNAL MEDICINE

## 2020-01-01 PROCEDURE — 85730 THROMBOPLASTIN TIME PARTIAL: CPT | Performed by: INTERNAL MEDICINE

## 2020-01-01 PROCEDURE — 25010000002 ONDANSETRON PER 1 MG: Performed by: NURSE PRACTITIONER

## 2020-01-01 PROCEDURE — 83735 ASSAY OF MAGNESIUM: CPT | Performed by: HOSPITALIST

## 2020-01-01 PROCEDURE — 25010000002 MAGNESIUM SULFATE IN D5W 1G/100ML (PREMIX) 1-5 GM/100ML-% SOLUTION: Performed by: INTERNAL MEDICINE

## 2020-01-01 PROCEDURE — 85027 COMPLETE CBC AUTOMATED: CPT | Performed by: INTERNAL MEDICINE

## 2020-01-01 PROCEDURE — 99233 SBSQ HOSP IP/OBS HIGH 50: CPT | Performed by: STUDENT IN AN ORGANIZED HEALTH CARE EDUCATION/TRAINING PROGRAM

## 2020-01-01 PROCEDURE — 25010000002 CEFTRIAXONE PER 250 MG: Performed by: HOSPITALIST

## 2020-01-01 PROCEDURE — 25010000002 MORPHINE PER 10 MG: Performed by: NURSE PRACTITIONER

## 2020-01-01 PROCEDURE — 83735 ASSAY OF MAGNESIUM: CPT | Performed by: INTERNAL MEDICINE

## 2020-01-01 PROCEDURE — 63710000001 INSULIN GLARGINE PER 5 UNITS: Performed by: HOSPITALIST

## 2020-01-01 PROCEDURE — 85025 COMPLETE CBC W/AUTO DIFF WBC: CPT | Performed by: NURSE PRACTITIONER

## 2020-01-01 PROCEDURE — 82570 ASSAY OF URINE CREATININE: CPT

## 2020-01-01 PROCEDURE — 84145 PROCALCITONIN (PCT): CPT | Performed by: HOSPITALIST

## 2020-01-01 PROCEDURE — 63710000001 INSULIN LISPRO (HUMAN) PER 5 UNITS: Performed by: INTERNAL MEDICINE

## 2020-01-01 PROCEDURE — 84484 ASSAY OF TROPONIN QUANT: CPT | Performed by: EMERGENCY MEDICINE

## 2020-01-01 PROCEDURE — 87040 BLOOD CULTURE FOR BACTERIA: CPT | Performed by: EMERGENCY MEDICINE

## 2020-01-01 PROCEDURE — 25010000002 FENTANYL CITRATE (PF) 100 MCG/2ML SOLUTION: Performed by: INTERNAL MEDICINE

## 2020-01-01 PROCEDURE — 0202U NFCT DS 22 TRGT SARS-COV-2: CPT | Performed by: NURSE PRACTITIONER

## 2020-01-01 PROCEDURE — B2151ZZ FLUOROSCOPY OF LEFT HEART USING LOW OSMOLAR CONTRAST: ICD-10-PCS | Performed by: INTERNAL MEDICINE

## 2020-01-01 PROCEDURE — 83605 ASSAY OF LACTIC ACID: CPT | Performed by: EMERGENCY MEDICINE

## 2020-01-01 PROCEDURE — 97166 OT EVAL MOD COMPLEX 45 MIN: CPT

## 2020-01-01 PROCEDURE — 84132 ASSAY OF SERUM POTASSIUM: CPT | Performed by: HOSPITALIST

## 2020-01-01 PROCEDURE — 25010000002 FUROSEMIDE PER 20 MG: Performed by: EMERGENCY MEDICINE

## 2020-01-01 PROCEDURE — 84550 ASSAY OF BLOOD/URIC ACID: CPT

## 2020-01-01 PROCEDURE — 25010000002 ENOXAPARIN PER 10 MG: Performed by: NURSE PRACTITIONER

## 2020-01-01 PROCEDURE — 80069 RENAL FUNCTION PANEL: CPT

## 2020-01-01 PROCEDURE — 25010000002 HYDROMORPHONE PER 4 MG: Performed by: NURSE PRACTITIONER

## 2020-01-01 PROCEDURE — 25010000002 ONDANSETRON PER 1 MG: Performed by: EMERGENCY MEDICINE

## 2020-01-01 PROCEDURE — 84145 PROCALCITONIN (PCT): CPT | Performed by: EMERGENCY MEDICINE

## 2020-01-01 PROCEDURE — 84550 ASSAY OF BLOOD/URIC ACID: CPT | Performed by: INTERNAL MEDICINE

## 2020-01-01 PROCEDURE — 83735 ASSAY OF MAGNESIUM: CPT | Performed by: NURSE PRACTITIONER

## 2020-01-01 PROCEDURE — P9612 CATHETERIZE FOR URINE SPEC: HCPCS

## 2020-01-01 PROCEDURE — 84484 ASSAY OF TROPONIN QUANT: CPT | Performed by: HOSPITALIST

## 2020-01-01 PROCEDURE — 99222 1ST HOSP IP/OBS MODERATE 55: CPT | Performed by: NURSE PRACTITIONER

## 2020-01-01 PROCEDURE — 25010000002 HYDROMORPHONE 1 MG/ML SOLUTION: Performed by: HOSPITALIST

## 2020-01-01 PROCEDURE — 99443 PR PHYS/QHP TELEPHONE EVALUATION 21-30 MIN: CPT | Performed by: NURSE PRACTITIONER

## 2020-01-01 PROCEDURE — 97165 OT EVAL LOW COMPLEX 30 MIN: CPT

## 2020-01-01 PROCEDURE — 82570 ASSAY OF URINE CREATININE: CPT | Performed by: INTERNAL MEDICINE

## 2020-01-01 PROCEDURE — 80048 BASIC METABOLIC PNL TOTAL CA: CPT | Performed by: NURSE PRACTITIONER

## 2020-01-01 PROCEDURE — 83735 ASSAY OF MAGNESIUM: CPT | Performed by: EMERGENCY MEDICINE

## 2020-01-01 PROCEDURE — 80053 COMPREHEN METABOLIC PANEL: CPT | Performed by: NURSE PRACTITIONER

## 2020-01-01 PROCEDURE — 93000 ELECTROCARDIOGRAM COMPLETE: CPT | Performed by: INTERNAL MEDICINE

## 2020-01-01 PROCEDURE — 94799 UNLISTED PULMONARY SVC/PX: CPT

## 2020-01-01 PROCEDURE — 25010000002 CEFTRIAXONE PER 250 MG: Performed by: EMERGENCY MEDICINE

## 2020-01-01 PROCEDURE — 25010000002 MORPHINE PER 10 MG: Performed by: EMERGENCY MEDICINE

## 2020-01-01 PROCEDURE — 85007 BL SMEAR W/DIFF WBC COUNT: CPT | Performed by: HOSPITALIST

## 2020-01-01 PROCEDURE — 85025 COMPLETE CBC W/AUTO DIFF WBC: CPT | Performed by: HOSPITALIST

## 2020-01-01 PROCEDURE — 84484 ASSAY OF TROPONIN QUANT: CPT | Performed by: NURSE PRACTITIONER

## 2020-01-01 PROCEDURE — 85610 PROTHROMBIN TIME: CPT | Performed by: INTERNAL MEDICINE

## 2020-01-01 PROCEDURE — 25010000002 MIDAZOLAM PER 1 MG: Performed by: INTERNAL MEDICINE

## 2020-01-01 PROCEDURE — C1894 INTRO/SHEATH, NON-LASER: HCPCS | Performed by: INTERNAL MEDICINE

## 2020-01-01 PROCEDURE — 93459 L HRT ART/GRFT ANGIO: CPT | Performed by: INTERNAL MEDICINE

## 2020-01-01 PROCEDURE — 85027 COMPLETE CBC AUTOMATED: CPT | Performed by: NURSE PRACTITIONER

## 2020-01-01 PROCEDURE — 81001 URINALYSIS AUTO W/SCOPE: CPT | Performed by: EMERGENCY MEDICINE

## 2020-01-01 PROCEDURE — 84443 ASSAY THYROID STIM HORMONE: CPT | Performed by: NURSE PRACTITIONER

## 2020-01-01 PROCEDURE — 0202U NFCT DS 22 TRGT SARS-COV-2: CPT | Performed by: EMERGENCY MEDICINE

## 2020-01-01 PROCEDURE — 83605 ASSAY OF LACTIC ACID: CPT | Performed by: HOSPITALIST

## 2020-01-01 PROCEDURE — 93306 TTE W/DOPPLER COMPLETE: CPT

## 2020-01-01 PROCEDURE — 97161 PT EVAL LOW COMPLEX 20 MIN: CPT

## 2020-01-01 PROCEDURE — 99441 PR PHYS/QHP TELEPHONE EVALUATION 5-10 MIN: CPT | Performed by: NURSE PRACTITIONER

## 2020-01-01 PROCEDURE — 83036 HEMOGLOBIN GLYCOSYLATED A1C: CPT | Performed by: HOSPITALIST

## 2020-01-01 PROCEDURE — 74176 CT ABD & PELVIS W/O CONTRAST: CPT

## 2020-01-01 PROCEDURE — 99222 1ST HOSP IP/OBS MODERATE 55: CPT | Performed by: INTERNAL MEDICINE

## 2020-01-01 PROCEDURE — 97162 PT EVAL MOD COMPLEX 30 MIN: CPT

## 2020-01-01 PROCEDURE — 0 IOPAMIDOL PER 1 ML: Performed by: INTERNAL MEDICINE

## 2020-01-01 PROCEDURE — 99239 HOSP IP/OBS DSCHRG MGMT >30: CPT | Performed by: INTERNAL MEDICINE

## 2020-01-01 PROCEDURE — 20610 DRAIN/INJ JOINT/BURSA W/O US: CPT | Performed by: NURSE PRACTITIONER

## 2020-01-01 PROCEDURE — 93005 ELECTROCARDIOGRAM TRACING: CPT | Performed by: HOSPITALIST

## 2020-01-01 PROCEDURE — 63710000001 INSULIN GLARGINE PER 5 UNITS: Performed by: NURSE PRACTITIONER

## 2020-01-01 PROCEDURE — 99222 1ST HOSP IP/OBS MODERATE 55: CPT | Performed by: STUDENT IN AN ORGANIZED HEALTH CARE EDUCATION/TRAINING PROGRAM

## 2020-01-01 PROCEDURE — 84300 ASSAY OF URINE SODIUM: CPT | Performed by: INTERNAL MEDICINE

## 2020-01-01 PROCEDURE — 85027 COMPLETE CBC AUTOMATED: CPT

## 2020-01-01 PROCEDURE — 71250 CT THORAX DX C-: CPT

## 2020-01-01 PROCEDURE — 85027 COMPLETE CBC AUTOMATED: CPT | Performed by: STUDENT IN AN ORGANIZED HEALTH CARE EDUCATION/TRAINING PROGRAM

## 2020-01-01 PROCEDURE — 99284 EMERGENCY DEPT VISIT MOD MDM: CPT

## 2020-01-01 PROCEDURE — 25010000002 ONDANSETRON PER 1 MG: Performed by: HOSPITALIST

## 2020-01-01 PROCEDURE — 85730 THROMBOPLASTIN TIME PARTIAL: CPT | Performed by: EMERGENCY MEDICINE

## 2020-01-01 PROCEDURE — 82306 VITAMIN D 25 HYDROXY: CPT

## 2020-01-01 PROCEDURE — 80069 RENAL FUNCTION PANEL: CPT | Performed by: INTERNAL MEDICINE

## 2020-01-01 PROCEDURE — 94640 AIRWAY INHALATION TREATMENT: CPT

## 2020-01-01 PROCEDURE — C1894 INTRO/SHEATH, NON-LASER: HCPCS

## 2020-01-01 RX ORDER — INSULIN GLARGINE 100 [IU]/ML
15 INJECTION, SOLUTION SUBCUTANEOUS NIGHTLY
Status: DISCONTINUED | OUTPATIENT
Start: 2020-01-01 | End: 2020-01-01 | Stop reason: HOSPADM

## 2020-01-01 RX ORDER — LORAZEPAM 2 MG/ML
0.5 INJECTION INTRAMUSCULAR
Status: DISCONTINUED | OUTPATIENT
Start: 2020-01-01 | End: 2020-09-21 | Stop reason: HOSPADM

## 2020-01-01 RX ORDER — NITROGLYCERIN 0.4 MG/1
0.4 TABLET SUBLINGUAL
Status: DISCONTINUED | OUTPATIENT
Start: 2020-01-01 | End: 2020-01-01 | Stop reason: HOSPADM

## 2020-01-01 RX ORDER — PROMETHAZINE HYDROCHLORIDE 25 MG/1
12.5 TABLET ORAL EVERY 4 HOURS PRN
Status: CANCELLED | OUTPATIENT
Start: 2020-01-01

## 2020-01-01 RX ORDER — ASPIRIN 81 MG/1
81 TABLET ORAL DAILY
Status: DISCONTINUED | OUTPATIENT
Start: 2020-01-01 | End: 2020-01-01 | Stop reason: HOSPADM

## 2020-01-01 RX ORDER — POTASSIUM CHLORIDE 1.5 G/1.77G
40 POWDER, FOR SOLUTION ORAL AS NEEDED
Status: DISCONTINUED | OUTPATIENT
Start: 2020-01-01 | End: 2020-01-01 | Stop reason: HOSPADM

## 2020-01-01 RX ORDER — NITROGLYCERIN 0.4 MG/1
TABLET SUBLINGUAL
Qty: 25 TABLET | Refills: 1 | Status: SHIPPED | OUTPATIENT
Start: 2020-01-01

## 2020-01-01 RX ORDER — FUROSEMIDE 10 MG/ML
40 INJECTION INTRAMUSCULAR; INTRAVENOUS ONCE
Status: COMPLETED | OUTPATIENT
Start: 2020-01-01 | End: 2020-01-01

## 2020-01-01 RX ORDER — HYDROMORPHONE HCL 110MG/55ML
1.5 PATIENT CONTROLLED ANALGESIA SYRINGE INTRAVENOUS
Status: DISCONTINUED | OUTPATIENT
Start: 2020-01-01 | End: 2020-09-21 | Stop reason: HOSPADM

## 2020-01-01 RX ORDER — MORPHINE SULFATE 2 MG/ML
4 INJECTION, SOLUTION INTRAMUSCULAR; INTRAVENOUS ONCE
Status: COMPLETED | OUTPATIENT
Start: 2020-01-01 | End: 2020-01-01

## 2020-01-01 RX ORDER — ONDANSETRON 2 MG/ML
4 INJECTION INTRAMUSCULAR; INTRAVENOUS ONCE
Status: COMPLETED | OUTPATIENT
Start: 2020-01-01 | End: 2020-01-01

## 2020-01-01 RX ORDER — ATROPINE SULFATE 10 MG/ML
2 SOLUTION/ DROPS OPHTHALMIC 2 TIMES DAILY PRN
Status: DISCONTINUED | OUTPATIENT
Start: 2020-01-01 | End: 2020-01-01 | Stop reason: HOSPADM

## 2020-01-01 RX ORDER — CLOPIDOGREL BISULFATE 75 MG/1
75 TABLET ORAL DAILY
Status: DISCONTINUED | OUTPATIENT
Start: 2020-01-01 | End: 2020-01-01 | Stop reason: HOSPADM

## 2020-01-01 RX ORDER — LATANOPROST 50 UG/ML
1 SOLUTION/ DROPS OPHTHALMIC NIGHTLY
Status: DISCONTINUED | OUTPATIENT
Start: 2020-01-01 | End: 2020-01-01

## 2020-01-01 RX ORDER — MAGNESIUM SULFATE HEPTAHYDRATE 40 MG/ML
2 INJECTION, SOLUTION INTRAVENOUS AS NEEDED
Status: DISCONTINUED | OUTPATIENT
Start: 2020-01-01 | End: 2020-01-01

## 2020-01-01 RX ORDER — AMLODIPINE BESYLATE 10 MG/1
10 TABLET ORAL DAILY
Status: DISCONTINUED | OUTPATIENT
Start: 2020-01-01 | End: 2020-01-01

## 2020-01-01 RX ORDER — LEVOTHYROXINE SODIUM 0.15 MG/1
150 TABLET ORAL
Status: DISCONTINUED | OUTPATIENT
Start: 2020-01-01 | End: 2020-01-01 | Stop reason: HOSPADM

## 2020-01-01 RX ORDER — DEXTROSE MONOHYDRATE 25 G/50ML
25 INJECTION, SOLUTION INTRAVENOUS
Status: DISCONTINUED | OUTPATIENT
Start: 2020-01-01 | End: 2020-01-01

## 2020-01-01 RX ORDER — LATANOPROST 50 UG/ML
1 SOLUTION/ DROPS OPHTHALMIC NIGHTLY
Status: DISCONTINUED | OUTPATIENT
Start: 2020-01-01 | End: 2020-01-01 | Stop reason: HOSPADM

## 2020-01-01 RX ORDER — GLIMEPIRIDE 2 MG/1
1 TABLET ORAL DAILY
Status: DISCONTINUED | OUTPATIENT
Start: 2020-01-01 | End: 2020-01-01 | Stop reason: HOSPADM

## 2020-01-01 RX ORDER — RANOLAZINE 500 MG/1
500 TABLET, EXTENDED RELEASE ORAL EVERY 12 HOURS SCHEDULED
Qty: 60 TABLET | Refills: 0 | Status: SHIPPED | OUTPATIENT
Start: 2020-01-01 | End: 2020-01-01 | Stop reason: SDUPTHER

## 2020-01-01 RX ORDER — NICOTINE POLACRILEX 4 MG
15 LOZENGE BUCCAL
Status: DISCONTINUED | OUTPATIENT
Start: 2020-01-01 | End: 2020-01-01 | Stop reason: HOSPADM

## 2020-01-01 RX ORDER — PROMETHAZINE HYDROCHLORIDE 25 MG/1
12.5 TABLET ORAL EVERY 4 HOURS PRN
Status: DISCONTINUED | OUTPATIENT
Start: 2020-01-01 | End: 2020-01-01 | Stop reason: HOSPADM

## 2020-01-01 RX ORDER — DIPHENOXYLATE HYDROCHLORIDE AND ATROPINE SULFATE 2.5; .025 MG/1; MG/1
1 TABLET ORAL
Status: DISCONTINUED | OUTPATIENT
Start: 2020-01-01 | End: 2020-01-01 | Stop reason: HOSPADM

## 2020-01-01 RX ORDER — FUROSEMIDE 40 MG/1
40 TABLET ORAL 2 TIMES DAILY
Status: DISCONTINUED | OUTPATIENT
Start: 2020-01-01 | End: 2020-01-01 | Stop reason: HOSPADM

## 2020-01-01 RX ORDER — SPIRONOLACTONE 25 MG/1
25 TABLET ORAL DAILY
Status: DISCONTINUED | OUTPATIENT
Start: 2020-01-01 | End: 2020-01-01

## 2020-01-01 RX ORDER — AMLODIPINE BESYLATE 5 MG/1
5 TABLET ORAL DAILY
Status: ON HOLD | COMMUNITY
End: 2020-01-01 | Stop reason: SDUPTHER

## 2020-01-01 RX ORDER — SODIUM CHLORIDE 9 MG/ML
100 INJECTION, SOLUTION INTRAVENOUS CONTINUOUS
Status: ACTIVE | OUTPATIENT
Start: 2020-01-01 | End: 2020-01-01

## 2020-01-01 RX ORDER — SCOLOPAMINE TRANSDERMAL SYSTEM 1 MG/1
1 PATCH, EXTENDED RELEASE TRANSDERMAL
Status: DISCONTINUED | OUTPATIENT
Start: 2020-01-01 | End: 2020-09-21 | Stop reason: HOSPADM

## 2020-01-01 RX ORDER — LORAZEPAM 2 MG/ML
1 CONCENTRATE ORAL
Status: DISCONTINUED | OUTPATIENT
Start: 2020-01-01 | End: 2020-09-21 | Stop reason: HOSPADM

## 2020-01-01 RX ORDER — ERGOCALCIFEROL 1.25 MG/1
50000 CAPSULE ORAL
Status: DISCONTINUED | OUTPATIENT
Start: 2020-01-01 | End: 2020-01-01 | Stop reason: HOSPADM

## 2020-01-01 RX ORDER — ONDANSETRON 2 MG/ML
4 INJECTION INTRAMUSCULAR; INTRAVENOUS EVERY 6 HOURS PRN
Status: DISCONTINUED | OUTPATIENT
Start: 2020-01-01 | End: 2020-01-01 | Stop reason: HOSPADM

## 2020-01-01 RX ORDER — AMLODIPINE BESYLATE 5 MG/1
5 TABLET ORAL
Status: DISCONTINUED | OUTPATIENT
Start: 2020-01-01 | End: 2020-01-01 | Stop reason: HOSPADM

## 2020-01-01 RX ORDER — CALCITRIOL 0.25 UG/1
0.5 CAPSULE, LIQUID FILLED ORAL DAILY
Status: DISCONTINUED | OUTPATIENT
Start: 2020-01-01 | End: 2020-01-01

## 2020-01-01 RX ORDER — LABETALOL HYDROCHLORIDE 5 MG/ML
10 INJECTION, SOLUTION INTRAVENOUS ONCE
Status: COMPLETED | OUTPATIENT
Start: 2020-01-01 | End: 2020-01-01

## 2020-01-01 RX ORDER — MORPHINE SULFATE 20 MG/ML
5 SOLUTION ORAL
Status: DISCONTINUED | OUTPATIENT
Start: 2020-01-01 | End: 2020-01-01 | Stop reason: HOSPADM

## 2020-01-01 RX ORDER — ACETAMINOPHEN 500 MG
500 TABLET ORAL EVERY 4 HOURS PRN
Status: DISCONTINUED | OUTPATIENT
Start: 2020-01-01 | End: 2020-01-01 | Stop reason: HOSPADM

## 2020-01-01 RX ORDER — LIDOCAINE HYDROCHLORIDE 20 MG/ML
INJECTION, SOLUTION INFILTRATION; PERINEURAL AS NEEDED
Status: DISCONTINUED | OUTPATIENT
Start: 2020-01-01 | End: 2020-01-01 | Stop reason: HOSPADM

## 2020-01-01 RX ORDER — NITROGLYCERIN 20 MG/100ML
10-50 INJECTION INTRAVENOUS
Status: DISCONTINUED | OUTPATIENT
Start: 2020-01-01 | End: 2020-01-01

## 2020-01-01 RX ORDER — AMLODIPINE BESYLATE 10 MG/1
10 TABLET ORAL DAILY
Status: DISCONTINUED | OUTPATIENT
Start: 2020-01-01 | End: 2020-01-01 | Stop reason: HOSPADM

## 2020-01-01 RX ORDER — ACETAMINOPHEN 325 MG/1
650 TABLET ORAL EVERY 4 HOURS PRN
Status: DISCONTINUED | OUTPATIENT
Start: 2020-01-01 | End: 2020-01-01 | Stop reason: HOSPADM

## 2020-01-01 RX ORDER — MORPHINE SULFATE 2 MG/ML
2 INJECTION, SOLUTION INTRAMUSCULAR; INTRAVENOUS EVERY 4 HOURS PRN
Status: CANCELLED | OUTPATIENT
Start: 2020-01-01 | End: 2020-09-21

## 2020-01-01 RX ORDER — ACETAMINOPHEN 160 MG/5ML
650 SOLUTION ORAL EVERY 4 HOURS PRN
Status: DISCONTINUED | OUTPATIENT
Start: 2020-01-01 | End: 2020-09-21 | Stop reason: HOSPADM

## 2020-01-01 RX ORDER — SODIUM CHLORIDE 0.9 % (FLUSH) 0.9 %
10 SYRINGE (ML) INJECTION AS NEEDED
Status: DISCONTINUED | OUTPATIENT
Start: 2020-01-01 | End: 2020-01-01 | Stop reason: HOSPADM

## 2020-01-01 RX ORDER — SCOLOPAMINE TRANSDERMAL SYSTEM 1 MG/1
1 PATCH, EXTENDED RELEASE TRANSDERMAL
Status: CANCELLED | OUTPATIENT
Start: 2020-01-01

## 2020-01-01 RX ORDER — DIPHENOXYLATE HYDROCHLORIDE AND ATROPINE SULFATE 2.5; .025 MG/1; MG/1
1 TABLET ORAL
Status: CANCELLED | OUTPATIENT
Start: 2020-01-01 | End: 2020-09-26

## 2020-01-01 RX ORDER — LORAZEPAM 2 MG/ML
2 CONCENTRATE ORAL
Status: DISCONTINUED | OUTPATIENT
Start: 2020-01-01 | End: 2020-09-21 | Stop reason: HOSPADM

## 2020-01-01 RX ORDER — MAGNESIUM SULFATE HEPTAHYDRATE 40 MG/ML
2 INJECTION, SOLUTION INTRAVENOUS AS NEEDED
Status: DISCONTINUED | OUTPATIENT
Start: 2020-01-01 | End: 2020-01-01 | Stop reason: HOSPADM

## 2020-01-01 RX ORDER — MORPHINE SULFATE 2 MG/ML
2 INJECTION, SOLUTION INTRAMUSCULAR; INTRAVENOUS
Status: CANCELLED | OUTPATIENT
Start: 2020-01-01 | End: 2020-09-26

## 2020-01-01 RX ORDER — MORPHINE SULFATE 20 MG/ML
20 SOLUTION ORAL
Status: DISCONTINUED | OUTPATIENT
Start: 2020-01-01 | End: 2020-01-01 | Stop reason: HOSPADM

## 2020-01-01 RX ORDER — METOPROLOL SUCCINATE 50 MG/1
50 TABLET, EXTENDED RELEASE ORAL EVERY 12 HOURS SCHEDULED
Status: DISCONTINUED | OUTPATIENT
Start: 2020-01-01 | End: 2020-01-01 | Stop reason: HOSPADM

## 2020-01-01 RX ORDER — LOSARTAN POTASSIUM 50 MG/1
50 TABLET ORAL DAILY
Status: DISCONTINUED | OUTPATIENT
Start: 2020-01-01 | End: 2020-01-01 | Stop reason: HOSPADM

## 2020-01-01 RX ORDER — CEFTRIAXONE SODIUM 1 G/50ML
1 INJECTION, SOLUTION INTRAVENOUS ONCE
Status: COMPLETED | OUTPATIENT
Start: 2020-01-01 | End: 2020-01-01

## 2020-01-01 RX ORDER — FUROSEMIDE 10 MG/ML
40 INJECTION INTRAMUSCULAR; INTRAVENOUS EVERY 12 HOURS
Status: COMPLETED | OUTPATIENT
Start: 2020-01-01 | End: 2020-01-01

## 2020-01-01 RX ORDER — MORPHINE SULFATE 10 MG/ML
6 INJECTION INTRAMUSCULAR; INTRAVENOUS; SUBCUTANEOUS
Status: CANCELLED | OUTPATIENT
Start: 2020-01-01 | End: 2020-09-26

## 2020-01-01 RX ORDER — SODIUM CHLORIDE 0.9 % (FLUSH) 0.9 %
10 SYRINGE (ML) INJECTION AS NEEDED
Status: DISCONTINUED | OUTPATIENT
Start: 2020-01-01 | End: 2020-09-21 | Stop reason: HOSPADM

## 2020-01-01 RX ORDER — LORAZEPAM 2 MG/ML
0.5 INJECTION INTRAMUSCULAR
Status: DISCONTINUED | OUTPATIENT
Start: 2020-01-01 | End: 2020-01-01 | Stop reason: HOSPADM

## 2020-01-01 RX ORDER — ISOSORBIDE MONONITRATE 60 MG/1
60 TABLET, EXTENDED RELEASE ORAL
Status: DISCONTINUED | OUTPATIENT
Start: 2020-01-01 | End: 2020-01-01 | Stop reason: HOSPADM

## 2020-01-01 RX ORDER — ISOSORBIDE MONONITRATE 30 MG/1
30 TABLET, EXTENDED RELEASE ORAL
Status: DISCONTINUED | OUTPATIENT
Start: 2020-01-01 | End: 2020-01-01

## 2020-01-01 RX ORDER — ISOSORBIDE MONONITRATE 30 MG/1
30 TABLET, EXTENDED RELEASE ORAL
Qty: 90 TABLET | Refills: 3 | Status: SHIPPED | OUTPATIENT
Start: 2020-01-01 | End: 2020-01-01 | Stop reason: SDUPTHER

## 2020-01-01 RX ORDER — CALCITRIOL 0.5 UG/1
0.5 CAPSULE, LIQUID FILLED ORAL DAILY
COMMUNITY

## 2020-01-01 RX ORDER — ACETAMINOPHEN 650 MG/1
650 SUPPOSITORY RECTAL EVERY 4 HOURS PRN
Status: CANCELLED | OUTPATIENT
Start: 2020-01-01

## 2020-01-01 RX ORDER — ISOSORBIDE MONONITRATE 60 MG/1
60 TABLET, EXTENDED RELEASE ORAL
Status: DISCONTINUED | OUTPATIENT
Start: 2020-01-01 | End: 2020-01-01

## 2020-01-01 RX ORDER — ISOSORBIDE MONONITRATE 60 MG/1
60 TABLET, EXTENDED RELEASE ORAL
Qty: 90 TABLET | Refills: 1 | Status: SHIPPED | OUTPATIENT
Start: 2020-01-01

## 2020-01-01 RX ORDER — ATROPINE SULFATE 10 MG/ML
2 SOLUTION/ DROPS OPHTHALMIC 2 TIMES DAILY PRN
Status: CANCELLED | OUTPATIENT
Start: 2020-01-01

## 2020-01-01 RX ORDER — HYDROCODONE BITARTRATE AND ACETAMINOPHEN 5; 325 MG/1; MG/1
1 TABLET ORAL EVERY 6 HOURS PRN
Status: DISCONTINUED | OUTPATIENT
Start: 2020-01-01 | End: 2020-01-01 | Stop reason: HOSPADM

## 2020-01-01 RX ORDER — ONDANSETRON 2 MG/ML
8 INJECTION INTRAMUSCULAR; INTRAVENOUS ONCE
Status: COMPLETED | OUTPATIENT
Start: 2020-01-01 | End: 2020-01-01

## 2020-01-01 RX ORDER — AMLODIPINE BESYLATE 5 MG/1
5 TABLET ORAL
Qty: 90 TABLET | Refills: 1 | Status: SHIPPED | OUTPATIENT
Start: 2020-01-01 | End: 2020-01-01 | Stop reason: HOSPADM

## 2020-01-01 RX ORDER — METOPROLOL SUCCINATE 50 MG/1
50 TABLET, EXTENDED RELEASE ORAL DAILY
Status: DISCONTINUED | OUTPATIENT
Start: 2020-01-01 | End: 2020-01-01 | Stop reason: HOSPADM

## 2020-01-01 RX ORDER — FUROSEMIDE 40 MG/1
40 TABLET ORAL 2 TIMES DAILY
Status: DISCONTINUED | OUTPATIENT
Start: 2020-01-01 | End: 2020-01-01

## 2020-01-01 RX ORDER — ISOSORBIDE MONONITRATE 60 MG/1
60 TABLET, EXTENDED RELEASE ORAL
Qty: 30 TABLET | Refills: 0 | Status: SHIPPED | OUTPATIENT
Start: 2020-01-01 | End: 2020-01-01 | Stop reason: SDUPTHER

## 2020-01-01 RX ORDER — MORPHINE SULFATE 4 MG/ML
4 INJECTION, SOLUTION INTRAMUSCULAR; INTRAVENOUS
Status: CANCELLED | OUTPATIENT
Start: 2020-01-01 | End: 2020-09-26

## 2020-01-01 RX ORDER — PROMETHAZINE HYDROCHLORIDE 6.25 MG/5ML
12.5 SYRUP ORAL EVERY 4 HOURS PRN
Status: DISCONTINUED | OUTPATIENT
Start: 2020-01-01 | End: 2020-01-01 | Stop reason: HOSPADM

## 2020-01-01 RX ORDER — RANOLAZINE 500 MG/1
500 TABLET, EXTENDED RELEASE ORAL EVERY 12 HOURS SCHEDULED
Qty: 180 TABLET | Refills: 1 | Status: SHIPPED | OUTPATIENT
Start: 2020-01-01

## 2020-01-01 RX ORDER — LORAZEPAM 2 MG/ML
2 INJECTION INTRAMUSCULAR
Status: DISCONTINUED | OUTPATIENT
Start: 2020-01-01 | End: 2020-01-01 | Stop reason: HOSPADM

## 2020-01-01 RX ORDER — MORPHINE SULFATE 2 MG/ML
2 INJECTION, SOLUTION INTRAMUSCULAR; INTRAVENOUS EVERY 4 HOURS PRN
Status: DISCONTINUED | OUTPATIENT
Start: 2020-01-01 | End: 2020-01-01 | Stop reason: HOSPADM

## 2020-01-01 RX ORDER — SPIRONOLACTONE 25 MG/1
25 TABLET ORAL DAILY
Status: DISCONTINUED | OUTPATIENT
Start: 2020-01-01 | End: 2020-01-01 | Stop reason: HOSPADM

## 2020-01-01 RX ORDER — ONDANSETRON 4 MG/1
4 TABLET, FILM COATED ORAL EVERY 6 HOURS PRN
Status: DISCONTINUED | OUTPATIENT
Start: 2020-01-01 | End: 2020-01-01 | Stop reason: HOSPADM

## 2020-01-01 RX ORDER — LEVOTHYROXINE SODIUM 0.15 MG/1
150 TABLET ORAL DAILY
Status: DISCONTINUED | OUTPATIENT
Start: 2020-01-01 | End: 2020-01-01 | Stop reason: HOSPADM

## 2020-01-01 RX ORDER — ASPIRIN 81 MG/1
81 TABLET ORAL DAILY
Status: DISCONTINUED | OUTPATIENT
Start: 2020-01-01 | End: 2020-01-01

## 2020-01-01 RX ORDER — ACETAMINOPHEN 160 MG/5ML
650 SOLUTION ORAL EVERY 4 HOURS PRN
Status: DISCONTINUED | OUTPATIENT
Start: 2020-01-01 | End: 2020-01-01 | Stop reason: HOSPADM

## 2020-01-01 RX ORDER — SODIUM CHLORIDE 0.9 % (FLUSH) 0.9 %
10 SYRINGE (ML) INJECTION AS NEEDED
Status: CANCELLED | OUTPATIENT
Start: 2020-01-01

## 2020-01-01 RX ORDER — FUROSEMIDE 40 MG/1
40 TABLET ORAL
Status: DISCONTINUED | OUTPATIENT
Start: 2020-01-01 | End: 2020-01-01 | Stop reason: HOSPADM

## 2020-01-01 RX ORDER — LORAZEPAM 2 MG/ML
1 CONCENTRATE ORAL
Status: CANCELLED | OUTPATIENT
Start: 2020-01-01 | End: 2020-09-26

## 2020-01-01 RX ORDER — DEXTROSE MONOHYDRATE 25 G/50ML
25 INJECTION, SOLUTION INTRAVENOUS
Status: DISCONTINUED | OUTPATIENT
Start: 2020-01-01 | End: 2020-01-01 | Stop reason: HOSPADM

## 2020-01-01 RX ORDER — MORPHINE SULFATE 2 MG/ML
2 INJECTION, SOLUTION INTRAMUSCULAR; INTRAVENOUS
Status: DISCONTINUED | OUTPATIENT
Start: 2020-01-01 | End: 2020-09-21 | Stop reason: HOSPADM

## 2020-01-01 RX ORDER — AMLODIPINE BESYLATE 10 MG/1
10 TABLET ORAL DAILY
Qty: 90 TABLET | Refills: 3 | Status: SHIPPED | OUTPATIENT
Start: 2020-01-01 | End: 2020-01-01 | Stop reason: SDUPTHER

## 2020-01-01 RX ORDER — PROMETHAZINE HYDROCHLORIDE 25 MG/1
12.5 TABLET ORAL EVERY 4 HOURS PRN
Status: DISCONTINUED | OUTPATIENT
Start: 2020-01-01 | End: 2020-09-21 | Stop reason: HOSPADM

## 2020-01-01 RX ORDER — MORPHINE SULFATE 20 MG/ML
5 SOLUTION ORAL
Status: CANCELLED | OUTPATIENT
Start: 2020-01-01 | End: 2020-09-26

## 2020-01-01 RX ORDER — RANOLAZINE 500 MG/1
500 TABLET, EXTENDED RELEASE ORAL EVERY 12 HOURS SCHEDULED
Status: DISCONTINUED | OUTPATIENT
Start: 2020-01-01 | End: 2020-01-01 | Stop reason: HOSPADM

## 2020-01-01 RX ORDER — PROMETHAZINE HYDROCHLORIDE 6.25 MG/5ML
12.5 SYRUP ORAL EVERY 4 HOURS PRN
Status: DISCONTINUED | OUTPATIENT
Start: 2020-01-01 | End: 2020-09-21 | Stop reason: HOSPADM

## 2020-01-01 RX ORDER — LOSARTAN POTASSIUM 50 MG/1
TABLET ORAL
Qty: 90 TABLET | Refills: 4 | Status: SHIPPED | OUTPATIENT
Start: 2020-01-01 | End: 2020-01-01

## 2020-01-01 RX ORDER — MORPHINE SULFATE 20 MG/ML
5 SOLUTION ORAL
Status: DISCONTINUED | OUTPATIENT
Start: 2020-01-01 | End: 2020-09-21 | Stop reason: HOSPADM

## 2020-01-01 RX ORDER — MIDAZOLAM HYDROCHLORIDE 1 MG/ML
INJECTION INTRAMUSCULAR; INTRAVENOUS AS NEEDED
Status: DISCONTINUED | OUTPATIENT
Start: 2020-01-01 | End: 2020-01-01 | Stop reason: HOSPADM

## 2020-01-01 RX ORDER — SCOLOPAMINE TRANSDERMAL SYSTEM 1 MG/1
1 PATCH, EXTENDED RELEASE TRANSDERMAL
Status: DISCONTINUED | OUTPATIENT
Start: 2020-01-01 | End: 2020-01-01 | Stop reason: HOSPADM

## 2020-01-01 RX ORDER — METOPROLOL SUCCINATE 50 MG/1
50 TABLET, EXTENDED RELEASE ORAL EVERY 12 HOURS
Status: DISCONTINUED | OUTPATIENT
Start: 2020-01-01 | End: 2020-01-01

## 2020-01-01 RX ORDER — ACETAMINOPHEN 160 MG/5ML
650 SOLUTION ORAL EVERY 4 HOURS PRN
Status: CANCELLED | OUTPATIENT
Start: 2020-01-01

## 2020-01-01 RX ORDER — NITROGLYCERIN 0.4 MG/1
0.4 TABLET SUBLINGUAL AS NEEDED
Status: CANCELLED | OUTPATIENT
Start: 2020-01-01

## 2020-01-01 RX ORDER — HEPARIN SODIUM 5000 [USP'U]/ML
30-44.1 INJECTION, SOLUTION INTRAVENOUS; SUBCUTANEOUS EVERY 6 HOURS PRN
Status: DISCONTINUED | OUTPATIENT
Start: 2020-01-01 | End: 2020-01-01 | Stop reason: HOSPADM

## 2020-01-01 RX ORDER — METOPROLOL SUCCINATE 50 MG/1
50 TABLET, EXTENDED RELEASE ORAL EVERY 12 HOURS
Qty: 180 TABLET | Refills: 3 | Status: SHIPPED | OUTPATIENT
Start: 2020-01-01

## 2020-01-01 RX ORDER — FENTANYL CITRATE 50 UG/ML
INJECTION, SOLUTION INTRAMUSCULAR; INTRAVENOUS AS NEEDED
Status: DISCONTINUED | OUTPATIENT
Start: 2020-01-01 | End: 2020-01-01 | Stop reason: HOSPADM

## 2020-01-01 RX ORDER — LORAZEPAM 2 MG/ML
0.5 CONCENTRATE ORAL
Status: DISCONTINUED | OUTPATIENT
Start: 2020-01-01 | End: 2020-01-01 | Stop reason: HOSPADM

## 2020-01-01 RX ORDER — DIPHENOXYLATE HYDROCHLORIDE AND ATROPINE SULFATE 2.5; .025 MG/1; MG/1
1 TABLET ORAL
Status: DISCONTINUED | OUTPATIENT
Start: 2020-01-01 | End: 2020-09-21 | Stop reason: HOSPADM

## 2020-01-01 RX ORDER — AMLODIPINE BESYLATE 10 MG/1
10 TABLET ORAL DAILY
Qty: 90 TABLET | Refills: 3 | Status: SHIPPED | OUTPATIENT
Start: 2020-01-01

## 2020-01-01 RX ORDER — LORAZEPAM 2 MG/ML
2 CONCENTRATE ORAL
Status: CANCELLED | OUTPATIENT
Start: 2020-01-01 | End: 2020-09-26

## 2020-01-01 RX ORDER — ASPIRIN 81 MG/1
324 TABLET, CHEWABLE ORAL ONCE
Status: DISCONTINUED | OUTPATIENT
Start: 2020-01-01 | End: 2020-01-01

## 2020-01-01 RX ORDER — NITROGLYCERIN 0.4 MG/1
0.4 TABLET SUBLINGUAL
Status: COMPLETED | OUTPATIENT
Start: 2020-01-01 | End: 2020-01-01

## 2020-01-01 RX ORDER — HEPARIN SODIUM 5000 [USP'U]/ML
44.1 INJECTION, SOLUTION INTRAVENOUS; SUBCUTANEOUS ONCE
Status: COMPLETED | OUTPATIENT
Start: 2020-01-01 | End: 2020-01-01

## 2020-01-01 RX ORDER — MORPHINE SULFATE 2 MG/ML
6 INJECTION, SOLUTION INTRAMUSCULAR; INTRAVENOUS
Status: DISCONTINUED | OUTPATIENT
Start: 2020-01-01 | End: 2020-01-01 | Stop reason: HOSPADM

## 2020-01-01 RX ORDER — MORPHINE SULFATE 20 MG/ML
10 SOLUTION ORAL
Status: DISCONTINUED | OUTPATIENT
Start: 2020-01-01 | End: 2020-09-21 | Stop reason: HOSPADM

## 2020-01-01 RX ORDER — ACETAMINOPHEN 325 MG/1
650 TABLET ORAL EVERY 6 HOURS PRN
Status: CANCELLED | OUTPATIENT
Start: 2020-01-01

## 2020-01-01 RX ORDER — ASPIRIN 81 MG/1
81 TABLET ORAL DAILY
COMMUNITY

## 2020-01-01 RX ORDER — NICOTINE POLACRILEX 4 MG
15 LOZENGE BUCCAL
Status: DISCONTINUED | OUTPATIENT
Start: 2020-01-01 | End: 2020-01-01

## 2020-01-01 RX ORDER — MORPHINE SULFATE 20 MG/ML
10 SOLUTION ORAL
Status: DISCONTINUED | OUTPATIENT
Start: 2020-01-01 | End: 2020-01-01 | Stop reason: HOSPADM

## 2020-01-01 RX ORDER — ONDANSETRON 2 MG/ML
4 INJECTION INTRAMUSCULAR; INTRAVENOUS EVERY 6 HOURS PRN
Status: DISCONTINUED | OUTPATIENT
Start: 2020-01-01 | End: 2020-09-21 | Stop reason: HOSPADM

## 2020-01-01 RX ORDER — LORAZEPAM 2 MG/ML
1 INJECTION INTRAMUSCULAR
Status: DISCONTINUED | OUTPATIENT
Start: 2020-01-01 | End: 2020-01-01 | Stop reason: HOSPADM

## 2020-01-01 RX ORDER — LORAZEPAM 2 MG/ML
1 CONCENTRATE ORAL
Status: DISCONTINUED | OUTPATIENT
Start: 2020-01-01 | End: 2020-01-01 | Stop reason: HOSPADM

## 2020-01-01 RX ORDER — POTASSIUM CHLORIDE 750 MG/1
40 CAPSULE, EXTENDED RELEASE ORAL AS NEEDED
Status: DISCONTINUED | OUTPATIENT
Start: 2020-01-01 | End: 2020-01-01 | Stop reason: HOSPADM

## 2020-01-01 RX ORDER — MORPHINE SULFATE 2 MG/ML
4 INJECTION, SOLUTION INTRAMUSCULAR; INTRAVENOUS ONCE AS NEEDED
Status: COMPLETED | OUTPATIENT
Start: 2020-01-01 | End: 2020-01-01

## 2020-01-01 RX ORDER — LORAZEPAM 2 MG/ML
1 INJECTION INTRAMUSCULAR
Status: DISCONTINUED | OUTPATIENT
Start: 2020-01-01 | End: 2020-09-21 | Stop reason: HOSPADM

## 2020-01-01 RX ORDER — AMLODIPINE BESYLATE 5 MG/1
5 TABLET ORAL
Qty: 30 TABLET | Refills: 0 | Status: SHIPPED | OUTPATIENT
Start: 2020-01-01 | End: 2020-01-01 | Stop reason: SDUPTHER

## 2020-01-01 RX ORDER — LORAZEPAM 2 MG/ML
1 INJECTION INTRAMUSCULAR
Status: CANCELLED | OUTPATIENT
Start: 2020-01-01 | End: 2020-09-26

## 2020-01-01 RX ORDER — HYDROMORPHONE HYDROCHLORIDE 1 MG/ML
0.5 INJECTION, SOLUTION INTRAMUSCULAR; INTRAVENOUS; SUBCUTANEOUS
Status: DISCONTINUED | OUTPATIENT
Start: 2020-01-01 | End: 2020-01-01 | Stop reason: HOSPADM

## 2020-01-01 RX ORDER — ACETAMINOPHEN 650 MG/1
650 SUPPOSITORY RECTAL EVERY 4 HOURS PRN
Status: DISCONTINUED | OUTPATIENT
Start: 2020-01-01 | End: 2020-01-01 | Stop reason: HOSPADM

## 2020-01-01 RX ORDER — IPRATROPIUM BROMIDE AND ALBUTEROL SULFATE 2.5; .5 MG/3ML; MG/3ML
3 SOLUTION RESPIRATORY (INHALATION) EVERY 4 HOURS PRN
Status: CANCELLED | OUTPATIENT
Start: 2020-01-01

## 2020-01-01 RX ORDER — LORAZEPAM 2 MG/ML
2 CONCENTRATE ORAL
Status: DISCONTINUED | OUTPATIENT
Start: 2020-01-01 | End: 2020-01-01 | Stop reason: HOSPADM

## 2020-01-01 RX ORDER — INSULIN GLARGINE 100 [IU]/ML
10 INJECTION, SOLUTION SUBCUTANEOUS NIGHTLY
Status: DISCONTINUED | OUTPATIENT
Start: 2020-01-01 | End: 2020-01-01

## 2020-01-01 RX ORDER — RANOLAZINE 500 MG/1
500 TABLET, EXTENDED RELEASE ORAL EVERY 12 HOURS SCHEDULED
Status: DISCONTINUED | OUTPATIENT
Start: 2020-01-01 | End: 2020-01-01

## 2020-01-01 RX ORDER — ASPIRIN 81 MG/1
81 TABLET, CHEWABLE ORAL DAILY
Status: DISCONTINUED | OUTPATIENT
Start: 2020-01-01 | End: 2020-01-01 | Stop reason: HOSPADM

## 2020-01-01 RX ORDER — LORAZEPAM 2 MG/ML
2 INJECTION INTRAMUSCULAR
Status: CANCELLED | OUTPATIENT
Start: 2020-01-01 | End: 2020-09-26

## 2020-01-01 RX ORDER — ONDANSETRON 2 MG/ML
4 INJECTION INTRAMUSCULAR; INTRAVENOUS EVERY 6 HOURS PRN
Status: CANCELLED | OUTPATIENT
Start: 2020-01-01

## 2020-01-01 RX ORDER — LORAZEPAM 2 MG/ML
2 INJECTION INTRAMUSCULAR
Status: DISCONTINUED | OUTPATIENT
Start: 2020-01-01 | End: 2020-09-21 | Stop reason: HOSPADM

## 2020-01-01 RX ORDER — PROMETHAZINE HYDROCHLORIDE 6.25 MG/5ML
12.5 SYRUP ORAL EVERY 4 HOURS PRN
Status: CANCELLED | OUTPATIENT
Start: 2020-01-01

## 2020-01-01 RX ORDER — SODIUM CHLORIDE 0.9 % (FLUSH) 0.9 %
10 SYRINGE (ML) INJECTION EVERY 12 HOURS SCHEDULED
Status: DISCONTINUED | OUTPATIENT
Start: 2020-01-01 | End: 2020-01-01

## 2020-01-01 RX ORDER — MAGNESIUM SULFATE 1 G/100ML
1 INJECTION INTRAVENOUS AS NEEDED
Status: DISCONTINUED | OUTPATIENT
Start: 2020-01-01 | End: 2020-01-01 | Stop reason: HOSPADM

## 2020-01-01 RX ORDER — HYDROCODONE BITARTRATE AND ACETAMINOPHEN 5; 325 MG/1; MG/1
1 TABLET ORAL EVERY 6 HOURS PRN
Status: DISCONTINUED | OUTPATIENT
Start: 2020-01-01 | End: 2020-09-21 | Stop reason: HOSPADM

## 2020-01-01 RX ORDER — CLOPIDOGREL BISULFATE 75 MG/1
75 TABLET ORAL DAILY
Status: DISCONTINUED | OUTPATIENT
Start: 2020-01-01 | End: 2020-01-01

## 2020-01-01 RX ORDER — MORPHINE SULFATE 2 MG/ML
4 INJECTION, SOLUTION INTRAMUSCULAR; INTRAVENOUS
Status: DISCONTINUED | OUTPATIENT
Start: 2020-01-01 | End: 2020-01-01 | Stop reason: HOSPADM

## 2020-01-01 RX ORDER — METOPROLOL SUCCINATE 50 MG/1
50 TABLET, EXTENDED RELEASE ORAL DAILY
Status: DISCONTINUED | OUTPATIENT
Start: 2020-01-01 | End: 2020-01-01

## 2020-01-01 RX ORDER — LORAZEPAM 2 MG/ML
0.5 CONCENTRATE ORAL
Status: CANCELLED | OUTPATIENT
Start: 2020-01-01 | End: 2020-09-26

## 2020-01-01 RX ORDER — ACETAMINOPHEN 650 MG/1
650 SUPPOSITORY RECTAL EVERY 4 HOURS PRN
Status: DISCONTINUED | OUTPATIENT
Start: 2020-01-01 | End: 2020-09-21 | Stop reason: HOSPADM

## 2020-01-01 RX ORDER — LORAZEPAM 2 MG/ML
0.5 INJECTION INTRAMUSCULAR
Status: CANCELLED | OUTPATIENT
Start: 2020-01-01 | End: 2020-09-26

## 2020-01-01 RX ORDER — CALCITRIOL 0.25 UG/1
CAPSULE, LIQUID FILLED ORAL
Status: ON HOLD | COMMUNITY
Start: 2019-01-01 | End: 2020-01-01

## 2020-01-01 RX ORDER — CALCIUM CARBONATE 200(500)MG
2 TABLET,CHEWABLE ORAL 3 TIMES DAILY PRN
Status: DISCONTINUED | OUTPATIENT
Start: 2020-01-01 | End: 2020-01-01

## 2020-01-01 RX ORDER — NIFEDIPINE 90 MG/1
TABLET, EXTENDED RELEASE ORAL
Qty: 90 TABLET | Refills: 4 | Status: SHIPPED | OUTPATIENT
Start: 2020-01-01 | End: 2020-01-01 | Stop reason: HOSPADM

## 2020-01-01 RX ORDER — SODIUM CHLORIDE 0.9 % (FLUSH) 0.9 %
10 SYRINGE (ML) INJECTION EVERY 12 HOURS SCHEDULED
Status: DISCONTINUED | OUTPATIENT
Start: 2020-01-01 | End: 2020-01-01 | Stop reason: HOSPADM

## 2020-01-01 RX ORDER — HYDROCODONE BITARTRATE AND ACETAMINOPHEN 5; 325 MG/1; MG/1
1 TABLET ORAL EVERY 6 HOURS PRN
Status: CANCELLED | OUTPATIENT
Start: 2020-01-01 | End: 2020-09-21

## 2020-01-01 RX ORDER — MORPHINE SULFATE 20 MG/ML
10 SOLUTION ORAL
Status: CANCELLED | OUTPATIENT
Start: 2020-01-01 | End: 2020-09-26

## 2020-01-01 RX ORDER — LORAZEPAM 2 MG/ML
0.5 CONCENTRATE ORAL
Status: DISCONTINUED | OUTPATIENT
Start: 2020-01-01 | End: 2020-09-21 | Stop reason: HOSPADM

## 2020-01-01 RX ORDER — LEVOTHYROXINE SODIUM 0.15 MG/1
150 TABLET ORAL DAILY
Status: DISCONTINUED | OUTPATIENT
Start: 2020-01-01 | End: 2020-01-01

## 2020-01-01 RX ORDER — PROMETHAZINE HYDROCHLORIDE 12.5 MG/1
12.5 SUPPOSITORY RECTAL EVERY 4 HOURS PRN
Status: DISCONTINUED | OUTPATIENT
Start: 2020-01-01 | End: 2020-09-21 | Stop reason: HOSPADM

## 2020-01-01 RX ORDER — HYDROMORPHONE HCL 110MG/55ML
1.5 PATIENT CONTROLLED ANALGESIA SYRINGE INTRAVENOUS
Status: CANCELLED | OUTPATIENT
Start: 2020-01-01 | End: 2020-09-26

## 2020-01-01 RX ORDER — INSULIN GLARGINE 100 [IU]/ML
15 INJECTION, SOLUTION SUBCUTANEOUS NIGHTLY
Status: DISCONTINUED | OUTPATIENT
Start: 2020-01-01 | End: 2020-01-01

## 2020-01-01 RX ORDER — ACETAMINOPHEN 325 MG/1
650 TABLET ORAL EVERY 6 HOURS PRN
Status: DISCONTINUED | OUTPATIENT
Start: 2020-01-01 | End: 2020-01-01 | Stop reason: HOSPADM

## 2020-01-01 RX ORDER — ISOSORBIDE MONONITRATE 30 MG/1
30 TABLET, EXTENDED RELEASE ORAL
Qty: 90 TABLET | Refills: 3 | Status: SHIPPED | OUTPATIENT
Start: 2020-01-01 | End: 2020-01-01 | Stop reason: HOSPADM

## 2020-01-01 RX ORDER — IPRATROPIUM BROMIDE AND ALBUTEROL SULFATE 2.5; .5 MG/3ML; MG/3ML
3 SOLUTION RESPIRATORY (INHALATION) EVERY 4 HOURS PRN
Status: DISCONTINUED | OUTPATIENT
Start: 2020-01-01 | End: 2020-09-21 | Stop reason: HOSPADM

## 2020-01-01 RX ORDER — NITROGLYCERIN 0.4 MG/1
0.4 TABLET SUBLINGUAL AS NEEDED
Status: DISCONTINUED | OUTPATIENT
Start: 2020-01-01 | End: 2020-09-21 | Stop reason: HOSPADM

## 2020-01-01 RX ORDER — METHYLPREDNISOLONE ACETATE 80 MG/ML
80 INJECTION, SUSPENSION INTRA-ARTICULAR; INTRALESIONAL; INTRAMUSCULAR; SOFT TISSUE
Status: COMPLETED | OUTPATIENT
Start: 2020-01-01 | End: 2020-01-01

## 2020-01-01 RX ORDER — GLIMEPIRIDE 2 MG/1
1 TABLET ORAL DAILY
Status: DISCONTINUED | OUTPATIENT
Start: 2020-01-01 | End: 2020-01-01

## 2020-01-01 RX ORDER — ACETAMINOPHEN 325 MG/1
650 TABLET ORAL EVERY 4 HOURS PRN
Status: CANCELLED | OUTPATIENT
Start: 2020-01-01

## 2020-01-01 RX ORDER — MORPHINE SULFATE 2 MG/ML
2 INJECTION, SOLUTION INTRAMUSCULAR; INTRAVENOUS
Status: DISCONTINUED | OUTPATIENT
Start: 2020-01-01 | End: 2020-01-01 | Stop reason: HOSPADM

## 2020-01-01 RX ORDER — HYDROMORPHONE HYDROCHLORIDE 1 MG/ML
0.5 INJECTION, SOLUTION INTRAMUSCULAR; INTRAVENOUS; SUBCUTANEOUS
Status: DISCONTINUED | OUTPATIENT
Start: 2020-01-01 | End: 2020-09-21 | Stop reason: HOSPADM

## 2020-01-01 RX ORDER — MAGNESIUM SULFATE HEPTAHYDRATE 40 MG/ML
4 INJECTION, SOLUTION INTRAVENOUS AS NEEDED
Status: DISCONTINUED | OUTPATIENT
Start: 2020-01-01 | End: 2020-01-01

## 2020-01-01 RX ORDER — CEFTRIAXONE SODIUM 1 G/50ML
1 INJECTION, SOLUTION INTRAVENOUS EVERY 24 HOURS
Status: DISCONTINUED | OUTPATIENT
Start: 2020-01-01 | End: 2020-01-01

## 2020-01-01 RX ORDER — MORPHINE SULFATE 4 MG/ML
4 INJECTION, SOLUTION INTRAMUSCULAR; INTRAVENOUS
Status: DISCONTINUED | OUTPATIENT
Start: 2020-01-01 | End: 2020-09-21 | Stop reason: HOSPADM

## 2020-01-01 RX ORDER — NITROGLYCERIN 0.4 MG/1
0.4 TABLET SUBLINGUAL ONCE
Status: COMPLETED | OUTPATIENT
Start: 2020-01-01 | End: 2020-01-01

## 2020-01-01 RX ORDER — ACETAMINOPHEN 325 MG/1
650 TABLET ORAL EVERY 4 HOURS PRN
Status: DISCONTINUED | OUTPATIENT
Start: 2020-01-01 | End: 2020-09-21 | Stop reason: HOSPADM

## 2020-01-01 RX ORDER — NITROGLYCERIN 0.4 MG/1
0.4 TABLET SUBLINGUAL AS NEEDED
Status: DISCONTINUED | OUTPATIENT
Start: 2020-01-01 | End: 2020-01-01 | Stop reason: HOSPADM

## 2020-01-01 RX ORDER — MORPHINE SULFATE 20 MG/ML
20 SOLUTION ORAL
Status: CANCELLED | OUTPATIENT
Start: 2020-01-01 | End: 2020-09-26

## 2020-01-01 RX ORDER — HYDROMORPHONE HYDROCHLORIDE 1 MG/ML
0.5 INJECTION, SOLUTION INTRAMUSCULAR; INTRAVENOUS; SUBCUTANEOUS
Status: CANCELLED | OUTPATIENT
Start: 2020-01-01 | End: 2020-09-26

## 2020-01-01 RX ORDER — ATROPINE SULFATE 10 MG/ML
2 SOLUTION/ DROPS OPHTHALMIC 2 TIMES DAILY PRN
Status: DISCONTINUED | OUTPATIENT
Start: 2020-01-01 | End: 2020-09-21 | Stop reason: HOSPADM

## 2020-01-01 RX ORDER — ISOSORBIDE MONONITRATE 30 MG/1
30 TABLET, EXTENDED RELEASE ORAL
Status: DISCONTINUED | OUTPATIENT
Start: 2020-01-01 | End: 2020-01-01 | Stop reason: HOSPADM

## 2020-01-01 RX ORDER — MORPHINE SULFATE 10 MG/ML
6 INJECTION INTRAMUSCULAR; INTRAVENOUS; SUBCUTANEOUS
Status: DISCONTINUED | OUTPATIENT
Start: 2020-01-01 | End: 2020-09-21 | Stop reason: HOSPADM

## 2020-01-01 RX ORDER — PROMETHAZINE HYDROCHLORIDE 12.5 MG/1
12.5 SUPPOSITORY RECTAL EVERY 4 HOURS PRN
Status: DISCONTINUED | OUTPATIENT
Start: 2020-01-01 | End: 2020-01-01 | Stop reason: HOSPADM

## 2020-01-01 RX ORDER — PROMETHAZINE HYDROCHLORIDE 12.5 MG/1
12.5 SUPPOSITORY RECTAL EVERY 4 HOURS PRN
Status: CANCELLED | OUTPATIENT
Start: 2020-01-01

## 2020-01-01 RX ORDER — SPIRONOLACTONE 25 MG/1
TABLET ORAL
Qty: 90 TABLET | Refills: 3 | Status: SHIPPED | OUTPATIENT
Start: 2020-01-01

## 2020-01-01 RX ORDER — IPRATROPIUM BROMIDE AND ALBUTEROL SULFATE 2.5; .5 MG/3ML; MG/3ML
3 SOLUTION RESPIRATORY (INHALATION) EVERY 4 HOURS PRN
Status: DISCONTINUED | OUTPATIENT
Start: 2020-01-01 | End: 2020-01-01 | Stop reason: HOSPADM

## 2020-01-01 RX ORDER — FUROSEMIDE 10 MG/ML
80 INJECTION INTRAMUSCULAR; INTRAVENOUS ONCE
Status: COMPLETED | OUTPATIENT
Start: 2020-01-01 | End: 2020-01-01

## 2020-01-01 RX ORDER — MORPHINE SULFATE 20 MG/ML
20 SOLUTION ORAL
Status: DISCONTINUED | OUTPATIENT
Start: 2020-01-01 | End: 2020-09-21 | Stop reason: HOSPADM

## 2020-01-01 RX ORDER — HEPARIN SODIUM 10000 [USP'U]/100ML
12 INJECTION, SOLUTION INTRAVENOUS
Status: DISCONTINUED | OUTPATIENT
Start: 2020-01-01 | End: 2020-01-01

## 2020-01-01 RX ORDER — MORPHINE SULFATE 2 MG/ML
2 INJECTION, SOLUTION INTRAMUSCULAR; INTRAVENOUS EVERY 4 HOURS PRN
Status: DISCONTINUED | OUTPATIENT
Start: 2020-01-01 | End: 2020-09-21 | Stop reason: HOSPADM

## 2020-01-01 RX ORDER — ACETAMINOPHEN 325 MG/1
650 TABLET ORAL EVERY 6 HOURS PRN
Status: DISCONTINUED | OUTPATIENT
Start: 2020-01-01 | End: 2020-09-21 | Stop reason: HOSPADM

## 2020-01-01 RX ORDER — SODIUM CHLORIDE 9 MG/ML
INJECTION, SOLUTION INTRAVENOUS CONTINUOUS PRN
Status: COMPLETED | OUTPATIENT
Start: 2020-01-01 | End: 2020-01-01

## 2020-01-01 RX ORDER — METOPROLOL SUCCINATE 50 MG/1
50 TABLET, EXTENDED RELEASE ORAL EVERY 12 HOURS
Qty: 180 TABLET | Refills: 3 | Status: SHIPPED | OUTPATIENT
Start: 2020-01-01 | End: 2020-01-01 | Stop reason: SDUPTHER

## 2020-01-01 RX ADMIN — RANOLAZINE 500 MG: 500 TABLET, FILM COATED, EXTENDED RELEASE ORAL at 09:28

## 2020-01-01 RX ADMIN — ONDANSETRON 4 MG: 2 INJECTION INTRAMUSCULAR; INTRAVENOUS at 08:47

## 2020-01-01 RX ADMIN — METOPROLOL SUCCINATE 50 MG: 50 TABLET, EXTENDED RELEASE ORAL at 20:27

## 2020-01-01 RX ADMIN — LEVOTHYROXINE SODIUM 150 MCG: 150 TABLET ORAL at 05:12

## 2020-01-01 RX ADMIN — HYDROMORPHONE HYDROCHLORIDE 1 MG: 1 INJECTION, SOLUTION INTRAMUSCULAR; INTRAVENOUS; SUBCUTANEOUS at 00:29

## 2020-01-01 RX ADMIN — FUROSEMIDE 40 MG: 40 TABLET ORAL at 18:39

## 2020-01-01 RX ADMIN — MORPHINE SULFATE 4 MG: 2 INJECTION, SOLUTION INTRAMUSCULAR; INTRAVENOUS at 12:34

## 2020-01-01 RX ADMIN — ENOXAPARIN SODIUM 30 MG: 30 INJECTION SUBCUTANEOUS at 16:32

## 2020-01-01 RX ADMIN — FUROSEMIDE 40 MG: 40 TABLET ORAL at 22:21

## 2020-01-01 RX ADMIN — FUROSEMIDE 40 MG: 40 TABLET ORAL at 20:28

## 2020-01-01 RX ADMIN — LORAZEPAM 0.5 MG: 2 INJECTION INTRAMUSCULAR; INTRAVENOUS at 20:20

## 2020-01-01 RX ADMIN — LOSARTAN POTASSIUM 50 MG: 50 TABLET, FILM COATED ORAL at 09:25

## 2020-01-01 RX ADMIN — METOPROLOL SUCCINATE 50 MG: 50 TABLET, EXTENDED RELEASE ORAL at 08:13

## 2020-01-01 RX ADMIN — TIMOLOL MALEATE 1 DROP: 2.5 SOLUTION/ DROPS OPHTHALMIC at 08:41

## 2020-01-01 RX ADMIN — MORPHINE SULFATE 4 MG: 2 INJECTION, SOLUTION INTRAMUSCULAR; INTRAVENOUS at 09:31

## 2020-01-01 RX ADMIN — AMLODIPINE BESYLATE 10 MG: 10 TABLET ORAL at 09:01

## 2020-01-01 RX ADMIN — HYDROMORPHONE HYDROCHLORIDE 1 MG: 1 INJECTION, SOLUTION INTRAMUSCULAR; INTRAVENOUS; SUBCUTANEOUS at 08:20

## 2020-01-01 RX ADMIN — FUROSEMIDE 40 MG: 40 TABLET ORAL at 16:35

## 2020-01-01 RX ADMIN — HYDROMORPHONE HYDROCHLORIDE 1 MG: 1 INJECTION, SOLUTION INTRAMUSCULAR; INTRAVENOUS; SUBCUTANEOUS at 16:37

## 2020-01-01 RX ADMIN — NITROGLYCERIN 1 INCH: 20 OINTMENT TOPICAL at 02:45

## 2020-01-01 RX ADMIN — SODIUM CHLORIDE, PRESERVATIVE FREE 10 ML: 5 INJECTION INTRAVENOUS at 20:27

## 2020-01-01 RX ADMIN — SODIUM CHLORIDE, PRESERVATIVE FREE 10 ML: 5 INJECTION INTRAVENOUS at 08:14

## 2020-01-01 RX ADMIN — CLOPIDOGREL 75 MG: 75 TABLET, FILM COATED ORAL at 08:20

## 2020-01-01 RX ADMIN — HYDROMORPHONE HYDROCHLORIDE 0.5 MG: 1 INJECTION, SOLUTION INTRAMUSCULAR; INTRAVENOUS; SUBCUTANEOUS at 13:43

## 2020-01-01 RX ADMIN — FUROSEMIDE 40 MG: 20 INJECTION, SOLUTION INTRAMUSCULAR; INTRAVENOUS at 05:07

## 2020-01-01 RX ADMIN — SODIUM CHLORIDE, PRESERVATIVE FREE 10 ML: 5 INJECTION INTRAVENOUS at 22:28

## 2020-01-01 RX ADMIN — METOPROLOL SUCCINATE 50 MG: 50 TABLET, EXTENDED RELEASE ORAL at 06:34

## 2020-01-01 RX ADMIN — FUROSEMIDE 40 MG: 40 TABLET ORAL at 09:00

## 2020-01-01 RX ADMIN — SPIRONOLACTONE 25 MG: 25 TABLET ORAL at 22:22

## 2020-01-01 RX ADMIN — SODIUM CHLORIDE, PRESERVATIVE FREE 10 ML: 5 INJECTION INTRAVENOUS at 09:34

## 2020-01-01 RX ADMIN — ENOXAPARIN SODIUM 30 MG: 30 INJECTION SUBCUTANEOUS at 15:41

## 2020-01-01 RX ADMIN — INSULIN LISPRO 2 UNITS: 100 INJECTION, SOLUTION INTRAVENOUS; SUBCUTANEOUS at 18:38

## 2020-01-01 RX ADMIN — ISOSORBIDE MONONITRATE 60 MG: 60 TABLET ORAL at 08:20

## 2020-01-01 RX ADMIN — ONDANSETRON 4 MG: 2 INJECTION INTRAMUSCULAR; INTRAVENOUS at 14:31

## 2020-01-01 RX ADMIN — RANOLAZINE 500 MG: 500 TABLET, FILM COATED, EXTENDED RELEASE ORAL at 09:13

## 2020-01-01 RX ADMIN — INSULIN LISPRO 3 UNITS: 100 INJECTION, SOLUTION INTRAVENOUS; SUBCUTANEOUS at 11:36

## 2020-01-01 RX ADMIN — POTASSIUM CHLORIDE 40 MEQ: 10 CAPSULE, COATED, EXTENDED RELEASE ORAL at 10:21

## 2020-01-01 RX ADMIN — CLOPIDOGREL 75 MG: 75 TABLET, FILM COATED ORAL at 09:07

## 2020-01-01 RX ADMIN — LEVOTHYROXINE SODIUM 150 MCG: 150 TABLET ORAL at 06:01

## 2020-01-01 RX ADMIN — METOPROLOL SUCCINATE 50 MG: 50 TABLET, EXTENDED RELEASE ORAL at 09:24

## 2020-01-01 RX ADMIN — AMLODIPINE BESYLATE 10 MG: 10 TABLET ORAL at 09:28

## 2020-01-01 RX ADMIN — LATANOPROST 1 DROP: 50 SOLUTION/ DROPS OPHTHALMIC at 23:21

## 2020-01-01 RX ADMIN — ASPIRIN 81 MG: 81 TABLET, COATED ORAL at 09:34

## 2020-01-01 RX ADMIN — SPIRONOLACTONE 25 MG: 25 TABLET ORAL at 14:18

## 2020-01-01 RX ADMIN — AMLODIPINE BESYLATE 5 MG: 5 TABLET ORAL at 08:32

## 2020-01-01 RX ADMIN — FUROSEMIDE 40 MG: 40 TABLET ORAL at 14:29

## 2020-01-01 RX ADMIN — LOSARTAN POTASSIUM 50 MG: 50 TABLET, FILM COATED ORAL at 08:38

## 2020-01-01 RX ADMIN — RANOLAZINE 500 MG: 500 TABLET, FILM COATED, EXTENDED RELEASE ORAL at 20:52

## 2020-01-01 RX ADMIN — METOPROLOL SUCCINATE 50 MG: 50 TABLET, EXTENDED RELEASE ORAL at 09:13

## 2020-01-01 RX ADMIN — FUROSEMIDE 40 MG: 40 TABLET ORAL at 09:07

## 2020-01-01 RX ADMIN — ISOSORBIDE MONONITRATE 60 MG: 60 TABLET ORAL at 14:18

## 2020-01-01 RX ADMIN — LORAZEPAM 0.5 MG: 2 INJECTION INTRAMUSCULAR; INTRAVENOUS at 20:41

## 2020-01-01 RX ADMIN — TIMOLOL MALEATE 1 DROP: 2.5 SOLUTION/ DROPS OPHTHALMIC at 09:05

## 2020-01-01 RX ADMIN — HEPARIN SODIUM 9 UNITS/KG/HR: 10000 INJECTION, SOLUTION INTRAVENOUS at 20:19

## 2020-01-01 RX ADMIN — TIMOLOL MALEATE 1 DROP: 2.5 SOLUTION/ DROPS OPHTHALMIC at 08:32

## 2020-01-01 RX ADMIN — INSULIN LISPRO 2 UNITS: 100 INJECTION, SOLUTION INTRAVENOUS; SUBCUTANEOUS at 12:19

## 2020-01-01 RX ADMIN — METHYLPREDNISOLONE ACETATE 80 MG: 80 INJECTION, SUSPENSION INTRA-ARTICULAR; INTRALESIONAL; INTRAMUSCULAR; SOFT TISSUE at 13:21

## 2020-01-01 RX ADMIN — SPIRONOLACTONE 25 MG: 25 TABLET, FILM COATED ORAL at 09:13

## 2020-01-01 RX ADMIN — IPRATROPIUM BROMIDE AND ALBUTEROL SULFATE 3 ML: 2.5; .5 SOLUTION RESPIRATORY (INHALATION) at 21:53

## 2020-01-01 RX ADMIN — AMLODIPINE BESYLATE 10 MG: 10 TABLET ORAL at 22:23

## 2020-01-01 RX ADMIN — INSULIN LISPRO 5 UNITS: 100 INJECTION, SOLUTION INTRAVENOUS; SUBCUTANEOUS at 11:41

## 2020-01-01 RX ADMIN — MORPHINE SULFATE 4 MG: 2 INJECTION, SOLUTION INTRAMUSCULAR; INTRAVENOUS at 08:47

## 2020-01-01 RX ADMIN — RANOLAZINE 500 MG: 500 TABLET, FILM COATED, EXTENDED RELEASE ORAL at 22:14

## 2020-01-01 RX ADMIN — INSULIN GLARGINE 15 UNITS: 100 INJECTION, SOLUTION SUBCUTANEOUS at 20:29

## 2020-01-01 RX ADMIN — SODIUM CHLORIDE, PRESERVATIVE FREE 10 ML: 5 INJECTION INTRAVENOUS at 22:27

## 2020-01-01 RX ADMIN — ISOSORBIDE MONONITRATE 60 MG: 60 TABLET ORAL at 09:01

## 2020-01-01 RX ADMIN — CLOPIDOGREL 75 MG: 75 TABLET, FILM COATED ORAL at 09:13

## 2020-01-01 RX ADMIN — LEVOTHYROXINE SODIUM 150 MCG: 150 TABLET ORAL at 06:28

## 2020-01-01 RX ADMIN — RANOLAZINE 500 MG: 500 TABLET, FILM COATED, EXTENDED RELEASE ORAL at 22:23

## 2020-01-01 RX ADMIN — LEVOTHYROXINE SODIUM 150 MCG: 150 TABLET ORAL at 09:28

## 2020-01-01 RX ADMIN — LORAZEPAM 1 MG: 2 INJECTION INTRAMUSCULAR; INTRAVENOUS at 08:23

## 2020-01-01 RX ADMIN — HYDROMORPHONE HYDROCHLORIDE 0.5 MG: 1 INJECTION, SOLUTION INTRAMUSCULAR; INTRAVENOUS; SUBCUTANEOUS at 00:20

## 2020-01-01 RX ADMIN — FUROSEMIDE 40 MG: 40 TABLET ORAL at 12:38

## 2020-01-01 RX ADMIN — RANOLAZINE 500 MG: 500 TABLET, FILM COATED, EXTENDED RELEASE ORAL at 15:49

## 2020-01-01 RX ADMIN — INSULIN LISPRO 2 UNITS: 100 INJECTION, SOLUTION INTRAVENOUS; SUBCUTANEOUS at 16:35

## 2020-01-01 RX ADMIN — RANOLAZINE 500 MG: 500 TABLET, FILM COATED, EXTENDED RELEASE ORAL at 09:34

## 2020-01-01 RX ADMIN — SPIRONOLACTONE 25 MG: 25 TABLET ORAL at 09:28

## 2020-01-01 RX ADMIN — ACETAMINOPHEN 650 MG: 325 TABLET, FILM COATED ORAL at 01:13

## 2020-01-01 RX ADMIN — RANOLAZINE 500 MG: 500 TABLET, FILM COATED, EXTENDED RELEASE ORAL at 20:20

## 2020-01-01 RX ADMIN — HYDROMORPHONE HYDROCHLORIDE 1 MG: 1 INJECTION, SOLUTION INTRAMUSCULAR; INTRAVENOUS; SUBCUTANEOUS at 20:31

## 2020-01-01 RX ADMIN — METOPROLOL SUCCINATE 50 MG: 50 TABLET, EXTENDED RELEASE ORAL at 09:28

## 2020-01-01 RX ADMIN — LEVOTHYROXINE SODIUM 150 MCG: 150 TABLET ORAL at 06:34

## 2020-01-01 RX ADMIN — MAGNESIUM SULFATE HEPTAHYDRATE 1 G: 1 INJECTION, SOLUTION INTRAVENOUS at 16:05

## 2020-01-01 RX ADMIN — HYDROMORPHONE HYDROCHLORIDE 1 MG: 1 INJECTION, SOLUTION INTRAMUSCULAR; INTRAVENOUS; SUBCUTANEOUS at 00:57

## 2020-01-01 RX ADMIN — LATANOPROST 1 DROP: 50 SOLUTION/ DROPS OPHTHALMIC at 20:57

## 2020-01-01 RX ADMIN — HYDROMORPHONE HYDROCHLORIDE 1 MG: 1 INJECTION, SOLUTION INTRAMUSCULAR; INTRAVENOUS; SUBCUTANEOUS at 08:24

## 2020-01-01 RX ADMIN — FUROSEMIDE 80 MG: 10 INJECTION, SOLUTION INTRAMUSCULAR; INTRAVENOUS at 17:44

## 2020-01-01 RX ADMIN — RANOLAZINE 500 MG: 500 TABLET, FILM COATED, EXTENDED RELEASE ORAL at 09:01

## 2020-01-01 RX ADMIN — ASPIRIN 81 MG: 81 TABLET, CHEWABLE ORAL at 08:41

## 2020-01-01 RX ADMIN — SPIRONOLACTONE 25 MG: 25 TABLET, FILM COATED ORAL at 18:39

## 2020-01-01 RX ADMIN — HYDROMORPHONE HYDROCHLORIDE 1 MG: 1 INJECTION, SOLUTION INTRAMUSCULAR; INTRAVENOUS; SUBCUTANEOUS at 04:34

## 2020-01-01 RX ADMIN — MORPHINE SULFATE 4 MG: 2 INJECTION, SOLUTION INTRAMUSCULAR; INTRAVENOUS at 14:38

## 2020-01-01 RX ADMIN — LATANOPROST 1 DROP: 50 SOLUTION OPHTHALMIC at 20:53

## 2020-01-01 RX ADMIN — HYDROMORPHONE HYDROCHLORIDE 1 MG: 1 INJECTION, SOLUTION INTRAMUSCULAR; INTRAVENOUS; SUBCUTANEOUS at 12:30

## 2020-01-01 RX ADMIN — AMLODIPINE BESYLATE 5 MG: 5 TABLET ORAL at 09:13

## 2020-01-01 RX ADMIN — SODIUM CHLORIDE, PRESERVATIVE FREE 10 ML: 5 INJECTION INTRAVENOUS at 09:28

## 2020-01-01 RX ADMIN — INSULIN GLARGINE 15 UNITS: 100 INJECTION, SOLUTION SUBCUTANEOUS at 22:21

## 2020-01-01 RX ADMIN — ISOSORBIDE MONONITRATE 30 MG: 30 TABLET ORAL at 09:25

## 2020-01-01 RX ADMIN — LEVOTHYROXINE SODIUM 150 MCG: 150 TABLET ORAL at 09:01

## 2020-01-01 RX ADMIN — INSULIN LISPRO 5 UNITS: 100 INJECTION, SOLUTION INTRAVENOUS; SUBCUTANEOUS at 22:22

## 2020-01-01 RX ADMIN — RANOLAZINE 500 MG: 500 TABLET, FILM COATED, EXTENDED RELEASE ORAL at 20:39

## 2020-01-01 RX ADMIN — SPIRONOLACTONE 25 MG: 25 TABLET, FILM COATED ORAL at 18:19

## 2020-01-01 RX ADMIN — LORAZEPAM 0.5 MG: 2 INJECTION INTRAMUSCULAR; INTRAVENOUS at 12:30

## 2020-01-01 RX ADMIN — HYDROMORPHONE HYDROCHLORIDE 0.5 MG: 1 INJECTION, SOLUTION INTRAMUSCULAR; INTRAVENOUS; SUBCUTANEOUS at 13:10

## 2020-01-01 RX ADMIN — SODIUM CHLORIDE, PRESERVATIVE FREE 10 ML: 5 INJECTION INTRAVENOUS at 09:06

## 2020-01-01 RX ADMIN — ONDANSETRON 8 MG: 2 INJECTION INTRAMUSCULAR; INTRAVENOUS at 12:34

## 2020-01-01 RX ADMIN — INSULIN GLARGINE 15 UNITS: 100 INJECTION, SOLUTION SUBCUTANEOUS at 22:20

## 2020-01-01 RX ADMIN — LORAZEPAM 0.5 MG: 2 INJECTION INTRAMUSCULAR; INTRAVENOUS at 04:29

## 2020-01-01 RX ADMIN — HYDROMORPHONE HYDROCHLORIDE 1 MG: 1 INJECTION, SOLUTION INTRAMUSCULAR; INTRAVENOUS; SUBCUTANEOUS at 20:41

## 2020-01-01 RX ADMIN — LATANOPROST 1 DROP: 50 SOLUTION OPHTHALMIC at 22:29

## 2020-01-01 RX ADMIN — CLOPIDOGREL 75 MG: 75 TABLET, FILM COATED ORAL at 08:13

## 2020-01-01 RX ADMIN — TIMOLOL MALEATE 1 DROP: 2.5 SOLUTION/ DROPS OPHTHALMIC at 09:37

## 2020-01-01 RX ADMIN — CLOPIDOGREL 75 MG: 75 TABLET, FILM COATED ORAL at 08:32

## 2020-01-01 RX ADMIN — LATANOPROST 1 DROP: 50 SOLUTION OPHTHALMIC at 21:40

## 2020-01-01 RX ADMIN — SODIUM CHLORIDE, PRESERVATIVE FREE 10 ML: 5 INJECTION INTRAVENOUS at 22:18

## 2020-01-01 RX ADMIN — HYDROMORPHONE HYDROCHLORIDE 0.5 MG: 1 INJECTION, SOLUTION INTRAMUSCULAR; INTRAVENOUS; SUBCUTANEOUS at 20:43

## 2020-01-01 RX ADMIN — INSULIN LISPRO 3 UNITS: 100 INJECTION, SOLUTION INTRAVENOUS; SUBCUTANEOUS at 12:35

## 2020-01-01 RX ADMIN — NITROGLYCERIN 0.4 MG: 0.4 TABLET SUBLINGUAL at 02:46

## 2020-01-01 RX ADMIN — NITROGLYCERIN 0.4 MG: 0.4 TABLET SUBLINGUAL at 13:38

## 2020-01-01 RX ADMIN — HYDROMORPHONE HYDROCHLORIDE 1 MG: 1 INJECTION, SOLUTION INTRAMUSCULAR; INTRAVENOUS; SUBCUTANEOUS at 17:12

## 2020-01-01 RX ADMIN — ISOSORBIDE MONONITRATE 60 MG: 60 TABLET ORAL at 10:06

## 2020-01-01 RX ADMIN — CLOPIDOGREL 75 MG: 75 TABLET, FILM COATED ORAL at 08:38

## 2020-01-01 RX ADMIN — SPIRONOLACTONE 25 MG: 25 TABLET ORAL at 08:19

## 2020-01-01 RX ADMIN — TIMOLOL MALEATE 1 DROP: 2.5 SOLUTION/ DROPS OPHTHALMIC at 09:07

## 2020-01-01 RX ADMIN — TIMOLOL MALEATE 1 DROP: 2.5 SOLUTION/ DROPS OPHTHALMIC at 08:20

## 2020-01-01 RX ADMIN — CLOPIDOGREL 75 MG: 75 TABLET, FILM COATED ORAL at 22:23

## 2020-01-01 RX ADMIN — FUROSEMIDE 40 MG: 10 INJECTION, SOLUTION INTRAMUSCULAR; INTRAVENOUS at 15:25

## 2020-01-01 RX ADMIN — HYDROMORPHONE HYDROCHLORIDE 0.5 MG: 1 INJECTION, SOLUTION INTRAMUSCULAR; INTRAVENOUS; SUBCUTANEOUS at 06:20

## 2020-01-01 RX ADMIN — LEVOTHYROXINE SODIUM 150 MCG: 150 TABLET ORAL at 09:07

## 2020-01-01 RX ADMIN — LEVOTHYROXINE SODIUM 150 MCG: 150 TABLET ORAL at 15:49

## 2020-01-01 RX ADMIN — ISOSORBIDE MONONITRATE 60 MG: 60 TABLET ORAL at 09:13

## 2020-01-01 RX ADMIN — FUROSEMIDE 40 MG: 40 TABLET ORAL at 08:31

## 2020-01-01 RX ADMIN — METOPROLOL SUCCINATE 50 MG: 50 TABLET, EXTENDED RELEASE ORAL at 08:32

## 2020-01-01 RX ADMIN — LORAZEPAM 0.5 MG: 2 INJECTION INTRAMUSCULAR; INTRAVENOUS at 09:03

## 2020-01-01 RX ADMIN — FUROSEMIDE 40 MG: 40 TABLET ORAL at 20:53

## 2020-01-01 RX ADMIN — INSULIN GLARGINE 15 UNITS: 100 INJECTION, SOLUTION SUBCUTANEOUS at 21:40

## 2020-01-01 RX ADMIN — RANOLAZINE 500 MG: 500 TABLET, FILM COATED, EXTENDED RELEASE ORAL at 09:07

## 2020-01-01 RX ADMIN — SODIUM CHLORIDE, PRESERVATIVE FREE 10 ML: 5 INJECTION INTRAVENOUS at 15:50

## 2020-01-01 RX ADMIN — TIMOLOL MALEATE 1 DROP: 2.5 SOLUTION/ DROPS OPHTHALMIC at 08:14

## 2020-01-01 RX ADMIN — CLOPIDOGREL 75 MG: 75 TABLET, FILM COATED ORAL at 09:34

## 2020-01-01 RX ADMIN — TIMOLOL MALEATE 1 DROP: 2.5 SOLUTION/ DROPS OPHTHALMIC at 14:27

## 2020-01-01 RX ADMIN — ACETAMINOPHEN 650 MG: 325 TABLET, FILM COATED ORAL at 10:00

## 2020-01-01 RX ADMIN — METOPROLOL SUCCINATE 50 MG: 50 TABLET, EXTENDED RELEASE ORAL at 09:01

## 2020-01-01 RX ADMIN — TIMOLOL MALEATE 1 DROP: 2.5 SOLUTION/ DROPS OPHTHALMIC at 09:13

## 2020-01-01 RX ADMIN — AMLODIPINE BESYLATE 10 MG: 10 TABLET ORAL at 08:20

## 2020-01-01 RX ADMIN — SPIRONOLACTONE 25 MG: 25 TABLET ORAL at 08:13

## 2020-01-01 RX ADMIN — AMLODIPINE BESYLATE 10 MG: 10 TABLET ORAL at 09:07

## 2020-01-01 RX ADMIN — NITROGLYCERIN 0.4 MG: 0.4 TABLET SUBLINGUAL at 13:55

## 2020-01-01 RX ADMIN — METOPROLOL SUCCINATE 50 MG: 50 TABLET, EXTENDED RELEASE ORAL at 15:49

## 2020-01-01 RX ADMIN — INSULIN LISPRO 2 UNITS: 100 INJECTION, SOLUTION INTRAVENOUS; SUBCUTANEOUS at 19:02

## 2020-01-01 RX ADMIN — HYDROMORPHONE HYDROCHLORIDE 1 MG: 1 INJECTION, SOLUTION INTRAMUSCULAR; INTRAVENOUS; SUBCUTANEOUS at 10:01

## 2020-01-01 RX ADMIN — FUROSEMIDE 40 MG: 40 TABLET ORAL at 08:14

## 2020-01-01 RX ADMIN — HEPARIN SODIUM 4000 UNITS: 5000 INJECTION INTRAVENOUS; SUBCUTANEOUS at 16:16

## 2020-01-01 RX ADMIN — ASPIRIN 81 MG: 81 TABLET, COATED ORAL at 08:14

## 2020-01-01 RX ADMIN — CLOPIDOGREL 75 MG: 75 TABLET, FILM COATED ORAL at 14:18

## 2020-01-01 RX ADMIN — ISOSORBIDE MONONITRATE 60 MG: 60 TABLET ORAL at 22:23

## 2020-01-01 RX ADMIN — CEFTRIAXONE SODIUM 1 G: 1 INJECTION, SOLUTION INTRAVENOUS at 17:44

## 2020-01-01 RX ADMIN — LEVOTHYROXINE SODIUM 150 MCG: 150 TABLET ORAL at 22:23

## 2020-01-01 RX ADMIN — SODIUM CHLORIDE, PRESERVATIVE FREE 10 ML: 5 INJECTION INTRAVENOUS at 22:15

## 2020-01-01 RX ADMIN — RANOLAZINE 500 MG: 500 TABLET, FILM COATED, EXTENDED RELEASE ORAL at 08:14

## 2020-01-01 RX ADMIN — AMLODIPINE BESYLATE 10 MG: 10 TABLET ORAL at 09:34

## 2020-01-01 RX ADMIN — TIMOLOL MALEATE 1 DROP: 2.5 SOLUTION/ DROPS OPHTHALMIC at 09:28

## 2020-01-01 RX ADMIN — METOPROLOL SUCCINATE 50 MG: 50 TABLET, EXTENDED RELEASE ORAL at 22:22

## 2020-01-01 RX ADMIN — HYDROMORPHONE HYDROCHLORIDE 0.5 MG: 1 INJECTION, SOLUTION INTRAMUSCULAR; INTRAVENOUS; SUBCUTANEOUS at 17:21

## 2020-01-01 RX ADMIN — ASPIRIN 81 MG: 81 TABLET, COATED ORAL at 14:18

## 2020-01-01 RX ADMIN — METOPROLOL SUCCINATE 50 MG: 50 TABLET, EXTENDED RELEASE ORAL at 09:07

## 2020-01-01 RX ADMIN — AMLODIPINE BESYLATE 5 MG: 5 TABLET ORAL at 10:49

## 2020-01-01 RX ADMIN — CLOPIDOGREL 75 MG: 75 TABLET, FILM COATED ORAL at 09:23

## 2020-01-01 RX ADMIN — TIMOLOL MALEATE 1 DROP: 2.5 SOLUTION/ DROPS OPHTHALMIC at 09:34

## 2020-01-01 RX ADMIN — SPIRONOLACTONE 25 MG: 25 TABLET ORAL at 09:07

## 2020-01-01 RX ADMIN — ASPIRIN 81 MG: 81 TABLET, COATED ORAL at 09:01

## 2020-01-01 RX ADMIN — ASPIRIN 81 MG: 81 TABLET, COATED ORAL at 08:20

## 2020-01-01 RX ADMIN — HYDROMORPHONE HYDROCHLORIDE 1 MG: 1 INJECTION, SOLUTION INTRAMUSCULAR; INTRAVENOUS; SUBCUTANEOUS at 09:03

## 2020-01-01 RX ADMIN — ASPIRIN 81 MG: 81 TABLET, COATED ORAL at 09:28

## 2020-01-01 RX ADMIN — SODIUM CHLORIDE, PRESERVATIVE FREE 10 ML: 5 INJECTION INTRAVENOUS at 23:07

## 2020-01-01 RX ADMIN — ASPIRIN 81 MG: 81 TABLET, COATED ORAL at 09:13

## 2020-01-01 RX ADMIN — RANOLAZINE 500 MG: 500 TABLET, FILM COATED, EXTENDED RELEASE ORAL at 20:27

## 2020-01-01 RX ADMIN — LORAZEPAM 0.5 MG: 2 INJECTION INTRAMUSCULAR; INTRAVENOUS at 04:34

## 2020-01-01 RX ADMIN — LORAZEPAM 0.5 MG: 2 INJECTION INTRAMUSCULAR; INTRAVENOUS at 00:57

## 2020-01-01 RX ADMIN — LATANOPROST 1 DROP: 50 SOLUTION/ DROPS OPHTHALMIC at 21:37

## 2020-01-01 RX ADMIN — RANOLAZINE 500 MG: 500 TABLET, FILM COATED, EXTENDED RELEASE ORAL at 20:54

## 2020-01-01 RX ADMIN — FUROSEMIDE 40 MG: 10 INJECTION, SOLUTION INTRAMUSCULAR; INTRAVENOUS at 03:48

## 2020-01-01 RX ADMIN — LATANOPROST 1 DROP: 50 SOLUTION OPHTHALMIC at 20:39

## 2020-01-01 RX ADMIN — FUROSEMIDE 40 MG: 10 INJECTION, SOLUTION INTRAMUSCULAR; INTRAVENOUS at 17:50

## 2020-01-01 RX ADMIN — CLOPIDOGREL 75 MG: 75 TABLET, FILM COATED ORAL at 09:01

## 2020-01-01 RX ADMIN — ASPIRIN 81 MG: 81 TABLET, COATED ORAL at 08:32

## 2020-01-01 RX ADMIN — AMLODIPINE BESYLATE 10 MG: 10 TABLET ORAL at 15:48

## 2020-01-01 RX ADMIN — SPIRONOLACTONE 25 MG: 25 TABLET, FILM COATED ORAL at 08:32

## 2020-01-01 RX ADMIN — HYDROMORPHONE HYDROCHLORIDE 1 MG: 1 INJECTION, SOLUTION INTRAMUSCULAR; INTRAVENOUS; SUBCUTANEOUS at 20:20

## 2020-01-01 RX ADMIN — LABETALOL HYDROCHLORIDE 10 MG: 5 INJECTION, SOLUTION INTRAVENOUS at 16:28

## 2020-01-01 RX ADMIN — SODIUM CHLORIDE 100 ML/HR: 9 INJECTION, SOLUTION INTRAVENOUS at 17:32

## 2020-01-01 RX ADMIN — HEPARIN SODIUM 12 UNITS/KG/HR: 10000 INJECTION, SOLUTION INTRAVENOUS at 11:24

## 2020-01-01 RX ADMIN — INSULIN GLARGINE 15 UNITS: 100 INJECTION, SOLUTION SUBCUTANEOUS at 20:54

## 2020-01-01 RX ADMIN — HYDROMORPHONE HYDROCHLORIDE 1 MG: 1 INJECTION, SOLUTION INTRAMUSCULAR; INTRAVENOUS; SUBCUTANEOUS at 12:46

## 2020-01-01 RX ADMIN — ISOSORBIDE MONONITRATE 30 MG: 30 TABLET ORAL at 08:38

## 2020-01-01 RX ADMIN — Medication 2 TABLET: at 11:55

## 2020-01-01 RX ADMIN — HYDROMORPHONE HYDROCHLORIDE 1 MG: 1 INJECTION, SOLUTION INTRAMUSCULAR; INTRAVENOUS; SUBCUTANEOUS at 04:29

## 2020-01-01 RX ADMIN — AMLODIPINE BESYLATE 10 MG: 10 TABLET ORAL at 08:14

## 2020-01-01 RX ADMIN — INSULIN GLARGINE 15 UNITS: 100 INJECTION, SOLUTION SUBCUTANEOUS at 20:47

## 2020-01-01 RX ADMIN — HYDROMORPHONE HYDROCHLORIDE 0.5 MG: 1 INJECTION, SOLUTION INTRAMUSCULAR; INTRAVENOUS; SUBCUTANEOUS at 18:41

## 2020-01-01 RX ADMIN — ISOSORBIDE MONONITRATE 60 MG: 60 TABLET ORAL at 09:28

## 2020-01-01 RX ADMIN — ISOSORBIDE MONONITRATE 60 MG: 60 TABLET ORAL at 09:34

## 2020-01-01 RX ADMIN — INSULIN LISPRO 7 UNITS: 100 INJECTION, SOLUTION INTRAVENOUS; SUBCUTANEOUS at 08:13

## 2020-01-01 RX ADMIN — SODIUM CHLORIDE, PRESERVATIVE FREE 10 ML: 5 INJECTION INTRAVENOUS at 09:25

## 2020-01-01 RX ADMIN — HYDROMORPHONE HYDROCHLORIDE 1 MG: 1 INJECTION, SOLUTION INTRAMUSCULAR; INTRAVENOUS; SUBCUTANEOUS at 12:16

## 2020-01-01 RX ADMIN — RANOLAZINE 500 MG: 500 TABLET, FILM COATED, EXTENDED RELEASE ORAL at 08:31

## 2020-01-01 RX ADMIN — ASPIRIN 81 MG: 81 TABLET, COATED ORAL at 09:07

## 2020-01-01 RX ADMIN — RANOLAZINE 500 MG: 500 TABLET, FILM COATED, EXTENDED RELEASE ORAL at 08:19

## 2020-01-01 RX ADMIN — SODIUM CHLORIDE, PRESERVATIVE FREE 10 ML: 5 INJECTION INTRAVENOUS at 08:20

## 2020-01-01 RX ADMIN — CLOPIDOGREL 75 MG: 75 TABLET, FILM COATED ORAL at 09:28

## 2020-01-01 RX ADMIN — LEVOTHYROXINE SODIUM 150 MCG: 150 TABLET ORAL at 08:14

## 2020-01-01 RX ADMIN — LATANOPROST 1 DROP: 50 SOLUTION/ DROPS OPHTHALMIC at 20:28

## 2020-01-01 RX ADMIN — SODIUM CHLORIDE, PRESERVATIVE FREE 10 ML: 5 INJECTION INTRAVENOUS at 09:07

## 2020-01-01 RX ADMIN — CEFTRIAXONE SODIUM 1 G: 1 INJECTION, SOLUTION INTRAVENOUS at 16:07

## 2020-01-01 RX ADMIN — FUROSEMIDE 40 MG: 40 TABLET ORAL at 09:24

## 2020-01-01 RX ADMIN — ONDANSETRON 4 MG: 2 INJECTION INTRAMUSCULAR; INTRAVENOUS at 09:31

## 2020-01-01 RX ADMIN — ENOXAPARIN SODIUM 30 MG: 30 INJECTION SUBCUTANEOUS at 15:42

## 2020-01-01 RX ADMIN — NITROGLYCERIN 0.4 MG: 0.4 TABLET SUBLINGUAL at 12:40

## 2020-01-01 RX ADMIN — LATANOPROST 1 DROP: 50 SOLUTION/ DROPS OPHTHALMIC at 22:21

## 2020-01-01 RX ADMIN — LOSARTAN POTASSIUM 50 MG: 50 TABLET, FILM COATED ORAL at 22:21

## 2020-01-01 RX ADMIN — INSULIN LISPRO 5 UNITS: 100 INJECTION, SOLUTION INTRAVENOUS; SUBCUTANEOUS at 16:31

## 2020-01-01 RX ADMIN — FUROSEMIDE 40 MG: 40 TABLET ORAL at 09:13

## 2020-01-01 RX ADMIN — ASPIRIN 81 MG: 81 TABLET, CHEWABLE ORAL at 09:24

## 2020-01-01 RX ADMIN — METOPROLOL SUCCINATE 50 MG: 50 TABLET, EXTENDED RELEASE ORAL at 08:19

## 2020-01-01 RX ADMIN — HEPARIN SODIUM 12 UNITS/KG/HR: 10000 INJECTION, SOLUTION INTRAVENOUS at 16:16

## 2020-01-01 RX ADMIN — METOPROLOL SUCCINATE 50 MG: 50 TABLET, EXTENDED RELEASE ORAL at 20:54

## 2020-01-01 RX ADMIN — METRONIDAZOLE 500 MG: 500 INJECTION, SOLUTION INTRAVENOUS at 12:24

## 2020-01-01 RX ADMIN — LORAZEPAM 0.5 MG: 2 INJECTION INTRAMUSCULAR; INTRAVENOUS at 00:29

## 2020-01-01 RX ADMIN — HYDROMORPHONE HYDROCHLORIDE 1 MG: 1 INJECTION, SOLUTION INTRAMUSCULAR; INTRAVENOUS; SUBCUTANEOUS at 17:00

## 2020-01-01 RX ADMIN — FUROSEMIDE 40 MG: 20 INJECTION, SOLUTION INTRAMUSCULAR; INTRAVENOUS at 16:06

## 2020-01-01 RX ADMIN — LEVOTHYROXINE SODIUM 150 MCG: 150 TABLET ORAL at 08:19

## 2020-01-01 RX ADMIN — NITROGLYCERIN 1 INCH: 20 OINTMENT TOPICAL at 08:31

## 2020-01-01 RX ADMIN — METOPROLOL SUCCINATE 50 MG: 50 TABLET, EXTENDED RELEASE ORAL at 08:38

## 2020-01-01 RX ADMIN — ISOSORBIDE MONONITRATE 60 MG: 60 TABLET ORAL at 09:07

## 2020-01-01 RX ADMIN — NITROGLYCERIN 1 INCH: 20 OINTMENT TOPICAL at 09:15

## 2020-01-01 RX ADMIN — FUROSEMIDE 40 MG: 40 TABLET ORAL at 22:22

## 2020-01-01 RX ADMIN — INSULIN GLARGINE 15 UNITS: 100 INJECTION, SOLUTION SUBCUTANEOUS at 22:26

## 2020-01-01 RX ADMIN — HYDROMORPHONE HYDROCHLORIDE 1 MG: 1 INJECTION, SOLUTION INTRAMUSCULAR; INTRAVENOUS; SUBCUTANEOUS at 04:13

## 2020-01-01 RX ADMIN — LORAZEPAM 0.5 MG: 2 INJECTION INTRAMUSCULAR; INTRAVENOUS at 16:37

## 2020-01-01 RX ADMIN — TIMOLOL MALEATE 1 DROP: 2.5 SOLUTION/ DROPS OPHTHALMIC at 15:51

## 2020-01-01 RX ADMIN — SODIUM CHLORIDE, PRESERVATIVE FREE 10 ML: 5 INJECTION INTRAVENOUS at 20:28

## 2020-01-01 RX ADMIN — RANOLAZINE 500 MG: 500 TABLET, FILM COATED, EXTENDED RELEASE ORAL at 20:28

## 2020-01-01 RX ADMIN — ISOSORBIDE MONONITRATE 60 MG: 60 TABLET ORAL at 08:13

## 2020-01-01 RX ADMIN — LATANOPROST 1 DROP: 50 SOLUTION/ DROPS OPHTHALMIC at 22:48

## 2020-01-01 RX ADMIN — INSULIN GLARGINE 15 UNITS: 100 INJECTION, SOLUTION SUBCUTANEOUS at 20:20

## 2020-01-01 RX ADMIN — SODIUM CHLORIDE 500 ML: 9 INJECTION, SOLUTION INTRAVENOUS at 12:34

## 2020-01-01 RX ADMIN — SPIRONOLACTONE 25 MG: 25 TABLET ORAL at 09:00

## 2020-01-01 RX ADMIN — LEVOTHYROXINE SODIUM 150 MCG: 150 TABLET ORAL at 06:23

## 2020-01-06 NOTE — TELEPHONE ENCOUNTER
Last saw SOFIA in 2018 for her left knee. She fell on it on [place]. Been having pain and burning in it. Minimal swelling and no bruising. Can patient be worked in sooner than 1st available with [facility] on 2/20 and [facility] on 1/20? (only wants Micheal)

## 2020-01-07 NOTE — TELEPHONE ENCOUNTER
Called patient and offered 1st available appt with [facility] or this week with [facility]. Patient wants to be seen this week and scheduled appt with [facility] on 1/9.

## 2020-01-09 NOTE — PATIENT INSTRUCTIONS
"Osteoarthritis    Osteoarthritis is a type of arthritis that affects tissue that covers the ends of bones in joints (cartilage). Cartilage acts as a cushion between the bones and helps them move smoothly. Osteoarthritis results when cartilage in the joints gets worn down. Osteoarthritis is sometimes called \"wear and tear\" arthritis.  Osteoarthritis is the most common form of arthritis. It often occurs in older people. It is a condition that gets worse over time (a progressive condition). Joints that are most often affected by this condition are in:  · Fingers.  · Toes.  · Hips.  · Knees.  · Spine, including neck and lower back.  What are the causes?  This condition is caused by age-related wearing down of cartilage that covers the ends of bones.  What increases the risk?  The following factors may make you more likely to develop this condition:  · Older age.  · Being overweight or obese.  · Overuse of joints, such as in athletes.  · Past injury of a joint.  · Past surgery on a joint.  · Family history of osteoarthritis.  What are the signs or symptoms?  The main symptoms of this condition are pain, swelling, and stiffness in the joint. The joint may lose its shape over time. Small pieces of bone or cartilage may break off and float inside of the joint, which may cause more pain and damage to the joint. Small deposits of bone (osteophytes) may grow on the edges of the joint. Other symptoms may include:  · A grating or scraping feeling inside the joint when you move it.  · Popping or creaking sounds when you move.  Symptoms may affect one or more joints. Osteoarthritis in a major joint, such as your knee or hip, can make it painful to walk or exercise. If you have osteoarthritis in your hands, you might not be able to  items, twist your hand, or control small movements of your hands and fingers (fine motor skills).  How is this diagnosed?  This condition may be diagnosed based on:  · Your medical history.  · A " physical exam.  · Your symptoms.  · X-rays of the affected joint(s).  · Blood tests to rule out other types of arthritis.  How is this treated?  There is no cure for this condition, but treatment can help to control pain and improve joint function. Treatment plans may include:  · A prescribed exercise program that allows for rest and joint relief. You may work with a physical therapist.  · A weight control plan.  · Pain relief techniques, such as:  ? Applying heat and cold to the joint.  ? Electric pulses delivered to nerve endings under the skin (transcutaneous electrical nerve stimulation, or TENS).  ? Massage.  ? Certain nutritional supplements.  · NSAIDs or prescription medicines to help relieve pain.  · Medicine to help relieve pain and inflammation (corticosteroids). This can be given by mouth (orally) or as an injection.  · Assistive devices, such as a brace, wrap, splint, specialized glove, or cane.  · Surgery, such as:  ? An osteotomy. This is done to reposition the bones and relieve pain or to remove loose pieces of bone and cartilage.  ? Joint replacement surgery. You may need this surgery if you have very bad (advanced) osteoarthritis.  Follow these instructions at home:  Activity  · Rest your affected joints as directed by your health care provider.  · Do not drive or use heavy machinery while taking prescription pain medicine.  · Exercise as directed. Your health care provider or physical therapist may recommend specific types of exercise, such as:  ? Strengthening exercises. These are done to strengthen the muscles that support joints that are affected by arthritis. They can be performed with weights or with exercise bands to add resistance.  ? Aerobic activities. These are exercises, such as brisk walking or water aerobics, that get your heart pumping.  ? Range-of-motion activities. These keep your joints easy to move.  ? Balance and agility exercises.  Managing pain, stiffness, and swelling          · If directed, apply heat to the affected area as often as told by your health care provider. Use the heat source that your health care provider recommends, such as a moist heat pack or a heating pad.  ? If you have a removable assistive device, remove it as told by your health care provider.  ? Place a towel between your skin and the heat source. If your health care provider tells you to keep the assistive device on while you apply heat, place a towel between the assistive device and the heat source.  ? Leave the heat on for 20-30 minutes.  ? Remove the heat if your skin turns bright red. This is especially important if you are unable to feel pain, heat, or cold. You may have a greater risk of getting burned.  · If directed, put ice on the affected joint:  ? If you have a removable assistive device, remove it as told by your health care provider.  ? Put ice in a plastic bag.  ? Place a towel between your skin and the bag. If your health care provider tells you to keep the assistive device on during icing, place a towel between the assistive device and the bag.  ? Leave the ice on for 20 minutes, 2-3 times a day.  General instructions  · Take over-the-counter and prescription medicines only as told by your health care provider.  · Maintain a healthy weight. Follow instructions from your health care provider for weight control. These may include dietary restrictions.  · Do not use any products that contain nicotine or tobacco, such as cigarettes and e-cigarettes. These can delay bone healing. If you need help quitting, ask your health care provider.  · Use assistive devices as directed by your health care provider.  · Keep all follow-up visits as told by your health care provider. This is important.  Where to find more information  · National [place] of Arthritis and Musculoskeletal and Skin Diseases: www.niams.nih.gov  · National [place] on Aging: www.dominga.nih.gov  · American College of Rheumatology:  www.rheumatology.org  Contact a health care provider if:  · Your skin turns red.  · You develop a rash.  · You have pain that gets worse.  · You have a fever along with joint or muscle aches.  Get help right away if:  · You lose a lot of weight.  · You suddenly lose your appetite.  · You have night sweats.  Summary  · Osteoarthritis is a type of arthritis that affects tissue covering the ends of bones in joints (cartilage).  · This condition is caused by age-related wearing down of cartilage that covers the ends of bones.  · The main symptom of this condition is pain, swelling, and stiffness in the joint.  · There is no cure for this condition, but treatment can help to control pain and improve joint function.  This information is not intended to replace advice given to you by your health care provider. Make sure you discuss any questions you have with your health care provider.  Document Released: 12/18/2006 Document Revised: 09/24/2018 Document Reviewed: 08/21/2017  [employer] Interactive Patient Education © 2019 [employer]

## 2020-01-09 NOTE — PROGRESS NOTES
Patient Name: Alessia Madrigal   YOB: 1928  Referring Primary Care Physician: Dario Mae MD  BMI: Body mass index is 35.19 kg/m².    Chief Complaint:    Chief Complaint   Patient presents with   • Left Knee - Establish Care, Pain        HPI: Fell on [place] and hit left knee. Pt has followed with [facility] in past and had cortisone. She lives independently and uses a wallker.    Alessia Madrigal is a 89 y.o. female who presents today for evaluation of   Chief Complaint   Patient presents with   • Left Knee - Establish Care, Pain       This problem is new to this examiner.     Subjective   Medications:   Home Medications:  Current Outpatient Medications on File Prior to Visit   Medication Sig   • acetaminophen (TYLENOL) 500 MG tablet Take 500 mg by mouth every 4 (four) hours as needed for mild pain (1-3) (Back Pain). EVERY 4 TO 6 HOURS AS NEEDED   • aspirin 81 MG chewable tablet Chew 81 mg Daily.   • calcitriol (ROCALTROL) 0.25 MCG capsule    • clopidogrel (PLAVIX) 75 MG tablet TAKE 1 TABLET DAILY   • Coenzyme Q10 (CO Q-10) 200 MG capsule Take 300 mg by mouth daily.   • furosemide (LASIX) 40 MG tablet Take 1 tablet by mouth 2 (Two) Times a Day.   • glucose blood (ONE TOUCH ULTRA TEST) test strip Test Three times daily. DX E11.9   • Insulin Glargine (LANTUS SOLOSTAR) 100 UNIT/ML injection pen Inject 15 Units under the skin into the appropriate area as directed Every Night.   • insulin lispro (HUMALOG) 100 UNIT/ML injection Inject 5 Units under the skin into the appropriate area as directed 3 (Three) Times a Day Before Meals.   • Insulin Pen Needle (BD PEN NEEDLE LISA U/F) 32G X 4 MM misc USE AS DIRECTED THREE TIMES A DAY.   • levothyroxine (SYNTHROID) 150 MCG tablet Take 1 tablet by mouth Daily.   • losartan (COZAAR) 50 MG tablet TAKE 1 TABLET DAILY   • metoprolol succinate XL (TOPROL-XL) 50 MG 24 hr tablet TAKE 1 TABLET DAILY   • NIFEdipine XL (PROCARDIA XL) 90 MG 24 hr tablet TAKE 1 TABLET  DAILY   • nitroglycerin (NITROSTAT) 0.4 MG SL tablet Place 1 tablet under the tongue Every 5 (Five) Minutes As Needed for Chest Pain. Take no more than 3 doses in 15 minutes.   • raNITIdine (ZANTAC) 150 MG tablet Take 1 tablet by mouth 2 (Two) Times a Day.   • spironolactone (ALDACTONE) 25 MG tablet TAKE 1 TABLET DAILY   • timolol (TIMOPTIC) 0.25 % ophthalmic solution Administer 1 drop to both eyes Tonight.   • travoprost, BAK free, (TRAVATAN) 0.004 % solution ophthalmic solution Administer 1 drop to both eyes every night. in affected eye(s)    • vitamin D (ERGOCALCIFEROL) 90652 units capsule capsule Take 50,000 Units by mouth Every 7 (Seven) Days.     No current facility-administered medications on file prior to visit.      Current Medications:  Scheduled Meds:  Continuous Infusions:  No current facility-administered medications for this visit.   PRN Meds:.    I have reviewed the patient's medical history in detail and updated the computerized patient record.  Review and summarization of old records includes:    Past Medical History:   Diagnosis Date   • Acute transmural inferior wall MI (CMS/HCC)    • Arthritis 03/13/2014    CONT TYLENOL FOR PAIN/ [facility]ANI[facility] CHEW (INTERNAL MEDICINE)   • CAD (coronary artery disease)    • Cancer of uterus (CMS/HCC)    • Carotid artery stenosis    • Chronic renal failure syndrome    • Coronary artery disease involving coronary bypass graft of native heart     with other forms of angina pectoris   • Coronary atherosclerosis    • Endometrial cancer (CMS/HCC)    • Esophageal reflux    • Essential hypertension    • Glaucoma    • Hypertension    • Malaise and fatigue    • Osteoarthritis    • Sleep apnea     USING CPAP   • Spinal stenosis    • Thyroid disease    • Type 2 diabetes mellitus (CMS/HCC) 1991    INsulin begun 2012   • Vitamin B12 deficiency     SHE HAS NOT HAD THIS CHECKED IN A WHILE. SHE HAD BEEN ON SHOTS.  CHECK HER B12 LEVELS TODAY.  BY JOSE DANIEL CHEW        Past Surgical  History:   Procedure Laterality Date   • CARDIAC CATHETERIZATION N/A 3/11/2018    Procedure: Left Heart Cath;  Surgeon: Cameron Chisholm MD;  Location: [facility];  Service: Cardiovascular   • CARDIAC CATHETERIZATION N/A 3/11/2018    Procedure: Stent ROSSI bypass graft;  Surgeon: Cameron Chisholm MD;  Location:  [facility] CATH INVASIVE LOCATION;  Service: Cardiovascular   • CATARACT EXTRACTION Bilateral    • CATARACT EXTRACTION W/ INTRAOCULAR LENS IMPLANT Right 10/25/2016    Procedure: RT SUPERFICIAL KERATECTOMY ;  Surgeon: Arian Singh MD;  Location: [facility] OR [facility];  Service:    • CHOLECYSTECTOMY     • COLONOSCOPY     • CORONARY ARTERY BYPASS GRAFT  1991    X3   • HYSTERECTOMY     • OOPHORECTOMY     • TRANSLUMINAL ATHERECTOMY PERONEAL ARTERY      PERCUTANEOUS TRANSLUMINAL ATHERECTOMY RENAL ARTERY        Social History     Occupational History   • Not on file   Tobacco Use   • Smoking status: Never Smoker   • Smokeless tobacco: Never Used   Substance and Sexual Activity   • Alcohol use: No     Comment: caffeine use   • Drug use: Not on file   • Sexual activity: Defer      Social History     Social History Narrative   • Not on file        Family History   Problem Relation Age of Onset   • Colon cancer Other    • Heart disease Other    • Hypertension Other    • Stroke Other         ISCHEMIC   • Colon cancer Mother    • Stroke Father    • Heart attack Father    • Heart disease Father    • Heart attack Brother    • Stroke Brother    • Heart disease Brother    • Breast cancer Maternal Grandmother        ROS: 14 point review of systems was performed and all other systems were reviewed and are negative except for documented findings in HPI and today's encounter.     Allergies:   Allergies   Allergen Reactions   • Allopurinol Unknown - High Severity   • Clindamycin/Lincomycin Itching     Felt like she was burning and itching around the neck   • Statins Myalgia   • Ampicillin Rash   • Penicillins Rash  "  • Pravastatin Myalgia     Constitutional:  Denies fever, shaking or chills   Eyes:  Denies change in visual acuity   HENT:  Denies nasal congestion or sore throat   Respiratory:  Denies cough or shortness of breath   Cardiovascular:  Denies chest pain or severe LE edema   GI:  Denies abdominal pain, nausea, vomiting, bloody stools or diarrhea   Musculoskeletal:  Numbness, tingling, pain, or loss of motor function only as noted above in history of present illness.  : Denies painful urination or hematuria  Integument:  Denies rash, lesion or ulceration   Neurologic:  Denies headache or focal weakness  Endocrine:  Denies lymphadenopathy  Psych:  Denies confusion or change in mental status   Hem:  Denies active bleeding    OBJECTIVE:  Physical Exam:   Temp 97.3 °F (36.3 °C) (Temporal)   Ht 162.6 cm (64\")   Wt 93 kg (205 lb)   BMI 35.19 kg/m²     General Appearance:    Alert, cooperative, in no acute distress                  Eyes: conjunctiva clear  ENT: external ears and nose atraumatic  CV: no peripheral edema  Resp: normal respiratory effort  Skin: no rashes or wounds; normal turgor  Psych: mood and affect appropriate  Lymph: no nodes appreciated  Neuro: gross sensation intact  Vascular:  Palpable peripheral pulse in noted extremity  Musculoskeletal Extremities: valgus deformity to left knee with lateral joint line pain. Pt has effusion , calf is soft and skin is warm, dry and intact, +pms    Radiology:   Left knee with valgus deformity and osteoarthritis lateral aspect - no fracture - xray for pain no comparison     Assessment:     ICD-10-CM ICD-9-CM   1. Osteoarthritis of left knee, unspecified osteoarthritis type M17.12 715.96   2. Secondary hyperparathyroidism, non-renal (CMS/[facility]) E21.1 252.02   3. Chronic kidney disease, stage IV (severe) (CMS/[facility]) N18.4 585.4   4. Disorder of aorta (CMS/[facility]) I77.9 447.9   5. Cor pulmonale (CMS/[facility]) I27.81 416.9   6. Coronary artery disease involving coronary bypass graft " of native heart with other forms of angina pectoris (CMS/[facility]) I25.708 414.05     413.9   7. Type 2 diabetes mellitus with stage 4 chronic kidney disease, with long-term current use of insulin (CMS/HCC) E11.22 250.40    N18.4 585.4    Z79.4 V58.67        Large Joint Arthrocentesis: L knee  Date/Time: 1/9/2020 1:21 PM  Consent given by: patient  Site marked: site marked  Timeout: Immediately prior to procedure a time out was called to verify the correct patient, procedure, equipment, support staff and site/side marked as required   Supporting Documentation  Indications: pain and joint swelling   Procedure Details  Location: knee - L knee  Preparation: Patient was prepped and draped in the usual sterile fashion  Needle gauge: 21 G.  Approach: anterolateral  Medications administered: 2 mL lidocaine (cardiac); 80 mg methylPREDNISolone acetate 80 MG/ML  Patient tolerance: patient tolerated the procedure well with no immediate complications             Plan: Biomechanics of pertinent body area discussed.  Risks, benefits, alternatives, comparisons, and complications of accepted medicines, injections, recommendations, surgical procedures, and therapies explained and education provided in laymen's terms. Natural history and expected course of this patient's diagnosis discussed along with evaluation of therapies. Questions answered. When appropriate I also discussed proper use of cane, walker, trekking poles.   BMI:  The concept of BMI body mass index and its importance and implications discussed.  BMI suggested to be < 40 or as low as possible. Lifestyle measures for weight loss and how this affects orthopedic condition.  RICE: Rest, ice, compression, and elevation therapy, Cryotherapy/brachy therapy, and or OTC linaments as indicated with instructions.   Cortisone Injection. See procedure note.      1/9/2020    Much of this encounter note is an electronic transcription/translation of spoken language to printed text. The  electronic translation of spoken language may permit erroneous, or at times, nonsensical words or phrases to be inadvertently transcribed; Although I have reviewed the note for such errors, some may still exist

## 2020-02-24 NOTE — TELEPHONE ENCOUNTER
Pt informed, states understanding.     Okay with referral to [facility] being placed.   Faxing to [facility] on [address] now.    none

## 2020-05-07 PROBLEM — I20.0 UNSTABLE ANGINA (HCC): Status: ACTIVE | Noted: 2020-01-01

## 2020-05-07 PROBLEM — I21.4 NSTEMI (NON-ST ELEVATED MYOCARDIAL INFARCTION) (HCC): Status: ACTIVE | Noted: 2020-01-01

## 2020-05-07 NOTE — ED TRIAGE NOTES
"Pt to ED from home via [facility] per c/o CP, SOA x 2 days. Pt took on nitro SL tab at home, reports that relieved her pain \"but it came back later.\" EMS gave pt 325 ASA enroute. Pt currently rates pain 3/10 \"but it's about a 6 when it kicks up.\" Pt masked, staff masked in triage.   "

## 2020-05-07 NOTE — PROGRESS NOTES
Discharge Planning Assessment  [facility]     Patient Name: Alessia Madrigal  MRN: [id number]  Today's Date: 5/7/2020    Admit Date: 5/7/2020    Discharge Needs Assessment     Row Name 05/07/20 1339       Living Environment    Lives With  alone    Current Living Arrangements  home/apartment/condo    Primary Care Provided by  self    Provides Primary Care For  no one    Family Caregiver if Needed  child(natacha), adult    Family Caregiver Names  Son José Madrigal [phone]    Quality of Family Relationships  helpful;involved;supportive    Able to Return to Prior Arrangements  yes       Resource/Environmental Concerns    Resource/Environmental Concerns  none    Transportation Concerns  car, none       Transition Planning    Patient/Family Anticipates Transition to  home    Patient/Family Anticipated Services at Transition  none    Transportation Anticipated  family or friend will provide       Discharge Needs Assessment    Readmission Within the Last 30 Days  no previous admission in last 30 days    Concerns to be Addressed  no discharge needs identified;denies needs/concerns at this time    Equipment Currently Used at Home  bipap/cpap;walker, rolling;cane, straight;commode;other (see comments) Transfer board    Anticipated Changes Related to Illness  none    Equipment Needed After Discharge  none        Discharge Plan     Row Name 05/07/20 5891       Plan    Plan  Home, denies needs    Patient/Family in Agreement with Plan  yes    Plan Comments  Met with patient at the bedside. Explained CCP role and verified facesheet. Patient asked that her emergency contact be changed to her son José aMdrigal [phone] because her son Edwin travels for work. Patient lives alone in a first floor apartment. She is IADLs. She has a cpap from [employer], rolling walker, cane, bsc, and trnasfer board. She normally drives but a friend has been delivering her groceries because of the pandemic. She has used HH in the past but is unsure  [facility] and has been to [facility]. Patient plans to return home at [place] and either family or friend will transport. CCP to follow. Mary Reyes RN               Demographic Summary     Row Name 05/07/20 7488       General Information    Admission Type  inpatient    Arrived From  emergency department    Referral Source  admission list    Reason for Consult  discharge planning    Preferred Language  English       Contact Information    Permission Granted to Share Info With  family/designee        Functional Status     Row Name 05/07/20 5799       Functional Status    Usual Activity Tolerance  moderate    Current Activity Tolerance  moderate       Functional Status, IADL    Medications  independent    Meal Preparation  independent    Housekeeping  independent    Laundry  independent    Shopping  independent       Mental Status    General Appearance WDL  WDL       Mental Status Summary    Recent Changes in Mental Status/Cognitive Functioning  no changes        Psychosocial     Row Name 05/07/20 6349       Behavior WDL    Behavior WDL  WDL       Emotion Mood WDL    Emotion/Mood/Affect WDL  WDL       Speech WDL    Speech WDL  WDL       Perceptual State WDL    Perceptual State WDL  WDL       Thought Process WDL    Thought Process WDL  WDL       Intellectual Performance WDL    Intellectual Performance WDL  WDL    Level of Consciousness  Alert       Coping/Stress    Major Change/Loss/Stressor  none    Patient Personal Strengths  able to adapt    Sources of Support  adult child(natacha)    Reaction to Health Status  accepting    Understanding of Condition and Treatment  adequate understanding of medical condition;adequate understanding of treatment       Developmental Stage (Eriksson's)    Developmental Stage  Stage 8 (65 years-death/Late Adulthood) Integrity vs. Despair        Abuse/Neglect     Row Name 05/07/20 4376       Personal Safety    Feels Unsafe at Home or Work/School  no    Feels Threatened by Someone  no    Does Anyone  Try to Keep You From Having Contact with Others or Doing Things Outside Your Home?  no    Physical Signs of Abuse Present  no                Mary Reyes RN

## 2020-05-07 NOTE — ED NOTES
Repeat troponin to be drawn at 0545 due to delayed initial blood draw.      Oziel Boyce, RN  05/07/20 0597

## 2020-05-07 NOTE — H&P
Patient Name: Alessia Madrigal  :10/9/1928  89 y.o.    Date of Admission: 2020  Encounter Provider: Charo Owusu RN  Date of Encounter Visit: 20  Place of Service: [facility]  Referring Provider: Griffin Joseph MD  Patient Care Team:  Dario Mae MD as PCP - General (Internal Medicine)      Chief complaint: chest pain and SOA      History of Present Illness: Alessia Madrigal is a 89 year old pt of Dr. Joseph  with a history of  coronary disease, previous colon bypass grafting, hypertension, diabetes mellitus, hyperlipidemia, renal failure, previous renal artery stenting. Pt had CABG in , then in  pt had a repeat cardiac catheterization which showed severe three- vessel coronary disease with occlusion of left anterior descending and right coronary artery and circumflex the left internal mammary graft left anterior descending was patent the vein graft to the diagonal was patent vein graft to the right coronary was patent and the circumflex filled by collaterals was only ischemic area.      Pt presented in 3/2018 with an acute inferior St elevation infarct. A cath showed severe three-vessel coronary disease, normal left ventricular ejection fraction with inferior wall motion abnormality she had occluded vein graft to the diagonal patent left internal mammary graft left anterior descending severe disease in the vein graft to the posterior descending artery.  She had a 3.5 x 23 mm drug-eluting stent placed in that vein graft.  Residual ischemic area being the diagonal distribution circumflex distribution and mid left anterior descending treated medically.  An ECHO showed left ventricular ejection fraction of 57% with wall motion abnormality, grade 2 diastolic dysfunction, mild to moderate mitral insufficiency mild tricuspid insufficiency with normal RV systolic pressure.     Pt was last seen in the office by Dr. Joseph on 2019 for follow  up. Pt denied chest pain, pressure, heaviness, shortness of breath, orthopnea, PND, palpitations, near syncope or syncope, no stroke like symptoms, or bleeding. She was doing fairly well. She was having twinges in her chest about 3-4 weeks prior and resolved. No changes were made.     Pt presented to ER on 5/7/20 with complaints of chest pain and SOA. Pt had an episode of chest pain about 1 week ago and was completely resolved by SL NTG. Pt developed some increasing dyspnea and an uncomfortable feeling in her chest a few days ago. Pt had return of chest pain tonight and she took 2 NTG with some relief. IN ER /114, BUN/CR 41/1.48, troponin 0.021,CXR showed cardiomegaly with suspected vascular congestion. EKG showed  NSR 90, ST depression in I, II, aVL, V 4-6, Pt admitted for unstable angina.     She had an episode of CP about a week ago better with nitro.  She had more chest pain on Tuesday.  And then multiple episodes on Wednesday.  It is located in the center of her chest like a pressure associated with shortness of breath but no palpitations.        CATH 3/11/2018    SUMMARY: Late presentation of an inferior myocardial infarction with a subtotally occluded saphenous vein graft that's 27 years old to the right artery.  Successful angioplasty drug-eluting stenting.  The lesion is 90% in the mid saphenous vein graft to the PDA the flow is MICAH-3 pre-and MICAH-3 after this is a type C lesion there was no evidence of dissection        RECOMMENDATIONS: Watch for renal insufficiency, will check an echo tomorrow, I would recommend further medical therapy for this lady going forward.  Routine PCI and MI care        ECHO 3/2/2018        · Left ventricular systolic function is normal. Estimated EF = 57%.  · Left ventricular diastolic dysfunction (grade II) consistent with pseudonormalization.  · Mild-to-moderate mitral valve regurgitation is present  · The following left ventricular wall segments are hypokinetic: mid  inferior, basal inferoseptal, mid inferoseptal, basal inferior and basal inferoseptal.  · Mild tricuspid valve regurgitation is present. Estimated right ventricular systolic pressure from tricuspid regurgitation is normal (<35 mmHg).          Stress Test 11/17/2016      · Findings consistent with a normal ECG stress test.  · Left ventricular ejection fraction is normal (Calculated EF = 62%).  · Breast attenuation and diaphragmatic attenuation artifacts are present.  · Myocardial perfusion imaging indicates a normal myocardial perfusion study with no definite evidence of ischemia or infarction.           Past Medical History:   Diagnosis Date   • Acute transmural inferior wall MI (CMS/HCC)    • Arthritis 03/13/2014    CONT TYLENOL FOR PAIN/ JOSE DANIEL CHEW (INTERNAL MEDICINE)   • CAD (coronary artery disease)    • Cancer of uterus (CMS/HCC)    • Carotid artery stenosis    • Chronic renal failure syndrome    • Coronary artery disease involving coronary bypass graft of native heart     with other forms of angina pectoris   • Coronary atherosclerosis    • Endometrial cancer (CMS/HCC)    • Esophageal reflux    • Essential hypertension    • Glaucoma    • Hypertension    • Malaise and fatigue    • Osteoarthritis    • Sleep apnea     USING CPAP   • Spinal stenosis    • Thyroid disease    • Type 2 diabetes mellitus (CMS/HCC) 1991    INsulin begun 2012   • Vitamin B12 deficiency     SHE HAS NOT HAD THIS CHECKED IN A WHILE. SHE HAD BEEN ON SHOTS.  CHECK HER B12 LEVELS TODAY.  BY JOSE DANIEL CHEW       Past Surgical History:   Procedure Laterality Date   • CARDIAC CATHETERIZATION N/A 3/11/2018    Procedure: Left Heart Cath;  Surgeon: Cameron Chisholm MD;  Location: [facility] CATH INVASIVE LOCATION;  Service: Cardiovascular   • CARDIAC CATHETERIZATION N/A 3/11/2018    Procedure: Stent ROSSI bypass graft;  Surgeon: Cameron Chisholm MD;  Location: [facility] CATH INVASIVE LOCATION;  Service: Cardiovascular   • CATARACT EXTRACTION Bilateral    •  CATARACT EXTRACTION W/ INTRAOCULAR LENS IMPLANT Right 10/25/2016    Procedure: RT SUPERFICIAL KERATECTOMY ;  Surgeon: Arian Singh MD;  Location: [facility] OR [facility] [place];  Service:    • CHOLECYSTECTOMY     • COLONOSCOPY     • CORONARY ARTERY BYPASS GRAFT  1991    X3   • HYSTERECTOMY     • OOPHORECTOMY     • TRANSLUMINAL ATHERECTOMY PERONEAL ARTERY      PERCUTANEOUS TRANSLUMINAL ATHERECTOMY RENAL ARTERY         Prior to Admission medications    Medication Sig Start Date End Date Taking? Authorizing Provider   acetaminophen (TYLENOL) 500 MG tablet Take 500 mg by mouth every 4 (four) hours as needed for mild pain (1-3) (Back Pain). EVERY 4 TO 6 HOURS AS NEEDED 3/12/13  Yes Renée Montoya MD   aspirin 81 MG chewable tablet Chew 81 mg Daily.   Yes Renée Montoya MD   calcitriol (ROCALTROL) 0.25 MCG capsule  12/11/19  Yes Renée Montoya MD   clopidogrel (PLAVIX) 75 MG tablet TAKE 1 TABLET DAILY 11/19/19  Yes Griffin Joseph MD   furosemide (LASIX) 40 MG tablet Take 1 tablet by mouth 2 (Two) Times a Day. 3/16/18  Yes Griffin Joseph MD   Insulin Glargine (LANTUS SOLOSTAR) 100 UNIT/ML injection pen Inject 15 Units under the skin into the appropriate area as directed Every Night. 7/15/19  Yes Dario Mae MD   insulin lispro (HUMALOG) 100 UNIT/ML injection Inject 5 Units under the skin into the appropriate area as directed 3 (Three) Times a Day Before Meals. 2/25/19  Yes Summer Gordon MD   Insulin Pen Needle (BD PEN NEEDLE LISA U/F) 32G X 4 MM misc USE AS DIRECTED THREE TIMES A DAY. 7/1/19  Yes Dario Mae MD   levothyroxine (SYNTHROID) 150 MCG tablet Take 1 tablet by mouth Daily. 10/2/19  Yes Dario Mae MD   losartan (COZAAR) 50 MG tablet TAKE 1 TABLET DAILY 2/7/20  Yes Griffin Joseph MD   metoprolol succinate XL (TOPROL-XL) 50 MG 24 hr tablet TAKE 1 TABLET DAILY 10/16/19  Yes Griffin Joseph MD   NIFEdipine XL (PROCARDIA XL) 90 MG 24 hr tablet TAKE 1 TABLET DAILY  2/7/20  Yes Griffin Joseph MD   nitroglycerin (NITROSTAT) 0.4 MG SL tablet Place 1 tablet under the tongue Every 5 (Five) Minutes As Needed for Chest Pain. Take no more than 3 doses in 15 minutes. 8/22/19  Yes Griffin Joseph MD   raNITIdine (ZANTAC) 150 MG tablet Take 1 tablet by mouth 2 (Two) Times a Day. 7/1/19  Yes Dario Mae MD   spironolactone (ALDACTONE) 25 MG tablet TAKE 1 TABLET DAILY 8/9/19  Yes Griffin Joseph MD   timolol (TIMOPTIC) 0.25 % ophthalmic solution Administer 1 drop to both eyes Tonight. 1/25/18  Yes Renée Montoya MD   Coenzyme Q10 (CO Q-10) 200 MG capsule Take 300 mg by mouth daily. 6/20/14   Renée Montoya MD   glucose blood (ONE TOUCH ULTRA TEST) test strip Test Three times daily. DX E11.9 7/9/19   Dario Mae MD   glucose blood test strip Use as instructed 3/16/20   Dario Mae MD   travoprost, BAK free, (TRAVATAN) 0.004 % solution ophthalmic solution Administer 1 drop to both eyes every night. in affected eye(s)     Renée Montoya MD   vitamin D (ERGOCALCIFEROL) 20755 units capsule capsule Take 50,000 Units by mouth Every 7 (Seven) Days.    Renée Montoya MD       Allergies   Allergen Reactions   • Allopurinol Unknown - High Severity   • Clindamycin/Lincomycin Itching     Felt like she was burning and itching around the neck   • Statins Myalgia   • Ampicillin Rash   • Penicillins Rash   • Pravastatin Myalgia       Social History     Socioeconomic History   • Marital status: Single     Spouse name: Not on file   • Number of children: Not on file   • Years of education: Not on file   • Highest education level: Not on file   Tobacco Use   • Smoking status: Never Smoker   • Smokeless tobacco: Never Used   Substance and Sexual Activity   • Alcohol use: No     Comment: caffeine use   • Sexual activity: Defer   Lifestyle   • Physical activity:     Days per week: Patient refused     Minutes per session: Patient refused   • Stress: Not  "on file       Family History   Problem Relation Age of Onset   • Colon cancer Other    • Heart disease Other    • Hypertension Other    • Stroke Other         ISCHEMIC   • Colon cancer Mother    • Stroke Father    • Heart attack Father    • Heart disease Father    • Heart attack Brother    • Stroke Brother    • Heart disease Brother    • Breast cancer Maternal Grandmother        REVIEW OF SYSTEMS:   All other systems reviewed and negative.        Objective:     Vitals:    05/07/20 0400 05/07/20 0507 05/07/20 0510 05/07/20 0624   BP: 135/61 128/93  160/85   BP Location:    Right arm   Patient Position:    Lying   Pulse: 74 75  71   Resp: 16   16   Temp:    98 °F (36.7 °C)   TempSrc:    Oral   SpO2: 98% 98%  97%   Weight:   90.7 kg (200 lb)    Height:   162.6 cm (64\")      Body mass index is 34.33 kg/m².  No intake or output data in the 24 hours ending 05/07/20 0628    Constitutional: She is oriented to person, place, and time. She appears well-developed. She does not appear ill.   HENT:   Head: Normocephalic and atraumatic. Head is without contusion.   Right Ear: Hearing normal. No drainage.   Left Ear: Hearing normal. No drainage.   Nose: No nasal deformity. No epistaxis.   Eyes: Lids are normal. Right eye exhibits no exudate. Left eye exhibits no exudate.  Neck: No JVD present. Carotid bruit is not present. No tracheal deviation present. No thyroid mass and no thyromegaly present.   Cardiovascular: Normal rate, regular rhythm and normal heart sounds.    Pulses:       Posterior tibial pulses are 2+ on the right side, and 2+ on the left side.   Pulmonary/Chest: Effort normal and breath sounds normal.   Abdominal: Soft. Normal appearance and bowel sounds are normal. There is no tenderness.   Musculoskeletal: Normal range of motion.        Right shoulder: She exhibits no deformity.        Left shoulder: She exhibits no deformity.   Neurological: She is alert and oriented to person, place, and time. She has normal " strength.   Skin: Skin is warm, dry and intact. No rash noted.   Psychiatric: She has a normal mood and affect. Her behavior is normal. Thought content normal.   Vitals reviewed      Lab Review:     Results from last 7 days   Lab Units 05/07/20  0347   SODIUM mmol/L 142   POTASSIUM mmol/L 4.5   CHLORIDE mmol/L 106   CO2 mmol/L 24.8   BUN mg/dL 41*   CREATININE mg/dL 1.48*   CALCIUM mg/dL 9.0   BILIRUBIN mg/dL 0.2   ALK PHOS U/L 83   ALT (SGPT) U/L <5   AST (SGOT) U/L 9   GLUCOSE mg/dL 162*     Results from last 7 days   Lab Units 05/07/20  0347   TROPONIN T ng/mL 0.021     Results from last 7 days   Lab Units 05/07/20  0347   WBC 10*3/mm3 10.25   HEMOGLOBIN g/dL 12.3   HEMATOCRIT % 36.3   PLATELETS 10*3/mm3 256     Results from last 7 days   Lab Units 05/07/20  0347   INR  1.02                          I personally viewed and interpreted the patient's EKG/Telemetry data.        Assessment and Plan:       1.  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  I recommend that she go for a heart catheterization.  She is a pretty sturdy lady even for her age.  Last LV function in 2018 was normal.  2.  History of coronary artery disease status post CABG and stents  3.  Systemic hypertension  4.  Hyperlipidemia  5.  Diabetes  6.  Chronic kidney disease    Charo Owusu RN  05/07/20  06:28

## 2020-05-07 NOTE — ED NOTES
Pt reports being pain free at this time after 1 SL nitro and 1 inch nitro paste.      Oziel Boyce, RN  05/07/20 0319

## 2020-05-07 NOTE — ED PROVIDER NOTES
EMERGENCY DEPARTMENT ENCOUNTER    CHIEF COMPLAINT  Chief Complaint: Chest pain and soa  History given by: patient  History limited by: none  Room Number: 11/11  PMD: Dario Mae MD      HPI:  Pt is a 89 y.o. female who presents complaining of chest pain and soa. Patient reports and episode of chest pain about 1 week ago, which was completely relieved by SL NTG. About 2 days ago patient developed some increasing dyspnea with and uncomfortable feeling in her chest. Tonight had return of chest pain. She took 2 NTG earlier this evening with some relief, but symptoms recurred. Patient denies fever, cough, n, v, d. She currently rates her discomfort as 5/10.        PAST MEDICAL HISTORY  Active Ambulatory Problems     Diagnosis Date Noted   • Actinic keratosis 02/02/2016   • Anemia of chronic disorder 02/02/2016   • Disorder of aorta (CMS/[facility]) 02/02/2016   • Chronic coronary artery disease 02/02/2016   • Chronic kidney disease, stage IV (severe) (CMS/[facility]) 02/02/2016   • Constipation 02/02/2016   • Cor pulmonale (CMS/[facility]) 02/02/2016   • Diabetes mellitus (CMS/[facility]) 02/02/2016   • High level of uric acid in blood 02/02/2016   • Fatigue 02/02/2016   • Hypertension 02/02/2016   • Hypothyroidism 02/02/2016   • Iron deficiency 02/02/2016   • Mitral valve insufficiency 02/02/2016   • Pulmonary hypertension (CMS/[facility]) 02/02/2016   • Swelling of lower extremity 02/02/2016   • Shortness of breath 02/02/2016   • Tricuspid valve insufficiency 02/02/2016   • Uncontrolled type 2 diabetes mellitus (CMS/[facility]) 02/02/2016   • Type 2 diabetes mellitus (CMS/[facility]) 02/02/2016   • Vitamin D deficiency 03/25/2016   • Secondary hyperparathyroidism, non-renal (CMS/[facility]) 03/25/2016   • Pulmonary nodules/lesions, multiple 05/27/2016   • Pleural effusion 05/27/2016   • Paronychia of second finger of right hand 09/29/2016   • Hyperuricemia 12/29/2016   • Acute pain of both shoulders 01/10/2018   • Acute transmural inferior wall MI (CMS/[facility])  03/11/2018   • Wrist injury, left, initial encounter 11/06/2018     Resolved Ambulatory Problems     Diagnosis Date Noted   • Hyperlipidemia 02/02/2016     Past Medical History:   Diagnosis Date   • Arthritis 03/13/2014   • CAD (coronary artery disease)    • Cancer of uterus (CMS/HCC)    • Carotid artery stenosis    • Chronic renal failure syndrome    • Coronary artery disease involving coronary bypass graft of native heart    • Coronary atherosclerosis    • Endometrial cancer (CMS/HCC)    • Esophageal reflux    • Essential hypertension    • Glaucoma    • Malaise and fatigue    • Osteoarthritis    • Sleep apnea    • Spinal stenosis    • Thyroid disease    • Vitamin B12 deficiency        PAST SURGICAL HISTORY  Past Surgical History:   Procedure Laterality Date   • CARDIAC CATHETERIZATION N/A 3/11/2018    Procedure: Left Heart Cath;  Surgeon: Cameron Chisholm MD;  Location: [facility] CATH INVASIVE LOCATION;  Service: Cardiovascular   • CARDIAC CATHETERIZATION N/A 3/11/2018    Procedure: Stent ROSSI bypass graft;  Surgeon: Cameron Chisholm MD;  Location[facility] CATH INVASIVE LOCATION;  Service: Cardiovascular   • CATARACT EXTRACTION Bilateral    • CATARACT EXTRACTION W/ INTRAOCULAR LENS IMPLANT Right 10/25/2016    Procedure: RT SUPERFICIAL KERATECTOMY ;  Surgeon: Arian Singh MD;  Location: [facility] OR [facility];  Service:    • CHOLECYSTECTOMY     • COLONOSCOPY     • CORONARY ARTERY BYPASS GRAFT  1991    X3   • HYSTERECTOMY     • OOPHORECTOMY     • TRANSLUMINAL ATHERECTOMY PERONEAL ARTERY      PERCUTANEOUS TRANSLUMINAL ATHERECTOMY RENAL ARTERY       FAMILY HISTORY  Family History   Problem Relation Age of Onset   • Colon cancer Other    • Heart disease Other    • Hypertension Other    • Stroke Other         ISCHEMIC   • Colon cancer Mother    • Stroke Father    • Heart attack Father    • Heart disease Father    • Heart attack Brother    • Stroke Brother    • Heart disease Brother    • Breast cancer Maternal Grandmother         SOCIAL HISTORY  Social History     Socioeconomic History   • Marital status: Single     Spouse name: Not on file   • Number of children: Not on file   • Years of education: Not on file   • Highest education level: Not on file   Tobacco Use   • Smoking status: Never Smoker   • Smokeless tobacco: Never Used   Substance and Sexual Activity   • Alcohol use: No     Comment: caffeine use   • Sexual activity: Defer   Lifestyle   • Physical activity:     Days per week: Patient refused     Minutes per session: Patient refused   • Stress: Not on file       ALLERGIES  Allopurinol; Clindamycin/lincomycin; Statins; Ampicillin; Penicillins; and Pravastatin    REVIEW OF SYSTEMS  Review of Systems   Constitutional: Negative.  Negative for fever.   HENT: Positive for postnasal drip. Negative for congestion and sore throat.    Eyes: Negative.    Respiratory: Positive for cough, chest tightness and shortness of breath.    Cardiovascular: Positive for chest pain. Negative for palpitations and leg swelling.   Gastrointestinal: Negative.  Negative for nausea and vomiting.   Genitourinary: Negative.  Negative for dysuria, frequency and urgency.   Musculoskeletal: Negative.    Skin: Negative.    Neurological: Negative.    Psychiatric/Behavioral: Negative.        PHYSICAL EXAM  ED Triage Vitals [05/07/20 0223]   Temp Heart Rate Resp BP SpO2   98 °F (36.7 °C) 91 16 (!) 149/114 99 %      Temp src Heart Rate Source Patient Position BP Location FiO2 (%)   -- -- -- -- --       Physical Exam   Constitutional: She is oriented to person, place, and time and well-developed, well-nourished, and in no distress.   HENT:   Head: Normocephalic and atraumatic.   Mouth/Throat: Oropharynx is clear and moist.   Eyes: Pupils are equal, round, and reactive to light. EOM are normal.   Neck: Normal range of motion. Neck supple.   Cardiovascular: Normal rate, regular rhythm, normal heart sounds and intact distal pulses.   No murmur heard.  Pulmonary/Chest:  Effort normal and breath sounds normal. No respiratory distress. She has no wheezes. She has no rales. She exhibits no tenderness.   Abdominal: Soft. Bowel sounds are normal. She exhibits no distension. There is no tenderness.   Musculoskeletal: Normal range of motion. She exhibits no edema.   Neurological: She is alert and oriented to person, place, and time. GCS score is 15.   Skin: Skin is warm and dry.   Psychiatric: Mood, memory, affect and judgment normal.       LAB RESULTS  Lab Results (last 24 hours)     Procedure Component Value Units Date/Time    CBC & Differential [[id number]] Collected:  05/07/20 0347    Specimen:  Blood Updated:  05/07/20 0359    Narrative:       The following orders were created for panel order CBC & Differential.  Procedure                               Abnormality         Status                     ---------                               -----------         ------                     CBC Auto Differential[[id number]]        Abnormal            Final result                 Please view results for these tests on the individual orders.    Comprehensive Metabolic Panel [[id number]]  (Abnormal) Collected:  05/07/20 0347    Specimen:  Blood Updated:  05/07/20 0417     Glucose 162 mg/dL      BUN 41 mg/dL      Creatinine 1.48 mg/dL      Sodium 142 mmol/L      Potassium 4.5 mmol/L      Chloride 106 mmol/L      CO2 24.8 mmol/L      Calcium 9.0 mg/dL      Total Protein 5.9 g/dL      Albumin 3.60 g/dL      ALT (SGPT) <5 U/L      AST (SGOT) 9 U/L      Alkaline Phosphatase 83 U/L      Total Bilirubin 0.2 mg/dL      eGFR Non African Amer 33 mL/min/1.73      Globulin 2.3 gm/dL      A/G Ratio 1.6 g/dL      BUN/Creatinine Ratio 27.7     Anion Gap 11.2 mmol/L     Narrative:       GFR Normal >60  Chronic Kidney Disease <60  Kidney Failure <15      Troponin [[id number]]  (Normal) Collected:  05/07/20 0347    Specimen:  Blood Updated:  05/07/20 0419     Troponin T 0.021 ng/mL     Narrative:       Troponin T  Reference Range:  <= 0.03 ng/mL-   Negative for AMI  >0.03 ng/mL-     Abnormal for myocardial necrosis.  Clinicians would have to utilize clinical acumen, EKG, Troponin and serial changes to determine if it is an Acute Myocardial Infarction or myocardial injury due to an underlying chronic condition.       Results may be falsely decreased if patient taking Biotin.      Protime-INR [[id number]]  (Normal) Collected:  05/07/20 0347    Specimen:  Blood Updated:  05/07/20 0411     Protime 13.1 Seconds      INR 1.02    CBC Auto Differential [[id number]]  (Abnormal) Collected:  05/07/20 0347    Specimen:  Blood Updated:  05/07/20 0359     WBC 10.25 10*3/mm3      RBC 3.73 10*6/mm3      Hemoglobin 12.3 g/dL      Hematocrit 36.3 %      MCV 97.3 fL      MCH 33.0 pg      MCHC 33.9 g/dL      RDW 12.7 %      RDW-SD 45.1 fl      MPV 10.9 fL      Platelets 256 10*3/mm3      Neutrophil % 79.6 %      Lymphocyte % 10.3 %      Monocyte % 7.3 %      Eosinophil % 1.8 %      Basophil % 0.5 %      Immature Grans % 0.5 %      Neutrophils, Absolute 8.16 10*3/mm3      Lymphocytes, Absolute 1.06 10*3/mm3      Monocytes, Absolute 0.75 10*3/mm3      Eosinophils, Absolute 0.18 10*3/mm3      Basophils, Absolute 0.05 10*3/mm3      Immature Grans, Absolute 0.05 10*3/mm3      nRBC 0.0 /100 WBC           I ordered the above labs and reviewed the results    RADIOLOGY  XR Chest 1 View   Final Result   Cardiomegaly with suspected vascular congestion.       This report was finalized on 5/7/2020 3:04 AM by Dr. Naomy Walker M.D.               I ordered the above noted radiological studies. Interpreted by radiologist. Reviewed by me in PACS.     PROCEDURES  Procedures  Patient was placed in face mask in first look. Patient was wearing facemask when I entered the room and throughout our encounter. I wore full protective equipment throughout this patient encounter including a face mask, and gloves. Hand hygiene was performed before donning protective  equipment and after removal when leaving the room.      PROGRESS AND CONSULTS      EKG          EKG time: 0228  Rhythm/Rate: NSR rate 90  P waves and IN: normal, no prolonged IN  QRS, axis: normal axis, no conduction delays   ST and T waves: ST depression in I,II,aVL,V4-6, no ST elevations noted.     Interpreted Contemporaneously by me, independently viewed  The ST changes are new compared to 8/2/2018.    9305 - Discussed with Dr. Joseph ([facility] [place]) who will admit patient for further care.      MEDICAL DECISION MAKING  Results were reviewed/discussed with the patient and they were also made aware of online access. Pt also made aware that some labs, such as cultures, will not be resulted during ER visit and follow up with PMD is necessary.     MDM  Number of Diagnoses or Management Options     Amount and/or Complexity of Data Reviewed  Clinical lab tests: ordered and reviewed  Tests in the radiology section of CPT®: ordered and reviewed  Tests in the medicine section of CPT®: ordered and reviewed           DIAGNOSIS  Final diagnoses:   Unstable angina (CMS/HCC)   Acute on chronic congestive heart failure, unspecified heart failure type (CMS/HCC)       DISPOSITION  ADMISSION    Discussed treatment plan and reason for admission with pt/family and admitting physician.  Pt/family voiced understanding of the plan for admission for further testing/treatment as needed.         Latest Documented Vital Signs:  As of 04:46  BP- 135/61 HR- 74 Temp- 98 °F (36.7 °C) O2 sat- 98%       Anirudh Sidhu MD  05/07/20 6179

## 2020-05-07 NOTE — DISCHARGE INSTRUCTIONS
[facility]  [address] [place] [address]    Coronary Angiogram (Radial/Ulnar Approach) After Care    Refer to this sheet in the next few weeks. These instructions provide you with information on caring for yourself after your procedure. Your caregiver may also give you more specific instructions. Your treatment has been planned according to current medical practices, but problems sometimes occur. Call your caregiver if you have any problems or questions after your procedure.    Home Care Instructions:  · You may shower the day after the procedure. Remove the bandage (dressing) and gently wash the site with plain soap and water. Gently pat the site dry. You may apply a band aid daily for 2 days if desired.    · Do not apply powder or lotion to the site.  · Do not submerge the affected site in water for 3 to 5 days or until the site is completely healed.   · Do not lift, push or pull anything over 10 pounds for 2 days after your procedure.  · Inspect the site at least twice daily. You may notice some bruising at the site and it may be tender for 1 to 2 weeks.     · Increase your fluid intake for the next 2 days.    · Keep arm elevated for 24 hours. For the remainder of the day, keep your arm in “Pledge of Allegiance” position when up and about.     · You may drive 24 hours after the procedure unless otherwise instructed by your caregiver.  · Do not operate machinery or power tools for 24 hours.  · A responsible adult should be with you for the first 24 hours after you arrive home. Do not make any important legal decisions or sign legal papers for 24 hours.  Do not drink alcohol for 24 hours.    · Metformin or any medications containing Metformin should not be taken for 48 hours after your procedure.      Call Your Doctor if:   · You have unusual pain at the radial/ulnar (wrist) site.  · You have redness, warmth, swelling, or pain at the radial/ulnar (wrist) site.  · You have drainage (other  than a small amount of blood on the dressing).  · You have chills or a fever > 101.  · Your arm becomes pale or dark, cool, tingly, or numb.  · You have heavy bleeding from the site, hold pressure on the site for 20 minutes.  If the bleeding stops, apply a fresh bandage and call your cardiologist.  However, if you continue to have bleeding, call 911.

## 2020-05-08 NOTE — PLAN OF CARE
I am the on-call provider.  RN paged having some concerns about her insulin regimen and not needing as much as she has ordered.  He was given okay to use a low-dose sliding scale insulin while in patient instead of set humalog dosage that she currently has ordered with meals (which was her home regimen).  I am not making any changes to her home regimen of insulin, so any further adjustments to her insulin regimen that is needed as an outpatient will need to be managed by whoever is ordering her insulin.  Her RN could not tell me if this was managed by her PCP or if she follows an endocrinologist but as far as I can tell in her chart Dr. Mae her PCP has been ordering her insulin.

## 2020-05-08 NOTE — PLAN OF CARE
Patient on 2L from overnight, Weaned down to 1 L and part room air but but still feeling short of air. IMDUR med started today, Procardia discontinued. Possible discharge tomorrow dependant on walking pulse ox, and physical therapy needs. VSS, No N/V/D noted, No S/S of acute distress noted. Will continue to monitor.

## 2020-05-08 NOTE — PLAN OF CARE
Problem: Patient Care Overview  Goal: Plan of Care Review  Outcome: Ongoing (interventions implemented as appropriate)  Flowsheets (Taken 2020 0608)  Plan of Care Reviewed With: patient  Note:   Left arm cath site with palpable pulses radial and brachial, no bleeding or swelling,vss resting at this time, will continue to monitor closely.     Problem: Fall Risk (Adult)  Goal: Identify Related Risk Factors and Signs and Symptoms  Outcome: Ongoing (interventions implemented as appropriate)     Problem: Fall Risk (Adult)  Goal: Absence of Fall  Outcome: Ongoing (interventions implemented as appropriate)     Problem: Cardiac: ACS (Acute Coronary Syndrome) (Adult)  Description  Prevent and manage potential problems includin. cardiovascular structural defects  2. chest pain (angina)  3. dysrhythmia/arrhythmia  4. embolism  5. heart failure/shock  6. ischemia leading to infarction  7. pericarditis  8. situational response  Goal: Signs and Symptoms of Listed Potential Problems Will be Absent, Minimized or Managed (Cardiac: ACS)  Outcome: Ongoing (interventions implemented as appropriate)

## 2020-05-08 NOTE — PROGRESS NOTES
"Patient Name: Alessia Madrigal  :10/9/1928  89 y.o.      Patient Care Team:  Dario Mae MD as PCP - General (Internal Medicine)    Interval History:   Status post heart catheterization and medication adjustment    Subjective:  Following for coronary disease and non-ST elevation myocardial infarction    Objective   Vital Signs  Temp:  [97.6 °F (36.4 °C)-98.9 °F (37.2 °C)] 97.6 °F (36.4 °C)  Heart Rate:  [] 75  Resp:  [16-20] 18  BP: (102-150)/() 117/49    Intake/Output Summary (Last 24 hours) at 2020 1522  Last data filed at 2020 1303  Gross per 24 hour   Intake 1480 ml   Output 3100 ml   Net -1620 ml     Flowsheet Rows      First Filed Value   Admission Height  162.6 cm (64\") Documented at 2020 0510   Admission Weight  90.7 kg (200 lb) Documented at 2020 0510          Physical Exam:   General Appearance:    Alert, cooperative, in no acute distress   Lungs:     Clear to auscultation.  Normal respiratory effort and rate.      Heart:    Regular rhythm and normal rate, normal S1 and S2, no murmurs, gallops or rubs.     Chest Wall:    No abnormalities observed   Abdomen:     Soft, nontender, positive bowel sounds.     Extremities:   no cyanosis, clubbing or edema.  No marked joint deformities.  Adequate musculoskeletal strength.       Results Review:    Results from last 7 days   Lab Units 20  0439   SODIUM mmol/L 143   POTASSIUM mmol/L 3.8   CHLORIDE mmol/L 105   CO2 mmol/L 24.0   BUN mg/dL 33*   CREATININE mg/dL 1.34*   GLUCOSE mg/dL 99   CALCIUM mg/dL 8.8     Results from last 7 days   Lab Units 20  0947 20  0347   TROPONIN T ng/mL 0.213* 0.021     Results from last 7 days   Lab Units 20  0438   WBC 10*3/mm3 7.32   HEMOGLOBIN g/dL 12.8   HEMATOCRIT % 37.1   PLATELETS 10*3/mm3 259     Results from last 7 days   Lab Units 20  0347   INR  1.02                     Medication Review:     aspirin 324 mg Oral Once   aspirin 81 mg Oral Daily "   clopidogrel 75 mg Oral Daily   furosemide 40 mg Oral BID   insulin glargine 15 Units Subcutaneous Nightly   insulin lispro 5 Units Subcutaneous TID AC   isosorbide mononitrate 30 mg Oral Q24H   latanoprost 1 drop Both Eyes Nightly   levothyroxine 150 mcg Oral Q AM   losartan 50 mg Oral Daily   metoprolol succinate XL 50 mg Oral Daily   NIFEdipine XL 90 mg Oral Daily   spironolactone 25 mg Oral Daily   timolol 1 drop Both Eyes Daily   vitamin D 50,000 Units Oral Q7 Days             Assessment/Plan     1.  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  I discussed the results of her heart catheterization yesterday with Dr. Chisholm and she has severe native disease as well as some disease in her bypass grafts and there is nothing to intervene upon.  2.  History of coronary artery disease status post CABG and stents  3.  Systemic hypertension  4.  Hyperlipidemia  5.  Diabetes  6.  Chronic kidney disease    Dr. Chisholm adjusted her medications for better medical therapy of her coronary disease yesterday.  Her blood pressures been a little soft and we have had to hold some medications.  I am going to have physical therapy see her since she said she is weak and has not been out of bed.  She will at the very least need to go home with home health and probably home physical therapy.  It is possible she may need to go to rehab for short stent    Cathy Downs MD, [facility]  05/08/20  15:22

## 2020-05-08 NOTE — PROGRESS NOTES
Continued Stay Note  [facility]     Patient Name: Alessia Madrigal  MRN: [id number]  Today's Date: 5/8/2020    Admit Date: 5/7/2020    Discharge Plan     Row Name 05/08/20 1159       Plan    Plan  Home, No needs    Patient/Family in Agreement with Plan  yes    Plan Comments  Patient planning to return home. Denies having any needs at this time. Family or friend will provide transport at discharge. CCP to follow. Mary Reyes RN        Discharge Codes    No documentation.             Mary Reyes RN

## 2020-05-09 NOTE — PROGRESS NOTES
Exercise Oximetry    Patient Name:Alessia Madrigal   MRN: [id number]   Date: 05/09/20             ROOM AIR BASELINE   SpO2% 98   Heart Rate 88   Blood Pressure      EXERCISE ON ROOM AIR SpO2% EXERCISE ON O2 @  LPM SpO2%   1 MINUTE 98 1 MINUTE    2 MINUTES 99 2 MINUTES    3 MINUTES 98 3 MINUTES    4 MINUTES 97 4 MINUTES    5 MINUTES 98 5 MINUTES    6 MINUTES 97 6 MINUTES               Distance Walked  Distance Walked   Dyspnea (Yordan Scale)  Dyspnea (Yordan Scale)   Fatigue (Yordan Scale)  Fatigue (Yordan Scale)   SpO2% Post Exercise 97 SpO2% Post Exercise   HR Post Exercise 102 HR Post Exercise   Time to Recovery 2 minutes Time to Recovery     Comments: patient walked on flat surface with walker 4 rounds around nurses station

## 2020-05-09 NOTE — OUTREACH NOTE
Prep Survey      Responses   [facility] patient discharged from?  [place]   Is LACE score < 7 ?  No   Eligibility  [facility]   Date of Admission  05/07/20   Date of Discharge  05/09/20   Discharge Disposition  Home or Self Care   Discharge diagnosis  MI   COVID-19 Test Status  Not tested   Does the patient have one of the following disease processes/diagnoses(primary or secondary)?  Acute MI (STEMI,NSTEMI)   Does the patient have Home health ordered?  No   Is there a DME ordered?  No   Prep survey completed?  Yes          Josee Munoz RN

## 2020-05-09 NOTE — DISCHARGE SUMMARY
Patient Name: Alessia Madrigal  :10/9/1928  89 y.o.    Date of Admit: 2020  Date of Discharge:  2020    Discharge Diagnosis:  Problem List Items Addressed This Visit        Cardiovascular and Mediastinum    Unstable angina (CMS/HCC) - Primary    Relevant Medications    isosorbide mononitrate (IMDUR) 30 MG 24 hr tablet (Start on 5/10/2020)      Other Visit Diagnoses     Acute on chronic congestive heart failure, unspecified heart failure type (CMS/HCC)        Relevant Medications    isosorbide mononitrate (IMDUR) 30 MG 24 hr tablet (Start on 5/10/2020)          Hospital Course:       1.  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  I discussed the results of her heart catheterization yesterday with Dr. Chisholm and she has severe native disease as well as some disease in her bypass grafts and there is nothing to intervene upon.  2.  History of coronary artery disease status post CABG and stents  3.  Systemic hypertension  4.  Hyperlipidemia  5.  Diabetes  6.  Chronic kidney disease    She had a heart catheterization which showed chronic disease.  Her medications were adjusted for medical management.  I stopped nifedipine because her blood pressures were little bit low.  She will go home with home health and physical therapy.    Procedures Performed  Procedure(s):  Left Heart Cath  Saphenous Vein Graft       Consults     Date and Time Order Name Status Description    2020 0440 LCG (on-call MD unless specified) Completed           Pertinent Test Results:   Results from last 7 days   Lab Units 20  0439 20  0347   SODIUM mmol/L 143 142   POTASSIUM mmol/L 3.8 4.5   CHLORIDE mmol/L 105 106   CO2 mmol/L 24.0 24.8   BUN mg/dL 33* 41*   CREATININE mg/dL 1.34* 1.48*   CALCIUM mg/dL 8.8 9.0   BILIRUBIN mg/dL  --  0.2   ALK PHOS U/L  --  83   ALT (SGPT) U/L  --  <5   AST (SGOT) U/L  --  9   GLUCOSE mg/dL 99 162*     Results from last 7 days   Lab Units 20  0947  05/07/20  0347   TROPONIN T ng/mL 0.213* 0.021     Results from last 7 days   Lab Units 05/08/20  0438   WBC 10*3/mm3 7.32   HEMOGLOBIN g/dL 12.8   HEMATOCRIT % 37.1   PLATELETS 10*3/mm3 259     Results from last 7 days   Lab Units 05/07/20  0347   INR  1.02               Condition on Discharge: stable    Discharge Medications     Discharge Medications      New Medications      Instructions Start Date   isosorbide mononitrate 30 MG 24 hr tablet  Commonly known as:  IMDUR   30 mg, Oral, Every 24 Hours Scheduled   Start Date:  May 10, 2020        Continue These Medications      Instructions Start Date   clopidogrel 75 MG tablet  Commonly known as:  PLAVIX   TAKE 1 TABLET DAILY      Co Q-10 200 MG capsule   300 mg, Oral, Daily      furosemide 40 MG tablet  Commonly known as:  LASIX   40 mg, Oral, 2 Times Daily      glucose blood test strip  Commonly known as:  ONE TOUCH ULTRA TEST   Test Three times daily. DX E11.9      glucose blood test strip   Use as instructed      Insulin Glargine 100 UNIT/ML injection pen  Commonly known as:  LANTUS SOLOSTAR   15 Units, Subcutaneous, Nightly      insulin lispro 100 UNIT/ML injection  Commonly known as:  HumaLOG   5 Units, Subcutaneous, 3 Times Daily Before Meals      Insulin Pen Needle 32G X 4 MM misc  Commonly known as:  BD Pen Needle Janis U/F   USE AS DIRECTED THREE TIMES A DAY.      levothyroxine 150 MCG tablet  Commonly known as:  Synthroid   150 mcg, Oral, Daily      losartan 50 MG tablet  Commonly known as:  COZAAR   TAKE 1 TABLET DAILY      metoprolol succinate XL 50 MG 24 hr tablet  Commonly known as:  TOPROL-XL   TAKE 1 TABLET DAILY      nitroglycerin 0.4 MG SL tablet  Commonly known as:  NITROSTAT   0.4 mg, Sublingual, Every 5 Minutes PRN, Take no more than 3 doses in 15 minutes.      raNITIdine 150 MG tablet  Commonly known as:  ZANTAC   150 mg, Oral, 2 Times Daily      spironolactone 25 MG tablet  Commonly known as:  ALDACTONE   TAKE 1 TABLET DAILY      timolol 0.25 %  ophthalmic solution  Commonly known as:  TIMOPTIC   1 drop, Both Eyes, Nightly - One Time      travoprost (KAL free) 0.004 % solution ophthalmic solution  Commonly known as:  TRAVATAN   1 drop, Both Eyes, Nightly, in affected eye(s)         Stop These Medications    acetaminophen 500 MG tablet  Commonly known as:  TYLENOL     aspirin 81 MG chewable tablet     NIFEdipine XL 90 MG 24 hr tablet  Commonly known as:  PROCARDIA XL     vitamin D 1.25 MG (30943 UT) capsule capsule  Commonly known as:  ERGOCALCIFEROL            Discharge Diet:   Diet Instructions     Diet: Cardiac      Discharge Diet:  Cardiac          Activity at Discharge:   Activity Instructions     Activity as Tolerated            Discharge disposition: home    Follow-up Appointments  Future Appointments   Date Time Provider Department Center   6/24/2020  9:00 AM [facility] None   6/29/2020  1:30 PM Dario Mae MD [facility] None   8/27/2020 12:40 PM Griffin Joseph MD [facility] None     Additional Instructions for the Follow-ups that You Need to Schedule     Discharge Follow-up with Specified Provider: Oralia in 1 week   As directed      To:  Oralia in 1 week             Discharge time was 35 minutes.     Cathy Downs MD, [facility]  05/09/20  11:07

## 2020-05-09 NOTE — PLAN OF CARE
Problem: Patient Care Overview  Goal: Plan of Care Review  Outcome: Ongoing (interventions implemented as appropriate)  Flowsheets (Taken 5/9/2020 0431)  Plan of Care Reviewed With: spouse  Outcome Summary: VItal signs as charted. Denies any chest pain/pain. Dressing on right arm dry and intact, Fall precautions enforced. Monitored.

## 2020-05-11 NOTE — PROGRESS NOTES
Case Management Discharge Note      Final Note: Patient DCd home              Transportation Services  Private: Car    Final Discharge Disposition Code: 01 - home or self-care

## 2020-05-11 NOTE — OUTREACH NOTE
Call Center TCM Note      Responses   [facility] patient discharged from?  [place]   Does the patient have one of the following disease processes/diagnoses(primary or secondary)?  Acute MI (STEMI,NSTEMI)   TCM attempt successful?  Yes   Call start time  1117   Call end time  1121   Discharge diagnosis  MI   Meds reviewed with patient/caregiver?  Yes   Is the patient having any side effects they believe may be caused by any medication additions or changes?  No   Does the patient have all prescriptions related to this admission filled (includes statins,anticoagulants,HTN meds,anti-arrhythmia meds)  Yes   Does the patient have a primary care provider?   Yes   Does the patient have an appointment with their PCP,cardiologist,or clinic within 7 days of discharge?  No   What is preventing the patient from scheduling follow up appointments within 7 days of discharge?  Unsure of when or with whom   Nursing Interventions  Educated patient on importance of making appointment   Has home health visited the patient within 72 hours of discharge?  N/A   Did the patient receive a copy of their discharge instructions?  Yes   Nursing interventions  Reviewed instructions with patient   What is the patient's perception of their health status since discharge?  Improving   Is the patient/caregiver able to teach back signs and symptoms of when to call for help immediately:  Shortness of breath at any time, Sudden sweating or clammy skin, Sudden discomfort in arms, back, neck or jaw, Sudden chest discomfort, Nausea or vomiting   Is the patient/caregiver able to teach back ways to prevent a second heart attack:  Take medications   Wrap up additional comments  doing better, reports SOB if she moves too fast          Iliana Funez RN    5/11/2020, 11:22

## 2020-05-14 NOTE — PROGRESS NOTES
Date of Office Visit: 20  Encounter Provider: MILVIA Iniguez  Place of Service: [facility] CARDIOLOGY  Patient Name: Alessia Madrigal  :10/9/1928    Chief Complaint   Patient presents with   • Coronary Artery Disease   • Follow-up     telephone visit   :     HPI: Alessia Madrigal is a 89 y.o. female  with history of coronary artery disease status post CABG (), carotid artery stenosis, hypertension, sleep apnea on CPAP,renal stent placement. Chronic renal failure,  diabetes on insulin, osteoarthritis, glaucoma, GERD, hypothyroidism, and uterine cancer.    She is followed by Dr. Joseph. I will visit with her in follow up today.                  In  she had coronary bypass grafting with Dr. Thomas.  In , she had repeat cardiac catheterization which showed severe three-vessel coronary artery disease with occlusion offt anterior descending and right coronary artery and circumflex the left internal mammary graft left anterior descending was patent the vein graft to the diagonal was patent vein graft to the right coronary was patent and the circumflex filled by collaterals was only ischemic area.  He is also had previous left renal artery stents in his follow with nephrology for renal failure.  She is also followed by vascular services for mild carotid artery disease.              She had a normal stress test with myocardial perfusion SPECT on 2016.She was last seen in this office on 2017 to be established with the office.  That time, she was complaining of shortness of breath that she believed was gradually worsening over the past year.  She also complained that the spinal stenosis and arthritis makes it difficult for her to walk and pinpoint the exact cause of shortness of breath.               She had transient, mild chest pain on Thursday 3/8.  She reports that she took nitroglycerin sublingual 1 on 2 separate days.  Then on Saturday night 3/10  she had a more constant mild chest pain and new that she should get this checked out.  However, she waited to present to the ER until Sunday where she was admitted on 3/11/2018 with an acute inferior myocardial infarction.  She was taken to the cardiac catheterization lab and found to have occluded vein graft to the diagonal, patent left internal mammary graft to left anterior descending, severe disease of the vein graft to the posterior descending artery.  She also had severe three-vessel disease and normal left ventricular ejection fraction with inferior wall motion abnormality.  She had drug-eluting stent placed to the vein graft to the PDA.  Residual ischemic areas being the diagonal distribution circumflex distribution and mid left anterior descending. She had no evidence of dissection.  She had a transthoracic echocardiogram that revealed normal left ventricular systolic function with estimated EF of 57%, grade 2 diastolic dysfunction, mild to moderate mitral regurgitation, mild tricuspid valve regurgitation, and normal RVSP.  On 3/13/2018 her creatinine was 1.07 and her EGFR was 48.  Her hemoglobin A1c was elevated 8.7.  The lipid panel was unremarkable. She was discharged to a rehab facility.     She was hospitalized 05/2020 with chest pain and ruled in for NSTEMI. Cardiac catheterization revealed LIMA to LAD: widely patent and normal.  It is widely patent and looks good the native LAD has 3 lesions subtotally occluded and the distal LAD .SVG to PDA there 20% luminal irregularities throughout the SVG. The stent in the mid SVGto PDA was patent, the PDA retrograde fills the SUPA and there is a 70 to 80% lesion in the mid SUPA this is not amenable to PCI there are some right to left collaterals to the distal circumflex. The distal LAD disease was progressed from prior cath and felt to be the cause for the non-STEMI.  She had no revascularization targets and medical management entailed starting isosorbide 30 mg  "daily.    We visit today for hospital discharge follow up. She has not had any further chest pain but continues to have shortness of breath with exertion. She stated that she moves at \" turtle speed\". She continues to live alone in a single level home. She had a fall a couple months ago after losing her balance as she was bending over to pick something up. She denies issues with swelling. She is tolerating Imdur. She is concerned that aspirin was \"Discontinued\" at discharge.      Allergies   Allergen Reactions   • Allopurinol Unknown - High Severity   • Clindamycin/Lincomycin Itching     Felt like she was burning and itching around the neck   • Statins Myalgia   • Ampicillin Rash   • Penicillins Rash   • Pravastatin Myalgia       Past Medical History:   Diagnosis Date   • Acute on chronic congestive heart failure (CMS/HCC)    • Acute transmural inferior wall MI (CMS/HCC)    • Arthritis 03/13/2014    CONT TYLENOL FOR PAIN/ JOSE DANIEL CHEW (INTERNAL MEDICINE)   • CAD (coronary artery disease)    • Cancer of uterus (CMS/HCC)    • Carotid artery stenosis    • Chronic renal failure syndrome    • Coronary artery disease involving coronary bypass graft of native heart     with other forms of angina pectoris   • Coronary atherosclerosis    • Endometrial cancer (CMS/HCC)    • Esophageal reflux    • Essential hypertension    • Glaucoma    • Hypertension    • Malaise and fatigue    • Osteoarthritis    • Sleep apnea     USING CPAP   • Spinal stenosis    • Thyroid disease    • Type 2 diabetes mellitus (CMS/HCC) 1991    INsulin begun 2012   • Vitamin B12 deficiency     SHE HAS NOT HAD THIS CHECKED IN A WHILE. SHE HAD BEEN ON SHOTS.  CHECK HER B12 LEVELS TODAY.  BY JOSE DANIEL CHEW       Past Surgical History:   Procedure Laterality Date   • CARDIAC CATHETERIZATION N/A 3/11/2018    Procedure: Left Heart Cath;  Surgeon: Cameron Chisholm MD;  Location: [facility] CATH INVASIVE LOCATION;  Service: Cardiovascular   • CARDIAC CATHETERIZATION " "N/A 3/11/2018    Procedure: Stent ROSSI bypass graft;  Surgeon: Cameron Chisholm MD;  Location: [facility] CATH INVASIVE LOCATION;  Service: Cardiovascular   • CARDIAC CATHETERIZATION N/A 5/7/2020    Procedure: Left Heart Cath;  Surgeon: Cameron Chisholm MD;  Location:  [facility] CATH INVASIVE LOCATION;  Service: Cardiovascular;  Laterality: N/A;   • CARDIAC CATHETERIZATION N/A 5/7/2020    Procedure: Saphenous Vein Graft;  Surgeon: Cameron Chisholm MD;  Location: [facility] CATH INVASIVE LOCATION;  Service: Cardiovascular;  Laterality: N/A;   • CATARACT EXTRACTION Bilateral    • CATARACT EXTRACTION W/ INTRAOCULAR LENS IMPLANT Right 10/25/2016    Procedure: RT SUPERFICIAL KERATECTOMY ;  Surgeon: Arian Singh MD;  [facility]  Service:    • CHOLECYSTECTOMY     • COLONOSCOPY     • CORONARY ARTERY BYPASS GRAFT  1991    X3   • HYSTERECTOMY     • OOPHORECTOMY     • TRANSLUMINAL ATHERECTOMY PERONEAL ARTERY      PERCUTANEOUS TRANSLUMINAL ATHERECTOMY RENAL ARTERY         Family and social history reviewed.     Review of Systems   Constitution: Positive for malaise/fatigue.   Cardiovascular: Positive for dyspnea on exertion.   Musculoskeletal: Positive for arthritis and falls.   Neurological: Positive for weakness.     All other systems were reviewed and are negative          Objective:     Vitals:    05/14/20 1050   Weight: 90.7 kg (200 lb)   Height: 163.8 cm (64.5\")     Body mass index is 33.8 kg/m².    PHYSICAL EXAM:  Physical Exam  Unable to assess  Procedures  Unable to assess  Current Outpatient Medications   Medication Sig Dispense Refill   • aspirin 81 MG EC tablet Take 81 mg by mouth Daily.     • calcitriol (ROCALTROL) 0.5 MCG capsule Take 0.5 mcg by mouth Daily.     • clopidogrel (PLAVIX) 75 MG tablet TAKE 1 TABLET DAILY 90 tablet 4   • Coenzyme Q10 (CO Q-10) 200 MG capsule Take 300 mg by mouth daily.     • furosemide (LASIX) 40 MG tablet Take 1 tablet by mouth 2 (Two) Times a Day. 60 tablet 3   • glucose " blood (ONE TOUCH ULTRA TEST) test strip Test Three times daily. DX E11.9 100 each 2   • glucose blood test strip Use as instructed 400 each 1   • Insulin Glargine (LANTUS SOLOSTAR) 100 UNIT/ML injection pen Inject 15 Units under the skin into the appropriate area as directed Every Night. 15 mL 1   • insulin lispro (HUMALOG) 100 UNIT/ML injection Inject 5 Units under the skin into the appropriate area as directed 3 (Three) Times a Day Before Meals. 15 mL 1   • Insulin Pen Needle (BD PEN NEEDLE LISA U/F) 32G X 4 MM misc USE AS DIRECTED THREE TIMES A DAY. 300 each 1   • isosorbide mononitrate (IMDUR) 30 MG 24 hr tablet Take 1 tablet by mouth Daily. 90 tablet 3   • levothyroxine (SYNTHROID) 150 MCG tablet Take 1 tablet by mouth Daily. 90 tablet 3   • losartan (COZAAR) 50 MG tablet TAKE 1 TABLET DAILY 90 tablet 4   • metoprolol succinate XL (TOPROL-XL) 50 MG 24 hr tablet TAKE 1 TABLET DAILY 90 tablet 4   • nitroglycerin (NITROSTAT) 0.4 MG SL tablet Place 1 tablet under the tongue Every 5 (Five) Minutes As Needed for Chest Pain. Take no more than 3 doses in 15 minutes. 25 tablet 2   • spironolactone (ALDACTONE) 25 MG tablet TAKE 1 TABLET DAILY 90 tablet 3   • timolol (TIMOPTIC) 0.25 % ophthalmic solution Administer 1 drop to both eyes Tonight.     • travoprost, BAK free, (TRAVATAN) 0.004 % solution ophthalmic solution Administer 1 drop to both eyes every night. in affected eye(s)        No current facility-administered medications for this visit.      Assessment:       Diagnosis Plan   1. Coronary artery disease involving coronary bypass graft of native heart without angina pectoris     2. Chronic kidney disease, stage IV (severe) (CMS/[facility])     3. Essential hypertension     4. Mixed hyperlipidemia     5. Type 2 diabetes mellitus with other circulatory complication, without long-term current use of insulin (CMS/[facility])     6. Non-rheumatic mitral regurgitation     7. Vitamin D deficiency          No orders of the defined  types were placed in this encounter.        Plan:     1. 89 year-old female with history of severe coronary artery disease with previous CABG in 1991.  She had a normal perfusion stress test in November 2016.  However, she had an Inferior STEMI  On 3/11/2018 and had cardiac catheterization that revealed lesli lLV function, occluded vein graft to the diagonal, patent left internal mammary graft to left anterior descending, severe disease of the vein graft to the posterior descending artery.   She had drug-eluting stent placed to the vein graft to the PDA.  then repeat NSTEMI 05/2020 repeat catheterization  LIMA to LAD: widely patent and normal.  It is widely patent and looks good the native LAD has 3 lesions subtotally occluded and the distal LAD .SVG to PDA there 20% luminal irregularities throughout the SVG. The stent in the mid SVGto PDA was patent, the PDA retrograde fills the SUPA and there is a 70 to 80% lesion in the mid SUPA this is not amenable to PCI there are some right to left collaterals to the distal circumflex. The distal LAD disease was progressed from prior cath and felt to be the cause for the non-STEMI.  No LV gram due to contrast limitation in the setting of renal insufficiency. She had no revascularization targets and medical management entailed starting isosorbide 30 mg daily.  - she is stable and would like to call with any issues and just keep 3 month follow up appointment   2.  Hypertension- stable  3.  Hyperlipidemia- no current therapy,  she has been intolerant of multiple statins in the past and considering her age it was deemed not to pursue any further treatment.  4.  Stage III renal failure-she is followed by Dr. Shields with nephrology  5.  renal  Artery stenosis status post left renal artery stenting  6.  Mild carotid artery disease-is followed by vascular  7.obstructive sleep apnea on CPAP  8. Diabetes Mellitus on insulin  9. Vitamin D deficiency she is on replacement    This patient  has consented to a telehealth visit via telephone. The visit was scheduled as a telephone visit to comply with patient safety concerns in accordance with CDC recommendations.  All vitals recorded within this visit are reported by the patient.  I spent  40 minutes in total including but not limited to the 25 minutes spent in direct conversation with this patient.         It has been a pleasure to participate in this patient's care.      Thank you,  MILVIA Iniguez      **I used Dragon to dictate this note:**

## 2020-05-15 NOTE — PROGRESS NOTES
Subjective   Alessia Madrigal is a 89 y.o. female.  Patient presents with a chief complaint of recently having been hospitalized for three-vessel coronary artery disease associated with shortness of breath chest pain and diaphoresis.  She had a cardiac catheterization done which showed 3 vessels with 98 to 99% occlusion and unfortunately given her age and body habitus they were unable to operate or place a stent at this time.  She was placed on maximal medication to improve collateral circulation and discharged home.  Concurrently she has issues with type 2 diabetes mellitus and hypertension along with hyperlipidemia.  She will be following up closely with cardiology as her medications are adjusted and currently she is doing fairly well post discharge.  She is clearly aware that she needs to exercise modestly but not exert herself too much.      /78 (BP Location: Left arm, Patient Position: Sitting, Cuff Size: Adult)   Pulse 75   Wt 90.7 kg (200 lb)   SpO2 95%   BMI 33.80 kg/m²     Body mass index is 33.8 kg/m².    History of Present Illness recent hospitalization for three-vessel disease that is inoperable    The following portions of the patient's history were reviewed and updated as appropriate: allergies, current medications, past family history, past medical history, past social history, past surgical history and problem list.    Review of Systems   Constitutional: Positive for fatigue.   HENT: Negative.    Respiratory: Positive for shortness of breath.    Cardiovascular: Positive for chest pain and palpitations.   Gastrointestinal: Negative.    Musculoskeletal: Positive for arthralgias and gait problem.   Psychiatric/Behavioral: Negative.        Objective   Physical Exam   Constitutional: She is oriented to person, place, and time. She appears well-developed and well-nourished.   HENT:   Head: Normocephalic and atraumatic.   Cardiovascular: Normal rate, regular rhythm and normal heart sounds.    Pulmonary/Chest: Effort normal and breath sounds normal.   Abdominal: Soft. Bowel sounds are normal.   Musculoskeletal:   Unstable gait due to advanced lower back arthritis and bilateral knee arthritis   Neurological: She is alert and oriented to person, place, and time.   Psychiatric: She has a normal mood and affect. Her behavior is normal.   Nursing note and vitals reviewed.        Assessment/Plan   Alessia was seen today for transitional care management and shortness of breath.    Diagnoses and all orders for this visit:    Unstable angina (CMS/[facility])  Comments:  adjusted her medications in the hospital    Essential hypertension  Comments:  well controlled    Type 2 diabetes mellitus with stage 4 chronic kidney disease, with long-term current use of insulin (CMS/[facility])  Comments:  moderately well controlled  Orders:  -     Hemoglobin A1c; Future  -     Hemoglobin A1c    Hospital discharge follow-up  Comments:  i have reviewed the current record    LONGORIA (dyspnea on exertion)  Comments:  slowly adjusting to her medications

## 2020-05-18 NOTE — OUTREACH NOTE
AMI Week 2 Survey      Responses   [facility] patient discharged from?  [place]   Does the patient have one of the following disease processes/diagnoses(primary or secondary)?  Acute MI (STEMI,NSTEMI)   Week 2 attempt successful?  Yes   Call start time  1533   Call end time  1544   Meds reviewed with patient/caregiver?  Yes   Is the patient having any side effects they believe may be caused by any medication additions or changes?  No   Does the patient have all prescriptions related to this admission filled (includes statins,anticoagulants,HTN meds,anti-arrhythmia meds)  Yes   Is the patient taking all medications as directed (includes completed medication regime)?  Yes   Does the patient have a primary care provider?   Yes   Does the patient have an appointment with their PCP,cardiologist,or clinic within 7 days of discharge?  Yes   Has the patient kept scheduled appointments due by today?  Yes   Has home health visited the patient within 72 hours of discharge?  N/A   Psychosocial issues?  No   Did the patient receive a copy of their discharge instructions?  Yes   Nursing interventions  Reviewed instructions with patient   What is the patient's perception of their health status since discharge?  Improving   Nursing interventions  Nurse provided patient education   Is the patient/caregiver able to teach back signs and symptoms of when to call for help immediately:  Shortness of breath at any time, Sudden sweating or clammy skin, Sudden discomfort in arms, back, neck or jaw, Sudden chest discomfort, Nausea or vomiting   Nursing interventions  Nurse provided patient education   Is the patient/caregiver able to teach back lifestyle changes to help prevent MIs  Heart healthy diet, Regular exercise as approved by provider, Maintaining a healthy weight   Is the patient/caregiver able to teach back ways to prevent a second heart attack:  Take medications, Follow up with MD, Manage risk factors   Is the  patient/caregiver able to teach back the hierarchy of who to call/visit for symptoms/problems? PCP, Specialist, Home health nurse, Urgent Care, ED, 911  Yes   Week 2 call completed?  Yes          Dustin Graff RN

## 2020-05-26 NOTE — OUTREACH NOTE
AMI Week 3 Survey      Responses   [facility]   Does the patient have one of the following disease processes/diagnoses(primary or secondary)?  Acute MI (STEMI,NSTEMI)   Week 3 attempt successful?  Yes   Call start time  1651   Call end time  1700   Discharge diagnosis  MI   Meds reviewed with patient/caregiver?  Yes   Is the patient having any side effects they believe may be caused by any medication additions or changes?  No   Does the patient have all prescriptions related to this admission filled (includes statins,anticoagulants,HTN meds,anti-arrhythmia meds)  Yes   Is the patient taking all medications as directed (includes completed medication regime)?  Yes   Does the patient have a primary care provider?   Yes   Does the patient have an appointment with their PCP,cardiologist,or clinic within 7 days of discharge?  Yes   Has the patient kept scheduled appointments due by today?  Yes   Has home health visited the patient within 72 hours of discharge?  N/A   Did the patient receive a copy of their discharge instructions?  Yes   Nursing interventions  Reviewed instructions with patient   What is the patient's perception of their health status since discharge?  Improving   Nursing interventions  Nurse provided patient education   Is the patient/caregiver able to teach back signs and symptoms of when to call for help immediately:  Sudden chest discomfort, Sudden discomfort in arms, back, neck or jaw, Sudden sweating or clammy skin, Nausea or vomiting, Shortness of breath at any time   Nursing interventions  Nurse provided patient education   Is the patient/caregiver able to teach back lifestyle changes to help prevent MIs  Regular exercise as approved by provider, Heart healthy diet, Reducing stress, Maintaining a healthy weight   Is the patient/caregiver able to teach back ways to prevent a second heart attack:  Take medications, Follow up with MD   Is the  patient/caregiver able to teach back the hierarchy of who to call/visit for symptoms/problems? PCP, Specialist, Home health nurse, Urgent Care, ED, 911  Yes   Additional teach back comments  Patient states she is doing well.  She states she can not find her bp cuff so she hasn't been monitoring her bp.  States her blood sugars have been good and yesterday it was 99.  States she does have chest pressure with sob at time and will take a nitro and it improves.  States this has happened a few time since being home but she has never had to take more than one.  She is needing to get a refill on her nitro and advised to discuss with her cardiologist.     Week 3 call completed?  Yes   Wrap up additional comments  No questions or needs a this time          Vikki Bah LPN

## 2020-06-03 NOTE — OUTREACH NOTE
AMI Week 4 Survey      Responses   [facility] patient discharged from?  [place]   Does the patient have one of the following disease processes/diagnoses(primary or secondary)?  Acute MI (STEMI,NSTEMI)   Week 4 attempt successful?  Yes   Call start time  1554   Call end time  1600   Discharge diagnosis  MI   Is patient permission given to speak with other caregiver?  Yes   List who call center can speak with  nephew   Meds reviewed with patient/caregiver?  Yes   Is the patient taking all medications as directed (includes completed medication regime)?  Yes   Has the patient kept scheduled appointments due by today?  Yes   Is the patient still receiving Home Health Services?  N/A   Psychosocial issues?  No   Comments  she reports that she gets SOA and CP with moving too fast, has to have NTG SL and that aggrevates her.    What is the patient's perception of their health status since discharge?  Same   Is the patient/caregiver able to teach back signs and symptoms of when to call for help immediately:  Sudden chest discomfort, Sudden discomfort in arms, back, neck or jaw, Sudden sweating or clammy skin, Irregular or rapid heart rate, Dizziness or lightheadedness   Is the patient/caregiver able to teach back lifestyle changes to help prevent MIs  Heart healthy diet, Regular exercise as approved by provider   Is the patient/caregiver able to teach back ways to prevent a second heart attack:  Take medications, Participate in Cardiac Rehab, Get support (AHA website:supportnetwork.heart.org)   Is the patient/caregiver able to teach back the hierarchy of who to call/visit for symptoms/problems? PCP, Specialist, Home health nurse, Urgent Care, ED, 911  Yes   Week 4 call completed?  Yes   Would the patient like one additional call?  No   Graduated  Yes   Did the patient feel the follow up calls were helpful during their recovery period?  Yes   Was the number of calls appropriate?  Yes          Tawana Kelly RN

## 2020-06-15 PROBLEM — I25.119 CHEST PAIN DUE TO CAD (HCC): Status: ACTIVE | Noted: 2020-01-01

## 2020-06-15 PROBLEM — I10 POORLY-CONTROLLED HYPERTENSION: Status: ACTIVE | Noted: 2020-01-01

## 2020-06-15 PROBLEM — E66.9 OBESITY (BMI 30-39.9): Status: ACTIVE | Noted: 2020-01-01

## 2020-06-15 PROBLEM — G47.33 OSA (OBSTRUCTIVE SLEEP APNEA): Status: ACTIVE | Noted: 2020-01-01

## 2020-06-16 ENCOUNTER — HOSPITAL ENCOUNTER (EMERGENCY)
Facility: HOSPITAL | Age: 85
Discharge: HOME | End: 2020-06-17
Attending: EMERGENCY MEDICINE
Payer: MEDICARE

## 2020-06-16 DIAGNOSIS — S02.2XXA CLOSED FRACTURE OF NASAL BONE, INITIAL ENCOUNTER: ICD-10-CM

## 2020-06-16 DIAGNOSIS — S00.83XA CONTUSION OF OTHER PART OF HEAD, INITIAL ENCOUNTER: ICD-10-CM

## 2020-06-16 DIAGNOSIS — S01.81XA FACIAL LACERATION, INITIAL ENCOUNTER: ICD-10-CM
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
## 2020-06-16 PROCEDURE — 12013 RPR F/E/E/N/L/M 2.6-5.0 CM: CPT

## 2020-06-16 PROCEDURE — 99285 EMERGENCY DEPT VISIT HI MDM: CPT

## 2020-06-16 PROCEDURE — 36415 COLL VENOUS BLD VENIPUNCTURE: CPT

## 2020-06-17 ENCOUNTER — APPOINTMENT (OUTPATIENT)
Dept: GENERAL RADIOLOGY | Facility: HOSPITAL | Age: 85
End: 2020-06-17
Attending: EMERGENCY MEDICINE
Payer: MEDICARE

## 2020-06-17 ENCOUNTER — APPOINTMENT (OUTPATIENT)
Dept: CT IMAGING | Facility: HOSPITAL | Age: 85
End: 2020-06-17
Attending: EMERGENCY MEDICINE
Payer: MEDICARE

## 2020-06-17 VITALS
DIASTOLIC BLOOD PRESSURE: 63 MMHG | OXYGEN SATURATION: 95 % | WEIGHT: 169.75 LBS | TEMPERATURE: 98 F | BODY MASS INDEX: 30 KG/M2 | RESPIRATION RATE: 18 BRPM | SYSTOLIC BLOOD PRESSURE: 130 MMHG | HEART RATE: 78 BPM

## 2020-06-17 PROBLEM — I21.4 NSTEMI (NON-ST ELEVATED MYOCARDIAL INFARCTION) (HCC): Status: RESOLVED | Noted: 2020-01-01 | Resolved: 2020-01-01

## 2020-06-17 PROCEDURE — 85025 COMPLETE CBC W/AUTO DIFF WBC: CPT | Performed by: EMERGENCY MEDICINE

## 2020-06-17 PROCEDURE — 72125 CT NECK SPINE W/O DYE: CPT | Performed by: EMERGENCY MEDICINE

## 2020-06-17 PROCEDURE — 70450 CT HEAD/BRAIN W/O DYE: CPT | Performed by: EMERGENCY MEDICINE

## 2020-06-17 PROCEDURE — 71101 X-RAY EXAM UNILAT RIBS/CHEST: CPT | Performed by: EMERGENCY MEDICINE

## 2020-06-17 PROCEDURE — 80053 COMPREHEN METABOLIC PANEL: CPT | Performed by: EMERGENCY MEDICINE

## 2020-06-17 PROCEDURE — 74176 CT ABD & PELVIS W/O CONTRAST: CPT | Performed by: EMERGENCY MEDICINE

## 2020-06-17 RX ORDER — ACETAMINOPHEN 500 MG
1000 TABLET ORAL ONCE
Status: COMPLETED | OUTPATIENT
Start: 2020-06-17 | End: 2020-06-17

## 2020-06-17 NOTE — OUTREACH NOTE
Prep Survey      Responses   [facility] patient discharged from?  [place]   Is LACE score < 7 ?  No   Eligibility  [facility]   Date of Admission  06/15/20   Date of Discharge  06/17/20   Discharge Disposition  Home or Self Care   Discharge diagnosis  Chest pain due to CAD,    Poorly-controlled hypertension    COVID-19 Test Status  Not tested   Does the patient have one of the following disease processes/diagnoses(primary or secondary)?  Other   Does the patient have Home health ordered?  Yes   What is the Home health agency?   [facility]    Is there a DME ordered?  No   Prep survey completed?  Yes          Sheri Hernandes RN

## 2020-06-17 NOTE — ED NOTES
Pt report/update given to Juan Alberto Levy, Nursing Director at [facility]

## 2020-06-17 NOTE — ED NOTES
I received bedside report from [address] [place] [address] we are waiting for medics to transport her back to the [address] [place] [address]

## 2020-06-17 NOTE — ED INITIAL ASSESSMENT (HPI)
Arrived via EMS from [facility] for fall eval. Pt alert, oriented x2 which is her norm. Lac to L side of forehead, dressing in place. C collar in place. Per report, pt was found on the floor, unwitnessed fall.  Pt unable to recall how she fell, \"I m

## 2020-06-17 NOTE — ED NOTES
[facility] here to transport pt back to [facility]. Pt c/o severe pain to the L rib while being transferred to EMS cot. Dr Stevenson Youssef aware who came to the pt's bedside.  Pt placed back to the stretcher for more testing

## 2020-06-17 NOTE — ED PROVIDER NOTES
Signed out by my colleague to follow-up CT abdomen pelvis. Patient's GFR was low. Her belly is benign on exam.  There is low suspicion for intra-abdominal injury.   Scan was changed to noncontrast.      Patient not complaining back pain has no tendernes

## 2020-06-17 NOTE — ED PROVIDER NOTES
Patient Seen in: [facility] Emergency Department      History   Patient presents with:  Fall    Stated Complaint: arrived via EMS for fall eval, unwitnessed    HPI    80 old female who presents from [facility] after a fall.   Patient is Rene Fowler Soft on exam without tenderness  Extremities: No upper extremity deformities.    strength 75  ED Course     Labs Reviewed   COMP METABOLIC PANEL (14) - Abnormal; Notable for the following components:       Result Value    BUN 36 (*)     Creatinine 1.47 closed using a simple closure with 6-0 nylon. The quality of the closure was excellent. The patient's wounds were dressed. The patient will be discharged back to the nursing home.     As the patient was being picked up by the ambulance service she starte

## 2020-06-17 NOTE — ED NOTES
THE [facility] ambulance was called for an update  Dispatch states the ambulance is on scene

## 2020-06-18 NOTE — OUTREACH NOTE
Call Center TCM Note      Responses   [facility] patient discharged from?  [place]   COVID-19 Test Status  Not tested   Does the patient have one of the following disease processes/diagnoses(primary or secondary)?  Other   TCM attempt successful?  Yes   Call start time  0948   Call end time  1002   Discharge diagnosis  Chest pain due to CAD,    Poorly-controlled hypertension    Is patient permission given to speak with other caregiver?  Yes   List who call center can speak with  nephew   Meds reviewed with patient/caregiver?  Yes   Is the patient having any side effects they believe may be caused by any medication additions or changes?  No   Does the patient have all medications ordered at discharge?  Yes   Is the patient taking all medications as directed (includes completed medication regime)?  Yes   Does the patient have a primary care provider?   Yes   Does the patient have an appointment with their PCP within 7 days of discharge?  Greater than 7 days   Comments regarding PCP  PCP:  Dario Mae MD has followup on June 29    Nursing Interventions  Educated patient on importance of making appointment, Advised patient to make appointment   Has the patient kept scheduled appointments due by today?  Yes   What is the Home health agency?   [facility]    Has home health visited the patient within 72 hours of discharge?  Call prior to 72 hours   Psychosocial issues?  No   Comments  Patient denies chest pain or SOB at present   Did the patient receive a copy of their discharge instructions?  Yes   Nursing interventions  Reviewed instructions with patient   What is the patient's perception of their health status since discharge?  Improving   Is the patient/caregiver able to teach back signs and symptoms related to disease process for when to call PCP?  Yes   Is the patient/caregiver able to teach back signs and symptoms related to disease process for when to call 911?  Yes   Is the patient/caregiver able  to teach back the hierarchy of who to call/visit for symptoms/problems? PCP, Specialist, Home health nurse, Urgent Care, ED, 911  Yes   TCM call completed?  Yes          Marcio Leonard RN    6/18/2020, 10:02

## 2020-06-24 NOTE — OUTREACH NOTE
Medical Week 2 Survey      Responses   [facility] patient discharged from?  [place]   COVID-19 Test Status  Not tested   Does the patient have one of the following disease processes/diagnoses(primary or secondary)?  Other   Week 2 attempt successful?  Yes   Call start time  1140   Discharge diagnosis  Chest pain due to CAD,    Poorly-controlled hypertension    Call end time  1149   Meds reviewed with patient/caregiver?  Yes   Is the patient having any side effects they believe may be caused by any medication additions or changes?  Yes   Side effects comments   Horrible taste in mouth   Is the patient taking all medications as directed (includes completed medication regime)?  Yes   Has the patient kept scheduled appointments due by today?  N/A   Comments  06/29/2020 Dr Mae   What is the Home health agency?   [facility] [place]    Home health comments  Patient says she has not received a call or visit from . I called and spoke with Home Health a visit is scheduled for today. Return call to the patient and left voice message.   Pulse Ox monitoring  None   What is the patient's perception of their health status since discharge?  Same   Additional teach back comments  -130 range   Week 2 Call Completed?  Yes          Sandy Tucker RN

## 2020-06-24 NOTE — PROGRESS NOTES
No Vitals taken  [facility] 6/15/20 for uncontrolled HTN    Palps no  SOA  no  Edema some  Lightheaded some times a little  Chest Pain no  Fatigue no

## 2020-06-24 NOTE — PROGRESS NOTES
Date of Office Visit: 20  Encounter Provider: MILVIA Iniguez  Place of Service: [facility] CARDIOLOGY  Patient Name: Alessia Madrigal  :10/9/1928    Chief Complaint   Patient presents with   • Coronary Artery Disease     1 month follow up   • Follow-up   :     HPI: Alessia Madrigal is a 89 y.o. female  with history of coronary artery disease status post CABG (), carotid artery stenosis, hypertension, sleep apnea on CPAP,renal stent placement. Chronic renal failure,  diabetes on insulin, osteoarthritis, glaucoma, GERD, hypothyroidism, and uterine cancer.     She is followed by Dr. Joseph. I will visit with her in follow up today.                    In  she had coronary bypass grafting with Dr. Thomas.  In , she had repeat cardiac catheterization which showed severe three-vessel coronary artery disease with occlusion offt anterior descending and right coronary artery and circumflex the left internal mammary graft left anterior descending was patent the vein graft to the diagonal was patent vein graft to the right coronary was patent and the circumflex filled by collaterals was only ischemic area.  He is also had previous left renal artery stents in his follow with nephrology for renal failure.  She is also followed by vascular services for mild carotid artery disease.              She had a normal stress test with myocardial perfusion SPECT on 2016.She was last seen in this office on 2017 to be established with the office.  That time, she was complaining of shortness of breath that she believed was gradually worsening over the past year.  She also complained that the spinal stenosis and arthritis makes it difficult for her to walk and pinpoint the exact cause of shortness of breath.               She had transient, mild chest pain on Thursday 3/8.  She reports that she took nitroglycerin sublingual 1 on 2 separate days.  Then on Saturday night  3/10 she had a more constant mild chest pain and new that she should get this checked out.  However, she waited to present to the ER until Sunday where she was admitted on 3/11/2018 with an acute inferior myocardial infarction.  She was taken to the cardiac catheterization lab and found to have occluded vein graft to the diagonal, patent left internal mammary graft to left anterior descending, severe disease of the vein graft to the posterior descending artery.  She also had severe three-vessel disease and normal left ventricular ejection fraction with inferior wall motion abnormality.  She had drug-eluting stent placed to the vein graft to the PDA.  Residual ischemic areas being the diagonal distribution circumflex distribution and mid left anterior descending. She had no evidence of dissection.  She had a transthoracic echocardiogram that revealed normal left ventricular systolic function with estimated EF of 57%, grade 2 diastolic dysfunction, mild to moderate mitral regurgitation, mild tricuspid valve regurgitation, and normal RVSP.  On 3/13/2018 her creatinine was 1.07 and her EGFR was 48.  Her hemoglobin A1c was elevated 8.7.  The lipid panel was unremarkable. She was discharged to a rehab facility.     She was hospitalized 05/2020 with chest pain and ruled in for NSTEMI. Cardiac catheterization revealed LIMA to LAD: widely patent and normal.  It is widely patent and looks good the native LAD has 3 lesions subtotally occluded and the distal LAD .SVG to PDA there 20% luminal irregularities throughout the SVG. The stent in the mid SVGto PDA was patent, the PDA retrograde fills the SUPA and there is a 70 to 80% lesion in the mid SUAP this is not amenable to PCI there are some right to left collaterals to the distal circumflex. The distal LAD disease was progressed from prior cath and felt to be the cause for the non-STEMI.  She had no revascularization targets and medical management entailed starting isosorbide 30  "mg daily.  She had no further chest pain but continued to have shortness of breath with exertion despite moving a \"turtle speed\".  She then presented to the hospital in June 2020 with chest pain.  She was found to have poorly controlled hypertension.  Amlodipine was added to her regimen in addition to Ranexa 500 mg twice daily.  Isosorbide was increased from 30 mg to 60 mg.  Her blood pressure improved.  Losartan was discontinued.  It was felt that she had some mild congestive heart failure.  Repeat echocardiogram showed ejection fraction 51%, mild concentric hypertrophy, grade 1 diastolic dysfunction, mild calcification of the aortic valve, moderate mitral annular calcification, mild tricuspid regurgitation with normal RVSP.    We visit today via telephone. She's had no angina since most recent medication adjustments.  She has occasional lightheadedness which is unchanged.  No real shortness of breath, edema, near-syncope or syncope.  She has a metallic taste in her mouth after taking her medication felt to be related to Ranexa but that is tolerable at this time.      Allergies   Allergen Reactions   • Allopurinol Unknown - High Severity   • Clindamycin/Lincomycin Itching     Felt like she was burning and itching around the neck   • Statins Myalgia   • Ampicillin Rash   • Penicillins Rash   • Pravastatin Myalgia       Past Medical History:   Diagnosis Date   • Acute on chronic congestive heart failure (CMS/HCC)    • Acute transmural inferior wall MI (CMS/HCC)    • Arthritis 03/13/2014    CONT TYLENOL FOR PAIN/ JOSE DANIEL CHEW (INTERNAL MEDICINE)   • CAD (coronary artery disease)    • Cancer of uterus (CMS/HCC)    • Carotid artery stenosis    • Chronic renal failure syndrome    • Coronary artery disease involving coronary bypass graft of native heart     with other forms of angina pectoris   • Coronary atherosclerosis    • Endometrial cancer (CMS/HCC)    • Esophageal reflux    • Essential hypertension    • Glaucoma  " "  • Hypertension    • Malaise and fatigue    • Osteoarthritis    • Sleep apnea     USING CPAP   • Spinal stenosis    • Thyroid disease    • Type 2 diabetes mellitus (CMS/HCC) 1991    INsulin begun 2012   • Vitamin B12 deficiency     SHE HAS NOT HAD THIS CHECKED IN A WHILE. SHE HAD BEEN ON SHOTS.  CHECK HER B12 LEVELS TODAY.  BY JOSE DANIEL CHEW       Past Surgical History:   Procedure Laterality Date   • CARDIAC CATHETERIZATION N/A 3/11/2018    Procedure: Left Heart Cath;  Surgeon: Cameron Chisholm MD;  Location: [facility] INVASIVE LOCATION;  Service: Cardiovascular   • CARDIAC CATHETERIZATION N/A 3/11/2018    Procedure: Stent ROSSI bypass graft;  Surgeon: Cameron Chisholm MD;  Location: [facility] CATH INVASIVE LOCATION;  Service: Cardiovascular   • CARDIAC CATHETERIZATION N/A 5/7/2020    Procedure: Left Heart Cath;  Surgeon: Cameron Chisholm MD;  Location: [facility] INVASIVE LOCATION;  Service: Cardiovascular;  Laterality: N/A;   • CARDIAC CATHETERIZATION N/A 5/7/2020    Procedure: Saphenous Vein Graft;  Surgeon: Cameron Chisholm MD;  Location: [facility] INVASIVE LOCATION;  Service: Cardiovascular;  Laterality: N/A;   • CATARACT EXTRACTION Bilateral    • CATARACT EXTRACTION W/ INTRAOCULAR LENS IMPLANT Right 10/25/2016    Procedure: RT SUPERFICIAL KERATECTOMY ;  Surgeon: Arian Singh MD;  Location: [facility] OR [facility] [place];  Service:    • CHOLECYSTECTOMY     • COLONOSCOPY     • CORONARY ARTERY BYPASS GRAFT  1991    X3   • HYSTERECTOMY     • OOPHORECTOMY     • TRANSLUMINAL ATHERECTOMY PERONEAL ARTERY      PERCUTANEOUS TRANSLUMINAL ATHERECTOMY RENAL ARTERY         Family and social history reviewed.     Review of Systems   Constitution: Positive for malaise/fatigue.   Cardiovascular: Positive for dyspnea on exertion.   Neurological: Positive for light-headedness.     All other systems were reviewed and are negative          Objective:     Vitals:    06/24/20 1254   Weight: 89.6 kg (197 lb 8 oz)   Height: 162.6 cm (64\") "     Body mass index is 33.9 kg/m².    PHYSICAL EXAM:  Physical Exam- unable to assess    Procedures- unable to assess    Current Outpatient Medications   Medication Sig Dispense Refill   • amLODIPine (NORVASC) 5 MG tablet Take 1 tablet by mouth Daily. 90 tablet 1   • aspirin 81 MG EC tablet Take 81 mg by mouth Daily.     • calcitriol (ROCALTROL) 0.5 MCG capsule Take 0.5 mcg by mouth Daily.     • clopidogrel (PLAVIX) 75 MG tablet TAKE 1 TABLET DAILY 90 tablet 4   • Coenzyme Q10 (CO Q-10) 200 MG capsule Take 300 mg by mouth daily.     • furosemide (LASIX) 40 MG tablet Take 1 tablet by mouth 2 (Two) Times a Day. 60 tablet 3   • glucose blood (ONE TOUCH ULTRA TEST) test strip Test Three times daily. DX E11.9 100 each 2   • glucose blood test strip Use as instructed 400 each 1   • Insulin Glargine (LANTUS SOLOSTAR) 100 UNIT/ML injection pen Inject 15 Units under the skin into the appropriate area as directed Every Night. 15 mL 1   • insulin lispro (HUMALOG) 100 UNIT/ML injection Inject 5 Units under the skin into the appropriate area as directed 3 (Three) Times a Day Before Meals. 15 mL 1   • Insulin Pen Needle (BD PEN NEEDLE LISA U/F) 32G X 4 MM misc USE AS DIRECTED THREE TIMES A DAY. 300 each 1   • isosorbide mononitrate (IMDUR) 60 MG 24 hr tablet Take 1 tablet by mouth Daily. 90 tablet 1   • levothyroxine (SYNTHROID) 150 MCG tablet Take 1 tablet by mouth Daily. 90 tablet 3   • metoprolol succinate XL (TOPROL-XL) 50 MG 24 hr tablet TAKE 1 TABLET DAILY 90 tablet 4   • nitroglycerin (NITROSTAT) 0.4 MG SL tablet Place 1 tablet under the tongue Every 5 (Five) Minutes As Needed for Chest Pain. Take no more than 3 doses in 15 minutes. 25 tablet 2   • ranolazine (RANEXA) 500 MG 12 hr tablet Take 1 tablet by mouth Every 12 (Twelve) Hours. 180 tablet 1   • spironolactone (ALDACTONE) 25 MG tablet TAKE 1 TABLET DAILY 90 tablet 3   • timolol (TIMOPTIC) 0.25 % ophthalmic solution Administer 1 drop to both eyes Tonight.     •  travoprost, KAL free, (TRAVATAN) 0.004 % solution ophthalmic solution Administer 1 drop to both eyes every night. in affected eye(s)        No current facility-administered medications for this visit.      Assessment:      No diagnosis found.     Orders Placed This Encounter   Procedures   • ECG 12 Lead     This order was created via procedure documentation         Plan:     1. 89 year-old female with history of severe coronary artery disease with previous CABG in 1991.  She had a normal perfusion stress test in November 2016.  However, she had an Inferior STEMI  On 3/11/2018 and had cardiac catheterization that revealed lesli lLV function, occluded vein graft to the diagonal, patent left internal mammary graft to left anterior descending, severe disease of the vein graft to the posterior descending artery.   She had drug-eluting stent placed to the vein graft to the PDA.  then repeat NSTEMI 05/2020 repeat catheterization  LIMA to LAD: widely patent and normal.  It is widely patent and looks good the native LAD has 3 lesions subtotally occluded and the distal LAD .SVG to PDA there 20% luminal irregularities throughout the SVG. The stent in the mid SVGto PDA was patent, the PDA retrograde fills the SUPA and there is a 70 to 80% lesion in the mid SUPA this is not amenable to PCI there are some right to left collaterals to the distal circumflex. The distal LAD disease was progressed from prior cath and felt to be the cause for the non-STEMI.  No LV gram due to contrast limitation in the setting of renal insufficiency. She had no revascularization targets and medical management continues with addition of Ranexa, amlodipine and increased isosorbide.  She is maintained on aspirin Plavix  2.  Hypertension-she does not have a blood pressure cuff at home to check.  She has a visit with her PCP on Monday and her blood pressure will be checked at that time  3.  Hyperlipidemia- no current therapy,  she has been intolerant of  multiple statins in the past and considering her age it was deemed not to pursue any further treatment.  4.  Stage III renal failure-she is followed by Dr. Shields with nephrology  5.  Renal  Artery stenosis status post left renal artery stenting  6.  Mild carotid artery disease-is followed by vascular  7. Obstructive sleep apnea on CPAP  8. Diabetes Mellitus on insulin  9. Vitamin D deficiency she is on replacement     This patient has consented to a telehealth visit via telephone. The visit was scheduled as a telephone visit to comply with patient safety concerns in accordance with CDC recommendations.  All vitals recorded within this visit are reported by the patient.  I spent  15  minutes in total including but not limited to the 7 minutes spent in direct conversation with this patient.       Follow up in  8 weeks as scheduled.  Call with questions or concerns.             It has been a pleasure to participate in this patient's care.      Thank you,  MILVIA Iniguez      **I used Dragon to dictate this note:**

## 2020-06-29 NOTE — PROGRESS NOTES
"Subjective   Alessia Madrigal is a 89 y.o. female.  Patient presents with a chief complaint of having been recently hospitalized for chest pain associated with fluid overload, pulmonary hypertension, essential hypertension, status post non-ST MI, type 2 diabetes mellitus and chronic kidney disease stage III here for follow-up evaluation and treatment.  Post diuresis she is doing remarkably well and I have no changes at this time but I am going to continue to monitor her diabetes.  I also instructed her if she felt like she was gaining more fluid weight again to contact me so that we could increase her diuretics and address the issue before she needed hospitalization again.      /76 (BP Location: Left arm, Patient Position: Sitting, Cuff Size: Adult)   Pulse 67   Temp 98.1 °F (36.7 °C) (Oral)   Resp 15   Ht 162.6 cm (64\")   Wt 89.4 kg (197 lb)   SpO2 95%   BMI 33.81 kg/m²     Body mass index is 33.81 kg/m².    History of Present Illness recent hospitalization for atypical chest pain and fluid overload    The following portions of the patient's history were reviewed and updated as appropriate: allergies, current medications, past family history, past medical history, past social history, past surgical history and problem list.    Review of Systems   Constitutional: Negative.    HENT: Positive for congestion.    Respiratory: Negative.    Cardiovascular: Negative.    Gastrointestinal: Negative.    Musculoskeletal: Positive for arthralgias and gait problem.   Psychiatric/Behavioral: Negative.        Objective   Physical Exam   Constitutional: She is oriented to person, place, and time. She appears well-developed and well-nourished.   HENT:   Head: Normocephalic and atraumatic.   Cardiovascular: Normal rate and regular rhythm.   Murmur heard.  Pulmonary/Chest: Effort normal and breath sounds normal.   Abdominal: Soft. Bowel sounds are normal.   Musculoskeletal:   Extensive arthritic changes uses a walker at " all times due to gait unsteadiness   Neurological: She is alert and oriented to person, place, and time.   Psychiatric: She has a normal mood and affect. Her behavior is normal.   Nursing note and vitals reviewed.        Assessment/Plan   Diagnoses and all orders for this visit:    Hospital discharge follow-up  Comments:  Doing much better since she went through fluid diuresis    Pulmonary hypertension (CMS/[facility])  Comments:  stable for now    Essential hypertension  Comments:  well controlled for now    NSTEMI (non-ST elevated myocardial infarction) (CMS/[facility])  Comments:  Doing much better currently no chest pain    Type 2 diabetes mellitus with stage 4 chronic kidney disease, with long-term current use of insulin (CMS/[facility])  Comments:  Last A1c was 7.7 I will repeat it in 3 months  Orders:  -     Hemoglobin A1c; Future    CKD (chronic kidney disease) stage 3, GFR 30-59 ml/min (CMS/[facility])  Comments:  Stable at this time

## 2020-08-27 NOTE — PROGRESS NOTES
Date of Office Visit: 20  Encounter Provider: Griffin Joseph MD  Place of Service: [facility] CARDIOLOGY  Patient Name: Alessia Madrigal  :10/9/1928  Referral Provider:No ref. provider found      Chief Complaint   Patient presents with   • Shortness of Breath     History of Present Illness  Mrs Madrigal is a pleasant 89 yo female with history of coronary disease, previous colon bypass grafting, hypertension, diabetes mellitus, hyperlipidemia, renal failure, previous renal artery stenting.  In  she had coronary bypass grafting in  had repeat cardiac catheterization which showed severe three-vessel coronary disease with occlusion of left anterior descending and right coronary artery and circumflex the left internal mammary graft left anterior descending was patent the vein graft to the diagonal was patent vein graft to the right coronary was patent and the circumflex filled by collaterals was only ischemic area.  She's also had previous left renal artery stent follows with nephrology for renal failure.  She has carotid artery disease follows with vascular surgery just mild.    She then presented in 2018 with acute inferior ST elevation infarct.  Cardiac catheterization showed severe three-vessel coronary disease, normal left ventricular ejection fraction with inferior wall motion abnormality she had occluded vein graft to the diagonal patent left internal mammary graft left anterior descending severe disease in the vein graft to the posterior descending artery.  She had a 3.5 x 23 mm drug-eluting stent placed in that vein graft.  Residual ischemic area being the diagonal distribution circumflex distribution and mid left anterior descending treated medically.  Echocardiogram showed left ventricular ejection fraction of 57% with wall motion abnormality, grade 2 diastolic dysfunction, mild to moderate mitral insufficiency mild tricuspid insufficiency with normal RV  "systolic pressure.    She was hospitalized 05/2020 with chest pain and ruled in for NSTEMI. Cardiac catheterization revealed LIMA to LAD: widely patent and normal.  It is widely patent and looks good the native LAD has 3 lesions subtotally occluded and the distal LAD .SVG to PDA there 20% luminal irregularities throughout the SVG. The stent in the mid SVGto PDA was patent, the PDA retrograde fills the SUPA and there is a 70 to 80% lesion in the mid SUPA this is not amenable to PCI there are some right to left collaterals to the distal circumflex. The distal LAD disease was progressed from prior cath and felt to be the cause for the non-STEMI.  She had no revascularization targets and medical management entailed starting isosorbide 30 mg daily.  She had no further chest pain but continued to have shortness of breath with exertion despite moving a \"turtle speed\".  She then presented to the hospital in June 2020 with chest pain.  She was found to have poorly controlled hypertension.  Amlodipine was added to her regimen in addition to Ranexa 500 mg twice daily.  Isosorbide was increased from 30 mg to 60 mg.  Her blood pressure improved.  Losartan was discontinued.  It was felt that she had some mild congestive heart failure.  Repeat echocardiogram showed ejection fraction 51%, mild concentric hypertrophy, grade 1 diastolic dysfunction, mild calcification of the aortic valve, moderate mitral annular calcification, mild tricuspid regurgitation with normal RVSP.  She now comes in for follow-up.  Unfortunately she has had some more chest discomfort over the past week or so today she has had 2 episodes took a couple nitro but overall she feels like she is pretty good she was better a while back she is also had some increased swelling and weight gain.  But her nephrologist a week ago stopped her amlodipine and her furosemide she states due to worsening renal failure GFR was down to 21.  Denies orthopnea or PND denies any real " palpitations near syncope or syncope.  Denies any falling.  Denies any blood in her stool or black stool.    Coronary Artery Disease   Pertinent negatives include no dizziness, muscle weakness or weight gain. There is no history of past myocardial infarction.         Past Medical History:   Diagnosis Date   • Acute on chronic congestive heart failure (CMS/HCC)    • Acute transmural inferior wall MI (CMS/HCC)    • Arthritis 03/13/2014    CONT TYLENOL FOR PAIN/ JOSE DANIEL CHEW (INTERNAL MEDICINE)   • CAD (coronary artery disease)    • Cancer of uterus (CMS/[facility])    • Carotid artery stenosis    • Chronic renal failure syndrome    • Coronary artery disease involving coronary bypass graft of native heart     with other forms of angina pectoris   • Coronary atherosclerosis    • Endometrial cancer (CMS/[facility])    • Esophageal reflux    • Essential hypertension    • Glaucoma    • Hypertension    • Malaise and fatigue    • Osteoarthritis    • Sleep apnea     USING CPAP   • Spinal stenosis    • Thyroid disease    • Type 2 diabetes mellitus (CMS/[facility]) 1991    INsulin begun 2012   • Vitamin B12 deficiency     SHE HAS NOT HAD THIS CHECKED IN A WHILE. SHE HAD BEEN ON SHOTS.  CHECK HER B12 LEVELS TODAY.  BY JOSE DANIEL CHEW         Past Surgical History:   Procedure Laterality Date   • CARDIAC CATHETERIZATION N/A 3/11/2018    Procedure: Left Heart Cath;  Surgeon: Cameron Chisholm MD;  Location: [facility] INVASIVE LOCATION;  Service: Cardiovascular   • CARDIAC CATHETERIZATION N/A 3/11/2018    Procedure: Stent ROSSI bypass graft;  Surgeon: Cameron Chisholm MD;  [facility];  Service: Cardiovascular   • CARDIAC CATHETERIZATION N/A 5/7/2020    Procedure: Left Heart Cath;  Surgeon: Cameron Chisholm MD;  Location: [facility] CATH INVASIVE LOCATION;  Service: Cardiovascular;  Laterality: N/A;   • CARDIAC CATHETERIZATION N/A 5/7/2020    Procedure: Saphenous Vein Graft;  Surgeon: Cameron Chisholm MD;  Location: [facility] CATH  INVASIVE LOCATION;  Service: Cardiovascular;  Laterality: N/A;   • CATARACT EXTRACTION Bilateral    • CATARACT EXTRACTION W/ INTRAOCULAR LENS IMPLANT Right 10/25/2016    Procedure: RT SUPERFICIAL KERATECTOMY ;  Surgeon: Arian Singh MD;  [facility];  Service:    • CHOLECYSTECTOMY     • COLONOSCOPY     • CORONARY ARTERY BYPASS GRAFT  1991    X3   • HYSTERECTOMY     • OOPHORECTOMY     • TRANSLUMINAL ATHERECTOMY PERONEAL ARTERY      PERCUTANEOUS TRANSLUMINAL ATHERECTOMY RENAL ARTERY         Current Outpatient Medications on File Prior to Visit   Medication Sig Dispense Refill   • glucose blood (ONE TOUCH ULTRA TEST) test strip Test Three times daily. DX E11.9 100 each 2   • glucose blood test strip Use as instructed 400 each 1   • Insulin Glargine (LANTUS SOLOSTAR) 100 UNIT/ML injection pen Inject 15 Units under the skin into the appropriate area as directed Every Night. 15 mL 1   • insulin lispro (HUMALOG) 100 UNIT/ML injection Inject 5 Units under the skin into the appropriate area as directed 3 (Three) Times a Day Before Meals. 15 mL 1   • Insulin Pen Needle (BD PEN NEEDLE LISA U/F) 32G X 4 MM misc USE AS DIRECTED THREE TIMES A DAY. 300 each 1   • isosorbide mononitrate (IMDUR) 60 MG 24 hr tablet Take 1 tablet by mouth Daily. 90 tablet 1   • levothyroxine (SYNTHROID) 150 MCG tablet Take 1 tablet by mouth Daily. 90 tablet 3   • metoprolol succinate XL (TOPROL-XL) 50 MG 24 hr tablet TAKE 1 TABLET DAILY 90 tablet 4   • nitroglycerin (NITROSTAT) 0.4 MG SL tablet DISSOLVE 1 TAB UNDER TONGUE FOR CHEST PAIN - IF PAIN REMAINS AFTER 5 MIN, CALL 911 AND REPEAT DOSE. MAX 3 TABS IN 15 MINUTES 25 tablet 1   • ranolazine (RANEXA) 500 MG 12 hr tablet Take 1 tablet by mouth Every 12 (Twelve) Hours. 180 tablet 1   • spironolactone (ALDACTONE) 25 MG tablet TAKE 1 TABLET DAILY 90 tablet 3   • timolol (TIMOPTIC) 0.25 % ophthalmic solution Administer 1 drop to both eyes Tonight.     • travoprost, KAL free,  (TRAVATAN) 0.004 % solution ophthalmic solution Administer 1 drop to both eyes every night. in affected eye(s)      • aspirin 81 MG EC tablet Take 81 mg by mouth Daily.     • calcitriol (ROCALTROL) 0.5 MCG capsule Take 0.5 mcg by mouth Daily.     • clopidogrel (PLAVIX) 75 MG tablet TAKE 1 TABLET DAILY 90 tablet 4   • Coenzyme Q10 (CO Q-10) 200 MG capsule Take 300 mg by mouth daily.     • furosemide (LASIX) 40 MG tablet Take 1 tablet by mouth 2 (Two) Times a Day. 60 tablet 3   • [DISCONTINUED] amLODIPine (NORVASC) 5 MG tablet Take 1 tablet by mouth Daily. 90 tablet 1     No current facility-administered medications on file prior to visit.          Social History     Socioeconomic History   • Marital status: Single     Spouse name: Not on file   • Number of children: Not on file   • Years of education: Not on file   • Highest education level: Not on file   Tobacco Use   • Smoking status: Never Smoker   • Smokeless tobacco: Never Used   • Tobacco comment: caffeine use- tea   Substance and Sexual Activity   • Alcohol use: No   • Drug use: Never   • Sexual activity: Defer   Lifestyle   • Physical activity:     Days per week: 0 days     Minutes per session: 0 min   • Stress: Not on file       Family History   Problem Relation Age of Onset   • Colon cancer Other    • Heart disease Other    • Hypertension Other    • Stroke Other         ISCHEMIC   • Colon cancer Mother    • Stroke Father    • Heart attack Father    • Heart disease Father    • Heart attack Brother    • Stroke Brother    • Heart disease Brother    • Breast cancer Maternal Grandmother          Review of Systems   Constitution: Positive for malaise/fatigue. Negative for decreased appetite, diaphoresis, fever, weight gain and weight loss.   HENT: Negative for congestion, hearing loss, nosebleeds and tinnitus.    Eyes: Negative for blurred vision, double vision, vision loss in left eye, vision loss in right eye and visual disturbance.   Cardiovascular:        As  "noted in HPI   Respiratory:        As noted HPI   Endocrine: Negative for cold intolerance and heat intolerance.   Hematologic/Lymphatic: Negative for bleeding problem. Does not bruise/bleed easily.   Skin: Negative for color change, flushing, itching and rash.   Musculoskeletal: Negative for arthritis, back pain, joint pain, joint swelling, muscle weakness and myalgias.   Gastrointestinal: Negative for bloating, abdominal pain, constipation, diarrhea, dysphagia, heartburn, hematemesis, hematochezia, melena, nausea and vomiting.   Genitourinary: Negative for bladder incontinence, dysuria, frequency, nocturia and urgency.   Neurological: Negative for dizziness, focal weakness, headaches, light-headedness, loss of balance, numbness, paresthesias, vertigo and weakness.   Psychiatric/Behavioral: Negative for depression, memory loss and substance abuse.       Procedures      ECG 12 Lead  Date/Time: 8/27/2020 12:57 PM  Performed by: Griffin Joseph MD  Authorized by: Griffin Joseph MD   Comparison: compared with previous ECG   Similar to previous ECG  Rhythm: sinus rhythm  Rate: normal  ST Depression: I and aVL  T inversion: I and aVL  QRS axis: normal  Other findings: non-specific ST-T wave changes                Objective:    /72 (BP Location: Right arm)   Pulse 73   Ht 162.6 cm (64\")   Wt 91.6 kg (202 lb)   BMI 34.67 kg/m²        Physical Exam  Physical Exam   Constitutional: She is oriented to person, place, and time. She appears well-developed and well-nourished. No distress.   HENT:   Head: Normocephalic.   Eyes: Pupils are equal, round, and reactive to light. Conjunctivae are normal. No scleral icterus.   Neck: Normal carotid pulses, no hepatojugular reflux and no JVD present. Carotid bruit is not present. No tracheal deviation, no edema and no erythema present. No thyromegaly present.   Cardiovascular: Normal rate, regular rhythm, S1 normal, S2 normal and intact distal pulses.  No extrasystoles " are present. PMI is not displaced. Exam reveals no gallop, no distant heart sounds and no friction rub.   Murmur heard.   Systolic murmur is present with a grade of 2/6 at the upper right sternal border.  Pulses:       Carotid pulses are 2+ on the right side with bruit, and 2+ on the left side with bruit.       Radial pulses are 2+ on the right side, and 2+ on the left side.        Femoral pulses are 2+ on the right side, and 2+ on the left side.       Dorsalis pedis pulses are 2+ on the right side, and 2+ on the left side.        Posterior tibial pulses are 2+ on the right side, and 2+ on the left side.   Pulmonary/Chest: Effort normal and breath sounds normal. No respiratory distress. She has no decreased breath sounds. She has no wheezes. She has no rhonchi. She has no rales. She exhibits no tenderness.   Abdominal: Soft. Bowel sounds are normal. She exhibits no distension and no mass. There is no hepatosplenomegaly. There is no tenderness. There is no rebound and no guarding.   Musculoskeletal: She exhibits no edema, tenderness or deformity.   Neurological: She is alert and oriented to person, place, and time.   Skin: Skin is warm and dry. No rash noted. She is not diaphoretic. No cyanosis or erythema. No pallor. Nails show no clubbing.   Psychiatric: She has a normal mood and affect. Her speech is normal and behavior is normal. Judgment and thought content normal.           Assessment:     1. 89 year-old female with history of severe coronary artery disease with previous CABG in 1991.  She had a normal perfusion stress test in November 2016.  However, she had an Inferior STEMI  On 3/11/2018 and had cardiac catheterization that revealed lesli lLV function, occluded vein graft to the diagonal, patent left internal mammary graft to left anterior descending, severe disease of the vein graft to the posterior descending artery.   She had drug-eluting stent placed to the vein graft to the PDA.  then repeat NSTEMI  05/2020 repeat catheterization  LIMA to LAD: widely patent and normal.  It is widely patent and looks good the native LAD has 3 lesions subtotally occluded and the distal LAD .SVG to PDA there 20% luminal irregularities throughout the SVG. The stent in the mid SVGto PDA was patent, the PDA retrograde fills the SUPA and there is a 70 to 80% lesion in the mid SUPA this is not amenable to PCI there are some right to left collaterals to the distal circumflex. The distal LAD disease was progressed from prior cath and felt to be the cause for the non-STEMI.  No LV gram due to contrast limitation in the setting of renal insufficiency.  Ischemic areas being the distal right coronary artery, circumflex distribution, distal left anterior descending and diagonal distribution.  She had no revascularization targets and medical management.  She is maintained on aspirin Plavix.  She having some increased chest discomfort but I believe that is probably due from being off the amlodipine.  We have asked her to go back on it may have to tolerate a little worse renal function to keep her anginal free.  2.  Hypertension-blood pressure had been under control.  3.  Hyperlipidemia- no current therapy,  she has been intolerant of multiple statins in the past and considering her age it was deemed not to pursue any further treatment.  4.  Stage III renal failure-she is followed by Dr. Shields with nephrology.  As outlined above.  5.  Renal  Artery stenosis status post left renal artery stenting  6.  Mild carotid artery disease-is followed by vascular  7. Obstructive sleep apnea on CPAP  8. Diabetes Mellitus on insulin  9. Vitamin D deficiency she is on replacement          Plan:

## 2020-09-03 ENCOUNTER — LAB REQUISITION (OUTPATIENT)
Dept: LAB | Facility: HOSPITAL | Age: 85
End: 2020-09-03
Payer: MEDICARE

## 2020-09-03 DIAGNOSIS — Z11.59 ENCOUNTER FOR SCREENING FOR OTHER VIRAL DISEASES: ICD-10-CM

## 2020-09-06 PROBLEM — I21.4 NON-STEMI (NON-ST ELEVATED MYOCARDIAL INFARCTION) (HCC): Status: ACTIVE | Noted: 2020-01-01

## 2020-09-06 NOTE — ED NOTES
Nursing report ED to floor  Alessia Madrigal  89 y.o.  female    HPI (triage note):   Chief Complaint   Patient presents with   • Chest Pain     Patient states that symptoms have been coming and going since 615 yesterday, states she will take a nitro which relieved her pain but then the pain will return.  She sees Dr. Joseph, last saw him last week.  Past medical history of diabetes, coronary artery disease, hypertension, CKD and has 1 stent placed.       Admitting doctor:   Radha Vu MD    Admitting diagnosis:   The primary encounter diagnosis was Non-STEMI (non-ST elevated myocardial infarction) (CMS/[facility]). A diagnosis of Chest pain, unspecified type was also pertinent to this visit.    Code status:   Current Code Status     Date Active Code Status Order ID Comments User Context       Prior          Allergies:   Allopurinol; Clindamycin/lincomycin; Statins; Ampicillin; Penicillins; and Pravastatin    Weight:       09/06/20  0748   Weight: 90.7 kg (200 lb)       Most recent vitals:   Vitals:    09/06/20 0900 09/06/20 0910 09/06/20 0915 09/06/20 0940   BP:  178/91  171/90   Pulse: 96 97 90 94   Resp:    17   Temp:       TempSrc:       SpO2: 97% 97%  97%   Weight:       Height:           Active LDAs/IV Access:   Lines, Drains & Airways    Active LDAs     Name:   Placement date:   Placement time:   Site:   Days:    Peripheral IV 06/15/20 0909 Right Antecubital   06/15/20    0909    Antecubital   83                Labs (abnormal labs have a star):   Labs Reviewed   COMPREHENSIVE METABOLIC PANEL - Abnormal; Notable for the following components:       Result Value    Glucose 154 (*)     Creatinine 1.34 (*)     Chloride 108 (*)     eGFR Non  Amer 37 (*)     All other components within normal limits    Narrative:     GFR Normal >60  Chronic Kidney Disease <60  Kidney Failure <15     TROPONIN (IN-HOUSE) - Abnormal; Notable for the following components:    Troponin T 0.041 (*)     All other components within  normal limits    Narrative:     Troponin T Reference Range:  <= 0.03 ng/mL-   Negative for AMI  >0.03 ng/mL-     Abnormal for myocardial necrosis.  Clinicians would have to utilize clinical acumen, EKG, Troponin and serial changes to determine if it is an Acute Myocardial Infarction or myocardial injury due to an underlying chronic condition.       Results may be falsely decreased if patient taking Biotin.     CBC WITH AUTO DIFFERENTIAL - Abnormal; Notable for the following components:    MCH 33.1 (*)     Lymphocyte % 14.1 (*)     Immature Grans % 0.7 (*)     Immature Grans, Absolute 0.06 (*)     All other components within normal limits   MAGNESIUM - Normal   COVID PRE-OP / PRE-PROCEDURE SCREENING ORDER (NO ISOLATION)    Narrative:     The following orders were created for panel order COVID PRE-OP / PRE-PROCEDURE SCREENING ORDER (NO ISOLATION) - Swab, Nasopharynx.  Procedure                               Abnormality         Status                     ---------                               -----------         ------                     Respiratory Panel PCR w/...[[id number]]                      In process                   Please view results for these tests on the individual orders.   RESPIRATORY PANEL PCR W/ COVID-19 (SARS-COV-2) [facility] IN-HOUSE, NP SWAB IN [facility], 3-4 HR TAT   CBC AND DIFFERENTIAL    Narrative:     The following orders were created for panel order CBC & Differential.  Procedure                               Abnormality         Status                     ---------                               -----------         ------                     CBC Auto Differential[[id number]]        Abnormal            Final result                 Please view results for these tests on the individual orders.       EKG:   ECG 12 Lead   Preliminary Result   HEART RATE= 95  bpm   RR Interval= 632  ms   MT Interval= 165  ms   P Horizontal Axis= 177  deg   P Front Axis= 166  deg   QRSD Interval= 94  ms    QT Interval= 328  ms   QRS Axis= 4  deg   T Wave Axis= 193  deg   - ABNORMAL ECG -   Sinus or ectopic atrial rhythm   Anteroseptal infarct, age indeterminate   Abnormal T, consider ischemia, lateral leads   Electronically Signed By:    Date and Time of Study: 2020-09-06 08:32:04      ECG 12 Lead   Preliminary Result   HEART RATE= 86  bpm   RR Interval= 700  ms   HI Interval= 198  ms   P Horizontal Axis= 8  deg   P Front Axis= 31  deg   QRSD Interval= 87  ms   QT Interval= 346  ms   QRS Axis= 16  deg   T Wave Axis= 168  deg   - ABNORMAL ECG -   Sinus rhythm   Low voltage, precordial leads   Nonspecific repol abnormality, diffuse leads   Electronically Signed By:    Date and Time of Study: 2020-09-06 07:43:10          Meds given in ED:   Medications   sodium chloride 0.9 % flush 10 mL (has no administration in time range)   nitroglycerin (NITROSTAT) ointment 1 inch (1 inch Topical Given 9/6/20 0831)   morphine injection 4 mg (4 mg Intravenous Given 9/6/20 0847)   ondansetron (ZOFRAN) injection 4 mg (4 mg Intravenous Given 9/6/20 0847)   morphine injection 4 mg (4 mg Intravenous Given 9/6/20 0931)   ondansetron (ZOFRAN) injection 4 mg (4 mg Intravenous Given 9/6/20 0931)       Imaging results:  No radiology results for the last day    Ambulatory status:   - up with assistance    Social issues:   Social History     Socioeconomic History   • Marital status: Single     Spouse name: Not on file   • Number of children: Not on file   • Years of education: Not on file   • Highest education level: Not on file   Tobacco Use   • Smoking status: Never Smoker   • Smokeless tobacco: Never Used   • Tobacco comment: caffeine use- tea   Substance and Sexual Activity   • Alcohol use: No   • Drug use: Never   • Sexual activity: Defer   Lifestyle   • Physical activity:     Days per week: 0 days     Minutes per session: 0 min   • Stress: Not on file          Minerva White, RN  09/06/20 1007

## 2020-09-06 NOTE — ED NOTES
Pt states took nitro PTA chest pain has decreased in severity.      Sukhjinder Johns RN  09/06/20 0750

## 2020-09-06 NOTE — ED NOTES
Patient reports increased chest pressure with associated dyspnea.  VSS.  IV obtained and nitro applied.  Placed on 2 liters nc for comfort.  Received order for morphine and administered.  Given warm blankets and room lights dimmed.       Minerva White RN  09/06/20 9846

## 2020-09-06 NOTE — ED PROVIDER NOTES
The HEYDI and I have discussed this patient's history, physical exam, and treatment plan. I have reviewed the documentation and personally had a face to face interaction with the patient  I affirm the documentation and agree with the treatment and plan.  The following describes my personal findings.    The patient presents complaining of intermittent chest pain for approximately 24 hours.  Patient reports her pain is anterior sternal, aching in nature, improved by nitro prior to arrival.  Patient reports she has been compliant with her medications.    Limited physical exam:  Patient is nontoxic appearing mild discomfort  Lungs/cardiovascular: Good air movement bilaterally, cardiovascular regular rate without obvious murmur  Abdomen: Soft, nontender  Back/extremities: Bilateral radial pulses and posterior tibial pulses palpable, 1+ pedal edema with atrophic skin changes bilateral lower extremities, good range of motion, pulse, sensation x4 extremities    EKG          EKG time: 07 43  Rhythm/Rate: Minus rhythm, rate in the 80s  P waves and AZ: Normal P waves, normal SHRADDHA's  QRS, axis: Unremarkable QRS,   ST and T waves: Nonspecific ST/T wave findings anterior lateral leads    Interpreted Contemporaneously by me, independently viewed  changed compared to prior 6/16/2020, flipped T waves anterior lateral leads improved    EKG          EKG time: 08/30/2010  Rhythm/Rate: Sinus rhythm, rate in the 60s  P waves and AZ: Normal P waves, normal SHRADDHA's  QRS, axis: Unremarkable  ST and T waves: ST depression flattened T waves     Interpreted Contemporaneously by me, independently viewed  Minimally changed compared to prior done today      Patient was wearing facemask when I entered the room and throughout our encounter. Full protective equipment was worn throughout this patient encounter including a face mask, eye protection and gloves. Hand hygiene was performed before donning protective equipment and after removal when leaving the  room.             Dalila Foley MD  09/06/20 3748

## 2020-09-06 NOTE — ED TRIAGE NOTES
Pt to ER via EMS from home. Per EMS, pt c/o chest pain that started yesterday morning in which she took nitro yesterday and it went away. Pt states the chest pain keeps reoccurring - started again around 0600 this morning     324mg ASA given en route    Hx of CABG     Patient in mask. This RN in appropriate PPE - including mask, goggles, and gloves during all of patient care.

## 2020-09-06 NOTE — ED PROVIDER NOTES
EMERGENCY DEPARTMENT ENCOUNTER    Room Number:  2214/1  Date of encounter:  9/6/2020  PCP: Dario Mae MD  Historian: Patient      PPE    Patient was placed in face mask in first look. Patient was wearing facemask when I entered the room and throughout our encounter. I wore full protective equipment throughout this patient encounter including a face mask, and gloves. Hand hygiene was performed before donning protective equipment and after removal when leaving the room.        HPI:  Chief Complaint: Chest pain  A complete HPI/ROS/PMH/PSH/SH/FH are unobtainable due to: Nothing    Context: Alessia Madrigal is a 89 y.o. female who arrives to the ED via EMS from home.  Patient presents with c/o mild, pressure, dull mid chest pain that began around 615 yesterday a.m. pain.   Patient also complains of nausea, vomiting, diaphoresis when she is having the pain and chronic shortness of air.  Patient denies fever, chills, radiation of the pain, cough, dizziness, weakness.  Patient states that nitro makes the symptoms better and nothing worsens symptoms.  Patient states that symptoms have been coming and going since 615 yesterday, states she will take a nitro which relieved her pain but then the pain will return.  She sees Dr. Joseph, last saw him last week.  Past medical history of diabetes, coronary artery disease, hypertension, CKD and has 1 stent placed.  She is currently taking Plavix and a baby aspirin daily.        PAST MEDICAL HISTORY  Active Ambulatory Problems     Diagnosis Date Noted   • Actinic keratosis 02/02/2016   • Anemia of chronic disorder 02/02/2016   • Disorder of aorta (CMS/[facility]) 02/02/2016   • Chronic coronary artery disease 02/02/2016   • CKD (chronic kidney disease) stage 3, GFR 30-59 ml/min (CMS/[facility]) 02/02/2016   • Constipation 02/02/2016   • Cor pulmonale (CMS/[facility]) 02/02/2016   • Diabetes mellitus (CMS/[facility]) 02/02/2016   • High level of uric acid in blood 02/02/2016   • Fatigue 02/02/2016   •  Hypertension 02/02/2016   • Hypothyroidism 02/02/2016   • Iron deficiency 02/02/2016   • Mitral valve insufficiency 02/02/2016   • Pulmonary hypertension (CMS/[facility]) 02/02/2016   • Swelling of lower extremity 02/02/2016   • Shortness of breath 02/02/2016   • Tricuspid valve insufficiency 02/02/2016   • Uncontrolled type 2 diabetes mellitus (CMS/HCC) 02/02/2016   • Type 2 diabetes mellitus with chronic kidney disease, with long-term current use of insulin (CMS/HCC) 02/02/2016   • Vitamin D deficiency 03/25/2016   • Secondary hyperparathyroidism, non-renal (CMS/HCC) 03/25/2016   • Pulmonary nodules/lesions, multiple 05/27/2016   • Pleural effusion 05/27/2016   • Paronychia of second finger of right hand 09/29/2016   • Hyperuricemia 12/29/2016   • Acute pain of both shoulders 01/10/2018   • Acute transmural inferior wall MI (CMS/HCC) 03/11/2018   • Wrist injury, left, initial encounter 11/06/2018   • Unstable angina (CMS/HCC) 05/07/2020   • Poorly-controlled hypertension 06/15/2020   • Chest pain due to CAD (CMS/HCC) 06/15/2020   • Obesity (BMI 30-39.9) 06/15/2020   • BRITTANY (obstructive sleep apnea) 06/15/2020     Resolved Ambulatory Problems     Diagnosis Date Noted   • Hyperlipidemia 02/02/2016   • NSTEMI (non-ST elevated myocardial infarction) (CMS/HCC) 05/07/2020     Past Medical History:   Diagnosis Date   • Acute on chronic congestive heart failure (CMS/HCC)    • Arthritis 03/13/2014   • CAD (coronary artery disease)    • Cancer of uterus (CMS/HCC)    • Carotid artery stenosis    • Chronic renal failure syndrome    • Coronary artery disease involving coronary bypass graft of native heart    • Coronary atherosclerosis    • Endometrial cancer (CMS/HCC)    • Esophageal reflux    • Essential hypertension    • Glaucoma    • Malaise and fatigue    • Osteoarthritis    • Sleep apnea    • Spinal stenosis    • Thyroid disease    • Type 2 diabetes mellitus (CMS/HCC) 1991   • Vitamin B12 deficiency          PAST SURGICAL  HISTORY  Past Surgical History:   Procedure Laterality Date   • CARDIAC CATHETERIZATION N/A 3/11/2018    Procedure: Left Heart Cath;  Surgeon: Cameron Chisholm MD;  Location: [facility] CATH INVASIVE LOCATION;  Service: Cardiovascular   • CARDIAC CATHETERIZATION N/A 3/11/2018    Procedure: Stent ROSSI bypass graft;  Surgeon: Cameron Chisholm MD;  Location: [facility] CATH INVASIVE LOCATION;  Service: Cardiovascular   • CARDIAC CATHETERIZATION N/A 5/7/2020    Procedure: Left Heart Cath;  Surgeon: Cameron Chisholm MD;  Location: [facility] CATH INVASIVE LOCATION;  Service: Cardiovascular;  Laterality: N/A;   • CARDIAC CATHETERIZATION N/A 5/7/2020    Procedure: Saphenous Vein Graft;  Surgeon: Cameron Chisholm MD;  Location: [facility] CATH INVASIVE LOCATION;  Service: Cardiovascular;  Laterality: N/A;   • CATARACT EXTRACTION Bilateral    • CATARACT EXTRACTION W/ INTRAOCULAR LENS IMPLANT Right 10/25/2016    Procedure: RT SUPERFICIAL KERATECTOMY ;  Surgeon: Arian Singh MD;  Location: [facility] OR [facility];  Service:    • CHOLECYSTECTOMY     • COLONOSCOPY     • CORONARY ARTERY BYPASS GRAFT  1991    X3   • HYSTERECTOMY     • OOPHORECTOMY     • TRANSLUMINAL ATHERECTOMY PERONEAL ARTERY      PERCUTANEOUS TRANSLUMINAL ATHERECTOMY RENAL ARTERY         FAMILY HISTORY  Family History   Problem Relation Age of Onset   • Colon cancer Other    • Heart disease Other    • Hypertension Other    • Stroke Other         ISCHEMIC   • Colon cancer Mother    • Stroke Father    • Heart attack Father    • Heart disease Father    • Heart attack Brother    • Stroke Brother    • Heart disease Brother    • Breast cancer Maternal Grandmother          SOCIAL HISTORY  Social History     Socioeconomic History   • Marital status: Single     Spouse name: Not on file   • Number of children: Not on file   • Years of education: Not on file   • Highest education level: Not on file   Tobacco Use   • Smoking status: Never Smoker   • Smokeless tobacco: Never Used   • Tobacco  comment: caffeine use- tea   Substance and Sexual Activity   • Alcohol use: No   • Drug use: Never   • Sexual activity: Defer   Lifestyle   • Physical activity:     Days per week: 0 days     Minutes per session: 0 min   • Stress: Not on file         ALLERGIES  Allopurinol; Clindamycin/lincomycin; Statins; Ampicillin; Penicillins; and Pravastatin        REVIEW OF SYSTEMS  Review of Systems     All systems reviewed and negative except for those discussed in HPI.        PHYSICAL EXAM    ED Triage Vitals   Temp Heart Rate Resp BP SpO2   09/06/20 0729 09/06/20 0729 09/06/20 0731 09/06/20 0729 09/06/20 0729   98.4 °F (36.9 °C) 90 18 144/96 97 %       Physical Exam  GENERAL: Well appearing, non-toxic appearing, not distressed  HENT: normocephalic, atraumatic  EYES: no scleral icterus, PERRL  CV: regular rhythm, regular rate, no murmur  RESPIRATORY: normal effort, CTAB  ABDOMEN: soft   MUSCULOSKELETAL: no deformity  Bilateral lower extremity edema  NEURO: alert, moves all extremities, follows commands, mental status normal/baseline  SKIN: warm, dry, no rash   Psych: Appropriate mood and affect  Nursing notes and vital signs reviewed      LAB RESULTS  Recent Results (from the past 24 hour(s))   Comprehensive Metabolic Panel    Collection Time: 09/06/20  8:33 AM   Result Value Ref Range    Glucose 154 (H) 65 - 99 mg/dL    BUN 22 8 - 23 mg/dL    Creatinine 1.34 (H) 0.57 - 1.00 mg/dL    Sodium 143 136 - 145 mmol/L    Potassium 4.2 3.5 - 5.2 mmol/L    Chloride 108 (H) 98 - 107 mmol/L    CO2 22.4 22.0 - 29.0 mmol/L    Calcium 9.6 8.2 - 9.6 mg/dL    Total Protein 6.9 6.0 - 8.5 g/dL    Albumin 4.10 3.50 - 5.20 g/dL    ALT (SGPT) 10 1 - 33 U/L    AST (SGOT) 13 1 - 32 U/L    Alkaline Phosphatase 106 39 - 117 U/L    Total Bilirubin 0.3 0.0 - 1.2 mg/dL    eGFR Non African Amer 37 (L) >60 mL/min/1.73    Globulin 2.8 gm/dL    A/G Ratio 1.5 g/dL    BUN/Creatinine Ratio 16.4 7.0 - 25.0    Anion Gap 12.6 5.0 - 15.0 mmol/L   Troponin     Collection Time: 09/06/20  8:33 AM   Result Value Ref Range    Troponin T 0.041 (C) 0.000 - 0.030 ng/mL   Magnesium    Collection Time: 09/06/20  8:33 AM   Result Value Ref Range    Magnesium 1.8 1.7 - 2.3 mg/dL   CBC Auto Differential    Collection Time: 09/06/20  8:33 AM   Result Value Ref Range    WBC 8.22 3.40 - 10.80 10*3/mm3    RBC 4.50 3.77 - 5.28 10*6/mm3    Hemoglobin 14.9 12.0 - 15.9 g/dL    Hematocrit 43.3 34.0 - 46.6 %    MCV 96.2 79.0 - 97.0 fL    MCH 33.1 (H) 26.6 - 33.0 pg    MCHC 34.4 31.5 - 35.7 g/dL    RDW 12.9 12.3 - 15.4 %    RDW-SD 45.8 37.0 - 54.0 fl    MPV 11.3 6.0 - 12.0 fL    Platelets 288 140 - 450 10*3/mm3    Neutrophil % 73.7 42.7 - 76.0 %    Lymphocyte % 14.1 (L) 19.6 - 45.3 %    Monocyte % 6.2 5.0 - 12.0 %    Eosinophil % 4.6 0.3 - 6.2 %    Basophil % 0.7 0.0 - 1.5 %    Immature Grans % 0.7 (H) 0.0 - 0.5 %    Neutrophils, Absolute 6.05 1.70 - 7.00 10*3/mm3    Lymphocytes, Absolute 1.16 0.70 - 3.10 10*3/mm3    Monocytes, Absolute 0.51 0.10 - 0.90 10*3/mm3    Eosinophils, Absolute 0.38 0.00 - 0.40 10*3/mm3    Basophils, Absolute 0.06 0.00 - 0.20 10*3/mm3    Immature Grans, Absolute 0.06 (H) 0.00 - 0.05 10*3/mm3    nRBC 0.0 0.0 - 0.2 /100 WBC   Respiratory Panel PCR w/COVID-19(SARS-CoV-2) [facility] In-House, NP Swab in UTM/VTM, 3-4 HR TAT - Swab, Nasopharynx    Collection Time: 09/06/20  8:47 AM   Result Value Ref Range    ADENOVIRUS, PCR Not Detected Not Detected    Coronavirus 229E Not Detected Not Detected    Coronavirus HKU1 Not Detected Not Detected    Coronavirus NL63 Not Detected Not Detected    Coronavirus OC43 Not Detected Not Detected    COVID19 Not Detected Not Detected - Ref. Range    Human Metapneumovirus Not Detected Not Detected    Human Rhinovirus/Enterovirus Not Detected Not Detected    Influenza A PCR Not Detected Not Detected    Influenza A H1 Not Detected Not Detected    Influenza A H1 2009 PCR Not Detected Not Detected    Influenza A H3 Not Detected Not  Detected    Influenza B PCR Not Detected Not Detected    Parainfluenza Virus 1 Not Detected Not Detected    Parainfluenza Virus 2 Not Detected Not Detected    Parainfluenza Virus 3 Not Detected Not Detected    Parainfluenza Virus 4 Not Detected Not Detected    RSV, PCR Not Detected Not Detected    Bordetella pertussis pcr Not Detected Not Detected    Bordetella parapertussis PCR Not Detected Not Detected    Chlamydophila pneumoniae PCR Not Detected Not Detected    Mycoplasma pneumo by PCR Not Detected Not Detected       Ordered the above labs and independently reviewed the results.      RADIOLOGY  Xr Chest 1 View    Result Date: 9/6/2020  ONE VIEW PORTABLE CHEST  HISTORY: Chest pain.  There is mild cardiomegaly with sternal wires from previous cardiac surgery. A small hiatus hernia is present without change from 06/15/2020. There is some very vague haziness and peribronchial thickening, particularly in the lower right chest that is a new finding and could relate to changes of bronchitis and developing pneumonia or less likely somewhat asymmetric changes of CHF.  This report was finalized on 9/6/2020 9:46 AM by Dr. Miguel Rain M.D.        I ordered the above noted radiological studies and viewed the images on the PACS system.       EKG    Independently viewed by me and interpreted by Dr. Foley        MEDICAL RECORD REVIEW  Medical records reviewed in epic, patient had an echo done Nicky 15 which showed an EF of 51%        PROCEDURES    Procedures        DIFFERENTIAL DIAGNOSIS  Differential diagnosis for chest pain include but are not limited to the following:  Anxiety, muscle strain, costochondritis, pleurisy, herpes zoster, MI, ACS, Aortic dissection, PE, pneumonia, pneumothorax, GERD        PROGRESS, DATA ANALYSIS, CONSULTS, AND MEDICAL DECISION MAKING        ED Course as of Sep 06 1120   Sun Sep 06, 2020   0806 Reviewed pt's history and workup with Dr. Foley.  After a bedside evaluation, they agree with the plan  of care.          [MS]   0811 Discussed pertinent information from history and physical exam with patient.  Discussed differential diagnosis and plan for ED evaluation/work-up and treatment including labs, EKG and chest x-ray.  All questions answered.  Patient is agreeable with this plan.  Patient is currently pain-free, states that she did take nitro right before EMS arrived.  She was given 4 baby aspirin in route per EMS.  We will place a inch of Nitropaste on patient at this time.      [MS]   0945 Consult Note    Discussed care with Dr Vu  Reviewed patient's history, exam, results and need for admission secondary to STEMI and chest pain  Dr. Vu accepts the patient to be admitted to telemetry observation bed.        [MS]      ED Course User Index  [MS] Mer Farooq, MILVIA     ADMISSION    Discussed treatment plan and reason for admission with pt/family and admitting physician.  Pt/family voiced understanding of the plan for admission for further testing/treatment as needed.      DIAGNOSIS  Final diagnoses:   Non-STEMI (non-ST elevated myocardial infarction) (CMS/[facility])   Chest pain, unspecified type           MEDICATIONS GIVEN IN ED    Medications   sodium chloride 0.9 % flush 10 mL (has no administration in time range)   nitroglycerin (NITROSTAT) ointment 1 inch (1 inch Topical Given 9/6/20 0831)   morphine injection 4 mg (4 mg Intravenous Given 9/6/20 0847)   ondansetron (ZOFRAN) injection 4 mg (4 mg Intravenous Given 9/6/20 0847)   morphine injection 4 mg (4 mg Intravenous Given 9/6/20 0931)   ondansetron (ZOFRAN) injection 4 mg (4 mg Intravenous Given 9/6/20 0931)           COURSE & MEDICAL DECISION MAKING  Any/All labs and Any/All Imaging studies that were ordered were reviewed and are noted above.  Results were reviewed/discussed with the patient and they were also made aware of online assess.   Pt also made aware that some labs, such as cultures, will not be resulted during ER visit and follow  up with PMD is necessary.        Mer Farooq, APRN  09/06/20 1123

## 2020-09-06 NOTE — H&P
"    Patient Name: Alessia Madrigal  :10/9/1928  89 y.o.    Date of Admission: 2020  Date of Consultation:  20  Encounter Provider: aRdha Vu MD  Place of Service: [facility] CARDIOLOGY  Referring Provider: Radha Vu MD  Patient Care Team:  Dario Mae MD as PCP - General (Internal Medicine)      Chief complaint: Non-STEMI    History of Present Illness:  This is a really giovanna 89-year-old.  She is generally seen by Dr. Joseph.  She has a significant history of coronary disease status post bypass grafting, hypertension, diabetes, hyperlipidemia, CKD with previous renal artery stenting.  She has had a long history of coronary disease, most recently had a non-STEMI in May 2020 left heart catheterization at that time showed a widely patent LIMA to LAD, however the LAD distal to the anastomosis was subtotally occluded.  She has a SVG to PDA with 20% luminal irregularities throughout the vein graft itself.  There is a stent within the vein graft which is patent.  The graft backfills into a PL which has been 80% lesion not amenable to PCI.  There are right to left collaterals to the distal circumflex.  She was treated medically at that time.    She has had intermittent angina since then.  Her medications have been uptitrated as recently as a week ago when she was seen in the office with Dr. Joseph.  He restarted amlodipine and increased Ranexa and isosorbide.  She felt well until this weekend.  Yesterday she had intermittent chest pain.  She had 3 episodes  by a few hours each.  Each episode resolved with sublingual nitroglycerin.  She went to bed and then was awoken this morning with substernal chest pain.  It did not really improve with nitro.  When she was in the emergency room she had severe chest pain, \"the worst she is ever had\".  She was put on nitroglycerin paste which improved her pain, however she does admit to have some lingering 3 out of 10 " chest pain during her interview.    She says she controls her angina at home by walking very slowly and avoiding raising her heart rate.  She just does basic toileting, walks within her home and fixes her own meals.  If she walks at a more normal pace she does have angina usually.  She is noticed lower extremity edema, orthopnea, dyspnea on exertion as her Lasix was discontinued earlier in the month.  She just restarted that recently.      Past Medical History:   Diagnosis Date   • Acute on chronic congestive heart failure (CMS/HCC)    • Acute transmural inferior wall MI (CMS/HCC)    • Arthritis 03/13/2014    CONT TYLENOL FOR PAIN/ [facility]W (INTERNAL MEDICINE)   • CAD (coronary artery disease)    • Cancer of uterus (CMS/[facility])    • Carotid artery stenosis    • Chronic renal failure syndrome    • Coronary artery disease involving coronary bypass graft of native heart     with other forms of angina pectoris   • Coronary atherosclerosis    • Endometrial cancer (CMS/HCC)    • Esophageal reflux    • Essential hypertension    • Glaucoma    • Hypertension    • Malaise and fatigue    • Osteoarthritis    • Sleep apnea     USING CPAP   • Spinal stenosis    • Thyroid disease    • Type 2 diabetes mellitus (CMS/[facility]) 1991    INsulin begun 2012   • Vitamin B12 deficiency     SHE HAS NOT HAD THIS CHECKED IN A WHILE. SHE HAD BEEN ON SHOTS.  CHECK HER B12 LEVELS TODAY.  BY [facility] CHEW       Past Surgical History:   Procedure Laterality Date   • CARDIAC CATHETERIZATION N/A 3/11/2018    Procedure: Left Heart Cath;  Surgeon: Cameron Chisholm MD;  Location: [facility] INVASIVE LOCATION;  Service: Cardiovascular   • CARDIAC CATHETERIZATION N/A 3/11/2018    Procedure: Stent ROSSI bypass graft;  Surgeon: Cameron Chisholm MD;  Location: [facility] INVASIVE LOCATION;  Service: Cardiovascular   • CARDIAC CATHETERIZATION N/A 5/7/2020    Procedure: Left Heart Cath;  Surgeon: Cameron Chisholm MD;  Location: [facility] INVASIVE LOCATION;   Service: Cardiovascular;  Laterality: N/A;   • CARDIAC CATHETERIZATION N/A 5/7/2020    Procedure: Saphenous Vein Graft;  Surgeon: Cameron Chisholm MD;  Location:  [facility] CATH INVASIVE LOCATION;  Service: Cardiovascular;  Laterality: N/A;   • CATARACT EXTRACTION Bilateral    • CATARACT EXTRACTION W/ INTRAOCULAR LENS IMPLANT Right 10/25/2016    Procedure: RT SUPERFICIAL KERATECTOMY ;  Surgeon: Arian Singh MD;  Location:  [facility] OR [facility];  Service:    • CHOLECYSTECTOMY     • COLONOSCOPY     • CORONARY ARTERY BYPASS GRAFT  1991    X3   • HYSTERECTOMY     • OOPHORECTOMY     • TRANSLUMINAL ATHERECTOMY PERONEAL ARTERY      PERCUTANEOUS TRANSLUMINAL ATHERECTOMY RENAL ARTERY         Prior to Admission medications    Medication Sig Start Date End Date Taking? Authorizing Provider   amLODIPine (NORVASC) 5 MG tablet Take 5 mg by mouth Daily.   Yes Renée Montoya MD   aspirin 81 MG EC tablet Take 81 mg by mouth Daily.    Renée Montoya MD   calcitriol (ROCALTROL) 0.5 MCG capsule Take 0.5 mcg by mouth Daily.    Renée Montoya MD   clopidogrel (PLAVIX) 75 MG tablet TAKE 1 TABLET DAILY 11/19/19   Griffin Joseph MD   Coenzyme Q10 (CO Q-10) 200 MG capsule Take 300 mg by mouth daily. 6/20/14   Renée Montoya MD   furosemide (LASIX) 40 MG tablet Take 1 tablet by mouth 2 (Two) Times a Day. 3/16/18   Griffin Joseph MD   glucose blood (ONE TOUCH ULTRA TEST) test strip Test Three times daily. DX E11.9 7/9/19   Dario Mae MD   glucose blood test strip Use as instructed 3/16/20   Dario Mae MD   Insulin Glargine (LANTUS SOLOSTAR) 100 UNIT/ML injection pen Inject 15 Units under the skin into the appropriate area as directed Every Night. 7/15/19   Dario Mae MD   insulin lispro (HUMALOG) 100 UNIT/ML injection Inject 5 Units under the skin into the appropriate area as directed 3 (Three) Times a Day Before Meals. 2/25/19   Summer Gordon MD   Insulin Pen Needle (BD PEN  NEEDLE LISA U/F) 32G X 4 MM misc USE AS DIRECTED THREE TIMES A DAY. 7/1/19   Dario Mae MD   isosorbide mononitrate (IMDUR) 60 MG 24 hr tablet Take 1 tablet by mouth Daily. 6/19/20   Griffin Joseph MD   levothyroxine (SYNTHROID) 150 MCG tablet Take 1 tablet by mouth Daily. 10/2/19   Dario Mae MD   metoprolol succinate XL (TOPROL-XL) 50 MG 24 hr tablet TAKE 1 TABLET DAILY 10/16/19   Griffin Joseph MD   nitroglycerin (NITROSTAT) 0.4 MG SL tablet DISSOLVE 1 TAB UNDER TONGUE FOR CHEST PAIN - IF PAIN REMAINS AFTER 5 MIN, CALL 911 AND REPEAT DOSE. MAX 3 TABS IN 15 MINUTES 7/6/20   Griffin Joseph MD   ranolazine (RANEXA) 500 MG 12 hr tablet Take 1 tablet by mouth Every 12 (Twelve) Hours. 6/19/20   Griffin Joseph MD   spironolactone (ALDACTONE) 25 MG tablet TAKE 1 TABLET DAILY 8/25/20   Griffin Joseph MD   timolol (TIMOPTIC) 0.25 % ophthalmic solution Administer 1 drop to both eyes Tonight. 1/25/18   ProviderRenée MD   travoprost, KAL free, (TRAVATAN) 0.004 % solution ophthalmic solution Administer 1 drop to both eyes every night. in affected eye(s)     ProviderRenée MD       Allergies   Allergen Reactions   • Allopurinol Unknown - High Severity   • Clindamycin/Lincomycin Itching     Felt like she was burning and itching around the neck   • Statins Myalgia   • Ampicillin Rash   • Penicillins Rash   • Pravastatin Myalgia       Social History     Socioeconomic History   • Marital status: Single     Spouse name: Not on file   • Number of children: Not on file   • Years of education: Not on file   • Highest education level: Not on file   Tobacco Use   • Smoking status: Never Smoker   • Smokeless tobacco: Never Used   • Tobacco comment: caffeine use- tea   Substance and Sexual Activity   • Alcohol use: No   • Drug use: Never   • Sexual activity: Defer   Lifestyle   • Physical activity:     Days per week: 0 days     Minutes per session: 0 min   • Stress: Not on file        Family History   Problem Relation Age of Onset   • Colon cancer Other    • Heart disease Other    • Hypertension Other    • Stroke Other         ISCHEMIC   • Colon cancer Mother    • Stroke Father    • Heart attack Father    • Heart disease Father    • Heart attack Brother    • Stroke Brother    • Heart disease Brother    • Breast cancer Maternal Grandmother        REVIEW OF SYSTEMS:   All systems reviewed.  Pertinent positives identified in HPI.  All other systems are negative.      Objective:     Vitals:    09/06/20 0915 09/06/20 0940 09/06/20 1047 09/06/20 1212   BP:  171/90 (!) 173/110 150/84   BP Location:   Left arm Left arm   Patient Position:   Lying Lying   Pulse: 90 94 106 96   Resp:  17 20 20   Temp:   97.8 °F (36.6 °C) 98.2 °F (36.8 °C)   TempSrc:   Temporal Temporal   SpO2:  97% 96% 92%   Weight:       Height:         Body mass index is 34.33 kg/m².    General Appearance:    Alert, cooperative, in no acute distress   Head:    Normocephalic, without obvious abnormality, atraumatic   Eyes:            Lids and lashes normal, conjunctivae and sclerae normal, no icterus, no pallor, corneas clear, PERRLA   Ears:    Ears appear intact with no abnormalities noted   Throat:   No oral lesions, no thrush, oral mucosa moist   Neck:   No adenopathy, supple, trachea midline, no thyromegaly, no carotid bruit, no JVD   Back:     No kyphosis present, no scoliosis present, no skin lesions, erythema or scars, no tenderness to percussion or palpation, range of motion normal   Lungs:     Clear to auscultation, respirations regular, even and unlabored    Heart:    Regular rhythm and normal rate, normal S1 and S2, no murmur, no gallop, no rub, no click   Chest Wall:    No abnormalities observed   Abdomen:     Normal bowel sounds, no masses, no organomegaly, soft, nontender, nondistended, no guarding, no rebound  tenderness   Extremities:   Moves all extremities well, no edema, no cyanosis, no redness   Pulses:   Pulses  palpable and equal bilaterally. Normal radial, carotid, femoral, dorsalis pedis and posterior tibial pulses bilaterally. Normal abdominal aorta   Skin:  Psychiatric:   No bleeding, bruising or rash    Alert and oriented x 3, normal mood and affect   Lab Review:     Results from last 7 days   Lab Units 09/06/20  1233 09/06/20  0833   SODIUM mmol/L 140 143   POTASSIUM mmol/L 4.3 4.2   CHLORIDE mmol/L 108* 108*   CO2 mmol/L 22.5 22.4   BUN mg/dL 20 22   CREATININE mg/dL 1.26* 1.34*   CALCIUM mg/dL 9.0 9.6   BILIRUBIN mg/dL  --  0.3   ALK PHOS U/L  --  106   ALT (SGPT) U/L  --  10   AST (SGOT) U/L  --  13   GLUCOSE mg/dL 194* 154*     Results from last 7 days   Lab Units 09/06/20  1233 09/06/20  0833   TROPONIN T ng/mL 0.091* 0.041*     Results from last 7 days   Lab Units 09/06/20  1233   WBC 10*3/mm3 11.72*   HEMOGLOBIN g/dL 13.7   HEMATOCRIT % 41.1   PLATELETS 10*3/mm3 271         Results from last 7 days   Lab Units 09/06/20  1233   MAGNESIUM mg/dL 1.7                   I personally viewed and interpreted the patient's EKG/Telemetry data.  NSR, ST depressions inferior lateral, unchanged from prior    Assessment and Plan:       1. NSTEMI: Known multivessel CAD with patent LIMA to LAD, SVG to RCA with PCI and mild irregularities, oclcuded rPDA, occluded circumflex. No interventional targets.   - Heparin drip given ongoing pain. X 48 hours.   - Nitro drip prn CP  - restart home antianginals: Metop, Imdur, Ranexa increase amlodipine to 10,   - ASA and Plavix   - intolerant of statins     2 Diastolic dysfunciton/CHF: b/l rales, LE edema. Lasix 40 IV q12 x 2 doses.     3. Severe HTN: BP as high as 190 in the ER, now down to 150s. Has not had any home meds yet today. restart as above  4.  CKD: III, follows with Dr. Shields. S/p renal artery stenting  5.  DM  6. PT/OT for placement, she would like to go home with [facility] if possible .    Radha Vu MD  09/06/20  13:49

## 2020-09-07 LAB — SARS-COV-2 BY PCR: NOT DETECTED

## 2020-09-07 NOTE — PLAN OF CARE
Pt with hx of multivessel CAD she was admitted to hospital with chest pain, dyspnea, high blood pressure this has improved. This visit she walked in the room with min-modA 20' she had 1 mild loss of balance able to recover with some assist. Plan is d/c home with HHPT vs snf depending on progression.

## 2020-09-07 NOTE — PLAN OF CARE
Problem: Patient Care Overview  Goal: Plan of Care Review  Outcome: Ongoing (interventions implemented as appropriate)  Flowsheets  Taken 9/7/2020 0583  Progress: improving  Outcome Summary: VSS. 2L NC. NSR. SBP- 110's. No c/o pain. Heparin gtt running. IV lasix given- good urine output. WCTM  Taken 9/7/2020 0407  Plan of Care Reviewed With: patient

## 2020-09-07 NOTE — THERAPY EVALUATION
Patient Name: Alessia Madrigal  : 10/9/1928    MRN: [id number]                              Today's Date: 2020       Admit Date: 2020    Visit Dx:     ICD-10-CM ICD-9-CM   1. Non-STEMI (non-ST elevated myocardial infarction) (CMS/HCC) I21.4 410.70   2. Chest pain, unspecified type R07.9 786.50     Patient Active Problem List   Diagnosis   • Actinic keratosis   • Anemia of chronic disorder   • Disorder of aorta (CMS/HCC)   • Chronic coronary artery disease   • CKD (chronic kidney disease) stage 3, GFR 30-59 ml/min (CMS/HCC)   • Constipation   • Cor pulmonale (CMS/HCC)   • Diabetes mellitus (CMS/HCC)   • High level of uric acid in blood   • Fatigue   • Hypertension   • Hypothyroidism   • Iron deficiency   • Mitral valve insufficiency   • Pulmonary hypertension (CMS/HCC)   • Swelling of lower extremity   • Shortness of breath   • Tricuspid valve insufficiency   • Uncontrolled type 2 diabetes mellitus (CMS/[facility])   • Type 2 diabetes mellitus with chronic kidney disease, with long-term current use of insulin (CMS/[facility])   • Vitamin D deficiency   • Secondary hyperparathyroidism, non-renal (CMS/HCC)   • Pulmonary nodules/lesions, multiple   • Pleural effusion   • Paronychia of second finger of right hand   • Hyperuricemia   • Acute pain of both shoulders   • Acute transmural inferior wall MI (CMS/HCC)   • Wrist injury, left, initial encounter   • Unstable angina (CMS/[facility])   • Poorly-controlled hypertension   • Chest pain due to CAD (CMS/HCC)   • Obesity (BMI 30-39.9)   • BRITTANY (obstructive sleep apnea)   • Non-STEMI (non-ST elevated myocardial infarction) (CMS/HCC)     Past Medical History:   Diagnosis Date   • Acute on chronic congestive heart failure (CMS/HCC)    • Acute transmural inferior wall MI (CMS/HCC)    • Arthritis 2014    CONT TYLENOL FOR PAIN/ JOSE DANIEL CHEW (INTERNAL MEDICINE)   • CAD (coronary artery disease)    • Cancer of uterus (CMS/HCC)    • Carotid artery stenosis    • Chronic renal failure  syndrome    • Coronary artery disease involving coronary bypass graft of native heart     with other forms of angina pectoris   • Coronary atherosclerosis    • Endometrial cancer (CMS/HCC)    • Esophageal reflux    • Essential hypertension    • Glaucoma    • Hypertension    • Malaise and fatigue    • Osteoarthritis    • Sleep apnea     USING CPAP   • Spinal stenosis    • Thyroid disease    • Type 2 diabetes mellitus (CMS/HCC) 1991    INsulin begun 2012   • Vitamin B12 deficiency     SHE HAS NOT HAD THIS CHECKED IN A WHILE. SHE HAD BEEN ON SHOTS.  CHECK HER B12 LEVELS TODAY.  BY JOSE DANIEL CHEW     Past Surgical History:   Procedure Laterality Date   • CARDIAC CATHETERIZATION N/A 3/11/2018    Procedure: Left Heart Cath;  Surgeon: Cameron Chisholm MD;  Location: [facility] CATH INVASIVE LOCATION;  Service: Cardiovascular   • CARDIAC CATHETERIZATION N/A 3/11/2018    Procedure: Stent ROSSI bypass graft;  Surgeon: Cameron Chisholm MD;  [facility] INVASIVE LOC[facility];  Service: Cardiovascular   • CARDIAC CATHETERIZATION N/A 5/7/2020    Procedure: Left Heart Cath;  Surgeon: Cameron Chisholm MD;  Location: [facility] CATH INVASIVE LOCATION;  Service: Cardiovascular;  Laterality: N/A;   • CARDIAC CATHETERIZATION N/A 5/7/2020    Procedure: Saphenous Vein Graft;  Surgeon: Cameron Chisholm MD;  Location: [facility] CATH INVASIVE LOCATION;  Service: Cardiovascular;  Laterality: N/A;   • CATARACT EXTRACTION Bilateral    • CATARACT EXTRACTION W/ INTRAOCULAR LENS IMPLANT Right 10/25/2016    Procedure: RT SUPERFICIAL KERATECTOMY ;  Surgeon: Arian Singh MD;  Location: [facility] OR [facility];  Service:    • CHOLECYSTECTOMY     • COLONOSCOPY     • CORONARY ARTERY BYPASS GRAFT  1991    X3   • HYSTERECTOMY     • OOPHORECTOMY     • TRANSLUMINAL ATHERECTOMY PERONEAL ARTERY      PERCUTANEOUS TRANSLUMINAL ATHERECTOMY RENAL ARTERY     General Information     Row Name 09/07/20 1325          PT Evaluation Time/Intention    Document Type  evaluation   -CS     Mode of Treatment  physical therapy  -CS     Row Name 09/07/20 1325          General Information    Patient Profile Reviewed?  yes  -CS     Prior Level of Function  independent:;all household mobility  -CS     Existing Precautions/Restrictions  fall;cardiac  -CS     Row Name 09/07/20 1325          Relationship/Environment    Lives With  alone  -CS     Row Name 09/07/20 1325          Resource/Environmental Concerns    Current Living Arrangements  home/apartment/condo  -CS     Row Name 09/07/20 1325          Cognitive Assessment/Intervention- PT/OT    Orientation Status (Cognition)  oriented x 3  -CS     Row Name 09/07/20 1325          Safety Issues, Functional Mobility    Impairments Affecting Function (Mobility)  balance;endurance/activity tolerance  -CS       User Key  (r) = Recorded By, (t) = Taken By, (c) = Cosigned By    Initials Name Provider Type    CS Sj Arenas, PT Physical Therapist        Mobility     Row Name 09/07/20 1326          Bed Mobility Assessment/Treatment    Bed Mobility Assessment/Treatment  supine-sit;sit-supine  -CS     Supine-Sit [place] (Bed Mobility)  contact guard;verbal cues  -CS     Sit-Supine [place] (Bed Mobility)  minimum assist (75% patient effort);verbal cues  -CS     Assistive Device (Bed Mobility)  bed rails;head of bed elevated  -CS     Row Name 09/07/20 1326          Sit-Stand Transfer    Sit-Stand [place] (Transfers)  minimum assist (75% patient effort);verbal cues;nonverbal cues (demo/gesture)  -CS     Assistive Device (Sit-Stand Transfers)  walker, front-wheeled  -CS     Row Name 09/07/20 1326          Gait/Stairs Assessment/Training    Gait/Stairs Assessment/Training  gait/ambulation assistive device  -CS     [place] Level (Gait)  nonverbal cues (demo/gesture);verbal cues;minimum assist (75% patient effort);moderate assist (50% patient effort)  -CS     Distance in Feet (Gait)  20  -CS     Deviations/Abnormal Patterns (Gait)  gait speed  decreased;base of support, narrow  -CS     Bilateral Gait Deviations  forward flexed posture  -CS     Comment (Gait/Stairs)  Pt had 1 loss of balance some assist needed to help steady  -CS       User Key  (r) = Recorded By, (t) = Taken By, (c) = Cosigned By    Initials Name Provider Type    Sj Chauhan, PT Physical Therapist        Obj/Interventions     Row Name 09/07/20 1330          General ROM    GENERAL ROM COMMENTS  WFL  -CS     Row Name 09/07/20 1330          MMT (Manual Muscle Testing)    General MMT Comments  >3/5  -CS       User Key  (r) = Recorded By, (t) = Taken By, (c) = Cosigned By    Initials Name Provider Type    Sj Chauhan, PT Physical Therapist        Goals/Plan     Row Name 09/07/20 1331          Bed Mobility Goal 1 (PT)    Activity/Assistive Device (Bed Mobility Goal 1, PT)  sit to supine;supine to sit  -CS     [place] Level/Cues Needed (Bed Mobility Goal 1, PT)  conditional independence  -CS     Time Frame (Bed Mobility Goal 1, PT)  1 week  -CS     Row Name 09/07/20 1331          Transfer Goal 1 (PT)    Activity/Assistive Device (Transfer Goal 1, PT)  bed-to-chair/chair-to-bed;sit-to-stand/stand-to-sit  -CS     [place] Level/Cues Needed (Transfer Goal 1, PT)  conditional independence  -CS     Time Frame (Transfer Goal 1, PT)  1 week  -CS     Row Name 09/07/20 1331          Gait Training Goal 1 (PT)    Activity/Assistive Device (Gait Training Goal 1, PT)  assistive device use  -CS     [place] Level (Gait Training Goal 1, PT)  conditional independence  -CS     Distance (Gait Goal 1, PT)  150  -CS     Time Frame (Gait Training Goal 1, PT)  1 week  -CS       User Key  (r) = Recorded By, (t) = Taken By, (c) = Cosigned By    Initials Name Provider Type    Sj Chauhan, PT Physical Therapist        Clinical Impression     Row Name 09/07/20 1330          Pain Assessment    Additional Documentation  Pain Scale: FACES Pre/Post-Treatment (Group)  -     Row Name  09/07/20 1330          Pain Scale: FACES Pre/Post-Treatment    Pain: FACES Scale, Pretreatment  0-->no hurt  -CS     Pain: FACES Scale, Post-Treatment  0-->no hurt  -CS     Row Name 09/07/20 1330          Plan of Care Review    Plan of Care Reviewed With  patient  -CS     Row Name 09/07/20 1330          Physical Therapy Clinical Impression    Patient/Family Goals Statement (PT Clinical Impression)  home  -CS     Criteria for Skilled Interventions Met (PT Clinical Impression)  yes  -CS     Rehab Potential (PT Clinical Summary)  good, to achieve stated therapy goals  -CS     Row Name 09/07/20 1330          Vital Signs    O2 Delivery Pre Treatment  supplemental O2  -CS     O2 Delivery Intra Treatment  room air  -CS     O2 Delivery Post Treatment  supplemental O2  -CS     Row Name 09/07/20 1330          Positioning and Restraints    Pre-Treatment Position  in bed  -CS     Post Treatment Position  bed  -CS     In Bed  supine;call light within reach;encouraged to call for assist;exit alarm on;notified nsg  -CS       User Key  (r) = Recorded By, (t) = Taken By, (c) = Cosigned By    Initials Name Provider Type    Sj Chauhan, PT Physical Therapist        Outcome Measures     Row Name 09/07/20 1331          How much help from another person do you currently need...    Turning from your back to your side while in flat bed without using bedrails?  3  -CS     Moving from lying on back to sitting on the side of a flat bed without bedrails?  3  -CS     Moving to and from a bed to a chair (including a wheelchair)?  3  -CS     Standing up from a chair using your arms (e.g., wheelchair, bedside chair)?  3  -CS     Climbing 3-5 steps with a railing?  2  -CS     To walk in hospital room?  3  -CS     AM-PAC 6 Clicks Score (PT)  17  -CS     Row Name 09/07/20 1331          Functional Assessment    Outcome Measure Options  AM-PAC 6 Clicks Basic Mobility (PT)  -CS       User Key  (r) = Recorded By, (t) = Taken By, (c) = Cosigned By     Initials Name Provider Type    Sj Chauhan PT Physical Therapist        Physical Therapy Education                 Title: PT OT SLP Therapies (Done)     Topic: Physical Therapy (Done)     Point: Mobility training (Done)     Description:   Instruct learner(s) on safety and technique for assisting patient out of bed, chair or wheelchair.  Instruct in the proper use of assistive devices, such as walker, crutches, cane or brace.              Patient Friendly Description:   It's important to get you on your feet again, but we need to do so in a way that is safe for you. Falling has serious consequences, and your personal safety is the most important thing of all.        When it's time to get out of bed, one of us or a family member will sit next to you on the bed to give you support.     If your doctor or nurse tells you to use a walker, crutches, a cane, or a brace, be sure you use it every time you get out of bed, even if you think you don't need it.    Learning Progress Summary           Patient Acceptance, E,TB, VU,NR by  at 9/7/2020 1332                   Point: Home exercise program (Done)     Description:   Instruct learner(s) on appropriate technique for monitoring, assisting and/or progressing patient with therapeutic exercises and activities.              Learning Progress Summary           Patient Acceptance, E,TB, VU,NR by  at 9/7/2020 1332                   Point: Body mechanics (Done)     Description:   Instruct learner(s) on proper positioning and spine alignment for patient and/or caregiver during mobility tasks and/or exercises.              Learning Progress Summary           Patient Acceptance, E,TB, VU,NR by  at 9/7/2020 1332                   Point: Precautions (Done)     Description:   Instruct learner(s) on prescribed precautions during mobility and gait tasks              Learning Progress Summary           Patient Acceptance, E,TB, VU,NR by  at 9/7/2020 1332                                User Key     Initials Effective Dates Name Provider Type Discipline     05/14/18 -  Sj Arenas, PT Physical Therapist PT              PT Recommendation and Plan  Planned Therapy Interventions (PT Eval): neuromuscular re-education, bed mobility training, balance training, gait training, home exercise program, patient/family education, stair training, transfer training  Outcome Summary/Treatment Plan (PT)  Anticipated Discharge Disposition (PT): home with assist, home with home health, skilled nursing facility  Plan of Care Reviewed With: patient     Time Calculation:   PT Charges     Row Name 09/07/20 1346             Time Calculation    Start Time  1304  -CS      Stop Time  1315  -CS      Time Calculation (min)  11 min  -CS      PT Received On  09/07/20  -      PT - Next Appointment  09/08/20  -        User Key  (r) = Recorded By, (t) = Taken By, (c) = Cosigned By    Initials Name Provider Type    CS Sj Arenas, PT Physical Therapist        Therapy Charges for Today     Code Description Service Date Service Provider Modifiers Qty    70733834953 HC PT EVAL MOD COMPLEXITY 2 9/7/2020 Sj Arenas, PT GP 1    69530091278 HC PT THER PROC EA 15 MIN 9/7/2020 Sj Arenas, PT GP 1          PT G-Codes  Outcome Measure Options: AM-PAC 6 Clicks Basic Mobility (PT)  AM-PAC 6 Clicks Score (PT): 17    Sj Arenas PT  9/7/2020

## 2020-09-08 NOTE — PROGRESS NOTES
"Discharge Planning Assessment  [facility]     Patient Name: Alessia Madrigal  MRN: [id number]  Today's Date: 9/8/2020    Admit Date: 9/6/2020    Discharge Needs Assessment     Row Name 09/08/20 1151       Living Environment    Lives With  alone    Current Living Arrangements  home/apartment/condo    Primary Care Provided by  self    Provides Primary Care For  pet(s) 1 cat    Family Caregiver if Needed  other (see comments)    Family Caregiver Names  POA/Nephew, José Madrigal [phone]    Quality of Family Relationships  supportive    Able to Return to Prior Arrangements  yes       Resource/Environmental Concerns    Resource/Environmental Concerns  none       Transition Planning    Patient/Family Anticipates Transition to  home    Patient/Family Anticipated Services at Transition  home health care    Transportation Anticipated  family or friend will provide       Discharge Needs Assessment    Readmission Within the Last 30 Days  no previous admission in last 30 days    Concerns to be Addressed  discharge planning    Equipment Currently Used at Home  bipap/cpap;rollator;walker, rolling;commode;bath bench    Anticipated Changes Related to Illness  none    Equipment Needed After Discharge  other (see comments) digital scales    Discharge Facility/Level of Care Needs  home with home health    Provided Post Acute Provider List?  N/A    N/A Provider List Comment  Pt already knew she wanted [facility] stating she used them in June 2020.    Current Discharge Risk  lives alone        Discharge Plan     Row Name 09/08/20 1154       Plan    Plan  Home w/ [facility] & friends assistance.      Plan Comments  CCP spoke with Pt at bedside.  CCP donned a mask and face shield.  CCP spoke with patient at a distance greater than six feet.  CCP introduced self and role.  Pt confirmed information on face sheet.  Pt stated she is IADL'S, a retired  and drives only \"minimally.\"  Pt lives alone in a first floor " apartment.  Pt reports she uses a rollator and rolling walker when ambulating.  Pt reports she uses a 3in1 commode, glucometer & bath bench at home.  Pt reports she does her own laundry and usually eats things she can microwave.  Pt reports she has assistance of a friends at home.  Pt reports she has a cat at home and her friend is caring for it while she is in hospital.  Pt friend, Brown runs various errands and chores for her.  Brown takes out her trash, vacuums & shops for her.  Pt reports her friend Verónica takes her to doctor appointments and also does some shopping.  Pt reports her upstairs neighbor will also assist her if Brown and Verónica are not available.  Pt states her nephew, José Madrigal is her POA (paper work on file in [employer]).  Pt reports she does not ask José to assist her often, because he lives twenty minutes away.  Pt reports PCP is Dario Mae.  Pt confirms pharmacy as [facility] on [address].  Pt chose to use [facility] at discharge.  Pt denies issues with affording her medications.  Pt reports she has a CPAP at home and gets her supplies from [place].  Pt reports she has been to [facility] for sub-acute rehab in the past.  Pt has used [facility] ([facility]) in past.  Pt reports she plans to return home at discharge and either Brown or Verónica will transport her home.  Pt request HC follow her at d/c.  Pt reported her scales at home was broken.  [facility] provided Pt with a digital scale on 9/8/2020 that was granted by [facility].  CCP will continue to follow…MARITZA URRUTIA/CCP        Destination      Coordination has not been started for this encounter.      Durable Medical Equipment      Coordination has not been started for this encounter.      Dialysis/Infusion      Coordination has not been started for this encounter.      Home Medical Care      Service Provider Request Status Selected Services Address Phone Number Fax Number     [facility] Accepted N/A [address] [place] [address] [phone]      Therapy      Coordination has not been started for this encounter.      Community Resources      Coordination has not been started for this encounter.          Demographic Summary     Row Name 09/08/20 1144       General Information    Admission Type  inpatient    Arrived From  emergency department    Required Notices Provided  Important Message from Medicare    Referral Source  admission list;physician    Reason for Consult  discharge planning    Preferred Language  English     Used During This Interaction  no        Functional Status     Row Name 09/08/20 1144       Functional Status    Usual Activity Tolerance  moderate    Current Activity Tolerance  fair       Functional Status, IADL    Medications  independent    Meal Preparation  assistive equipment    Housekeeping  assistive person    Laundry  assistive person    Shopping  assistive person    IADL Comments  Pt reports she has assistance from friend Brown, upstairs neighbor and Friend Verónica takes her shopping and to MD appts.       Mental Status    General Appearance WDL  WDL       Mental Status Summary    Recent Changes in Mental Status/Cognitive Functioning  no changes       Employment/    Employment Status  retired    Current or Previous Occupation  education    Employment/ Comments          Psychosocial    No documentation.       Abuse/Neglect    No documentation.       Legal    No documentation.       Substance Abuse    No documentation.       Patient Forms    No documentation.           Kalyani Lamar RN

## 2020-09-08 NOTE — PLAN OF CARE
Problem: Patient Care Overview  Goal: Plan of Care Review  Flowsheets (Taken 9/8/2020 1231)  Plan of Care Reviewed With: patient  Outcome Summary: Pt is a 89 y.o. female with hx of multivessel CAD she was admitted to hospital with chest pain, dyspnea, high blood pressure. Pt reports she lives alone and is independent with ADLs at baseline; pt has friends and neighbors who assist with groceries, transport to MD appointments, etc. Upon assessment pt is able to performs ADLs seated EOB with setup assist, requires CGA for sit to stand. Pt noted with impaired self care skills, impaired activity tolerance, and impaired functional transfers. Recommend skilled OT services to increase safety and independence with performance of ADLs.  Therapist used appropriate personal protective equipment including mask, gloves and eye protection.  Mask used was standard procedure mask. Appropriate PPE was worn during the entire therapy session. Hand hygiene was completed before and after therapy session. Patient is not in enhanced droplet precautions.

## 2020-09-08 NOTE — THERAPY EVALUATION
Acute Care - Occupational Therapy Initial Evaluation  [facility]     Patient Name: Alessia Madrigal  : 10/9/1928  MRN: [id number]  Today's Date: 2020             Admit Date: 2020       ICD-10-CM ICD-9-CM   1. Non-STEMI (non-ST elevated myocardial infarction) (CMS/HCC) I21.4 410.70   2. Chest pain, unspecified type R07.9 786.50     Patient Active Problem List   Diagnosis   • Actinic keratosis   • Anemia of chronic disorder   • Disorder of aorta (CMS/HCC)   • Chronic coronary artery disease   • CKD (chronic kidney disease) stage 3, GFR 30-59 ml/min (CMS/HCC)   • Constipation   • Cor pulmonale (CMS/HCC)   • Diabetes mellitus (CMS/HCC)   • High level of uric acid in blood   • Fatigue   • Hypertension   • Hypothyroidism   • Iron deficiency   • Mitral valve insufficiency   • Pulmonary hypertension (CMS/HCC)   • Swelling of lower extremity   • Shortness of breath   • Tricuspid valve insufficiency   • Uncontrolled type 2 diabetes mellitus (CMS/[facility])   • Type 2 diabetes mellitus with chronic kidney disease, with long-term current use of insulin (CMS/[facility])   • Vitamin D deficiency   • Secondary hyperparathyroidism, non-renal (CMS/HCC)   • Pulmonary nodules/lesions, multiple   • Pleural effusion   • Paronychia of second finger of right hand   • Hyperuricemia   • Acute pain of both shoulders   • Acute transmural inferior wall MI (CMS/HCC)   • Wrist injury, left, initial encounter   • Unstable angina (CMS/[facility])   • Poorly-controlled hypertension   • Chest pain due to CAD (CMS/HCC)   • Obesity (BMI 30-39.9)   • BRITTANY (obstructive sleep apnea)   • Non-STEMI (non-ST elevated myocardial infarction) (CMS/HCC)     Past Medical History:   Diagnosis Date   • Acute on chronic congestive heart failure (CMS/HCC)    • Acute transmural inferior wall MI (CMS/HCC)    • Arthritis 2014    CONT TYLENOL FOR PAIN/ JOSE DANIEL CHEW (INTERNAL MEDICINE)   • CAD (coronary artery disease)    • Cancer of uterus (CMS/HCC)    • Carotid  artery stenosis    • Chronic renal failure syndrome    • Coronary artery disease involving coronary bypass graft of native heart     with other forms of angina pectoris   • Coronary atherosclerosis    • Endometrial cancer (CMS/HCC)    • Esophageal reflux    • Essential hypertension    • Glaucoma    • Hypertension    • Malaise and fatigue    • Osteoarthritis    • Sleep apnea     USING CPAP   • Spinal stenosis    • Thyroid disease    • Type 2 diabetes mellitus (CMS/HCC) 1991    INsulin begun 2012   • Vitamin B12 deficiency     SHE HAS NOT HAD THIS CHECKED IN A WHILE. SHE HAD BEEN ON SHOTS.  CHECK HER B12 LEVELS TODAY.  BY JOSE DANIEL CHEW     Past Surgical History:   Procedure Laterality Date   • CARDIAC CATHETERIZATION N/A 3/11/2018    Procedure: Left Heart Cath;  Surgeon: Cameron Chisholm MD;  Location: [facility] CATH INVASIVE LOCATION;  Service: Cardiovascular   • CARDIAC CATHETERIZATION N/A 3/11/2018    Procedure: Stent ROSSI bypass graft;  Surgeon: Cameron Chisholm MD;  Location: [facility] CATH INVASIVE LOCATION;  Service: Cardiovascular   • CARDIAC CATHETERIZATION N/A 5/7/2020    Procedure: Left Heart Cath;  Surgeon: Cameron Chisholm MD;  Location: [facility] CATH INVASIVE LOCATION;  Service: Cardiovascular;  Laterality: N/A;   • CARDIAC CATHETERIZATION N/A 5/7/2020    Procedure: Saphenous Vein Graft;  Surgeon: Cameron Chisholm MD;  Location: [facility] CATH INVASIVE LOCATION;  Service: Cardiovascular;  Laterality: N/A;   • CATARACT EXTRACTION Bilateral    • CATARACT EXTRACTION W/ INTRAOCULAR LENS IMPLANT Right 10/25/2016    Procedure: RT SUPERFICIAL KERATECTOMY ;  Surgeon: Arian Singh MD;  Location: [facility] OR [facility];  Service:    • CHOLECYSTECTOMY     • COLONOSCOPY     • CORONARY ARTERY BYPASS GRAFT  1991    X3   • HYSTERECTOMY     • OOPHORECTOMY     • TRANSLUMINAL ATHERECTOMY PERONEAL ARTERY      PERCUTANEOUS TRANSLUMINAL ATHERECTOMY RENAL ARTERY          OT ASSESSMENT FLOWSHEET (last 12 hours)      Occupational Therapy  Evaluation     Row Name 09/08/20 1000                   OT Evaluation Time/Intention    Document Type  evaluation  -MR        Mode of Treatment  occupational therapy  -MR        Patient Effort  good  -MR        Symptoms Noted During/After Treatment  none  -MR           General Information    Patient Profile Reviewed?  yes  -MR        Patient Observations  alert;cooperative;agree to therapy  -MR        Patient/Family Observations  pt supine in bed, no acute distress  -MR        Prior Level of Function  independent:  -MR        Equipment Currently Used at Home  bath bench;rollator;walker, rolling  -MR        Existing Precautions/Restrictions  fall;cardiac  -MR        Benefits Reviewed  patient:  -MR           Relationship/Environment    Lives With  alone  -MR           Resource/Environmental Concerns    Current Living Arrangements  home/apartment/condo  -MR           Cognitive Assessment/Intervention- PT/OT    Orientation Status (Cognition)  oriented x 3  -MR        Follows Commands (Cognition)  follows one step commands  -MR           Safety Issues, Functional Mobility    Safety Issues Affecting Function (Mobility)  awareness of need for assistance;insight into deficits/self awareness  -MR        Impairments Affecting Function (Mobility)  balance;endurance/activity tolerance  -MR           Bed Mobility Assessment/Treatment    Bed Mobility Assessment/Treatment  supine-sit;sit-supine  -MR        Supine-Sit [place] (Bed Mobility)  contact guard;verbal cues  -MR        Sit-Supine [place] (Bed Mobility)  contact guard;verbal cues  -MR           Transfer Assessment/Treatment    Transfer Assessment/Treatment  sit-stand transfer;stand-sit transfer  -MR           Sit-Stand Transfer    Sit-Stand [place] (Transfers)  minimum assist (75% patient effort);contact guard;verbal cues  -MR           Stand-Sit Transfer    Stand-Sit [place] (Transfers)  contact guard;verbal cues  -MR           ADL  Assessment/Intervention    BADL Assessment/Intervention  lower body dressing;grooming;bathing  -MR           Bathing Assessment/Intervention    Bathing Ellis Level  bathing skills;upper extremities;set up;supervision;verbal cues  -MR        Bathing Position  edge of bed sitting  -MR           Lower Body Dressing Assessment/Training    Lower Body Dressing Ellis Level  lower body dressing skills;doff;don;socks;supervision;verbal cues  -MR        Lower Body Dressing Position  edge of bed sitting  -MR           Grooming Assessment/Training    Ellis Level (Grooming)  grooming skills;set up;supervision;verbal cues  -MR        Grooming Position  edge of bed sitting  -MR           General ROM    GENERAL ROM COMMENTS  BUE AROM WFL  -MR           MMT (Manual Muscle Testing)    General MMT Comments  BUE >/= 3/5  -MR           Motor Assessment/Interventions    Additional Documentation  Therapeutic Exercise (Group);Therapeutic Exercise Interventions (Group);Balance (Group)  -MR           Therapeutic Exercise    Upper Extremity Range of Motion (Therapeutic Exercise)  shoulder flexion/extension, bilateral;shoulder horizontal abduction/adduction, bilateral;shoulder internal/external rotation, bilateral;elbow flexion/extension, bilateral;forearm supination/pronation, bilateral;wrist flexion/extension, bilateral  -MR        Hand (Therapeutic Exercise)  finger flexion/extension, bilateral  -MR        Exercise Type (Therapeutic Exercise)  AROM (active range of motion)  -MR        Position (Therapeutic Exercise)  seated  -MR        Expected Outcome (Therapeutic Exercise)  improve functional tolerance, self-care activity;improve performance, BADLs  -MR           Balance    Balance  static sitting balance;static standing balance  -MR           Static Sitting Balance    Level of Ellis (Unsupported Sitting, Static Balance)  supervision  -MR        Sitting Position (Unsupported Sitting, Static Balance)  sitting on  edge of bed  -MR           Static Standing Balance    Level of [place] (Supported Standing, Static Balance)  contact guard assist  -MR           Positioning and Restraints    Pre-Treatment Position  in bed  -MR        Post Treatment Position  bed  -MR        In Bed  supine;call light within reach;encouraged to call for assist;exit alarm on  -MR           Pain Scale: FACES Pre/Post-Treatment    Pain: FACES Scale, Pretreatment  0-->no hurt  -MR        Pain: FACES Scale, Post-Treatment  0-->no hurt  -MR           Clinical Impression (OT)    Criteria for Skilled Therapeutic Interventions Met (OT Eval)  yes;treatment indicated  -MR        Rehab Potential (OT Eval)  good, to achieve stated therapy goals  -MR        Therapy Frequency (OT Eval)  5 times/wk  -MR        Anticipated Discharge Disposition (OT)  skilled nursing facility;home with assist;home with home health  -MR           OT Goals    Transfer Goal Selection (OT)  transfer, OT goal 1  -MR        Toileting Goal Selection (OT)  toileting, OT goal 1  -MR        Functional Mobility Goal Selection (OT)  functional mobility, OT goal 1  -MR        Additional Documentation  Functional Mobility Selection (OT) (Row)  -MR           Transfer Goal 1 (OT)    Activity/Assistive Device (Transfer Goal 1, OT)  toilet;bed-to-chair/chair-to-bed;walker, rolling  -MR        Phillips Level/Cues Needed (Transfer Goal 1, OT)  supervision required;conditional independence  -MR        Time Frame (Transfer Goal 1, OT)  long term goal (LTG);by discharge  -MR           Toileting Goal 1 (OT)    Activity/Device (Toileting Goal 1, OT)  toileting skills, all  -MR        [place]ips Level/Cues Needed (Toileting Goal 1, OT)  supervision required;conditional independence  -MR        Time Frame (Toileting Goal 1, OT)  long term goal (LTG);by discharge  -MR           Functional Mobility Goal 1 (OT)    Activity/Assistive Device (Functional Mobility Goal 1, OT)  walker, rolling  -MR         [place] Level/Cues Needed (Functional Mobility Goal 1, OT)  supervision required;conditional independence  -MR        Distance Goal 1 (Functional Mobility, OT)  pt to perform functional mobility to the bathroom  -MR        Time Frame (Functional Mobility Goal 1, OT)  long term goal (LTG);by discharge  -MR          User Key  (r) = Recorded By, (t) = Taken By, (c) = Cosigned By    Initials Name Effective Dates    Lali Vance, OT 06/22/16 -          Occupational Therapy Education                 Title: PT OT SLP Therapies (In Progress)     Topic: Occupational Therapy (In Progress)     Point: ADL training (Done)     Description:   Instruct learner(s) on proper safety adaptation and remediation techniques during self care or transfers.   Instruct in proper use of assistive devices.              Learning Progress Summary           Patient Acceptance, E,TB, VU by MR at 9/8/2020 1011    Comment:  Educated on role of OT; OT POC. Educated pt on energy conservation techniques (e.g., pacing with activity, seated rest breaks as needed) to increase safety and independence with ADLs.                   Point: Home exercise program (Not Started)     Description:   Instruct learner(s) on appropriate technique for monitoring, assisting and/or progressing therapeutic exercises/activities.              Learner Progress:   Not documented in this visit.          Point: Precautions (Not Started)     Description:   Instruct learner(s) on prescribed precautions during self-care and functional transfers.              Learner Progress:   Not documented in this visit.          Point: Body mechanics (Not Started)     Description:   Instruct learner(s) on proper positioning and spine alignment during self-care, functional mobility activities and/or exercises.              Learner Progress:   Not documented in this visit.                      User Key     Initials Effective Dates Name Provider Type Discipline    MR 06/22/16 -  Daisha  Lali Almonte, OT Occupational Therapist OT                  OT Recommendation and Plan  Outcome Summary/Treatment Plan (OT)  Anticipated Discharge Disposition (OT): skilled nursing facility, home with assist, home with home health  Therapy Frequency (OT Eval): 5 times/wk  Plan of Care Review  Plan of Care Reviewed With: patient  Plan of Care Reviewed With: patient  Outcome Summary: Pt is a 89 y.o. female with hx of multivessel CAD she was admitted to hospital with chest pain, dyspnea, high blood pressure. Pt reports she lives alone and is independent with ADLs at baseline; pt has friends and neighbors who assist with groceries, transport to MD appointments, etc. Upon assessment pt is able to performs ADLs seated EOB with setup assist, requires CGA for sit to stand. Pt noted with impaired self care skills, impaired activity tolerance, and impaired functional transfers. Recommend skilled OT services to increase safety and independence with performance of ADLs.    Outcome Measures     Row Name 09/08/20 1200             How much help from another is currently needed...    Putting on and taking off regular lower body clothing?  3  -MR      Bathing (including washing, rinsing, and drying)  3  -MR      Toileting (which includes using toilet bed pan or urinal)  3  -MR      Putting on and taking off regular upper body clothing  3  -MR      Taking care of personal grooming (such as brushing teeth)  3  -MR      Eating meals  3  -MR      AM-PAC 6 Clicks Score (OT)  18  -MR         Functional Assessment    Outcome Measure Options  AM-PAC 6 Clicks Daily Activity (OT)  -MR        User Key  (r) = Recorded By, (t) = Taken By, (c) = Cosigned By    Initials Name Provider Type    MR AshtonLali, OT Occupational Therapist          Time Calculation:   Time Calculation- OT     Row Name 09/08/20 1236             Time Calculation- OT    OT Start Time  0943  -MR      OT Stop Time  1014  -MR      OT Time Calculation (min)  31 min  -MR       Total Timed Code Minutes- OT  23 minute(s)  -MR      OT Received On  09/08/20  -MR      OT - Next Appointment  09/09/20  -MR      OT Goal Re-Cert Due Date  09/22/20  -MR        User Key  (r) = Recorded By, (t) = Taken By, (c) = Cosigned By    Initials Name Provider Type    MR HallvaneLali, OT Occupational Therapist        Therapy Charges for Today     Code Description Service Date Service Provider Modifiers Qty    22926007813 HC OT EVAL MOD COMPLEXITY 2 9/8/2020 Lali Ashton, OT GO 1    81977146212 HC OT THER PROC EA 15 MIN 9/8/2020 Lali Ashton, OT GO 1    57981367491 HC OT SELF CARE/MGMT/TRAIN EA 15 MIN 9/8/2020 Lali Ashton, OT GO 1               Lali Ashton OT  9/8/2020

## 2020-09-08 NOTE — PROGRESS NOTES
"    Patient Name: Alessia Madrigal  :10/9/1928  89 y.o.      Patient Care Team:  Dario Mae MD as PCP - General (Internal Medicine)    Chief Complaint: follow up NSTEMI    Interval History: she hasn't been out of bed much but no chest pain since she was in the emergency room. She remains on heparin drip.        Objective   Vital Signs  Temp:  [97.3 °F (36.3 °C)-98.2 °F (36.8 °C)] 98.2 °F (36.8 °C)  Heart Rate:  [69-76] 69  Resp:  [18] 18  BP: (111-145)/(59-84) 145/84    Intake/Output Summary (Last 24 hours) at 2020 1146  Last data filed at 2020 0400  Gross per 24 hour   Intake 720 ml   Output 1600 ml   Net -880 ml     Flowsheet Rows      First Filed Value   Admission Height  162.6 cm (64\") Documented at 2020 0748   Admission Weight  90.7 kg (200 lb) Documented at 2020 0748          Physical Exam:   General Appearance:    Alert, cooperative, in no acute distress   Lungs:     Clear to auscultation.  Normal respiratory effort and rate.      Heart:    Regular rhythm and normal rate, normal S1 and S2, no murmurs, gallops or rubs.     Chest Wall:    No abnormalities observed   Abdomen:     Soft, nontender, positive bowel sounds.     Extremities:   no cyanosis, clubbing or edema.  No marked joint deformities.  Adequate musculoskeletal strength.       Results Review:    Results from last 7 days   Lab Units 20  0347   SODIUM mmol/L 140   POTASSIUM mmol/L 4.0   CHLORIDE mmol/L 106   CO2 mmol/L 24.2   BUN mg/dL 27*   CREATININE mg/dL 1.53*   GLUCOSE mg/dL 85   CALCIUM mg/dL 8.8     Results from last 7 days   Lab Units 20  2157 20  1750 20  1233   TROPONIN T ng/mL 1.160* 0.197* 0.091*     Results from last 7 days   Lab Units 20  0347   WBC 10*3/mm3 7.33   HEMOGLOBIN g/dL 11.4*   HEMATOCRIT % 34.8   PLATELETS 10*3/mm3 233     Results from last 7 days   Lab Units 20  0347 20  1716 20  0758  20  1504   INR   --   --   --   --  0.96   APTT " seconds 79.0* 120.8* 50.6*   < > 25.7    < > = values in this interval not displayed.     Results from last 7 days   Lab Units 09/08/20  0347   MAGNESIUM mg/dL 1.8                   Medication Review:     amLODIPine 10 mg Oral Daily   aspirin 81 mg Oral Daily   clopidogrel 75 mg Oral Daily   insulin glargine 15 Units Subcutaneous Nightly   insulin lispro 5 Units Subcutaneous TID AC   isosorbide mononitrate 60 mg Oral Q24H   latanoprost 1 drop Both Eyes Nightly   levothyroxine 150 mcg Oral Daily   metoprolol succinate XL 50 mg Oral Daily   ranolazine 500 mg Oral Q12H   sodium chloride 10 mL Intravenous Q12H   spironolactone 25 mg Oral Daily   timolol 1 drop Both Eyes Daily          heparin (porcine) 12 Units/kg/hr Last Rate: 9 Units/kg/hr (09/08/20 0821)   nitroglycerin 10-50 mcg/min        Assessment/Plan   1. NSTEMI - known multivessel disease with patent LIMA to LAD, SVG to RCA, occluded rPDA, occluded LCx, no interventional targets.    - stop heparin today at 1615 (total of 48 hrs)   - increase activity and assess angina. Room to titrate beta blocker if needed. Amlodipine was increased on admission. Continue ISMN and ranexa.   - ASA and plavix   - intolerant to statins    2. Acute on chronic diastolic heart failure s/p lasix x 2. Creatinine up just a little. Volume status appears improved. Will resume home oral lasix.     3. Severe hypertension - SBP as high as 190's in the emergency room. Now better. Continue same and follow.     4. CKD III - follows with Dr. Shields. History of renal artery stenosis s/p stent    5. 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 Continue home regimen.    Increase activity. PT is following. Continue med therapy as above and assess angina. Home next 1-2 days.     MILVIA Vásquez  [facility]  09/08/20  11:46

## 2020-09-08 NOTE — THERAPY TREATMENT NOTE
Patient Name: Alessia Madrigal  : 10/9/1928    MRN: [id number]                              Today's Date: 2020       Admit Date: 2020    Visit Dx:     ICD-10-CM ICD-9-CM   1. Non-STEMI (non-ST elevated myocardial infarction) (CMS/HCC) I21.4 410.70   2. Chest pain, unspecified type R07.9 786.50     Patient Active Problem List   Diagnosis   • Actinic keratosis   • Anemia of chronic disorder   • Disorder of aorta (CMS/HCC)   • Chronic coronary artery disease   • CKD (chronic kidney disease) stage 3, GFR 30-59 ml/min (CMS/HCC)   • Constipation   • Cor pulmonale (CMS/HCC)   • Diabetes mellitus (CMS/HCC)   • High level of uric acid in blood   • Fatigue   • Hypertension   • Hypothyroidism   • Iron deficiency   • Mitral valve insufficiency   • Pulmonary hypertension (CMS/HCC)   • Swelling of lower extremity   • Shortness of breath   • Tricuspid valve insufficiency   • Uncontrolled type 2 diabetes mellitus (CMS/[facility])   • Type 2 diabetes mellitus with chronic kidney disease, with long-term current use of insulin (CMS/[facility])   • Vitamin D deficiency   • Secondary hyperparathyroidism, non-renal (CMS/HCC)   • Pulmonary nodules/lesions, multiple   • Pleural effusion   • Paronychia of second finger of right hand   • Hyperuricemia   • Acute pain of both shoulders   • Acute transmural inferior wall MI (CMS/HCC)   • Wrist injury, left, initial encounter   • Unstable angina (CMS/HCC)   • Poorly-controlled hypertension   • Chest pain due to CAD (CMS/HCC)   • Obesity (BMI 30-39.9)   • BRITTANY (obstructive sleep apnea)   • Non-STEMI (non-ST elevated myocardial infarction) (CMS/HCC)     Past Medical History:   Diagnosis Date   • Acute on chronic congestive heart failure (CMS/HCC)    • Acute transmural inferior wall MI (CMS/HCC)    • Arthritis 2014    CONT TYLENOL FOR PAIN/ JOSE DANIEL CHEW (INTERNAL MEDICINE)   • CAD (coronary artery disease)    • Cancer of uterus (CMS/HCC)    • Carotid artery stenosis    • Chronic renal failure  syndrome    • Coronary artery disease involving coronary bypass graft of native heart     with other forms of angina pectoris   • Coronary atherosclerosis    • Endometrial cancer (CMS/HCC)    • Esophageal reflux    • Essential hypertension    • Glaucoma    • Hypertension    • Malaise and fatigue    • Osteoarthritis    • Sleep apnea     USING CPAP   • Spinal stenosis    • Thyroid disease    • Type 2 diabetes mellitus (CMS/HCC) 1991    INsulin begun 2012   • Vitamin B12 deficiency     SHE HAS NOT HAD THIS CHECKED IN A WHILE. SHE HAD BEEN ON SHOTS.  CHECK HER B12 LEVELS TODAY.  BY JOSE DANIEL CHEW     Past Surgical History:   Procedure Laterality Date   • CARDIAC CATHETERIZATION N/A 3/11/2018    Procedure: Left Heart Cath;  Surgeon: Cameron Chisholm MD;  Location: [facility] CATH INVASIVE LOCATION;  Service: Cardiovascular   • CARDIAC CATHETERIZATION N/A 3/11/2018    Procedure: Stent ROSSI bypass graft;  Surgeon: Cameron Chisholm MD;  [facility];  Service: Cardiovascular   • CARDIAC CATHETERIZATION N/A 5/7/2020    Procedure: Left Heart Cath;  Surgeon: Cameron Chisholm MD;  Location: [facility] CATH INVASIVE LOCATION;  Service: Cardiovascular;  Laterality: N/A;   • CARDIAC CATHETERIZATION N/A 5/7/2020    Procedure: Saphenous Vein Graft;  Surgeon: Cameron Chisholm MD;  Location: [facility] CATH INVASIVE LOCATION;  Service: Cardiovascular;  Laterality: N/A;   • CATARACT EXTRACTION Bilateral    • CATARACT EXTRACTION W/ INTRAOCULAR LENS IMPLANT Right 10/25/2016    Procedure: RT SUPERFICIAL KERATECTOMY ;  Surgeon: Arian Singh MD;  Location: [facility] OR [facility];  Service:    • CHOLECYSTECTOMY     • COLONOSCOPY     • CORONARY ARTERY BYPASS GRAFT  1991    X3   • HYSTERECTOMY     • OOPHORECTOMY     • TRANSLUMINAL ATHERECTOMY PERONEAL ARTERY      PERCUTANEOUS TRANSLUMINAL ATHERECTOMY RENAL ARTERY     General Information     Row Name 09/08/20 1351          PT Evaluation Time/Intention    Document Type  therapy  note (daily note)  -EJ     Mode of Treatment  physical therapy  -EJ     Row Name 09/08/20 1351          General Information    Existing Precautions/Restrictions  fall;cardiac  -EJ       User Key  (r) = Recorded By, (t) = Taken By, (c) = Cosigned By    Initials Name Provider Type    Morenita Stahl, PT Physical Therapist        Mobility     Row Name 09/08/20 1351          Bed Mobility Assessment/Treatment    Supine-Sit [place] (Bed Mobility)  verbal cues;contact guard;supervision  -EJ     Sit-Supine [place] (Bed Mobility)  verbal cues;contact guard;supervision  -EJ     Assistive Device (Bed Mobility)  bed rails;head of bed elevated  -EJ     Row Name 09/08/20 1351          Sit-Stand Transfer    Sit-Stand [place] (Transfers)  verbal cues;contact guard;1 person to manage equipment  -EJ     Assistive Device (Sit-Stand Transfers)  walker, front-wheeled  -EJ     Row Name 09/08/20 1351          Gait/Stairs Assessment/Training    Gait/Stairs Assessment/Training  gait/ambulation independence  -EJ     [place] Level (Gait)  verbal cues;contact guard;1 person to manage equipment  -EJ     Assistive Device (Gait)  walker, front-wheeled  -EJ     Distance in Feet (Gait)  80  -EJ     Deviations/Abnormal Patterns (Gait)  shanda decreased;stride length decreased  -EJ     Bilateral Gait Deviations  forward flexed posture;heel strike decreased  -EJ     Comment (Gait/Stairs)  no LOB or unsteadiness noted, cues for upright posture as well as to keep feet inside walker on turns  -EJ       User Key  (r) = Recorded By, (t) = Taken By, (c) = Cosigned By    Initials Name Provider Type    Morenita Stahl, PT Physical Therapist        Obj/Interventions    No documentation.       Goals/Plan    No documentation.       Clinical Impression     Row Name 09/08/20 1354          Pain Scale: FACES Pre/Post-Treatment    Pain: FACES Scale, Pretreatment  0-->no hurt  -EJ     Pain: FACES Scale, Post-Treatment  0-->no hurt  -EJ      Row Name 09/08/20 1352          Plan of Care Review    Plan of Care Reviewed With  patient  -EJ     Progress  improving  -EJ     Outcome Summary  Pt doing well this afternoon and agreeable to PT. She has no complaints at this time and is demonstrating improvement in mobility. Pt requiring less assist for all mobility and was able to increase ambulation distance to approx 80 ft with Rwx and CGA. Pt does state she is slightly SOA upon returning to room, but appeared to recover quickly. Will continue to progress.    -EJ     Row Name 09/08/20 1354          Vital Signs    O2 Delivery Pre Treatment  room air  -EJ     O2 Delivery Intra Treatment  room air  -EJ     O2 Delivery Post Treatment  room air  -EJ     Row Name 09/08/20 1354          Positioning and Restraints    Pre-Treatment Position  in bed  -EJ     Post Treatment Position  bed  -EJ     In Bed  notified nsg;supine;call light within reach;encouraged to call for assist;exit alarm on  -EJ       User Key  (r) = Recorded By, (t) = Taken By, (c) = Cosigned By    Initials Name Provider Type    Morenita Stahl PT Physical Therapist        Outcome Measures     Row Name 09/08/20 2893          How much help from another person do you currently need...    Turning from your back to your side while in flat bed without using bedrails?  3  -EJ     Moving from lying on back to sitting on the side of a flat bed without bedrails?  3  -EJ     Moving to and from a bed to a chair (including a wheelchair)?  3  -EJ     Standing up from a chair using your arms (e.g., wheelchair, bedside chair)?  3  -EJ     Climbing 3-5 steps with a railing?  3  -EJ     To walk in hospital room?  3  -EJ     AM-PAC 6 Clicks Score (PT)  18  -EJ       User Key  (r) = Recorded By, (t) = Taken By, (c) = Cosigned By    Initials Name Provider Type    Morenita Stahl PT Physical Therapist        Physical Therapy Education                 Title: PT OT SLP Therapies (In Progress)     Topic:  Physical Therapy (Done)     Point: Mobility training (Done)     Description:   Instruct learner(s) on safety and technique for assisting patient out of bed, chair or wheelchair.  Instruct in the proper use of assistive devices, such as walker, crutches, cane or brace.              Patient Friendly Description:   It's important to get you on your feet again, but we need to do so in a way that is safe for you. Falling has serious consequences, and your personal safety is the most important thing of all.        When it's time to get out of bed, one of us or a family member will sit next to you on the bed to give you support.     If your doctor or nurse tells you to use a walker, crutches, a cane, or a brace, be sure you use it every time you get out of bed, even if you think you don't need it.    Learning Progress Summary           Patient Acceptance, E,TB,D, VU,NR by MCKENNA at 9/8/2020 1356    Acceptance, E,TB, VU,NR by  at 9/7/2020 1332                   Point: Home exercise program (Done)     Description:   Instruct learner(s) on appropriate technique for monitoring, assisting and/or progressing patient with therapeutic exercises and activities.              Learning Progress Summary           Patient Acceptance, E,TB, VU,NR by  at 9/7/2020 1332                   Point: Body mechanics (Done)     Description:   Instruct learner(s) on proper positioning and spine alignment for patient and/or caregiver during mobility tasks and/or exercises.              Learning Progress Summary           Patient Acceptance, E,TB, VU,NR by  at 9/7/2020 1332                   Point: Precautions (Done)     Description:   Instruct learner(s) on prescribed precautions during mobility and gait tasks              Learning Progress Summary           Patient Acceptance, E,TB, VU,NR by  at 9/7/2020 1332                               User Key     Initials Effective Dates Name Provider Type Discipline     04/03/18 -  Morenita Zuniga, PT  Physical Therapist PT    CS 05/14/18 -  Sj Arenas PT Physical Therapist PT              PT Recommendation and Plan     Plan of Care Reviewed With: patient  Progress: improving  Outcome Summary: Pt doing well this afternoon and agreeable to PT. She has no complaints at this time and is demonstrating improvement in mobility. Pt requiring less assist for all mobility and was able to increase ambulation distance to approx 80 ft with Rwx and CGA. Pt does state she is slightly SOA upon returning to room, but appeared to recover quickly. Will continue to progress.       Time Calculation:   PT Charges     Row Name 09/08/20 1357             Time Calculation    Start Time  1320  -EJ      Stop Time  1337  -EJ      Time Calculation (min)  17 min  -EJ      PT Received On  09/08/20  -EJ      PT - Next Appointment  09/09/20  -EJ        User Key  (r) = Recorded By, (t) = Taken By, (c) = Cosigned By    Initials Name Provider Type    EJ Morenita Zuniga, PT Physical Therapist        Therapy Charges for Today     Code Description Service Date Service Provider Modifiers Qty    39655372886 HC PT THER PROC EA 15 MIN 9/8/2020 Morenita Zuniga, PT GP 1          PT G-Codes  Outcome Measure Options: AM-PAC 6 Clicks Daily Activity (OT)  AM-PAC 6 Clicks Score (PT): 18  AM-PAC 6 Clicks Score (OT): 18    Morenita Zuniga PT  9/8/2020

## 2020-09-08 NOTE — PLAN OF CARE
Problem: Patient Care Overview  Goal: Plan of Care Review  Flowsheets (Taken 9/8/2020 5752)  Progress: improving  Plan of Care Reviewed With: patient  Outcome Summary: Pt doing well this afternoon and agreeable to PT. She has no complaints at this time and is demonstrating improvement in mobility. Pt requiring less assist for all mobility and was able to increase ambulation distance to approx 80 ft with Rwx and CGA. Pt does state she is slightly SOA upon returning to room, but appeared to recover quickly. Will continue to progress.     Patient was intermittently wearing a face mask during this therapy encounter. Therapist used appropriate personal protective equipment including eye protection, mask, and gloves.  Mask used was standard procedure mask. Appropriate PPE was worn during the entire therapy session. Hand hygiene was completed before and after therapy session. Patient is not in enhanced droplet precautions.

## 2020-09-08 NOTE — DISCHARGE PLACEMENT REQUEST
"Alessia Madrigal (89 y.o. Female)     Date of Birth Social Security Number Address Home Phone MRN    10/09/1928  [address] [address] [phone] [id number]    [employer] Marital Status          [facility]       Admission Date Admission Type Admitting Provider Attending Provider Department, Room/Bed    9/6/20 Emergency Radha Vu MD Ummat, Bianca, MD [address] CVI, 2214/1    Discharge Date Discharge Disposition Discharge Destination                       Attending Provider:  Radha Vu MD    Allergies:  Allopurinol, Clindamycin/lincomycin, Statins, Ampicillin, Penicillins, Pravastatin    Isolation:  None   Infection:  None   Code Status:  CPR    Ht:  162.6 cm (64\")   Wt:  88.1 kg (194 lb 5 oz)    Admission Cmt:  None   Principal Problem:  None                Active Insurance as of 9/6/2020     Primary Coverage     Payor Plan Insurance Group Employer/Plan Group    [facility] MEDICARE REPLACEMENT [facility] MEDICARE REPLACEMENT [id number]     Payor Plan Address Payor Plan Phone Number Payor Plan Fax Number Effective Dates    [address] [address]   1/1/2016 - None Entered    [facility] [id number]       Subscriber Name Subscriber Birth Date Member ID       ALESSIA MADRIGAL 10/9/1928 [id number]                 Emergency Contacts      (Rel.) Home Phone Work Phone Mobile Phone    José Madrigal (NEPHEW) (Relative) [phone]            "

## 2020-09-09 NOTE — PLAN OF CARE
Problem: Patient Care Overview  Goal: Plan of Care Review  Flowsheets (Taken 9/9/2020 0251)  Progress: improving  Plan of Care Reviewed With: patient  Outcome Summary: patient c/o fatigue but agreeable to PT, able to tolerate increased ambulation distance at supervision/CGA level. Patient anticipates d/c home tomorrow with HH to follow.    Patient was wearing a face mask during this therapy encounter. Therapist used appropriate personal protective equipment including eye protection, mask, and gloves.  Mask used was standard procedure mask. Appropriate PPE was worn during the entire therapy session. Hand hygiene was completed before and after therapy session. Patient is not in enhanced droplet precautions.

## 2020-09-09 NOTE — PROGRESS NOTES
"    Patient Name: Alessia Madrigal  :10/9/1928  89 y.o.      Patient Care Team:  Dario Mae MD as PCP - General (Internal Medicine)    Chief Complaint: follow up NSTEMI    Interval History:walked with physical therapy. No additional chest pain. BP remains above goal. She did not sleep well last night. She feels tired and weak. She has sleep apnea, no cpap here. She wore nasal canula for sleep last night.       Objective   Vital Signs  Temp:  [96.9 °F (36.1 °C)-98.3 °F (36.8 °C)] 96.9 °F (36.1 °C)  Heart Rate:  [66-71] 70  Resp:  [18] 18  BP: (126-164)/(61-80) 132/63    Intake/Output Summary (Last 24 hours) at 2020 1109  Last data filed at 2020 0859  Gross per 24 hour   Intake 770 ml   Output 3250 ml   Net -2480 ml     Flowsheet Rows      First Filed Value   Admission Height  162.6 cm (64\") Documented at 2020 0748   Admission Weight  90.7 kg (200 lb) Documented at 2020 0748          Physical Exam:   General Appearance:    Alert, cooperative, in no acute distress   Lungs:     Clear to auscultation.  Normal respiratory effort and rate.      Heart:    Regular rhythm and normal rate, normal S1 and S2, no murmurs, gallops or rubs.     Chest Wall:    No abnormalities observed   Abdomen:     Soft, nontender, positive bowel sounds.     Extremities:   no cyanosis, clubbing or edema.  No marked joint deformities.  Adequate musculoskeletal strength.       Results Review:    Results from last 7 days   Lab Units 20  0459   SODIUM mmol/L 142   POTASSIUM mmol/L 3.5   CHLORIDE mmol/L 104   CO2 mmol/L 26.7   BUN mg/dL 27*   CREATININE mg/dL 1.58*   GLUCOSE mg/dL 78   CALCIUM mg/dL 9.0     Results from last 7 days   Lab Units 20  2157 20  1750 20  1233   TROPONIN T ng/mL 1.160* 0.197* 0.091*     Results from last 7 days   Lab Units 20  0459   WBC 10*3/mm3 7.70   HEMOGLOBIN g/dL 12.5   HEMATOCRIT % 36.9   PLATELETS 10*3/mm3 270     Results from last 7 days   Lab Units " 09/08/20  0347 09/07/20  1716 09/07/20  0758  09/06/20  1504   INR   --   --   --   --  0.96   APTT seconds 79.0* 120.8* 50.6*   < > 25.7    < > = values in this interval not displayed.     Results from last 7 days   Lab Units 09/09/20  0459   MAGNESIUM mg/dL 1.6*                   Medication Review:     amLODIPine 10 mg Oral Daily   aspirin 81 mg Oral Daily   clopidogrel 75 mg Oral Daily   furosemide 40 mg Oral BID   insulin glargine 15 Units Subcutaneous Nightly   insulin lispro 0-7 Units Subcutaneous TID AC   isosorbide mononitrate 60 mg Oral Q24H   latanoprost 1 drop Both Eyes Nightly   levothyroxine 150 mcg Oral Daily   metoprolol succinate XL 50 mg Oral Daily   ranolazine 500 mg Oral Q12H   sodium chloride 10 mL Intravenous Q12H   spironolactone 25 mg Oral Daily   timolol 1 drop Both Eyes Daily             Assessment/Plan   1. NSTEMI - known multivessel disease with patent LIMA to LAD, SVG to RCA, occluded rPDA, occluded LCx, no interventional targets.    - stopped heparin 9/8 at 1615 (total of 48 hrs)   - increased activity and no angina. Walked better with PT yesterday.    - Amlodipine was increased on admission. Continue ISMN and ranexa.   - ASA and plavix   - intolerant to statins   - increase BB today.    2. Acute on chronic diastolic heart failure s/p lasix x 2. Creatinine up just a little. Volume status appears improved. Diuresing well on oral lasix.     3. Severe hypertension - SBP as high as 190's in the emergency room. Improving.    4. CKD III - follows with Dr. Shields. History of renal artery stenosis s/p stent.. Creatinine staying around 1.5    5. 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 Continue home regimen.    Increase beta blocker. If she feels better, stronger this afternoon, she could go. She lives alone. Plans to dc with home health. Otherwise, will discharge tomorrow morning.     Potassium is a little low. Will give one time dose. She is on spironolactone  and usually does not require additional K replacement.     MILVIA Vásquez  [facility]  09/09/20  11:09

## 2020-09-09 NOTE — THERAPY TREATMENT NOTE
Patient Name: Alessia Madrigal  : 10/9/1928    MRN: [id number]                              Today's Date: 2020       Admit Date: 2020    Visit Dx:     ICD-10-CM ICD-9-CM   1. Non-STEMI (non-ST elevated myocardial infarction) (CMS/HCC) I21.4 410.70   2. Chest pain, unspecified type R07.9 786.50     Patient Active Problem List   Diagnosis   • Actinic keratosis   • Anemia of chronic disorder   • Disorder of aorta (CMS/HCC)   • Chronic coronary artery disease   • CKD (chronic kidney disease) stage 3, GFR 30-59 ml/min (CMS/HCC)   • Constipation   • Cor pulmonale (CMS/HCC)   • Diabetes mellitus (CMS/HCC)   • High level of uric acid in blood   • Fatigue   • Hypertension   • Hypothyroidism   • Iron deficiency   • Mitral valve insufficiency   • Pulmonary hypertension (CMS/HCC)   • Swelling of lower extremity   • Shortness of breath   • Tricuspid valve insufficiency   • Uncontrolled type 2 diabetes mellitus (CMS/[facility])   • Type 2 diabetes mellitus with chronic kidney disease, with long-term current use of insulin (CMS/[facility])   • Vitamin D deficiency   • Secondary hyperparathyroidism, non-renal (CMS/HCC)   • Pulmonary nodules/lesions, multiple   • Pleural effusion   • Paronychia of second finger of right hand   • Hyperuricemia   • Acute pain of both shoulders   • Acute transmural inferior wall MI (CMS/HCC)   • Wrist injury, left, initial encounter   • Unstable angina (CMS/[facility])   • Poorly-controlled hypertension   • Chest pain due to CAD (CMS/HCC)   • Obesity (BMI 30-39.9)   • BRITTANY (obstructive sleep apnea)   • Non-STEMI (non-ST elevated myocardial infarction) (CMS/HCC)     Past Medical History:   Diagnosis Date   • Acute on chronic congestive heart failure (CMS/HCC)    • Acute transmural inferior wall MI (CMS/HCC)    • Arthritis 2014    CONT TYLENOL FOR PAIN/ JOSE DANIEL CHEW (INTERNAL MEDICINE)   • CAD (coronary artery disease)    • Cancer of uterus (CMS/HCC)    • Carotid artery stenosis    • Chronic renal failure  syndrome    • Coronary artery disease involving coronary bypass graft of native heart     with other forms of angina pectoris   • Coronary atherosclerosis    • Endometrial cancer (CMS/HCC)    • Esophageal reflux    • Essential hypertension    • Glaucoma    • Hypertension    • Malaise and fatigue    • Osteoarthritis    • Sleep apnea     USING CPAP   • Spinal stenosis    • Thyroid disease    • Type 2 diabetes mellitus (CMS/HCC) 1991    INsulin begun 2012   • Vitamin B12 deficiency     SHE HAS NOT HAD THIS CHECKED IN A WHILE. SHE HAD BEEN ON SHOTS.  CHECK HER B12 LEVELS TODAY.  BY JOSE DANIEL CHEW     Past Surgical History:   Procedure Laterality Date   • CARDIAC CATHETERIZATION N/A 3/11/2018    Procedure: Left Heart Cath;  Surgeon: Cameron Chisholm MD;  Location: [facility] CATH INVASIVE LOCATION;  Service: Cardiovascular   • CARDIAC CATHETERIZATION N/A 3/11/2018    Procedure: Stent ROSSI bypass graft;  Surgeon: Cameron Chisholm MD;  [facility];  Service: Cardiovascular   • CARDIAC CATHETERIZATION N/A 5/7/2020    Procedure: Left Heart Cath;  Surgeon: Cameron Chisholm MD;  Location: [facility] CATH INVASIVE LOCATION;  Service: Cardiovascular;  Laterality: N/A;   • CARDIAC CATHETERIZATION N/A 5/7/2020    Procedure: Saphenous Vein Graft;  Surgeon: Cameron Chisholm MD;  Location: [facility] CATH INVASIVE LOCATION;  Service: Cardiovascular;  Laterality: N/A;   • CATARACT EXTRACTION Bilateral    • CATARACT EXTRACTION W/ INTRAOCULAR LENS IMPLANT Right 10/25/2016    Procedure: RT SUPERFICIAL KERATECTOMY ;  Surgeon: Arian Singh MD;  Location: [facility] OR [facility];  Service:    • CHOLECYSTECTOMY     • COLONOSCOPY     • CORONARY ARTERY BYPASS GRAFT  1991    X3   • HYSTERECTOMY     • OOPHORECTOMY     • TRANSLUMINAL ATHERECTOMY PERONEAL ARTERY      PERCUTANEOUS TRANSLUMINAL ATHERECTOMY RENAL ARTERY     General Information     Row Name 09/09/20 1433          PT Evaluation Time/Intention    Document Type  therapy  note (daily note)  -EM     Mode of Treatment  physical therapy  -EM     Row Name 09/09/20 1433          General Information    Existing Precautions/Restrictions  cardiac  -EM       User Key  (r) = Recorded By, (t) = Taken By, (c) = Cosigned By    Initials Name Provider Type    Irlanda Vega PT Physical Therapist        Mobility     Row Name 09/09/20 1434          Bed Mobility Assessment/Treatment    Supine-Sit [place] (Bed Mobility)  supervision  -EM     Sit-Supine [place] (Bed Mobility)  supervision  -EM     Assistive Device (Bed Mobility)  head of bed elevated  -EM     Row Name 09/09/20 1434          Sit-Stand Transfer    Sit-Stand [place] (Transfers)  supervision  -EM     Assistive Device (Sit-Stand Transfers)  walker, front-wheeled  -EM     Row Name 09/09/20 1434          Gait/Stairs Assessment/Training    Gait/Stairs Assessment/Training  gait/ambulation independence  -EM     [place] Level (Gait)  contact guard  -EM     Assistive Device (Gait)  walker, front-wheeled  -EM     Distance in Feet (Gait)  160  -EM     Deviations/Abnormal Patterns (Gait)  gait speed decreased;stride length decreased  -EM     Bilateral Gait Deviations  forward flexed posture  -EM       User Key  (r) = Recorded By, (t) = Taken By, (c) = Cosigned By    Initials Name Provider Type    Irlanda Vega PT Physical Therapist        Obj/Interventions    No documentation.       Goals/Plan    No documentation.       Clinical Impression     Row Name 09/09/20 1435          Pain Assessment    Additional Documentation  Pain Scale: Numbers Pre/Post-Treatment (Group)  -EM     Row Name 09/09/20 1435          Pain Scale: Numbers Pre/Post-Treatment    Pain Scale: Numbers, Pretreatment  0/10 - no pain  -EM     Row Name 09/09/20 1435          Plan of Care Review    Plan of Care Reviewed With  patient  -EM     Progress  improving  -EM     Outcome Summary  patient c/o fatigue but agreeable to PT, able to tolerate  increased ambulation distance at supervision/CGA level. Patient anticipates d/c home tomorrow with HH to follow.   -EM     Row Name 09/09/20 1435          Positioning and Restraints    Pre-Treatment Position  in bed  -EM     Post Treatment Position  bed  -EM     In Bed  supine;call light within reach  -EM       User Key  (r) = Recorded By, (t) = Taken By, (c) = Cosigned By    Initials Name Provider Type    Irlanda Vega PT Physical Therapist        Outcome Measures     Row Name 09/09/20 1436          How much help from another person do you currently need...    Turning from your back to your side while in flat bed without using bedrails?  4  -EM     Moving from lying on back to sitting on the side of a flat bed without bedrails?  3  -EM     Moving to and from a bed to a chair (including a wheelchair)?  3  -EM     Standing up from a chair using your arms (e.g., wheelchair, bedside chair)?  3  -EM     Climbing 3-5 steps with a railing?  3  -EM     To walk in hospital room?  3  -EM     AM-PAC 6 Clicks Score (PT)  19  -EM       User Key  (r) = Recorded By, (t) = Taken By, (c) = Cosigned By    Initials Name Provider Type    Irlanda Vega PT Physical Therapist        Physical Therapy Education                 Title: PT OT SLP Therapies (In Progress)     Topic: Physical Therapy (Done)     Point: Mobility training (Done)     Description:   Instruct learner(s) on safety and technique for assisting patient out of bed, chair or wheelchair.  Instruct in the proper use of assistive devices, such as walker, crutches, cane or brace.              Patient Friendly Description:   It's important to get you on your feet again, but we need to do so in a way that is safe for you. Falling has serious consequences, and your personal safety is the most important thing of all.        When it's time to get out of bed, one of us or a family member will sit next to you on the bed to give you support.     If your doctor or  nurse tells you to use a walker, crutches, a cane, or a brace, be sure you use it every time you get out of bed, even if you think you don't need it.    Learning Progress Summary           Patient Acceptance, E, VU by EM at 9/9/2020 1436    Acceptance, E,TB,D, VU,NR by  at 9/8/2020 1356    Acceptance, E,TB, VU,NR by  at 9/7/2020 1332                   Point: Home exercise program (Done)     Description:   Instruct learner(s) on appropriate technique for monitoring, assisting and/or progressing patient with therapeutic exercises and activities.              Learning Progress Summary           Patient Acceptance, E,TB, VU,NR by  at 9/7/2020 1332                   Point: Body mechanics (Done)     Description:   Instruct learner(s) on proper positioning and spine alignment for patient and/or caregiver during mobility tasks and/or exercises.              Learning Progress Summary           Patient Acceptance, E,TB, VU,NR by  at 9/7/2020 1332                   Point: Precautions (Done)     Description:   Instruct learner(s) on prescribed precautions during mobility and gait tasks              Learning Progress Summary           Patient Acceptance, E,TB, VU,NR by  at 9/7/2020 1332                               User Key     Initials Effective Dates Name Provider Type Discipline    EM 04/03/18 -  Irlanda Villeda, PT Physical Therapist PT    EJ 04/03/18 -  Morenita Zuniga, PT Physical Therapist PT     05/14/18 -  Sj Arenas, PT Physical Therapist PT              PT Recommendation and Plan     Outcome Summary/Treatment Plan (PT)  Anticipated Discharge Disposition (PT): home with assist, home with home health  Plan of Care Reviewed With: patient  Progress: improving  Outcome Summary: patient c/o fatigue but agreeable to PT, able to tolerate increased ambulation distance at supervision/CGA level. Patient anticipates d/c home tomorrow with HH to follow.      Time Calculation:   PT Charges     Row Name  09/09/20 1438             Time Calculation    Start Time  1412  -EM      Stop Time  1426  -EM      Time Calculation (min)  14 min  -EM      PT Received On  09/09/20  -EM      PT - Next Appointment  09/10/20  -EM         Time Calculation- PT    Total Timed Code Minutes- PT  14 minute(s)  -EM        User Key  (r) = Recorded By, (t) = Taken By, (c) = Cosigned By    Initials Name Provider Type    EM Irlanda Villeda PT Physical Therapist        Therapy Charges for Today     Code Description Service Date Service Provider Modifiers Qty    64362610255 HC PT THER PROC EA 15 MIN 9/9/2020 Irlanda Villeda PT GP 1          PT G-Codes  Outcome Measure Options: AM-PAC 6 Clicks Daily Activity (OT)  AM-PAC 6 Clicks Score (PT): 19  AM-PAC 6 Clicks Score (OT): 18    Irlanda Villeda PT  9/9/2020

## 2020-09-10 NOTE — THERAPY TREATMENT NOTE
Patient Name: Alessia Madrigal  : 10/9/1928    MRN: [id number]                              Today's Date: 9/10/2020       Admit Date: 2020    Visit Dx:     ICD-10-CM ICD-9-CM   1. Non-STEMI (non-ST elevated myocardial infarction) (CMS/HCC) I21.4 410.70   2. Chest pain, unspecified type R07.9 786.50     Patient Active Problem List   Diagnosis   • Actinic keratosis   • Anemia of chronic disorder   • Disorder of aorta (CMS/HCC)   • Chronic coronary artery disease   • CKD (chronic kidney disease) stage 3, GFR 30-59 ml/min (CMS/HCC)   • Constipation   • Cor pulmonale (CMS/HCC)   • Diabetes mellitus (CMS/HCC)   • High level of uric acid in blood   • Fatigue   • Hypertension   • Hypothyroidism   • Iron deficiency   • Mitral valve insufficiency   • Pulmonary hypertension (CMS/HCC)   • Swelling of lower extremity   • Shortness of breath   • Tricuspid valve insufficiency   • Uncontrolled type 2 diabetes mellitus (CMS/[facility])   • Type 2 diabetes mellitus with chronic kidney disease, with long-term current use of insulin (CMS/[facility])   • Vitamin D deficiency   • Secondary hyperparathyroidism, non-renal (CMS/HCC)   • Pulmonary nodules/lesions, multiple   • Pleural effusion   • Paronychia of second finger of right hand   • Hyperuricemia   • Acute pain of both shoulders   • Acute transmural inferior wall MI (CMS/HCC)   • Wrist injury, left, initial encounter   • Unstable angina (CMS/[facility])   • Poorly-controlled hypertension   • Chest pain due to CAD (CMS/HCC)   • Obesity (BMI 30-39.9)   • BRITTANY (obstructive sleep apnea)   • Non-STEMI (non-ST elevated myocardial infarction) (CMS/HCC)     Past Medical History:   Diagnosis Date   • Acute on chronic congestive heart failure (CMS/HCC)    • Acute transmural inferior wall MI (CMS/HCC)    • Arthritis 2014    CONT TYLENOL FOR PAIN/ JOSE DANIEL CHEW (INTERNAL MEDICINE)   • CAD (coronary artery disease)    • Cancer of uterus (CMS/HCC)    • Carotid artery stenosis    • Chronic renal  failure syndrome    • Coronary artery disease involving coronary bypass graft of native heart     with other forms of angina pectoris   • Coronary atherosclerosis    • Endometrial cancer (CMS/HCC)    • Esophageal reflux    • Essential hypertension    • Glaucoma    • Hypertension    • Malaise and fatigue    • Osteoarthritis    • Sleep apnea     USING CPAP   • Spinal stenosis    • Thyroid disease    • Type 2 diabetes mellitus (CMS/HCC) 1991    INsulin begun 2012   • Vitamin B12 deficiency     SHE HAS NOT HAD THIS CHECKED IN A WHILE. SHE HAD BEEN ON SHOTS.  CHECK HER B12 LEVELS TODAY.  BY JOSE DANIEL CHEW     Past Surgical History:   Procedure Laterality Date   • CARDIAC CATHETERIZATION N/A 3/11/2018    Procedure: Left Heart Cath;  Surgeon: Cameron Chisholm MD;  Location: [facility] CATH INVASIVE LOCATION;  Service: Cardiovascular   • CARDIAC CATHETERIZATION N/A 3/11/2018    Procedure: Stent ROSSI bypass graft;  Surgeon: Cameron Chisholm MD;  [facility];  Service: Cardiovascular   • CARDIAC CATHETERIZATION N/A 5/7/2020    Procedure: Left Heart Cath;  Surgeon: Cameron Chisholm MD;  Location: [facility] CATH INVASIVE LOCATION;  Service: Cardiovascular;  Laterality: N/A;   • CARDIAC CATHETERIZATION N/A 5/7/2020    Procedure: Saphenous Vein Graft;  Surgeon: Cameron Chisholm MD;  Location: [facility] CATH INVASIVE LOCATION;  Service: Cardiovascular;  Laterality: N/A;   • CATARACT EXTRACTION Bilateral    • CATARACT EXTRACTION W/ INTRAOCULAR LENS IMPLANT Right 10/25/2016    Procedure: RT SUPERFICIAL KERATECTOMY ;  Surgeon: Arian Singh MD;  Location: [facility] OR [facility];  Service:    • CHOLECYSTECTOMY     • COLONOSCOPY     • CORONARY ARTERY BYPASS GRAFT  1991    X3   • HYSTERECTOMY     • OOPHORECTOMY     • TRANSLUMINAL ATHERECTOMY PERONEAL ARTERY      PERCUTANEOUS TRANSLUMINAL ATHERECTOMY RENAL ARTERY     General Information     Row Name 09/10/20 1105          PT Evaluation Time/Intention    Document Type   therapy note (daily note)  -EM     Mode of Treatment  physical therapy  -EM     Row Name 09/10/20 1105          General Information    Existing Precautions/Restrictions  fall;cardiac  -EM       User Key  (r) = Recorded By, (t) = Taken By, (c) = Cosigned By    Initials Name Provider Type    Irlanda Vega PT Physical Therapist        Mobility     Row Name 09/10/20 1106          Bed Mobility Assessment/Treatment    Bed Mobility Assessment/Treatment  bed mobility (all) activities  -EM     Supine-Sit [place] (Bed Mobility)  conditional independence  -EM     Assistive Device (Bed Mobility)  bed rails  -EM     Row Name 09/10/20 1106          Sit-Stand Transfer    Sit-Stand [place] (Transfers)  conditional independence  -EM     Assistive Device (Sit-Stand Transfers)  walker, front-wheeled  -EM     Row Name 09/10/20 1106          Gait/Stairs Assessment/Training    Gait/Stairs Assessment/Training  gait/ambulation assistive device  -EM     [place] Level (Gait)  contact guard  -EM     Assistive Device (Gait)  walker, front-wheeled  -EM     Distance in Feet (Gait)  170  -EM     Pattern (Gait)  step-to  -EM     Deviations/Abnormal Patterns (Gait)  shanda decreased;gait speed decreased  -EM     Comment (Gait/Stairs)  Pt able to maintain uprigth w/o cuing today, no LOB or unsteadiness noted. Maintained proper position within the rwx.  -EM       User Key  (r) = Recorded By, (t) = Taken By, (c) = Cosigned By    Initials Name Provider Type    Irlanda Vega PT Physical Therapist        Obj/Interventions    No documentation.       Goals/Plan    No documentation.       Clinical Impression     Row Name 09/10/20 1111          Pain Scale: Numbers Pre/Post-Treatment    Pain Scale: Numbers, Pretreatment  0/10 - no pain  -EM     Pain Scale: Numbers, Post-Treatment  0/10 - no pain  -EM     Row Name 09/10/20 1111          Plan of Care Review    Outcome Summary  Pt agreeable to PT, able to tolerare increased  distance with ambulation with a rwx with supervision/CGA. Pt anticipates to be D/C home today with HH to follow.   -EM     Row Name 09/10/20 1111          Physical Therapy Clinical Impression    Patient/Family Goals Statement (PT Clinical Impression)  go home  -EM     Row Name 09/10/20 1111          Vital Signs    Pre Systolic BP Rehab  134  -EM     Pre Treatment Diastolic BP  71  -EM     Pretreatment Heart Rate (beats/min)  63  -EM     Posttreatment Heart Rate (beats/min)  70  -EM     Pre SpO2 (%)  95  -EM     O2 Delivery Pre Treatment  room air  -EM     Post SpO2 (%)  92  -EM     O2 Delivery Post Treatment  room air  -EM     Pre Patient Position  Supine  -EM     Post Patient Position  Sitting  -EM     Row Name 09/10/20 1111          Positioning and Restraints    Pre-Treatment Position  in bed  -EM     Post Treatment Position  chair  -EM     In Chair  reclined;call light within reach;exit alarm on  -EM       User Key  (r) = Recorded By, (t) = Taken By, (c) = Cosigned By    Initials Name Provider Type    Irlanda Vega PT Physical Therapist        Outcome Measures     Row Name 09/10/20 1116          How much help from another person do you currently need...    Turning from your back to your side while in flat bed without using bedrails?  3  -EM     Moving from lying on back to sitting on the side of a flat bed without bedrails?  3  -EM     Moving to and from a bed to a chair (including a wheelchair)?  3  -EM     Standing up from a chair using your arms (e.g., wheelchair, bedside chair)?  3  -EM     Climbing 3-5 steps with a railing?  3  -EM     To walk in hospital room?  3  -EM     AM-PAC 6 Clicks Score (PT)  18  -EM       User Key  (r) = Recorded By, (t) = Taken By, (c) = Cosigned By    Initials Name Provider Type    Irlanda Vega PT Physical Therapist        Physical Therapy Education                 Title: PT OT SLP Therapies (In Progress)     Topic: Physical Therapy (Done)     Point:  Mobility training (Done)     Description:   Instruct learner(s) on safety and technique for assisting patient out of bed, chair or wheelchair.  Instruct in the proper use of assistive devices, such as walker, crutches, cane or brace.              Patient Friendly Description:   It's important to get you on your feet again, but we need to do so in a way that is safe for you. Falling has serious consequences, and your personal safety is the most important thing of all.        When it's time to get out of bed, one of us or a family member will sit next to you on the bed to give you support.     If your doctor or nurse tells you to use a walker, crutches, a cane, or a brace, be sure you use it every time you get out of bed, even if you think you don't need it.    Learning Progress Summary           Patient Acceptance, E, VU,DU by EM at 9/10/2020 1117    Acceptance, E, VU by EM at 9/9/2020 1436    Acceptance, E,TB,D, VU,NR by  at 9/8/2020 1356    Acceptance, E,TB, VU,NR by CS at 9/7/2020 1332                   Point: Home exercise program (Done)     Description:   Instruct learner(s) on appropriate technique for monitoring, assisting and/or progressing patient with therapeutic exercises and activities.              Learning Progress Summary           Patient Acceptance, E,TB, VU,NR by  at 9/7/2020 1332                   Point: Body mechanics (Done)     Description:   Instruct learner(s) on proper positioning and spine alignment for patient and/or caregiver during mobility tasks and/or exercises.              Learning Progress Summary           Patient Acceptance, E,TB, VU,NR by  at 9/7/2020 1332                   Point: Precautions (Done)     Description:   Instruct learner(s) on prescribed precautions during mobility and gait tasks              Learning Progress Summary           Patient Acceptance, E,TB, VU,NR by CS at 9/7/2020 1332                               User Key     Initials Effective Dates Name Provider  Type Discipline    EM 04/03/18 -  Irlanda Villeda PT Physical Therapist PT    EJ 04/03/18 -  Morenita Zuniga, PT Physical Therapist PT     05/14/18 -  Sj Arenas, PT Physical Therapist PT              PT Recommendation and Plan     Outcome Summary/Treatment Plan (PT)  Anticipated Discharge Disposition (PT): home, home with home health  Plan of Care Reviewed With: patient  Progress: improving  Outcome Summary: Pt agreeable to PT, able to tolerare increased distance with ambulation with a rwx with supervision/CGA. Pt anticipates to be D/C home today with HH to follow.      Time Calculation:   PT Charges     Row Name 09/10/20 1120             Time Calculation    Start Time  1046  -EM      Stop Time  1058  -EM      Time Calculation (min)  12 min  -EM      PT Received On  09/10/20  -EM      PT - Next Appointment  09/11/20  -EM         Time Calculation- PT    Total Timed Code Minutes- PT  12 minute(s)  -EM        User Key  (r) = Recorded By, (t) = Taken By, (c) = Cosigned By    Initials Name Provider Type    EM Irlanda Villeda, PT Physical Therapist        Therapy Charges for Today     Code Description Service Date Service Provider Modifiers Qty    08603599079 HC PT THER PROC EA 15 MIN 9/9/2020 Irlanda Villeda, PT GP 1    89528011510 HC PT THER PROC EA 15 MIN 9/10/2020 Irlanda Villeda PT GP 1          PT G-Codes  Outcome Measure Options: AM-PAC 6 Clicks Daily Activity (OT)  AM-PAC 6 Clicks Score (PT): 18  AM-PAC 6 Clicks Score (OT): 18    Irlanda Villeda PT  9/10/2020

## 2020-09-10 NOTE — PROGRESS NOTES
"Pharmacy Services: Discharge Medication Education    Alessia Madrigal was counseled over medical management for NSTEMI.  Counseling points included the followin) Clopidogrel's indication, patient's need for the medication, and dosing/frequency  2) Enforced the importance of taking clopidogrel/aspirin as instructed every day  3) Explained possible side effects of clopidogrel/aspirin therapy, including increased risk of bleeding, s/sx of bleeding, and increase in cold intolerance. Also talked about ways to control bleeding for minor cuts and scrapes.  4) Discussed all important drug interactions, including over-the-counter medications and supplements (\"G\" supplements, fish oil, green tea, [place]'s Wort, and Vitamin E).    Patient expressed understanding and had no further questions.      Shannan Ruiz, Pharm.D., [facility]   Clinical Staff Pharmacist     "

## 2020-09-10 NOTE — DISCHARGE SUMMARY
Hospital Discharge    Patient Name: Alessia Madrigal  Age/Sex: 89 y.o. female  : 10/9/1928  MRN: [id number]    Encounter Provider: MILVIA Vásquez  Referring Provider: Radha Vu MD  Place of Service: [facility] CARDIOLOGY  Patient Care Team:  Dario Mae MD as PCP - General (Internal Medicine)         Date of Discharge:  9/10/2020   Date of Admit: 2020    Discharge Condition: Stable  Discharge Diagnosis:    Non-STEMI (non-ST elevated myocardial infarction) (CMS/[facility])      Hospital Course:   Alessia Madrigal is a 89 y.o. female with history of coronary artery disease status post bypass grafting, hypertension, diabetes mellitus on insulin hyperlipidemia, chronic kidney disease, renal artery stenosis s/p renal artery stenting. She was admitted to the hospital in May 2020 with NSTEMI. Cardiac catheterization at that time showed a widely patent LIMA to LAD, however the LAD distal to the anastomosis was subtotally occluded.  She has a SVG to PDA with 20% luminal irregularities throughout the vein graft itself.  There is a stent within the vein graft which is patent.  The graft backfills into a PL which has been 80% lesion not amenable to PCI.  There are right to left collaterals to the distal circumflex.  She was treated medically at that time.    She continued to have chest discomfort. Medications were titrated as outpatient. She used sublingual nitroglycerin at home for several episodes and pain improved however on  she was woken from sleep with chest pain that did not improve with nitro. She came to the emergency room for evaluation. Trop was elevated consistent with NSTEMI. She was treated medically with heparin drip X 48 hrs. Anti anginals were titrated (amlodipine increased to 10 mg, beta blocker doubled). She had mild volume overload and responded well to a few doses of IV lasix. She was also noted to be significantly hypertensive on admission. This  improved with medication adjustments. Patient is stable for discharge with home health. She will follow up in 1-2 weeks with MILVIA Iniguez.     Objective:  Temp:  [95.9 °F (35.5 °C)-98.3 °F (36.8 °C)] 95.9 °F (35.5 °C)  Heart Rate:  [65-77] 70  Resp:  [15-18] 16  BP: (115-134)/(61-82) 120/64    Intake/Output Summary (Last 24 hours) at 9/10/2020 1138  Last data filed at 9/10/2020 1133  Gross per 24 hour   Intake 1080 ml   Output 2550 ml   Net -1470 ml     Body mass index is 32.03 kg/m².      09/08/20  0449 09/09/20  0547 09/10/20  0500   Weight: 88.1 kg (194 lb 5 oz) 85.6 kg (188 lb 11.2 oz) 84.6 kg (186 lb 9.6 oz)     Weight change: -0.953 kg (-2 lb 1.6 oz)    Physical Exam:  Constitutional: She is oriented to person, place, and time. She appears well-developed. She does not appear ill.   HENT:   Head: Normocephalic and atraumatic. Head is without contusion.   Right Ear: Hearing normal. No drainage.   Left Ear: Hearing normal. No drainage.   Nose: No nasal deformity. No epistaxis.   Eyes: Lids are normal. Right eye exhibits no exudate. Left eye exhibits no exudate.  Neck: No JVD present. Carotid bruit is not present. No tracheal deviation present. No thyroid mass and no thyromegaly present.   Cardiovascular: Normal rate, regular rhythm and normal heart sounds.    Pulses:       Posterior tibial pulses are 2+ on the right side, and 2+ on the left side.   Pulmonary/Chest: Effort normal and breath sounds normal.   Abdominal: Soft. Normal appearance and bowel sounds are normal. There is no tenderness.   Musculoskeletal: Normal range of motion.        Right shoulder: She exhibits no deformity.        Left shoulder: She exhibits no deformity.   Neurological: She is alert and oriented to person, place, and time. She has normal strength.   Skin: Skin is warm, dry and intact. No rash noted.   Psychiatric: She has a normal mood and affect. Her behavior is normal. Thought content normal.   Vitals reviewed      Procedures  Performed         Consults:  Consults     Date and Time Order Name Status Description    9/6/2020 0925 Cardiology (on-call MD unless specified) Completed           Pertinent Test Results:  Results from last 7 days   Lab Units 09/10/20  0424 09/09/20  0459 09/08/20 0347 09/07/20  0758 09/06/20  1233 09/06/20  0833   SODIUM mmol/L 140 142 140 139 140 143   POTASSIUM mmol/L 4.2 3.5 4.0 4.6 4.3 4.2   CHLORIDE mmol/L 101 104 106 106 108* 108*   CO2 mmol/L 28.9 26.7 24.2 23.6 22.5 22.4   BUN mg/dL 31* 27* 27* 23 20 22   CREATININE mg/dL 1.48* 1.58* 1.53* 1.57* 1.26* 1.34*   GLUCOSE mg/dL 90 78 85 102* 194* 154*   CALCIUM mg/dL 9.3 9.0 8.8 8.4 9.0 9.6   AST (SGOT) U/L  --   --   --   --   --  13   ALT (SGPT) U/L  --   --   --   --   --  10     Results from last 7 days   Lab Units 09/06/20 2157 09/06/20  1750 09/06/20  1233 09/06/20  0833   TROPONIN T ng/mL 1.160* 0.197* 0.091* 0.041*     Results from last 7 days   Lab Units 09/10/20  0424 09/09/20  0459 09/08/20 0347 09/07/20  0759 09/06/20  1233 09/06/20  0833   WBC 10*3/mm3 7.48 7.70 7.33 10.16 11.72* 8.22   HEMOGLOBIN g/dL 13.7 12.5 11.4* 12.8 13.7 14.9   HEMATOCRIT % 42.4 36.9 34.8 37.1 41.1 43.3   PLATELETS 10*3/mm3 270 270 233 240 271 288     Results from last 7 days   Lab Units 09/08/20  0347 09/07/20  1716 09/07/20  0758 09/06/20  2157 09/06/20  1504   INR   --   --   --   --  0.96   APTT seconds 79.0* 120.8* 50.6* 109.3* 25.7     Results from last 7 days   Lab Units 09/10/20  0424 09/09/20  0459 09/08/20  0347 09/07/20  0758 09/06/20  1233 09/06/20  0833   MAGNESIUM mg/dL 1.8 1.6* 1.8 2.0 1.7 1.8           Invalid input(s): LDLCALC            Discharge Medications     Discharge Medications      Changes to Medications      Instructions Start Date   amLODIPine 10 MG tablet  Commonly known as:  NORVASC  What changed:    · medication strength  · how much to take   10 mg, Oral, Daily      metoprolol succinate XL 50 MG 24 hr tablet  Commonly known as:   TOPROL-XL  What changed:  when to take this   50 mg, Oral, Every 12 Hours         Continue These Medications      Instructions Start Date   aspirin 81 MG EC tablet   81 mg, Oral, Daily      calcitriol 0.5 MCG capsule  Commonly known as:  ROCALTROL   0.5 mcg, Oral, Daily      clopidogrel 75 MG tablet  Commonly known as:  PLAVIX   TAKE 1 TABLET DAILY      Co Q-10 200 MG capsule   300 mg, Oral, Daily      furosemide 40 MG tablet  Commonly known as:  LASIX   40 mg, Oral, 2 Times Daily      glucose blood test strip  Commonly known as:  ONE TOUCH ULTRA TEST   Test Three times daily. DX E11.9      glucose blood test strip   Use as instructed      Insulin Glargine 100 UNIT/ML injection pen  Commonly known as:  LANTUS SOLOSTAR   15 Units, Subcutaneous, Nightly      insulin lispro 100 UNIT/ML injection  Commonly known as:  HumaLOG   5 Units, Subcutaneous, 3 Times Daily Before Meals      Insulin Pen Needle 32G X 4 MM misc  Commonly known as:  BD Pen Needle Janis U/F   USE AS DIRECTED THREE TIMES A DAY.      isosorbide mononitrate 60 MG 24 hr tablet  Commonly known as:  IMDUR   60 mg, Oral, Every 24 Hours Scheduled      levothyroxine 150 MCG tablet  Commonly known as:  Synthroid   150 mcg, Oral, Daily      nitroglycerin 0.4 MG SL tablet  Commonly known as:  NITROSTAT   DISSOLVE 1 TAB UNDER TONGUE FOR CHEST PAIN - IF PAIN REMAINS AFTER 5 MIN, CALL 911 AND REPEAT DOSE. MAX 3 TABS IN 15 MINUTES      ranolazine 500 MG 12 hr tablet  Commonly known as:  RANEXA   500 mg, Oral, Every 12 Hours Scheduled      spironolactone 25 MG tablet  Commonly known as:  ALDACTONE   TAKE 1 TABLET DAILY      timolol 0.25 % ophthalmic solution  Commonly known as:  TIMOPTIC   1 drop, Both Eyes, Nightly - One Time      travoprost (KAL free) 0.004 % solution ophthalmic solution  Commonly known as:  TRAVATAN   1 drop, Both Eyes, Nightly, in affected eye(s)             Discharge Diet:    Dietary Orders (From admission, onward)     Start     Ordered     09/06/20 1204  Diet Regular; Consistent Carbohydrate  Diet Effective Now     Question Answer Comment   Diet Texture / Consistency Regular    Common Modifiers Consistent Carbohydrate        09/06/20 1204                Activity at Discharge:    Activity Instructions     Gradually Increase Activity Until at Pre-Hospitalization Level             Discharge disposition: home with home health     Discharge Instructions and Follow ups:  Future Appointments   Date Time Provider Department Center   9/30/2020  1:30 PM Dario Mae MD [facility] None     Additional Instructions for the Follow-ups that You Need to Schedule     Ambulatory Referral to Home Health   As directed      Face to Face Visit Date:  9/10/2020    Follow-up provider for Plan of Care?:  I treated the patient in an acute care facility and will not continue treatment after discharge.    Follow-up provider:  CEFERINO LEA [[id number]]    Reason/Clinical Findings:  nstemi, heart failure    Describe mobility limitations that make leaving home difficult:  weak, unable to drive    Nursing/Therapeutic Services Requested:  Skilled Nursing Physical Therapy    Skilled nursing orders:  Medication education Cardiopulmonary assessments CHF management    PT orders:  Strengthening    Frequency:  1 Week 1         Discharge Follow-up with Specified Provider: MILVIA Iniguez; 2 Weeks   As directed      To:  MILVIA Iniguez    Follow Up:  2 Weeks            Contact information for follow-up providers     Dario Mae MD. Go on 9/10/2020.    Specialty:  Internal Medicine  Contact information:  [address]  [facility] [id number] [phone]             [facility] HOME CARE REFERRAL [place] .    Specialty:  Home Health Services  Contact information:  [address]  [facility] [place] [address]                 Contact information for after-discharge care     Home Medical Care     [facility] CARE  [place] .    Service:  Home Health Services  Contact information:  [address]  [facility] [address] [phone]                             Test Results Pending at Discharge: none     Sharri Logan, APRN  09/10/20  11:38

## 2020-09-10 NOTE — PROGRESS NOTES
[facility] Clinical Pharmacy Services: National Cardiology Data Registry (NCDR) Medication Review    Alessia Madrigal is being medically managed for NSTEMI. Pharmacy to review discharge medications to make sure appropriate medications have been prescribed.    Patient has been discharged on the following:  · P2Y12 Inhibitor: Clopidogrel 75 mg daily  · Aspirin EC 81 mg daily  · High Intensity Statin: intolerant  · Beta-blocker: Metoprolol XL 50 mg q12    Patient will not be discharged on a statin due to severe myalgias from the group as a whole.     These medications meet the requirements for NCDR discharge medication for chest pain and MI.    Shannan Ruiz, Pharm.D., [facility]  Clinical Staff Pharmacist

## 2020-09-10 NOTE — OUTREACH NOTE
Prep Survey      Responses   [facility] facility patient discharged from?  [place]   Is LACE score < 7 ?  No   Eligibility  [facility]   Date of Admission  09/06/20   Date of Discharge  09/10/20   Discharge Disposition  Home-Health Care Svc   Discharge diagnosis  non-ST elevated myocardial infarction   COVID-19 Test Status  Negative   Does the patient have one of the following disease processes/diagnoses(primary or secondary)?  Acute MI (STEMI,NSTEMI)   Does the patient have Home health ordered?  Yes   What is the Home health agency?    [facility]    Is there a DME ordered?  No   Prep survey completed?  Yes          Lali Heaton RN

## 2020-09-10 NOTE — PLAN OF CARE
Problem: Patient Care Overview  Goal: Plan of Care Review  Flowsheets  Taken 9/9/2020 1435 by Irlanda Villeda PT  Progress: improving  Taken 9/10/2020 0905 by Cristina Vogel RN  Plan of Care Reviewed With: patient  Taken 9/10/2020 1111 by Irlanda Villeda PT  Outcome Summary: Pt agreeable to PT, able to tolerare increased distance with ambulation with a rwx with supervision/CGA. Pt anticipates to be D/C home today with HH to follow.    Patient was intermittently wearing a face mask during this therapy encounter. Therapist used appropriate personal protective equipment including eye protection, mask, and gloves.  Mask used was standard procedure mask. Appropriate PPE was worn during the entire therapy session. Hand hygiene was completed before and after therapy session. Patient is not in enhanced droplet precautions.

## 2020-09-10 NOTE — PROGRESS NOTES
Case Management Discharge Note      Final Note: Pt d/c home with [facility] scheduled to follow.      Provided Post Acute Provider List?: N/A  N/A Provider List Comment: Pt already knew she wanted [facility] stating she used them in June 2020.    Destination      No service has been selected for the patient.      Durable Medical Equipment      No service has been selected for the patient.      Dialysis/Infusion      No service has been selected for the patient.      Home Medical Care - Selection Complete      Service Provider Request Status Selected Services Address Phone Number Fax Number    [facility] CARE [place] Selected Home Health Services [address] [address] [phone]      Therapy      No service has been selected for the patient.      Community Resources      No service has been selected for the patient.             Final Discharge Disposition Code: 06 - home with home health care

## 2020-09-11 NOTE — OUTREACH NOTE
Call Center TCM Note      Responses   [facility] patient discharged from?  [place]   Does the patient have one of the following disease processes/diagnoses(primary or secondary)?  Acute MI (STEMI,NSTEMI)   TCM attempt successful?  Yes   Call start time  1242   Call end time  1244   Discharge diagnosis  non-ST elevated myocardial infarction   Meds reviewed with patient/caregiver?  Yes   Is the patient having any side effects they believe may be caused by any medication additions or changes?  No   Does the patient have all prescriptions related to this admission filled (includes statins,anticoagulants,HTN meds,anti-arrhythmia meds)  Yes   Is the patient taking all medications as directed (includes completed medication regime)?  Yes   Does the patient have a primary care provider?   Yes   Does the patient have an appointment with their PCP,cardiologist,or clinic within 7 days of discharge?  Yes   Comments regarding PCP  f/u with MILVIA Warner on 9/18   Has the patient kept scheduled appointments due by today?  N/A   What is the Home health agency?    [facility]    Has home health visited the patient within 72 hours of discharge?  Yes   Psychosocial issues?  No   Did the patient receive a copy of their discharge instructions?  Yes   Nursing interventions  Reviewed instructions with patient   What is the patient's perception of their health status since discharge?  Improving   Nursing interventions  Nurse provided patient education   Is the patient/caregiver able to teach back signs and symptoms of when to call for help immediately:  Sudden chest discomfort, Shortness of breath at any time, Dizziness or lightheadedness   Is the patient/caregiver able to teach back ways to prevent a second heart attack:  Take medications, Follow up with MD   Is the patient/caregiver able to teach back the hierarchy of who to call/visit for symptoms/problems? PCP, Specialist, Home health nurse, Urgent Care, ED, 911  Yes   TCM call  completed?  Yes   Wrap up additional comments  Patient says she is doing well, no questions or concerns at this time.          Sugar Connell RN    9/11/2020, 12:44

## 2020-09-14 PROBLEM — R77.8 ELEVATED TROPONIN: Status: ACTIVE | Noted: 2020-01-01

## 2020-09-14 PROBLEM — R07.9 ACUTE CHEST PAIN: Status: ACTIVE | Noted: 2020-01-01

## 2020-09-14 PROBLEM — D72.829 LEUKOCYTOSIS: Status: ACTIVE | Noted: 2020-01-01

## 2020-09-14 PROBLEM — J18.9 COMMUNITY ACQUIRED PNEUMONIA: Status: ACTIVE | Noted: 2020-01-01

## 2020-09-14 NOTE — ED NOTES
Pt arrived via EMS from home w c/o of chest pain that started 30-40 mins prior to arrival. Pt states she woke up with upper abdominal pain at 0300 and around 1130 pt started having chest pain. En route pt received two sprays of nitro ( 90 mcg a piece) , pt took ASA prior to arrival. Pt reports feeling nauseous and SOB. MD Red at bedside.     Roxanne Kaplan, RN  09/14/20 2875

## 2020-09-14 NOTE — ED NOTES
Daphnie RN with IV team failed at multiple IV attempts, charge RN and MD notified.      Roxanne Kaplan, RN  09/14/20 2958

## 2020-09-14 NOTE — ED NOTES
This RN wore mask and goggles when in pt room, pt placed in mask upon arrival into room      Roxanne Kaplan RN  09/14/20 2705

## 2020-09-14 NOTE — ED NOTES
[facility] team, spoke with Santos with [facility] who is on his way down      Roxanne Kaplan, RN  09/14/20 5814

## 2020-09-14 NOTE — ED NOTES
Pt reports feeling more SOB, this RN and tech pulled pt up in bed and sit HOB up and increased oxygen to 3L. Pts oxygen saturation reading 92%. MD Red notified     Eusebio, Roxanne, RN  09/14/20 7730

## 2020-09-14 NOTE — ED NOTES
Viridiana RN will attempt IV access with ultrasound again.      Roxanne Kaplan, RN  09/14/20 6751

## 2020-09-14 NOTE — H&P
HISTORY AND PHYSICAL   [facility]        Patient Identification:  Name: Alessia Madrigal  Age: 89 y.o.  Sex: female  :  10/9/1928  MRN: [id number]                     Primary Care Physician: Dario Mae MD    Chief Complaint:  Chest pain and short of air    History of Present Illness:       The patient is a 89-year-old white female with history of CHF, previous MI, chronic coronary artery disease, chronic renal failure, history of endometrial cancer, reflux, hypertension, osteoarthritis and diabetes who was admitted with history of having some chest pain and shortness of air.  The patient had evaluation in the ER and was felt to have some degree of pneumonia and also her troponin was elevated.  The patient had been given some nitro by EMS and did have some improvement of her pain.  She was given some morphine in the ER and started on some antibiotics.  She has had some nausea and felt pretty weak.  The patient was admitted for further work-up with possible community-acquired pneumonia and some chest pain and elevated troponin.    Past Medical History:  Past Medical History:   Diagnosis Date   • Acute on chronic congestive heart failure (CMS/HCC)    • Acute transmural inferior wall MI (CMS/HCC)    • Arthritis 2014    CONT TYLENOL FOR PAIN/ JOSE DANIEL CHEW (INTERNAL MEDICINE)   • CAD (coronary artery disease)    • Cancer of uterus (CMS/HCC)    • Carotid artery stenosis    • Chronic renal failure syndrome    • Coronary artery disease involving coronary bypass graft of native heart     with other forms of angina pectoris   • Coronary atherosclerosis    • Endometrial cancer (CMS/HCC)    • Esophageal reflux    • Essential hypertension    • Glaucoma    • Hypertension    • Malaise and fatigue    • Osteoarthritis    • Sleep apnea     USING CPAP   • Spinal stenosis    • Thyroid disease    • Type 2 diabetes mellitus (CMS/HCC)     INsulin begun    • Vitamin B12 deficiency     SHE HAS NOT  HAD THIS CHECKED IN A WHILE. SHE HAD BEEN ON SHOTS.  CHECK HER B12 LEVELS TODAY.  BY JOSE DANIEL CHEW     Past Surgical History:  Past Surgical History:   Procedure Laterality Date   • CARDIAC CATHETERIZATION N/A 3/11/2018    Procedure: Left Heart Cath;  Surgeon: Cameron Chisholm MD;  Location: [facility] CATH INVASIVE LOCATION;  Service: Cardiovascular   • CARDIAC CATHETERIZATION N/A 3/11/2018    Procedure: Stent ROSSI bypass graft;  Surgeon: Cameron Chisholm MD;  Location: [facility] CATH INVASIVE LOCATION;  Service: Cardiovascular   • CARDIAC CATHETERIZATION N/A 5/7/2020    Procedure: Left Heart Cath;  Surgeon: Cameron Chisholm MD;  Location: [facility] CATH INVASIVE LOCATION;  Service: Cardiovascular;  Laterality: N/A;   • CARDIAC CATHETERIZATION N/A 5/7/2020    Procedure: Saphenous Vein Graft;  Surgeon: Cameron Chisholm MD;  Location: [facility] CATH INVASIVE LOCATION;  Service: Cardiovascular;  Laterality: N/A;   • CATARACT EXTRACTION Bilateral    • CATARACT EXTRACTION W/ INTRAOCULAR LENS IMPLANT Right 10/25/2016    Procedure: RT SUPERFICIAL KERATECTOMY ;  Surgeon: Arian Singh MD;  [place] [facility] [place] [facility] [place]  Service:    • CHOLECYSTECTOMY     • COLONOSCOPY     • CORONARY ARTERY BYPASS GRAFT  1991    X3   • HYSTERECTOMY     • OOPHORECTOMY     • TRANSLUMINAL ATHERECTOMY PERONEAL ARTERY      PERCUTANEOUS TRANSLUMINAL ATHERECTOMY RENAL ARTERY      Home Meds:  Medications Prior to Admission   Medication Sig Dispense Refill Last Dose   • amLODIPine (NORVASC) 10 MG tablet Take 1 tablet by mouth Daily. 90 tablet 3 9/13/2020 at Unknown time   • aspirin 81 MG EC tablet Take 81 mg by mouth Daily.   9/14/2020 at Unknown time   • calcitriol (ROCALTROL) 0.5 MCG capsule Take 0.5 mcg by mouth Daily.   9/13/2020 at Unknown time   • clopidogrel (PLAVIX) 75 MG tablet TAKE 1 TABLET DAILY 90 tablet 4 9/13/2020 at Unknown time   • Coenzyme Q10 (CO Q-10) 200 MG capsule Take 300 mg by mouth daily.   9/13/2020 at Unknown time   • furosemide  (LASIX) 40 MG tablet Take 1 tablet by mouth 2 (Two) Times a Day. 60 tablet 3 9/13/2020 at Unknown time   • Insulin Glargine (LANTUS SOLOSTAR) 100 UNIT/ML injection pen Inject 15 Units under the skin into the appropriate area as directed Every Night. 15 mL 1 9/13/2020 at Unknown time   • insulin lispro (HUMALOG) 100 UNIT/ML injection Inject 5 Units under the skin into the appropriate area as directed 3 (Three) Times a Day Before Meals. 15 mL 1 9/13/2020 at Unknown time   • isosorbide mononitrate (IMDUR) 60 MG 24 hr tablet Take 1 tablet by mouth Daily. 90 tablet 1 9/13/2020 at Unknown time   • levothyroxine (SYNTHROID) 150 MCG tablet Take 1 tablet by mouth Daily. 90 tablet 3 9/13/2020 at Unknown time   • metoprolol succinate XL (TOPROL-XL) 50 MG 24 hr tablet Take 1 tablet by mouth Every 12 (Twelve) Hours. 180 tablet 3 9/14/2020 at Unknown time   • nitroglycerin (NITROSTAT) 0.4 MG SL tablet DISSOLVE 1 TAB UNDER TONGUE FOR CHEST PAIN - IF PAIN REMAINS AFTER 5 MIN, CALL 911 AND REPEAT DOSE. MAX 3 TABS IN 15 MINUTES 25 tablet 1 9/13/2020 at Unknown time   • ranolazine (RANEXA) 500 MG 12 hr tablet Take 1 tablet by mouth Every 12 (Twelve) Hours. 180 tablet 1 9/14/2020 at Unknown time   • glucose blood (ONE TOUCH ULTRA TEST) test strip Test Three times daily. DX E11.9 100 each 2    • glucose blood test strip Use as instructed 400 each 1    • Insulin Pen Needle (BD PEN NEEDLE LISA U/F) 32G X 4 MM misc USE AS DIRECTED THREE TIMES A DAY. 300 each 1    • spironolactone (ALDACTONE) 25 MG tablet TAKE 1 TABLET DAILY 90 tablet 3    • timolol (TIMOPTIC) 0.25 % ophthalmic solution Administer 1 drop to both eyes Tonight.      • travoprost, KAL free, (TRAVATAN) 0.004 % solution ophthalmic solution Administer 1 drop to both eyes every night. in affected eye(s)         Current meds    Current Facility-Administered Medications:   •  sodium chloride 0.9 % flush 10 mL, 10 mL, Intravenous, PRN, Rickey Red MD  Allergies:  Allergies    Allergen Reactions   • Allopurinol Unknown - High Severity   • Clindamycin/Lincomycin Itching     Felt like she was burning and itching around the neck   • Statins Myalgia   • Ampicillin Rash   • Penicillins Rash   • Pravastatin Myalgia     Immunizations:  Immunization History   Administered Date(s) Administered   • Flu Mist 09/15/2014   • Fluad Quad 65+ 10/23/2019   • Fluzone High Dose =>65 Years ([employer] ONLY) 09/29/2016, 01/10/2018, 01/22/2019   • Pneumococcal, Unspecified 03/26/2015     Social History:   Social History     Social History Narrative   • Not on file     Social History     Socioeconomic History   • Marital status: Single     Spouse name: Not on file   • Number of children: Not on file   • Years of education: Not on file   • Highest education level: Not on file   Tobacco Use   • Smoking status: Never Smoker   • Smokeless tobacco: Never Used   • Tobacco comment: caffeine use- tea   Substance and Sexual Activity   • Alcohol use: No   • Drug use: Never   • Sexual activity: Defer   Lifestyle   • Physical activity     Days per week: 0 days     Minutes per session: 0 min   • Stress: Not on file       Family History:  Family History   Problem Relation Age of Onset   • Colon cancer Other    • Heart disease Other    • Hypertension Other    • Stroke Other         ISCHEMIC   • Colon cancer Mother    • Stroke Father    • Heart attack Father    • Heart disease Father    • Heart attack Brother    • Stroke Brother    • Heart disease Brother    • Breast cancer Maternal Grandmother         Review of Systems  See history of present illness and past medical history.  Patient denies headache, dizziness, syncope, falls, trauma, change in vision, change in hearing, change in taste, changes in weight, changes in appetite, focal weakness, numbness, or paresthesia.  Patient denies chest pain, palpitations, dyspnea, orthopnea, PND, cough, sinus pressure, rhinorrhea, epistaxis, hemoptysis, nausea, vomiting, hematemesis,  "diarrhea, constipation or hematchezia.  Denies cold or heat intolerance, polydipsia, polyuria, polyphagia. Denies hematuria, pyuria, dysuria, hesitancy, frequency or urgency.  Denies fever, chills, sweats, night sweats.   Remainder of ROS is negative.    Objective:  tMax 24 hrs: Temp (24hrs), Av.6 °F (36.4 °C), Min:97.4 °F (36.3 °C), Max:97.8 °F (36.6 °C)    Vitals Ranges:   Temp:  [97.4 °F (36.3 °C)-97.8 °F (36.6 °C)] 97.4 °F (36.3 °C)  Heart Rate:  [] 99  Resp:  [18-22] 22  BP: (110-177)/(51-87) 110/85      Exam:  /85   Pulse 99   Temp 97.4 °F (36.3 °C) (Oral)   Resp 22   Ht 162.6 cm (64\")   Wt 84.4 kg (186 lb)   SpO2 92%   BMI 31.93 kg/m²     General Appearance:    Alert, cooperative, no distress, appears stated age   Head:    Normocephalic, without obvious abnormality, atraumatic   Eyes:    PERRL, conjunctivae/corneas clear, EOM's intact, both eyes   Ears:    Normal external ear canals, both ears   Nose:   Nares normal, septum midline, mucosa normal, no drainage    or sinus tenderness   Throat:   Lips, mucosa, and tongue normal   Neck:   Supple, symmetrical, trachea midline, no adenopathy;     thyroid:  no enlargement/tenderness/nodules; no carotid    bruit or JVD   Back:     Symmetric, no curvature, ROM normal, no CVA tenderness   Lungs:     Clear to auscultation bilaterally, respirations unlabored   Chest Wall:    No tenderness or deformity    Heart:    Regular rate and rhythm, S1 and S2 normal, no murmur, rub   or gallop   Abdomen:     Soft, nontender, bowel sounds active all four quadrants,     no masses, no hepatomegaly, no splenomegaly   Extremities:   Extremities normal, atraumatic, no cyanosis or edema   Pulses:   2+ and symmetric all extremities   Skin:   Skin color, texture, turgor normal, no rashes or lesions   Lymph nodes:   Cervical, supraclavicular, and axillary nodes normal   Neurologic:   CNII-XII intact, normal strength, sensation intact throughout      .    Data " "Review:  Lab Results (last 72 hours)     Procedure Component Value Units Date/Time    Urinalysis With Microscopic If Indicated (No Culture) - Urine, Catheter [[id number]]  (Abnormal) Collected: 09/14/20 1613    Specimen: Urine, Catheter Updated: 09/14/20 1632     Color, UA Dark Yellow     Appearance, UA Clear     pH, UA <=5.0     Specific Gravity, UA 1.019     Glucose, UA Negative     Ketones, UA Trace     Bilirubin, UA Negative     Blood, UA Negative     Protein, UA 30 mg/dL (1+)     Leuk Esterase, UA Trace     Nitrite, UA Negative     Urobilinogen, UA 1.0 E.U./dL    Urinalysis, Microscopic Only - Urine, Catheter [[id number]] Collected: 09/14/20 1613    Specimen: Urine, Catheter Updated: 09/14/20 1632     RBC, UA 0-2 /HPF      WBC, UA 0-2 /HPF      Bacteria, UA None Seen /HPF      Squamous Epithelial Cells, UA 0-2 /HPF      Hyaline Casts, UA 7-12 /LPF      Methodology Automated Microscopy    Procalcitonin [[id number]]  (Normal) Collected: 09/14/20 1433    Specimen: Blood Updated: 09/14/20 1630     Procalcitonin 0.11 ng/mL     Narrative:      As a Marker for Sepsis (Non-Neonates):   1. <0.5 ng/mL represents a low risk of severe sepsis and/or septic shock.  1. >2 ng/mL represents a high risk of severe sepsis and/or septic shock.    As a Marker for Lower Respiratory Tract Infections that require antibiotic therapy:  PCT on Admission     Antibiotic Therapy             6-12 Hrs later  > 0.5                Strongly Recommended            >0.25 - <0.5         Recommended  0.1 - 0.25           Discouraged                   Remeasure/reassess PCT  <0.1                 Strongly Discouraged          Remeasure/reassess PCT      As 28 day mortality risk marker: \"Change in Procalcitonin Result\" (> 80 % or <=80 %) if Day 0 (or Day 1) and Day 4 values are available. Refer to http://www.[facility]-pct-calculator.com/   Change in PCT <=80 %   A decrease of PCT levels below or equal to 80 % defines a positive change in PCT test result " representing a higher risk for 28-day all-cause mortality of patients diagnosed with severe sepsis or septic shock.  Change in PCT > 80 %   A decrease of PCT levels of more than 80 % defines a negative change in PCT result representing a lower risk for 28-day all-cause mortality of patients diagnosed with severe sepsis or septic shock.                Results may be falsely decreased if patient taking Biotin.     Lactic Acid, Plasma [[id number]]  (Abnormal) Collected: 09/14/20 1549    Specimen: Blood Updated: 09/14/20 1614     Lactate 3.1 mmol/L     Lactic Acid, Reflex Timer (This will reflex a repeat order 3-3:15 hours after ordered.) [[id number]] Collected: 09/14/20 1549    Specimen: Blood Updated: 09/14/20 1614    Blood Culture - Blood, Arm, Left [[id number]] Collected: 09/14/20 1549    Specimen: Blood from Arm, Left Updated: 09/14/20 1553    Troponin [[id number]]  (Abnormal) Collected: 09/14/20 1433    Specimen: Blood Updated: 09/14/20 1507     Troponin T 0.232 ng/mL     Narrative:      Troponin T Reference Range:  <= 0.03 ng/mL-   Negative for AMI  >0.03 ng/mL-     Abnormal for myocardial necrosis.  Clinicians would have to utilize clinical acumen, EKG, Troponin and serial changes to determine if it is an Acute Myocardial Infarction or myocardial injury due to an underlying chronic condition.       Results may be falsely decreased if patient taking Biotin.      COVID PRE-OP / PRE-PROCEDURE SCREENING ORDER (NO ISOLATION) - Swab, Nasopharynx [[id number]]  (Normal) Collected: 09/14/20 1237    Specimen: Swab from Nasopharynx Updated: 09/14/20 1414    Narrative:      The following orders were created for panel order COVID PRE-OP / PRE-PROCEDURE SCREENING ORDER (NO ISOLATION) - Swab, Nasopharynx.  Procedure                               Abnormality         Status                     ---------                               -----------         ------                     Respiratory Panel PCR w/...[[id number]]  Normal               Final result                 Please view results for these tests on the individual orders.    Respiratory Panel PCR w/COVID-19(SARS-CoV-2) [facility] In-House, NP Swab in UTM/VTM, 3-4 HR TAT - Swab, Nasopharynx [[id number]]  (Normal) Collected: 09/14/20 1237    Specimen: Swab from Nasopharynx Updated: 09/14/20 1414     ADENOVIRUS, PCR Not Detected     Coronavirus 229E Not Detected     Coronavirus HKU1 Not Detected     Coronavirus NL63 Not Detected     Coronavirus OC43 Not Detected     COVID19 Not Detected     Human Metapneumovirus Not Detected     Human Rhinovirus/Enterovirus Not Detected     Influenza A PCR Not Detected     Influenza A H1 Not Detected     Influenza A H1 2009 PCR Not Detected     Influenza A H3 Not Detected     Influenza B PCR Not Detected     Parainfluenza Virus 1 Not Detected     Parainfluenza Virus 2 Not Detected     Parainfluenza Virus 3 Not Detected     Parainfluenza Virus 4 Not Detected     RSV, PCR Not Detected     Bordetella pertussis pcr Not Detected     Bordetella parapertussis PCR Not Detected     Chlamydophila pneumoniae PCR Not Detected     Mycoplasma pneumo by PCR Not Detected    Narrative:      Fact sheet for providers: https://docs.[employer]/wp-content/uploads/UTF2410-3956-PI4.1-EUA-Provider-Fact-Sheet-3.pdf    Fact sheet for patients: https://docs.[employer]/wp-content/uploads/XFH4587-6449-QF0.1-EUA-Patient-Fact-Sheet-1.pdf    Pease Draw [[id number]] Collected: 09/14/20 1234    Specimen: Blood Updated: 09/14/20 1346    Narrative:      The following orders were created for panel order Pease Draw.  Procedure                               Abnormality         Status                     ---------                               -----------         ------                     Light Blue Top[[id number]]                                   Final result               Green Top (Gel)[[id number]]                                  Final result               Lavender  Top[[id number]]                                     Final result               Gold Top - SST[[id number]]                                   Final result                 Please view results for these tests on the individual orders.    Light Blue Top [[id number]] Collected: 09/14/20 1234    Specimen: Blood Updated: 09/14/20 1346     Extra Tube hold for add-on     Comment: Auto resulted       Lavender Top [[id number]] Collected: 09/14/20 1234    Specimen: Blood Updated: 09/14/20 1346     Extra Tube hold for add-on     Comment: Auto resulted       Gold Top - SST [[id number]] Collected: 09/14/20 1234    Specimen: Blood Updated: 09/14/20 1346     Extra Tube Hold for add-ons.     Comment: Auto resulted.       Green Top (Gel) [[id number]] Collected: 09/14/20 1234    Specimen: Blood Updated: 09/14/20 1346     Extra Tube Hold for add-ons.     Comment: Auto resulted.       Protime-INR [[id number]]  (Normal) Collected: 09/14/20 1234    Specimen: Blood Updated: 09/14/20 1334     Protime 13.3 Seconds      INR 1.02    aPTT [[id number]]  (Normal) Collected: 09/14/20 1234    Specimen: Blood Updated: 09/14/20 1334     PTT 25.1 seconds     Troponin [[id number]]  (Abnormal) Collected: 09/14/20 1234    Specimen: Blood Updated: 09/14/20 1311     Troponin T 0.236 ng/mL     Narrative:      Troponin T Reference Range:  <= 0.03 ng/mL-   Negative for AMI  >0.03 ng/mL-     Abnormal for myocardial necrosis.  Clinicians would have to utilize clinical acumen, EKG, Troponin and serial changes to determine if it is an Acute Myocardial Infarction or myocardial injury due to an underlying chronic condition.       Results may be falsely decreased if patient taking Biotin.      Comprehensive Metabolic Panel [[id number]]  (Abnormal) Collected: 09/14/20 1234    Specimen: Blood Updated: 09/14/20 1311     Glucose 287 mg/dL      BUN 50 mg/dL      Creatinine 2.32 mg/dL      Sodium 136 mmol/L      Potassium 4.3 mmol/L      Chloride 97 mmol/L      CO2 22.9 mmol/L       Calcium 9.7 mg/dL      Total Protein 6.7 g/dL      Albumin 4.10 g/dL      ALT (SGPT) 20 U/L      AST (SGOT) 18 U/L      Alkaline Phosphatase 112 U/L      Total Bilirubin 0.4 mg/dL      eGFR Non African Amer 20 mL/min/1.73      Globulin 2.6 gm/dL      A/G Ratio 1.6 g/dL      BUN/Creatinine Ratio 21.6     Anion Gap 16.1 mmol/L     Narrative:      GFR Normal >60  Chronic Kidney Disease <60  Kidney Failure <15      Magnesium [[id number]]  (Normal) Collected: 09/14/20 1234    Specimen: Blood Updated: 09/14/20 1309     Magnesium 1.9 mg/dL     BNP [[id number]]  (Abnormal) Collected: 09/14/20 1234    Specimen: Blood Updated: 09/14/20 1307     proBNP 7,491.0 pg/mL     Narrative:      Among patients with dyspnea, NT-proBNP is highly sensitive for the detection of acute congestive heart failure. In addition NT-proBNP of <300 pg/ml effectively rules out acute congestive heart failure with 99% negative predictive value.    Results may be falsely decreased if patient taking Biotin.      CBC & Differential [[id number]]  (Abnormal) Collected: 09/14/20 1234    Specimen: Blood Updated: 09/14/20 1258    Narrative:      The following orders were created for panel order CBC & Differential.  Procedure                               Abnormality         Status                     ---------                               -----------         ------                     CBC Auto Differential[[id number]]        Abnormal            Final result                 Please view results for these tests on the individual orders.    CBC Auto Differential [[id number]]  (Abnormal) Collected: 09/14/20 1234    Specimen: Blood Updated: 09/14/20 1258     WBC 20.26 10*3/mm3      RBC 4.00 10*6/mm3      Hemoglobin 13.3 g/dL      Hematocrit 38.7 %      MCV 96.8 fL      MCH 33.3 pg      MCHC 34.4 g/dL      RDW 12.8 %      RDW-SD 46.1 fl      MPV 12.0 fL      Platelets 339 10*3/mm3      Neutrophil % 89.3 %      Lymphocyte % 5.8 %      Monocyte % 4.0 %      Eosinophil % 0.0  %      Basophil % 0.2 %      Immature Grans % 0.7 %      Neutrophils, Absolute 18.09 10*3/mm3      Lymphocytes, Absolute 1.17 10*3/mm3      Monocytes, Absolute 0.81 10*3/mm3      Eosinophils, Absolute 0.00 10*3/mm3      Basophils, Absolute 0.04 10*3/mm3      Immature Grans, Absolute 0.15 10*3/mm3      nRBC 0.0 /100 WBC                    Imaging Results (All)     Procedure Component Value Units Date/Time    CT Abdomen Pelvis Without Contrast [[id number]] Collected: 09/14/20 1525     Updated: 09/14/20 1707    Narrative:      CT ABDOMEN PELVIS WO CONTRAST-, CT CHEST WO CONTRAST-     Radiation dose reduction techniques were utilized, including automated  exposure control and exposure modulation based on body size.     CLINICAL INFORMATION: Abdominal pain, chest pain, shortness of breath,  89-year-old female.     COMPARISON: CT of the abdomen and pelvis 02/19/2016, 04/09/2004 and CT  scan of the chest 03/08/2018, 04/16/2019.     CT CHEST FINDINGS: Contour and caliber of the thoracic aorta is stable  and within normal limits. There is heavy atherosclerotic plaque  formation. There are stable mildly enlarged mediastinal lymph nodes  again demonstrated. There is cardiac enlargement similar to the prior  examination. No pericardial abnormality. There is a stable moderate size  hiatal hernia. Likely paraesophageal type. Caliber of the esophagus  leading to this location is normal. There is thickening of the septal  markings with subtle areas of groundglass infiltrate demonstrated  throughout both lungs suggesting mild vascular congestion. However, no  pleural effusion, atypical pneumonia not excluded. There is bronchial  wall thickening bilaterally, question bronchitis.     CONCLUSION: Contour and caliber of the aorta is normal, there is cardiac  enlargement with septal thickening bilaterally and subtle areas of  groundglass infiltrate seen in both lungs, however, no pleural effusion.  Mild or early vascular congestion versus  atypical pneumonia. There is a  hiatal hernia again seen, moderate in size and likely paraesophageal. In  addition there is bilateral bronchial wall thickening, perhaps related  to bronchitis.     CT OF THE ABDOMEN AND PELVIS FINDINGS: The liver, pancreas and spleen  are satisfactory in appearance. No adrenal abnormality. No biliary duct  dilatation status post cholecystectomy. The kidneys are symmetric and  stable in appearance, no calculus or obstructive uropathy. Diameter of  the aorta is normal. There is heavy atherosclerotic plaque formation  with approximately 50% luminal compromise in the infrarenal region.  There is a right renal stent in position. Urinary bladder is collapsed.     There is diverticulosis of the colon, however, no active diverticulitis.  The appendix and small bowel have a normal appearance. Small  periumbilical hernia containing only mesenteric fat.     CONCLUSION:  1. Atherosclerotic calcification of the aorta with 50% luminal  compromise in the infrarenal region. No aneurysm.  2. Diverticulosis of the colon. No indication of active diverticulitis.  3. Periumbilical hernia.     Findings of this report called to Dr. Red in the emergency room at  the time of completion 3:20 PM.        This report was finalized on 9/14/2020 5:04 PM by Dr. Rm Grace M.D.       CT Chest Without Contrast [[id number]] Collected: 09/14/20 1525     Updated: 09/14/20 1707    Narrative:      CT ABDOMEN PELVIS WO CONTRAST-, CT CHEST WO CONTRAST-     Radiation dose reduction techniques were utilized, including automated  exposure control and exposure modulation based on body size.     CLINICAL INFORMATION: Abdominal pain, chest pain, shortness of breath,  89-year-old female.     COMPARISON: CT of the abdomen and pelvis 02/19/2016, 04/09/2004 and CT  scan of the chest 03/08/2018, 04/16/2019.     CT CHEST FINDINGS: Contour and caliber of the thoracic aorta is stable  and within normal limits. There is heavy  atherosclerotic plaque  formation. There are stable mildly enlarged mediastinal lymph nodes  again demonstrated. There is cardiac enlargement similar to the prior  examination. No pericardial abnormality. There is a stable moderate size  hiatal hernia. Likely paraesophageal type. Caliber of the esophagus  leading to this location is normal. There is thickening of the septal  markings with subtle areas of groundglass infiltrate demonstrated  throughout both lungs suggesting mild vascular congestion. However, no  pleural effusion, atypical pneumonia not excluded. There is bronchial  wall thickening bilaterally, question bronchitis.     CONCLUSION: Contour and caliber of the aorta is normal, there is cardiac  enlargement with septal thickening bilaterally and subtle areas of  groundglass infiltrate seen in both lungs, however, no pleural effusion.  Mild or early vascular congestion versus atypical pneumonia. There is a  hiatal hernia again seen, moderate in size and likely paraesophageal. In  addition there is bilateral bronchial wall thickening, perhaps related  to bronchitis.     CT OF THE ABDOMEN AND PELVIS FINDINGS: The liver, pancreas and spleen  are satisfactory in appearance. No adrenal abnormality. No biliary duct  dilatation status post cholecystectomy. The kidneys are symmetric and  stable in appearance, no calculus or obstructive uropathy. Diameter of  the aorta is normal. There is heavy atherosclerotic plaque formation  with approximately 50% luminal compromise in the infrarenal region.  There is a right renal stent in position. Urinary bladder is collapsed.     There is diverticulosis of the colon, however, no active diverticulitis.  The appendix and small bowel have a normal appearance. Small  periumbilical hernia containing only mesenteric fat.     CONCLUSION:  1. Atherosclerotic calcification of the aorta with 50% luminal  compromise in the infrarenal region. No aneurysm.  2. Diverticulosis of the  colon. No indication of active diverticulitis.  3. Periumbilical hernia.     Findings of this report called to Dr. Red in the emergency room at  the time of completion 3:20 PM.        This report was finalized on 9/14/2020 5:04 PM by Dr. Rm Grace M.D.       XR Chest 1 View [[id number]] Collected: 09/14/20 1259     Updated: 09/14/20 1311    Narrative:      ONE VIEW PORTABLE CHEST     HISTORY: Chest pain.     FINDINGS: There is cardiomegaly with mild vascular congestion and  interstitial prominence actually showing improvement from 8 days ago.  The findings remain concerning for changes of mild CHF. I cannot  completely exclude a component of superimposed bronchitis. There appears  to be a moderately large hiatus hernia that is unchanged.     This report was finalized on 9/14/2020 1:08 PM by Dr. Miguel Rain M.D.           Past Medical History:   Diagnosis Date   • Acute on chronic congestive heart failure (CMS/HCC)    • Acute transmural inferior wall MI (CMS/HCC)    • Arthritis 03/13/2014    CONT TYLENOL FOR PAIN/ [facility] (INTERNAL MEDICINE)   • CAD (coronary artery disease)    • Cancer of uterus (CMS/HCC)    • Carotid artery stenosis    • Chronic renal failure syndrome    • Coronary artery disease involving coronary bypass graft of native heart     with other forms of angina pectoris   • Coronary atherosclerosis    • Endometrial cancer (CMS/HCC)    • Esophageal reflux    • Essential hypertension    • Glaucoma    • Hypertension    • Malaise and fatigue    • Osteoarthritis    • Sleep apnea     USING CPAP   • Spinal stenosis    • Thyroid disease    • Type 2 diabetes mellitus (CMS/HCC) 1991    INsulin begun 2012   • Vitamin B12 deficiency     SHE HAS NOT HAD THIS CHECKED IN A WHILE. SHE HAD BEEN ON SHOTS.  CHECK HER B12 LEVELS TODAY.  BY [facility]       Assessment:  Active Hospital Problems    Diagnosis  POA   • **Community acquired pneumonia [J18.9]  Yes   • Elevated troponin [R79.89]  Unknown    • Leukocytosis [D72.829]  Unknown   • Acute chest pain [R07.9]  Unknown   • CKD (chronic kidney disease) stage 3, GFR 30-59 ml/min (CMS/[facility] [place]) [N18.3]  Yes   • Chronic coronary artery disease [I25.10]  Yes   • Diabetes mellitus (CMS/[facility] [place]) [E11.9]  Yes   • Hypertension [I10]  Yes   • Hypothyroidism [E03.9]  Yes      Resolved Hospital Problems   No resolved problems to display.       Plan:  We will consult cardiology and nephrology.  We will get some serial cardiac enzymes and continue with IV antibiotics for possible pneumonia.  We will continue with oxygen.  We will get some follow-up lab studies.    Aaron Warner MD  9/14/2020  18:32 EDT

## 2020-09-14 NOTE — ED PROVIDER NOTES
EMERGENCY DEPARTMENT ENCOUNTER    Room Number:  43/43  Date of encounter:  9/14/2020  PCP: Dario Mae MD    HPI:  Context: Alessia Madrigal is a 89 y.o. female who presents to the ED c/o chief complaint of chest pain.  Patient states she began to experience lower abdominal pain this morning, described as pressure.  Pain began to radiate upwards increasing to the top part of her stomach.  Patient reports that she began to experience chest pressure approximately 23 minutes prior to arrival.  Patient denies any radiation of the chest pressure, no radiation of the abdominal pain to her extremities.  Patient does endorse shortness of breath, has had nausea with no emesis, no diaphoresis.  Patient states she has not ambulated, is unsure if pain is exertional.  Patient received aspirin and nitroglycerin via EMS prior to arrival, reports pain went from 10 out of 10 to 7 out of 10.  Patient states pain feels similar to previous MI.    MEDICAL HISTORY REVIEW  Reviewed in EPIC    PAST MEDICAL HISTORY  Active Ambulatory Problems     Diagnosis Date Noted   • Actinic keratosis 02/02/2016   • Anemia of chronic disorder 02/02/2016   • Disorder of aorta (CMS/[facility]) 02/02/2016   • Chronic coronary artery disease 02/02/2016   • CKD (chronic kidney disease) stage 3, GFR 30-59 ml/min (CMS/[facility]) 02/02/2016   • Constipation 02/02/2016   • Cor pulmonale (CMS/[facility]) 02/02/2016   • Diabetes mellitus (CMS/[facility]) 02/02/2016   • High level of uric acid in blood 02/02/2016   • Fatigue 02/02/2016   • Hypertension 02/02/2016   • Hypothyroidism 02/02/2016   • Iron deficiency 02/02/2016   • Mitral valve insufficiency 02/02/2016   • Pulmonary hypertension (CMS/[facility]) 02/02/2016   • Swelling of lower extremity 02/02/2016   • Shortness of breath 02/02/2016   • Tricuspid valve insufficiency 02/02/2016   • Uncontrolled type 2 diabetes mellitus (CMS/[facility]) 02/02/2016   • Type 2 diabetes mellitus with chronic kidney disease, with long-term current use of  insulin (CMS/HCC) 02/02/2016   • Vitamin D deficiency 03/25/2016   • Secondary hyperparathyroidism, non-renal (CMS/HCC) 03/25/2016   • Pulmonary nodules/lesions, multiple 05/27/2016   • Pleural effusion 05/27/2016   • Paronychia of second finger of right hand 09/29/2016   • Hyperuricemia 12/29/2016   • Acute pain of both shoulders 01/10/2018   • Acute transmural inferior wall MI (CMS/HCC) 03/11/2018   • Wrist injury, left, initial encounter 11/06/2018   • Unstable angina (CMS/HCC) 05/07/2020   • Poorly-controlled hypertension 06/15/2020   • Chest pain due to CAD (CMS/HCC) 06/15/2020   • Obesity (BMI 30-39.9) 06/15/2020   • BRITTANY (obstructive sleep apnea) 06/15/2020   • Non-STEMI (non-ST elevated myocardial infarction) (CMS/HCC) 09/06/2020     Resolved Ambulatory Problems     Diagnosis Date Noted   • Hyperlipidemia 02/02/2016   • NSTEMI (non-ST elevated myocardial infarction) (CMS/HCC) 05/07/2020     Past Medical History:   Diagnosis Date   • Acute on chronic congestive heart failure (CMS/[facility])    • Arthritis 03/13/2014   • CAD (coronary artery disease)    • Cancer of uterus (CMS/HCC)    • Carotid artery stenosis    • Chronic renal failure syndrome    • Coronary artery disease involving coronary bypass graft of native heart    • Coronary atherosclerosis    • Endometrial cancer (CMS/[facility])    • Esophageal reflux    • Essential hypertension    • Glaucoma    • Malaise and fatigue    • Osteoarthritis    • Sleep apnea    • Spinal stenosis    • Thyroid disease    • Type 2 diabetes mellitus (CMS/HCC) 1991   • Vitamin B12 deficiency        PAST SURGICAL HISTORY  Past Surgical History:   Procedure Laterality Date   • CARDIAC CATHETERIZATION N/A 3/11/2018    Procedure: Left Heart Cath;  Surgeon: Cameron Chisholm MD;  Location: [facility] CATH INVASIVE LOCATION;  Service: Cardiovascular   • CARDIAC CATHETERIZATION N/A 3/11/2018    Procedure: Stent ROSSI bypass graft;  Surgeon: Cameron Chisholm MD;  Location: [facility] CATH INVASIVE LOCATION;   Service: Cardiovascular   • CARDIAC CATHETERIZATION N/A 5/7/2020    Procedure: Left Heart Cath;  Surgeon: Cameron Chisholm MD;  Location:  [facility] CATH INVASIVE LOCATION;  Service: Cardiovascular;  Laterality: N/A;   • CARDIAC CATHETERIZATION N/A 5/7/2020    Procedure: Saphenous Vein Graft;  Surgeon: Cameron Chisholm MD;  Location:  [facility] CATH INVASIVE LOCATION;  Service: Cardiovascular;  Laterality: N/A;   • CATARACT EXTRACTION Bilateral    • CATARACT EXTRACTION W/ INTRAOCULAR LENS IMPLANT Right 10/25/2016    Procedure: RT SUPERFICIAL KERATECTOMY ;  Surgeon: Arian Singh MD;  Location:  [facility];  Service:    • CHOLECYSTECTOMY     • COLONOSCOPY     • CORONARY ARTERY BYPASS GRAFT  1991    X3   • HYSTERECTOMY     • OOPHORECTOMY     • TRANSLUMINAL ATHERECTOMY PERONEAL ARTERY      PERCUTANEOUS TRANSLUMINAL ATHERECTOMY RENAL ARTERY       FAMILY HISTORY  Family History   Problem Relation Age of Onset   • Colon cancer Other    • Heart disease Other    • Hypertension Other    • Stroke Other         ISCHEMIC   • Colon cancer Mother    • Stroke Father    • Heart attack Father    • Heart disease Father    • Heart attack Brother    • Stroke Brother    • Heart disease Brother    • Breast cancer Maternal Grandmother        SOCIAL HISTORY  Social History     Socioeconomic History   • Marital status: Single     Spouse name: Not on file   • Number of children: Not on file   • Years of education: Not on file   • Highest education level: Not on file   Tobacco Use   • Smoking status: Never Smoker   • Smokeless tobacco: Never Used   • Tobacco comment: caffeine use- tea   Substance and Sexual Activity   • Alcohol use: No   • Drug use: Never   • Sexual activity: Defer   Lifestyle   • Physical activity     Days per week: 0 days     Minutes per session: 0 min   • Stress: Not on file       ALLERGIES  Allopurinol, Clindamycin/lincomycin, Statins, Ampicillin, Penicillins, and Pravastatin    The patient's allergies have been  reviewed    REVIEW OF SYSTEMS  All systems reviewed and negative except for those discussed in HPI.     PHYSICAL EXAM  I have reviewed the triage vital signs and nursing notes.  ED Triage Vitals   Temp Heart Rate Resp BP SpO2   09/14/20 1221 09/14/20 1213 09/14/20 1213 09/14/20 1223 09/14/20 1213   97.8 °F (36.6 °C) 71 18 172/76 97 %      Temp src Heart Rate Source Patient Position BP Location FiO2 (%)   09/14/20 1221 09/14/20 1213 -- -- --   Oral Monitor        GENERAL: No acute distress  HENT: NCAT, PERRL, Nares patent  EYES: no scleral icterus  NECK: trachea midline, no ROM limitations  CV: regular rhythm, regular rate  RESPIRATORY: normal effort  ABDOMEN: soft, nondistended, nontender palpation, no abdominal masses  : deferred  MUSCULOSKELETAL: no deformity  NEURO: alert, moves all extremities, 5 out of 5 strength in all extremities, sensation intact light touch all extremities, follows commands  SKIN: warm, dry.  2+ bilateral radial pulses, 1+ bilateral lower extremity pulses.    LAB RESULTS  Recent Results (from the past 24 hour(s))   Protime-INR    Collection Time: 09/14/20 12:34 PM    Specimen: Blood   Result Value Ref Range    Protime 13.3 11.7 - 14.2 Seconds    INR 1.02 0.90 - 1.10   aPTT    Collection Time: 09/14/20 12:34 PM    Specimen: Blood   Result Value Ref Range    PTT 25.1 22.7 - 35.4 seconds   BNP    Collection Time: 09/14/20 12:34 PM    Specimen: Blood   Result Value Ref Range    proBNP 7,491.0 (H) 0.0-1,800.0 pg/mL   Magnesium    Collection Time: 09/14/20 12:34 PM    Specimen: Blood   Result Value Ref Range    Magnesium 1.9 1.7 - 2.3 mg/dL   Comprehensive Metabolic Panel    Collection Time: 09/14/20 12:34 PM    Specimen: Blood   Result Value Ref Range    Glucose 287 (H) 65 - 99 mg/dL    BUN 50 (H) 8 - 23 mg/dL    Creatinine 2.32 (H) 0.57 - 1.00 mg/dL    Sodium 136 136 - 145 mmol/L    Potassium 4.3 3.5 - 5.2 mmol/L    Chloride 97 (L) 98 - 107 mmol/L    CO2 22.9 22.0 - 29.0 mmol/L    Calcium 9.7  (H) 8.2 - 9.6 mg/dL    Total Protein 6.7 6.0 - 8.5 g/dL    Albumin 4.10 3.50 - 5.20 g/dL    ALT (SGPT) 20 1 - 33 U/L    AST (SGOT) 18 1 - 32 U/L    Alkaline Phosphatase 112 39 - 117 U/L    Total Bilirubin 0.4 0.0 - 1.2 mg/dL    eGFR Non African Amer 20 (L) >60 mL/min/1.73    Globulin 2.6 gm/dL    A/G Ratio 1.6 g/dL    BUN/Creatinine Ratio 21.6 7.0 - 25.0    Anion Gap 16.1 (H) 5.0 - 15.0 mmol/L   Troponin    Collection Time: 09/14/20 12:34 PM    Specimen: Blood   Result Value Ref Range    Troponin T 0.236 (C) 0.000 - 0.030 ng/mL   Light Blue Top    Collection Time: 09/14/20 12:34 PM   Result Value Ref Range    Extra Tube hold for add-on    Green Top (Gel)    Collection Time: 09/14/20 12:34 PM   Result Value Ref Range    Extra Tube Hold for add-ons.    Lavender Top    Collection Time: 09/14/20 12:34 PM   Result Value Ref Range    Extra Tube hold for add-on    Gold Top - SST    Collection Time: 09/14/20 12:34 PM   Result Value Ref Range    Extra Tube Hold for add-ons.    CBC Auto Differential    Collection Time: 09/14/20 12:34 PM    Specimen: Blood   Result Value Ref Range    WBC 20.26 (H) 3.40 - 10.80 10*3/mm3    RBC 4.00 3.77 - 5.28 10*6/mm3    Hemoglobin 13.3 12.0 - 15.9 g/dL    Hematocrit 38.7 34.0 - 46.6 %    MCV 96.8 79.0 - 97.0 fL    MCH 33.3 (H) 26.6 - 33.0 pg    MCHC 34.4 31.5 - 35.7 g/dL    RDW 12.8 12.3 - 15.4 %    RDW-SD 46.1 37.0 - 54.0 fl    MPV 12.0 6.0 - 12.0 fL    Platelets 339 140 - 450 10*3/mm3    Neutrophil % 89.3 (H) 42.7 - 76.0 %    Lymphocyte % 5.8 (L) 19.6 - 45.3 %    Monocyte % 4.0 (L) 5.0 - 12.0 %    Eosinophil % 0.0 (L) 0.3 - 6.2 %    Basophil % 0.2 0.0 - 1.5 %    Immature Grans % 0.7 (H) 0.0 - 0.5 %    Neutrophils, Absolute 18.09 (H) 1.70 - 7.00 10*3/mm3    Lymphocytes, Absolute 1.17 0.70 - 3.10 10*3/mm3    Monocytes, Absolute 0.81 0.10 - 0.90 10*3/mm3    Eosinophils, Absolute 0.00 0.00 - 0.40 10*3/mm3    Basophils, Absolute 0.04 0.00 - 0.20 10*3/mm3    Immature Grans, Absolute 0.15 (H) 0.00  - 0.05 10*3/mm3    nRBC 0.0 0.0 - 0.2 /100 WBC   Respiratory Panel PCR w/COVID-19(SARS-CoV-2) [facility], NP Swab in UTM/VTM, 3-4 HR TAT - Swab, Nasopharynx    Collection Time: 09/14/20 12:37 PM    Specimen: Nasopharynx; Swab   Result Value Ref Range    ADENOVIRUS, PCR Not Detected Not Detected    Coronavirus 229E Not Detected Not Detected    Coronavirus HKU1 Not Detected Not Detected    Coronavirus NL63 Not Detected Not Detected    Coronavirus OC43 Not Detected Not Detected    COVID19 Not Detected Not Detected - Ref. Range    Human Metapneumovirus Not Detected Not Detected    Human Rhinovirus/Enterovirus Not Detected Not Detected    Influenza A PCR Not Detected Not Detected    Influenza A H1 Not Detected Not Detected    Influenza A H1 2009 PCR Not Detected Not Detected    Influenza A H3 Not Detected Not Detected    Influenza B PCR Not Detected Not Detected    Parainfluenza Virus 1 Not Detected Not Detected    Parainfluenza Virus 2 Not Detected Not Detected    Parainfluenza Virus 3 Not Detected Not Detected    Parainfluenza Virus 4 Not Detected Not Detected    RSV, PCR Not Detected Not Detected    Bordetella pertussis pcr Not Detected Not Detected    Bordetella parapertussis PCR Not Detected Not Detected    Chlamydophila pneumoniae PCR Not Detected Not Detected    Mycoplasma pneumo by PCR Not Detected Not Detected   Troponin    Collection Time: 09/14/20  2:33 PM    Specimen: Blood   Result Value Ref Range    Troponin T 0.232 (C) 0.000 - 0.030 ng/mL   Lactic Acid, Plasma    Collection Time: 09/14/20  3:49 PM    Specimen: Blood   Result Value Ref Range    Lactate 3.1 (C) 0.5 - 2.0 mmol/L       I ordered the above labs and reviewed the results.    RADIOLOGY  Ct Abdomen Pelvis Without Contrast    Result Date: 9/14/2020  CT ABDOMEN PELVIS WO CONTRAST-, CT CHEST WO CONTRAST-  Radiation dose reduction techniques were utilized, including automated exposure control and exposure modulation based on body  size.  CLINICAL INFORMATION: Abdominal pain, chest pain, shortness of breath, 89-year-old female.  COMPARISON: CT of the abdomen and pelvis 02/19/2016, 04/09/2004 and CT scan of the chest 03/08/2018, 04/16/2019.  CT CHEST FINDINGS: Contour and caliber of the thoracic aorta is stable and within normal limits. There is heavy atherosclerotic plaque formation. There are stable mildly enlarged mediastinal lymph nodes again demonstrated. There is cardiac enlargement similar to the prior examination. No pericardial abnormality. There is a stable moderate size hiatal hernia. Likely paraesophageal type. Caliber of the esophagus leading to this location is normal. There is thickening of the septal markings with subtle areas of groundglass infiltrate demonstrated throughout both lungs suggesting mild vascular congestion. However, no pleural effusion, atypical pneumonia not excluded. There is bronchial wall thickening bilaterally, question bronchitis.  CONCLUSION: Contour and caliber of the aorta is normal, there is cardiac enlargement with septal thickening bilaterally and subtle areas of groundglass infiltrate seen in both lungs, however, no pleural effusion. Mild or early vascular congestion versus atypical pneumonia. There is a hiatal hernia again seen, moderate in size and likely paraesophageal. In addition there is bilateral bronchial wall thickening, perhaps related to bronchitis.  CT OF THE ABDOMEN AND PELVIS FINDINGS: The liver, pancreas and spleen are satisfactory in appearance. No adrenal abnormality. No biliary duct dilatation status post cholecystectomy. The kidneys are symmetric and stable in appearance, no calculus or obstructive uropathy. Diameter of the aorta is normal. There is heavy atherosclerotic plaque formation with approximately 50% luminal compromise in the infrarenal region. There is a right renal stent in position. Urinary bladder is collapsed.  There is diverticulosis of the colon, however, no active  diverticulitis. The appendix and small bowel have a normal appearance. Small periumbilical hernia containing only mesenteric fat.  CONCLUSION: 1. Atherosclerotic calcification of the aorta with 50% luminal compromise in the infrarenal region. No aneurysm. 2. Diverticulosis of the colon. No indication of active diverticulitis. 3. Periumbilical hernia.  Findings of this report called to Dr. Red in the emergency room at the time of completion 3:20 PM.         Ct Chest Without Contrast    Result Date: 9/14/2020  CT ABDOMEN PELVIS WO CONTRAST-, CT CHEST WO CONTRAST-  Radiation dose reduction techniques were utilized, including automated exposure control and exposure modulation based on body size.  CLINICAL INFORMATION: Abdominal pain, chest pain, shortness of breath, 89-year-old female.  COMPARISON: CT of the abdomen and pelvis 02/19/2016, 04/09/2004 and CT scan of the chest 03/08/2018, 04/16/2019.  CT CHEST FINDINGS: Contour and caliber of the thoracic aorta is stable and within normal limits. There is heavy atherosclerotic plaque formation. There are stable mildly enlarged mediastinal lymph nodes again demonstrated. There is cardiac enlargement similar to the prior examination. No pericardial abnormality. There is a stable moderate size hiatal hernia. Likely paraesophageal type. Caliber of the esophagus leading to this location is normal. There is thickening of the septal markings with subtle areas of groundglass infiltrate demonstrated throughout both lungs suggesting mild vascular congestion. However, no pleural effusion, atypical pneumonia not excluded. There is bronchial wall thickening bilaterally, question bronchitis.  CONCLUSION: Contour and caliber of the aorta is normal, there is cardiac enlargement with septal thickening bilaterally and subtle areas of groundglass infiltrate seen in both lungs, however, no pleural effusion. Mild or early vascular congestion versus atypical pneumonia. There is a hiatal  hernia again seen, moderate in size and likely paraesophageal. In addition there is bilateral bronchial wall thickening, perhaps related to bronchitis.  CT OF THE ABDOMEN AND PELVIS FINDINGS: The liver, pancreas and spleen are satisfactory in appearance. No adrenal abnormality. No biliary duct dilatation status post cholecystectomy. The kidneys are symmetric and stable in appearance, no calculus or obstructive uropathy. Diameter of the aorta is normal. There is heavy atherosclerotic plaque formation with approximately 50% luminal compromise in the infrarenal region. There is a right renal stent in position. Urinary bladder is collapsed.  There is diverticulosis of the colon, however, no active diverticulitis. The appendix and small bowel have a normal appearance. Small periumbilical hernia containing only mesenteric fat.  CONCLUSION: 1. Atherosclerotic calcification of the aorta with 50% luminal compromise in the infrarenal region. No aneurysm. 2. Diverticulosis of the colon. No indication of active diverticulitis. 3. Periumbilical hernia.  Findings of this report called to Dr. Red in the emergency room at the time of completion 3:20 PM.         Xr Chest 1 View    Result Date: 9/14/2020  ONE VIEW PORTABLE CHEST  HISTORY: Chest pain.  FINDINGS: There is cardiomegaly with mild vascular congestion and interstitial prominence actually showing improvement from 8 days ago. The findings remain concerning for changes of mild CHF. I cannot completely exclude a component of superimposed bronchitis. There appears to be a moderately large hiatus hernia that is unchanged.  This report was finalized on 9/14/2020 1:08 PM by Dr. Miguel Rain M.D.        I ordered the above noted radiological studies. I reviewed the images and results. I agree with the radiologist interpretation.    PROCEDURES  Procedures    MEDICATIONS GIVEN IN ER  Medications   sodium chloride 0.9 % flush 10 mL (has no administration in time range)    cefTRIAXone (ROCEPHIN) IVPB 1 g (1 g Intravenous New Bag 9/14/20 1607)   nitroglycerin (NITROSTAT) SL tablet 0.4 mg (0.4 mg Sublingual Given 9/14/20 1355)   morphine injection 4 mg (4 mg Intravenous Given 9/14/20 1234)   sodium chloride 0.9 % bolus 500 mL (0 mL Intravenous Stopped 9/14/20 1541)   ondansetron (ZOFRAN) injection 8 mg (8 mg Intravenous Given 9/14/20 1234)   morphine injection 4 mg (4 mg Intravenous Given 9/14/20 1438)   furosemide (LASIX) injection 40 mg (40 mg Intravenous Given 9/14/20 1606)       PROGRESS, DATA ANALYSIS, CONSULTS, AND MEDICAL DECISION MAKING  A complete history and physical exam have been performed.  All available laboratory and imaging results have been reviewed by myself prior to disposition.  Patient was placed in face mask in first look. Patient was wearing facemask when I entered the room and throughout our encounter. I wore full protective equipment throughout this patient encounter including a face mask, and gloves. Hand hygiene was performed before donning protective equipment and after removal when leaving the room.  MDM  After the initial H&P, I discussed pertinent information from history and physical exam with patient/family.  Discussed differential diagnosis.  Discussed plan for ED evaluation/work-up/treatment.  All questions answered.  Patient/family is agreeable with plan.  ED Course as of Sep 14 1617   Mon Sep 14, 2020   1213 EKG independently viewed and contemporaneously interpreted by ED physician. Time: 12:11 PM.  Rate 69.  Interpretation: Normal sinus rhythm, normal axis, normal QRS, slight ST elevation in lead III, no contiguous ST elevation.  ST depression with T wave inversion and leads I, aVL, V2 through V6.  Prior EKG appears similar although ST depression and T wave inversion does appear somewhat worse in this EKG.    [JG]   1226 Posterior EKG obtained.  EKG independently viewed and contemporaneously interpreted by ED physician. Time: 12:18 PM.  Rate 76.   Interpretation: Normal sinus rhythm, normal axis, normal QRS,.  Again slight ST elevation is seen in lead III, no contiguous ST elevation, same ST depression with T wave inversions is seen in lead I, aVL, V2 through V3.  The posterior leads of V4 through V6 showed no ST elevation.  EKG negative for posterior ST MI.    [JG]   1445 Patient was last admitted to our hospital from the sixth this month with a 10.  Patient with history of coronary artery disease status post CABG, hypertension, hyperlipidemia, insulin-dependent diabetics, chronic kidney disease, renal artery stenosis status post renal artery stenting.  Patient was admitted, found to have an NSTEMI, was on heparin, antianginals were titrated, mild volume overload responded to diuretics, discharge home with outpatient follow-up.  Patient's last cardiac cath was performed when she was admitted to the hospital in May with an end STEMI.    [JG]   1521 Phone call with radiologist.  I had previously reviewed the images. I discussed the patient, imaging, and their interpretation.  See dictated report for final interpretation.        [JG]   1527 Repeat troponin is flat.    [JG]   1535 Patient reassessed.  Patient reports abdominal pain has resolved, chest pain has resolved.  Patient's only complaint at present is shortness of breath.  Upon further questioning, patient denies any shortness of breath yesterday, denies any cough or fevers.  Discussed ED findings, differential diagnosis, and the need for admission for evaluation/treatment.  They are agreeable to admission and all questions were answered.        [JG]   9152 Phone call with Dr Warner.  Discussed the patient, relevant history, exam, diagnostics, ED findings/progress, and concerns. They agree to admit the patient to tele.  I discussed patient's negative Kovic test as well as lab results and CT imaging findings.  Dr. Warner agrees with discontinuing isolation.  Care assumed by the admitting physician at this  time.        [JG]   1616 Phone call with Dr Farrell.  Discussed the patient, relevant history, exam, diagnostics, ED findings/progress, and concerns. He agrees to consult.        [JG]      ED Course User Index  [JG] Rickey Red MD       AS OF 16:17 EDT VITALS:    BP - 173/84  HR - 87  TEMP - 97.8 °F (36.6 °C) (Oral)  O2 SATS - 94%    DIAGNOSIS  Final diagnoses:   Community acquired pneumonia, unspecified laterality   Leukocytosis, unspecified type   Acute chest pain   Elevated troponin   Chronic kidney disease, unspecified CKD stage     Critical care:  Total critical care time of 50 minutes is exclusive of any other billable procedures and includes time spent with direct patient care and observation, retrospective chart review, management of acute condition, and consultation with other physicians.    DISPOSITION  ADMISSION    Discussed treatment plan and reason for admission with pt/family and admitting physician.  Pt/family voiced understanding of the plan for admission for further testing/treatment as needed.          Rickey Red MD  09/14/20 1444

## 2020-09-14 NOTE — ED NOTES
Pt oxygen saturation dropped to 88%, this RN placed pt on 2L. Pt oxygen saturation at 95% at this time.      Roxanne Kaplan, RN  09/14/20 [id number]

## 2020-09-14 NOTE — TELEPHONE ENCOUNTER
"Having dry heaves, loose stools and abd pain rated 8-9, having abd pain since she had blow out, of stool, started at 3AM, after being constipated, She said was just discharged Thursday had MI, no pain in chest but some pressure just started and dry heaving now. Told to call 911 or have someone bring to hospital    Reason for Disposition  • [1] SEVERE pain AND [2] age > 60    Additional Information  • Negative: Shock suspected (e.g., cold/pale/clammy skin, too weak to stand, low BP, rapid pulse)  • Negative: Difficult to awaken or acting confused (e.g., disoriented, slurred speech)  • Negative: Passed out (i.e., lost consciousness, collapsed and was not responding)  • Negative: Sounds like a life-threatening emergency to the triager  • Negative: Chest pain  • Negative: Pain is mainly in upper abdomen  (if needed ask: \"is it mainly above the belly button?\")  • Negative: Followed an abdomen (stomach) injury  • Negative: [1] Abdominal pain AND [2] pregnant < 20 weeks  • Negative: [1] Abdominal pain AND [2] pregnant > 20 weeks  • Negative: [1] Abdominal pain AND [2] postpartum (from 0 to 6 weeks after delivery)  • Negative: [1] SEVERE pain (e.g., excruciating) AND [2] present > 1 hour  • Negative: [1] Vomiting AND [2] contains red blood or black (\"coffee ground\") material  (Exception: few red streaks in vomit that only happened once)  • Negative: Blood in bowel movements   (Exception: blood on surface of BM with constipation)  • Negative: Black or tarry bowel movements  (Exception: chronic-unchanged  black-grey bowel movements AND is taking iron pills or Pepto-bismol)  • Negative: Patient sounds very sick or weak to the triager    Answer Assessment - Initial Assessment Questions  1. LOCATION: \"Where does it hurt?\"       Lower abd  2. RADIATION: \"Does the pain shoot anywhere else?\" (e.g., chest, back)      Shoots to chest  3. ONSET: \"When did the pain begin?\" (e.g., minutes, hours or days ago)       3AM  4. SUDDEN: " "\"Gradual or sudden onset?\"      Sudden with blow out of stool  5. PATTERN \"Does the pain come and go, or is it constant?\"     - If constant: \"Is it getting better, staying the same, or worsening?\"       (Note: Constant means the pain never goes away completely; most serious pain is constant and it progresses)      - If intermittent: \"How long does it last?\" \"Do you have pain now?\"      (Note: Intermittent means the pain goes away completely between bouts)      constant  6. SEVERITY: \"How bad is the pain?\"  (e.g., Scale 1-10; mild, moderate, or severe)    - MILD (1-3): doesn't interfere with normal activities, abdomen soft and not tender to touch     - MODERATE (4-7): interferes with normal activities or awakens from sleep, tender to touch     - SEVERE (8-10): excruciating pain, doubled over, unable to do any normal activities       Rated 8-9  7. RECURRENT SYMPTOM: \"Have you ever had this type of abdominal pain before?\" If so, ask: \"When was the last time?\" and \"What happened that time?\"       no  8. CAUSE: \"What do you think is causing the abdominal pain?\"      unknown  9. RELIEVING/AGGRAVATING FACTORS: \"What makes it better or worse?\" (e.g., movement, antacids, bowel movement)      no  10. OTHER SYMPTOMS: \"Has there been any vomiting, diarrhea, constipation, or urine problems?\"        Constipated, nausea, now loose stools  11. PREGNANCY: \"Is there any chance you are pregnant?\" \"When was your last menstrual period?\"        no    Protocols used: ABDOMINAL PAIN - FEMALE-ADULT-AH      "

## 2020-09-15 PROBLEM — I21.4 NSTEMI (NON-ST ELEVATED MYOCARDIAL INFARCTION) (HCC): Status: ACTIVE | Noted: 2020-01-01

## 2020-09-15 PROBLEM — R10.30 LOWER ABDOMINAL PAIN: Status: ACTIVE | Noted: 2020-01-01

## 2020-09-15 PROBLEM — N17.9 AKI (ACUTE KIDNEY INJURY) (HCC): Status: ACTIVE | Noted: 2020-01-01

## 2020-09-15 PROBLEM — I50.33 ACUTE ON CHRONIC DIASTOLIC CHF (CONGESTIVE HEART FAILURE) (HCC): Status: ACTIVE | Noted: 2020-01-01

## 2020-09-15 NOTE — PROGRESS NOTES
Discharge Planning Assessment  [facility]     Patient Name: Alessia Madrigal  MRN: [id number]  Today's Date: 9/15/2020    Admit Date: 9/14/2020    Discharge Needs Assessment     Row Name 09/15/20 1208       Living Environment    Lives With  alone    Current Living Arrangements  home/apartment/condo    Primary Care Provided by  self    Provides Primary Care For  no one    Family Caregiver if Needed  other (see comments) José Rohan, Nephew/[facility], [phone]    Quality of Family Relationships  involved    Able to Return to Prior Arrangements  yes       Resource/Environmental Concerns    Resource/Environmental Concerns  none       Transition Planning    Patient/Family Anticipates Transition to  home;inpatient rehabilitation facility    Patient/Family Anticipated Services at Transition  home health care;skilled nursing    Transportation Anticipated  family or friend will provide       Discharge Needs Assessment    Readmission Within the Last 30 Days  previous discharge plan unsuccessful    Equipment Currently Used at Home  cpap;rollator;walker, rolling;commode;bath bench;glucometer    Concerns to be Addressed  care coordination/care conferences;decision-making;discharge planning    Anticipated Changes Related to Illness  none    Equipment Needed After Discharge  none    Discharge Facility/Level of Care Needs  home with home health;nursing facility, skilled    Provided Post Acute Provider List?  Yes    Post Acute Provider List  Home Health;Nursing Home    Provided Post Acute Provider Quality & Resource List?  Refused    Refused Quality and Resource List Comment  Refused at this time. Current with [facility]  and if SNF needed will address at that time.    Delivered To  Patient    Method of Delivery  In person    Patient's Choice of Community Agency(s)  [facility]    Current Discharge Risk  lives alone;lack of support system/caregiver;chronically ill        Discharge Plan     Row Name 09/15/20 1214       Plan     Plan  Home with [facility] . Will need to follow for possible SNF need. Currently on O2, does not wear home O2. Will need PT eval.    Patient/Family in Agreement with Plan  yes    Plan Comments  Met with pt. at bedside. Pt is a retired . Explained roll of . Face sheet and pharmacy verified. Pt lives with alone in a first floor apartment. DME equipment includes a CPAP through [employer], rollator, rolling walker, commode, bath bench, glucometer, and digital scales for weighing. Pt is independent with ADLs. Pt has been to [facility] for Rehab in the past.  Pt discharged from [facility] 9/10 after a NSTEMI. Pt went home with [facility] . Request [facility]  see at D/C. Declined Medicare Ratings. Referral placed. Jennifer Ca/[facility]/POA, [phone]. Pt has neighbors that assist her at home.  Pt’s PCP is Dr. Dario Mae. Uses [facility] on [address]. At discharge, family or friend will transport. Currently on O2@4LNC, does not use home O2. Will need to follow for possible home O2 needs. Will need to PT eval once medical stable to assess discharge needs. Explained that CCP would follow to assess for discharge needs.  Eben Joe RN-BC        Continued Care and Services - Admitted Since 9/14/2020     Home Medical Care     Service Provider Request Status Selected Services Address Phone Fax    [facility]  [facility] N[facility]A [address] [facility] [address] [address] [phone]       Internal Comment last updated by Eben Joe RN 9/15/2020 2867    Pt is current with [facility]                       Selected Continued Care - Prior Encounters Includes selections from prior encounters from 6/16/2020 to 9/15/2020    Discharged on 9/10/2020 Admission date: 9/6/2020 - Discharge disposition: Home-Health Care c    Home Medical Care     Service Provider Selected Services Address Phone Fax    [facility] Services [address] [facility] [phone]                Discharged on 6/17/2020 Admission date: 6/15/2020 - Discharge disposition: Home-Health Care c    Home Medical Care     Service Provider Selected Services Address Phone Fax    [facility] [address] [address] [phone]                      Demographic Summary     Row Name 09/15/20 1206       General Information    Admission Type  inpatient    Arrived From  emergency department    Required Notices Provided  Important Message from Medicare    Reason for Consult  care coordination/care conference;decision-making;discharge planning    Preferred Language  English     Used During This Interaction  no       Contact Information    Permission Granted to Share Info With  family/designee Jennifer Ca/[facility], [phone]        Functional Status     Row Name 09/15/20 1207       Functional Status    Usual Activity Tolerance  moderate    Current Activity Tolerance  fair       Functional Status, IADL    Medications  assistive equipment and person    Meal Preparation  assistive equipment    Housekeeping  assistive equipment and person    Laundry  assistive equipment and person    Shopping  assistive equipment and person       Mental Status    General Appearance WDL  WDL       Mental Status Summary    Recent Changes in Mental Status/Cognitive Functioning  no changes       Employment/    Employment Status  retired    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       Abuse/Neglect    No documentation.       Legal    No documentation.       Substance Abuse    No documentation.       Patient Forms    No documentation.           Eben Joe RN

## 2020-09-15 NOTE — PLAN OF CARE
Goal Outcome Evaluation:  Plan of Care Reviewed With: patient  Progress: declining  Outcome Summary: VSS. Pt weaned to 3.5L O2. Very little urine output, but pt has very little PO intake. Cardiology and renal rounded on pt. Echo in progress. 1x dose IV Lasix 80mg ordered. PRN Tums and Zofran given for nausea/indigestion. PM dose of lantus decreased. Pulmonary and general surgery consulted. Pt continues to c/o RLQ pain- no relief with Tylenol. Troponin up to 6.2. MDs think pt had another MI. Continue to monitor.

## 2020-09-15 NOTE — NURSING NOTE
Spoke with Cardiology nurse Xochitl. Trop increased to 6.2, lactate down to 1.9, and EKG NSR. No new orders at this time.

## 2020-09-15 NOTE — PROGRESS NOTES
Continued Stay Note  [facility]     Patient Name: Alessia Madrigal  MRN: [id number]  Today's Date: 9/15/2020    Admit Date: 9/14/2020    Discharge Plan     Row Name 09/15/20 1214       Plan    Plan  Home with [facility]. Will need to follow for possible SNF need. Currently on O2, does not wear home O2. Will need PT eval.    Patient/Family in Agreement with Plan  yes    Plan Comments  Met with pt. at bedside. Pt is a retired . Explained roll of . Face sheet and pharmacy verified. Pt lives with alone in a first floor apartment. DME equipment includes a CPAP through [employer], rollator, rolling walker, commode, bath bench, glucometer, and digital scales for weighing. Pt is independent with ADLs. Pt has been to [facility] for Rehab in the past.  Pt discharged from [facility] 9/10 after a NSTEMI. Pt went home with [facility] . Request [facility] see at D/C. Declined Medicare Ratings. Referral placed. Jennifer Ca/[facility]/POA, [phone]. Pt has neighbors that assist her at home.  Pt’s PCP is Dr. Dario Mae. Uses [facility] on [address]. At discharge, family or friend will transport. Currently on O2@4LNC, does not use home O2. Will need to follow for possible home O2 needs. Will need to PT eval once medical stable to assess discharge needs. Explained that CCP would follow to assess for discharge needs.  Eben Joe RN-BC        Discharge Codes    No documentation.             Eben Joe RN

## 2020-09-15 NOTE — PROGRESS NOTES
Patient is current with [facility] for SN and PT services; happy to continue to follow. Thank you.

## 2020-09-15 NOTE — CONSULTS
[facility] Consult Note    Patient Name: Alessia Madrigal  :10/9/1928  89 y.o.    Date of Admission: 2020  Date of Consultation:  09/15/20  Encounter Provider: Cher Mcbride MD  Place of Service: [facility] CARDIOLOGY  Referring Provider: Aaron Warner MD  Patient Care Team:  Dario Mae MD as PCP - General (Internal Medicine)      Chief complaint: Community acquired pneumonia    Reason for Consult:  Chest pain    History of Present Illness:    Ms Madrigal is a 89 year old patient of Dr Bowles with a history of coronary disease (status post CABG ), diabetes mellitus type 2, hypertension, CKD, BRITTANY (CPAP), and renal artery stenting, who is admitted with abdominal pain and a NSTEMI.     She presented to the ED yesterday with complaints of chest pain and lower abdominal pain with radiation to her upper abdomen.  She had associated SOA.  She reports her symptoms started out as lower abdominal pain and eventually she developed chest discomfort.  She reports that the abdominal pain was much more severe than the chest discomfort.  Her chest discomfort resolved after receiving sublingual nitroglycerin and morphine and has not recurred since admission.  She continues to have abdominal discomfort that is concentrated to her right lower quadrant this morning.  She denies any chest discomfort.  Her dyspnea is at her baseline.   She reports some recurrent lower extremity edema since her recent discharge.      On arrival her blood pressure was 172/76.  EKG showed anterolateral ST depression.  Her cardiac enzymes were noted to be elevated on admission at 0.236 which was lower than on her recent admission but have since trended up to 6.2 this morning.  Her creatinine was also elevated on admission and is up to 3 this morning (baseline previously around 1.4-1.5).      She has had recurrent admissions for NSTEMI since 2020.   Her last cath was in 2020 and showed a  patent LIMA to LAD and SVG to PDA (with patent stent), severe diffuse disease of the mid to distal LAD and PDA after the graft anastomosis, and nothing amenable for PCI.  Medical management was recommended.      In June 2020 she again presented with chest pain.  Her blood pressure was out of control and she was started on amlodipine instead of losartan.  Her isosorbide was increased.  It was felt she had some CHF and was placed on lasix.    At her follow up office visit on  8/27/2020 she was continuing to have chest pain.  She also had some edema and weight gain.  She had been taken off her diuretics and amlodipine by her nephrologist due to worsening renal function. Her amlodipine was restarted.      She returned to the hospital in 9/2020 and discharged on 9/10 for a NSTEMI.  She was noted to be hypertensive on admission.  She was treated with heparin gtt for about 48 hours.  Her medications were adjusted with an increase in amlodipine and beta blockers.  She was also diuresed with IV furosemide.        Previous Cardiac Testing   ECHO 6/17/2020  · Left ventricular systolic function is normal. Calculated EF = 51.3%. Estimated EF was in agreement with the calculated EF. Estimated EF = 51%. Normal left ventricular cavity size noted. All left ventricular wall segments contract normally. Wall motion abnormalities as noted below Left ventricular wall thickness is consistent with mild concentric hypertrophy. Left ventricular diastolic dysfunction is noted (grade I a w/high LAP) consistent with impaired relaxation.  · There is mild calcification of the aortic valve  · Moderate MAC is present.  · Mild tricuspid valve regurgitation is present. Estimated right ventricular systolic pressure from tricuspid regurgitation is normal (<35 mmHg). Calculated right ventricular systolic pressure from tricuspid regurgitation is 30 mmHg.     Cardiac Catheterization 5/7/2020  FINDINGS:     LEFT VENTRICULOGRAPHY: The LV pressure was  124/10.  There was no gradient across the aortic valve on pullback.     CORONARY ANGIOGRAPHY:  LIMA to LAD: widely patent and normal.  It is widely patent and looks good the native LAD has 3 lesions subtotally occluded and the distal LAD     SVG to PDA there 20% luminal irregularities throughout the SVG the vein the stent in the mid SVG is widely patent and looks good the PDA looks good it retrograde fills the SUPA and there is a 70 to 80% lesion in the mid SUPA this is not amenable to PCI there are some right to left collaterals to the distal circumflex     SUMMARY: The thing that looks worse from the last time we did a cath is the distal LAD disease this is probably the cause of the non-STEMI        RECOMMENDATIONS: Medical therapy only for this lady               Past Medical History:   Diagnosis Date   • Acute on chronic congestive heart failure (CMS/HCC)    • Acute transmural inferior wall MI (CMS/HCC)    • Arthritis 03/13/2014    CONT TYLENOL FOR PAIN/ JOSE DANIEL CHEW (INTERNAL MEDICINE)   • CAD (coronary artery disease)    • Cancer of uterus (CMS/HCC)    • Carotid artery stenosis    • Chronic renal failure syndrome    • Coronary artery disease involving coronary bypass graft of native heart     with other forms of angina pectoris   • Coronary atherosclerosis    • Endometrial cancer (CMS/HCC)    • Esophageal reflux    • Essential hypertension    • Glaucoma    • Hypertension    • Malaise and fatigue    • Osteoarthritis    • Sleep apnea     USING CPAP   • Spinal stenosis    • Thyroid disease    • Type 2 diabetes mellitus (CMS/HCC) 1991    INsulin begun 2012   • Vitamin B12 deficiency     SHE HAS NOT HAD THIS CHECKED IN A WHILE. SHE HAD BEEN ON SHOTS.  CHECK HER B12 LEVELS TODAY.  BY JOSE DANIEL CHEW       Past Surgical History:   Procedure Laterality Date   • CARDIAC CATHETERIZATION N/A 3/11/2018    Procedure: Left Heart Cath;  Surgeon: Cameron Chisholm MD;  Location: [facility] CATH INVASIVE LOCATION;  Service:  Cardiovascular   • CARDIAC CATHETERIZATION N/A 3/11/2018    Procedure: Stent ROSSI bypass graft;  Surgeon: Cameron Chisholm MD;  Location:  [facility] CATH INVASIVE LOCATION;  Service: Cardiovascular   • CARDIAC CATHETERIZATION N/A 5/7/2020    Procedure: Left Heart Cath;  Surgeon: Cameron Chisholm MD;  Location:  [facility] CATH INVASIVE LOCATION;  Service: Cardiovascular;  Laterality: N/A;   • CARDIAC CATHETERIZATION N/A 5/7/2020    Procedure: Saphenous Vein Graft;  Surgeon: Cameron Chisholm MD;  Location:  [facility] CATH INVASIVE LOCATION;  Service: Cardiovascular;  Laterality: N/A;   • CATARACT EXTRACTION Bilateral    • CATARACT EXTRACTION W/ INTRAOCULAR LENS IMPLANT Right 10/25/2016    Procedure: RT SUPERFICIAL KERATECTOMY ;  Surgeon: Arian Singh MD;  Location:  [facility];  Service:    • CHOLECYSTECTOMY     • COLONOSCOPY     • CORONARY ARTERY BYPASS GRAFT  1991    X3   • HYSTERECTOMY     • OOPHORECTOMY     • TRANSLUMINAL ATHERECTOMY PERONEAL ARTERY      PERCUTANEOUS TRANSLUMINAL ATHERECTOMY RENAL ARTERY         Prior to Admission medications    Medication Sig Start Date End Date Taking? Authorizing Provider   amLODIPine (NORVASC) 10 MG tablet Take 1 tablet by mouth Daily. 9/10/20  Yes Sharri Logan APRN   aspirin 81 MG EC tablet Take 81 mg by mouth Daily.   Yes Renée Montoya MD   calcitriol (ROCALTROL) 0.5 MCG capsule Take 0.5 mcg by mouth Daily.   Yes Renée Montoya MD   clopidogrel (PLAVIX) 75 MG tablet TAKE 1 TABLET DAILY 11/19/19  Yes Griffin Joseph MD   Coenzyme Q10 (CO Q-10) 200 MG capsule Take 300 mg by mouth daily. 6/20/14  Yes Renée Montoya MD   furosemide (LASIX) 40 MG tablet Take 1 tablet by mouth 2 (Two) Times a Day. 3/16/18  Yes Griffin Joseph MD   Insulin Glargine (LANTUS SOLOSTAR) 100 UNIT/ML injection pen Inject 15 Units under the skin into the appropriate area as directed Every Night. 7/15/19  Yes Dario Mae MD   insulin lispro (HUMALOG) 100 UNIT/ML  injection Inject 5 Units under the skin into the appropriate area as directed 3 (Three) Times a Day Before Meals. 2/25/19  Yes Summer Gordon MD   isosorbide mononitrate (IMDUR) 60 MG 24 hr tablet Take 1 tablet by mouth Daily. 6/19/20  Yes Griffin Joseph MD   levothyroxine (SYNTHROID) 150 MCG tablet Take 1 tablet by mouth Daily. 10/2/19  Yes Dario Mae MD   metoprolol succinate XL (TOPROL-XL) 50 MG 24 hr tablet Take 1 tablet by mouth Every 12 (Twelve) Hours. 9/10/20  Yes Sharri Logan APRN   nitroglycerin (NITROSTAT) 0.4 MG SL tablet DISSOLVE 1 TAB UNDER TONGUE FOR CHEST PAIN - IF PAIN REMAINS AFTER 5 MIN, CALL 911 AND REPEAT DOSE. MAX 3 TABS IN 15 MINUTES 7/6/20  Yes Griffin Joseph MD   ranolazine (RANEXA) 500 MG 12 hr tablet Take 1 tablet by mouth Every 12 (Twelve) Hours. 6/19/20  Yes Griffin Joseph MD   glucose blood (ONE TOUCH ULTRA TEST) test strip Test Three times daily. DX E11.9 7/9/19   Dario Mae MD   glucose blood test strip Use as instructed 3/16/20   Dario Mae MD   Insulin Pen Needle (BD PEN NEEDLE LISA U/F) 32G X 4 MM misc USE AS DIRECTED THREE TIMES A DAY. 7/1/19   Dario Mae MD   spironolactone (ALDACTONE) 25 MG tablet TAKE 1 TABLET DAILY 8/25/20   Griffin Joseph MD   timolol (TIMOPTIC) 0.25 % ophthalmic solution Administer 1 drop to both eyes Tonight. 1/25/18   Renée Montoya MD   travoprost, KAL free, (TRAVATAN) 0.004 % solution ophthalmic solution Administer 1 drop to both eyes every night. in affected eye(s)     Renée Montoya MD       Allergies   Allergen Reactions   • Allopurinol Unknown - High Severity   • Clindamycin/Lincomycin Itching     Felt like she was burning and itching around the neck   • Statins Myalgia   • Ampicillin Rash   • Penicillins Rash   • Pravastatin Myalgia       Social History     Socioeconomic History   • Marital status: Single     Spouse name: Not on file   • Number of children: Not on file   • Years of  education: Not on file   • Highest education level: Not on file   Tobacco Use   • Smoking status: Never Smoker   • Smokeless tobacco: Never Used   • Tobacco comment: caffeine use- tea   Substance and Sexual Activity   • Alcohol use: No   • Drug use: Never   • Sexual activity: Defer   Lifestyle   • Physical activity     Days per week: 0 days     Minutes per session: 0 min   • Stress: Not on file       Family History   Problem Relation Age of Onset   • Colon cancer Other    • Heart disease Other    • Hypertension Other    • Stroke Other         ISCHEMIC   • Colon cancer Mother    • Stroke Father    • Heart attack Father    • Heart disease Father    • Heart attack Brother    • Stroke Brother    • Heart disease Brother    • Breast cancer Maternal Grandmother        REVIEW OF SYSTEMS:   All systems reviewed.  Pertinent positives identified in HPI.  All other systems are negative.      Objective:     Vitals:    09/14/20 1900 09/14/20 2153 09/14/20 2221 09/14/20 2336   BP: 119/55  125/87 131/41   BP Location: Right arm      Patient Position: Lying      Pulse: 76 85 83 76   Resp: 20 24  26   Temp: 97.6 °F (36.4 °C)   97.5 °F (36.4 °C)   TempSrc: Oral   Oral   SpO2: 90% 94%  91%   Weight:       Height:         Body mass index is 31.93 kg/m².    General Appearance:    Alert, cooperative, in no acute distress   Head:    Normocephalic, without obvious abnormality, atraumatic   Eyes:            Lids and lashes normal, conjunctivae and sclerae normal, no icterus, no pallor, corneas clear, PERRLA   Ears:    Ears appear intact with no abnormalities noted   Neck:   No adenopathy, supple, trachea midline, no thyromegaly, no carotid bruit, no JVD   Lungs:     Clear to auscultation, respirations regular, even and unlabored    Heart:    Regular rhythm and normal rate, normal S1 and S2, no murmur, no gallop, no rub, no click   Chest Wall:    No abnormalities observed   Abdomen:     Normal bowel sounds, no masses, no organomegaly, soft,  nontender, nondistended, no guarding, no rebound  tenderness   Extremities:   Moves all extremities well, 1+ bilateral pedal edema, no cyanosis, no redness   Pulses:   Pulses palpable and equal bilaterally. Normal radial, carotid, femoral, dorsalis pedis and posterior tibial pulses bilaterally. Normal abdominal aorta   Skin:  Psychiatric:   No bleeding, bruising or rash    Alert and oriented x 3, normal mood and affect   Lab Review:     Results from last 7 days   Lab Units 09/15/20  0543  09/14/20  1234   SODIUM mmol/L 133*  --  136   POTASSIUM mmol/L 5.3*   < > 4.3   CHLORIDE mmol/L 96*  --  97*   CO2 mmol/L 22.1  --  22.9   BUN mg/dL 66*  --  50*   CREATININE mg/dL 3.00*  --  2.32*   CALCIUM mg/dL 9.1  --  9.7*   BILIRUBIN mg/dL  --   --  0.4   ALK PHOS U/L  --   --  112   ALT (SGPT) U/L  --   --  20   AST (SGOT) U/L  --   --  18   GLUCOSE mg/dL 383*  --  287*    < > = values in this interval not displayed.     Results from last 7 days   Lab Units 09/15/20  0543 09/14/20  1853 09/14/20  1433   TROPONIN T ng/mL 5.500* 0.405* 0.232*     Results from last 7 days   Lab Units 09/15/20  0543   WBC 10*3/mm3 25.57*   HEMOGLOBIN g/dL 14.3   HEMATOCRIT % 41.2   PLATELETS 10*3/mm3 281     Results from last 7 days   Lab Units 09/14/20  1234   INR  1.02   APTT seconds 25.1     Results from last 7 days   Lab Units 09/14/20  1853   MAGNESIUM mg/dL 2.1                 CT of abdomen/pelvis  CONCLUSION:  1. Atherosclerotic calcification of the aorta with 50% luminal  compromise in the infrarenal region. No aneurysm.  2. Diverticulosis of the colon. No indication of active diverticulitis.  3. Periumbilical hernia.    CXR  FINDINGS: There is cardiomegaly with mild vascular congestion and  interstitial prominence actually showing improvement from 8 days ago.  The findings remain concerning for changes of mild CHF. I cannot  completely exclude a component of superimposed bronchitis. There appears  to be a moderately large hiatus hernia  that is unchanged.    Telemetry      EKG last PM    EKG repeat (posterior)      EKG on arrival       EKG  Baseline    I personally viewed and interpreted the patient's EKG/Telemetry data.        Assessment and Plan:       1. NSTEMI.  It appears she has completed an infarct recently based on the degree of her troponin elevation.  She is currently chest pain free and her EKG this morning is at her baseline.    2. Abdominal pain.  This appears to be a separate issue from above.    3. ESE/CKD. Nephrology to see.    4. Acute on chronic diastolic CHF.  Received IV furosemide yesterday.   5. Hyperkalemia  6. Coronary artery disease.  Has known severe native multivessel coronary artery disease.  Last cath in 5/2020 showed a patent SVG to PDA (with patent stent) and patent LIMA to LAD but severe disease of the mid to distal LAD and PDA beyond the graft anastomosis not amenable to PCI.   7. Hypertension. Currently well controlled.   8. Suspected pneumonia. Procalcitonin normal.   9. Diabetes mellitus type 2  10. Hypothyroidism  11. Hyperlipidemia.  Statin intolerant.      -  I suspect she had a myocardial infarction yesterday.  Since she is currently chest pain free, repeat EKG show no acute changes, her renal function has continued to worsen and her last cath showed no good targets for PCI I would not recommend further work up with a cardiac catheterization at this time.  I discussed this with the patient who seems to have a good understanding of the situation.    -  Will proceed with an echocardiogram for further prognostic information.  I would not be surprised if her LV function has declined.   -  Will defer work up of her lower abdominal pain to Medicine  -  Will discontinue spironolactone due to hyperkalemia and ESE  -  Continue aspirin, clopidogrel, amlodipine, ranolazine and metoprolol as tolerates.   -  Hold off on further diuresis until seen by Nephrology  -  Depending on her clinical course we may have to consider  palliative care    Cher Mcbride MD  09/15/20  07:43 EDT

## 2020-09-15 NOTE — OUTREACH NOTE
AMI Week 2 Survey      Responses   [facility] patient discharged from?  [facility]   Does the patient have one of the following disease processes/diagnoses(primary or secondary)?  Acute MI (STEMI,NSTEMI)   Week 2 attempt successful?  No   Revoke  Readmitted          Kim Mora RN

## 2020-09-15 NOTE — NURSING NOTE
WOCN Consult: Coccyx red blanchable. Appears more moisture than pressure.  Recommend to apply moisture barrier cream   09/15/20 1001   Wound 09/14/20 1830 midline coccyx Pressure Injury   Placement Date/Time: 09/14/20 1830   Present on Hospital Admission: Yes  Orientation: midline  Location: coccyx  Primary Wound Type: Pressure Injury  Stage, Pressure Injury : Stage 1   Dressing Appearance open to air   Base blanchable;red;pink   Periwound intact;blanchable   Edges irregular   Drainage Amount none

## 2020-09-15 NOTE — CONSULTS
CONSULT NOTE    Patient Identification:  Alessia Madrigal  89 y.o.  female  10/9/1928  [id number]            Requesting physician: Dr Watson    Reason for Consultation:  Evaluation for pna    CC:  Chest and abd pain  History of Present Illness:  Patient is a 89-year-old female with a history of coronary artery disease status post CABG about 30 years ago diabetes obstructive sleep apnea on CPAP therapy renal artery stenosis status post stenting type 2 diabetes hypertension who presented to the emergency room with chest pain and abdominal pain ongoing over the past day.  It was improved with sublingual nitroglycerin.  She did have associated shortness of breath.  She had EKG changes consistent with ischemia and troponin has elevated significantly.    She is able to carry on conversation she says she is very nauseous.  She says her right lower quadrant still is very painful.  She has no chest pain anymore.  No sputum production other than some postnasal drip and some cough associated with it which is chronic for her especially in the fall.  Denies any high fever or chills.  Says her biggest complaint is her abdominal pain.    Review of Systems:  CONSTITUTIONAL:  Denies fevers or chills  EYE:  No new vision changes  EAR:  No change in hearing  CARDIAC:  No chest pain at present  PULMONARY:  No productive cough or shortness of breath  GI:  No diarrhea, hematemesis or hematochezia, +abd pain  RENAL:  No dysuria or urinary frequency  MUSCULOSKELETAL:  No musculoskeletal complaints  ENDOCRINE:  No heat or cold intolerance  INTEGUMENTARY: No skin rashes  NEUROLOGICAL:  No dizziness or confusion.  No seizure activity  PSYCHIATRIC:  No new anxiety or depression  12 system review of systems performed and all else negative    Past Medical History:   Diagnosis Date   • Acute on chronic congestive heart failure (CMS/HCC)    • Acute transmural inferior wall MI (CMS/HCC)    • Arthritis 03/13/2014    CONT TYLENOL FOR  CLEMENTE/ [facility] (INTERNAL MEDICINE)   • CAD (coronary artery disease)    • Cancer of uterus (CMS/HCC)    • Carotid artery stenosis    • Chronic renal failure syndrome    • Coronary artery disease involving coronary bypass graft of native heart     with other forms of angina pectoris   • Coronary atherosclerosis    • Endometrial cancer (CMS/HCC)    • Esophageal reflux    • Essential hypertension    • Glaucoma    • Hypertension    • Malaise and fatigue    • Osteoarthritis    • Sleep apnea     USING CPAP   • Spinal stenosis    • Thyroid disease    • Type 2 diabetes mellitus (CMS/HCC) 1991    INsulin begun 2012   • Vitamin B12 deficiency     SHE HAS NOT HAD THIS CHECKED IN A WHILE. SHE HAD BEEN ON SHOTS.  CHECK HER B12 LEVELS TODAY.  BY JO[facility]EL CHEW       Past Surgical History:   Procedure Laterality Date   • CARDIAC CATHETERIZATION N/A 3/11/2018    Procedure: Left Heart Cath;  Surgeon: Cameron Chisholm MD;  Location: [facility] CATH INVASIVE LOCATION;  Service: Cardiovascular   • CARDIAC CATHETERIZATION N/A 3/11/2018    Procedure: Stent ROSSI bypass graft;  Surgeon: Cameron Chisholm MD;  Location: [facility] CATH INVASIVE LOCATION;  Service: Cardiovascular   • CARDIAC CATHETERIZATION N/A 5/7/2020    Procedure: Left Heart Cath;  Surgeon: Cameron Chisholm MD;  Location: [facility] CATH INVASIVE LOCATION;  Service: Cardiovascular;  Laterality: N/A;   • CARDIAC CATHETERIZATION N/A 5/7/2020    Procedure: Saphenous Vein Graft;  Surgeon: Cameron Chisholm MD;  Location: [facility] CATH INVASIVE LOCATION;  Service: Cardiovascular;  Laterality: N/A;   • CATARACT EXTRACTION Bilateral    • CATARACT EXTRACTION W/ INTRAOCULAR LENS IMPLANT Right 10/25/2016    Procedure: RT SUPERFICIAL KERATECTOMY ;  Surgeon: Arian Singh MD;  Location: [facility] OR [facility];  Service:    • CHOLECYSTECTOMY     • COLONOSCOPY     • CORONARY ARTERY BYPASS GRAFT  1991    X3   • HYSTERECTOMY     • OOPHORECTOMY     • TRANSLUMINAL ATHERECTOMY PERONEAL ARTERY       PERCUTANEOUS TRANSLUMINAL ATHERECTOMY RENAL ARTERY        Medications Prior to Admission   Medication Sig Dispense Refill Last Dose   • amLODIPine (NORVASC) 10 MG tablet Take 1 tablet by mouth Daily. 90 tablet 3 9/13/2020 at Unknown time   • aspirin 81 MG EC tablet Take 81 mg by mouth Daily.   9/14/2020 at Unknown time   • calcitriol (ROCALTROL) 0.5 MCG capsule Take 0.5 mcg by mouth Daily.   9/13/2020 at Unknown time   • clopidogrel (PLAVIX) 75 MG tablet TAKE 1 TABLET DAILY 90 tablet 4 9/13/2020 at Unknown time   • Coenzyme Q10 (CO Q-10) 200 MG capsule Take 300 mg by mouth daily.   9/13/2020 at Unknown time   • furosemide (LASIX) 40 MG tablet Take 1 tablet by mouth 2 (Two) Times a Day. 60 tablet 3 9/13/2020 at Unknown time   • Insulin Glargine (LANTUS SOLOSTAR) 100 UNIT/ML injection pen Inject 15 Units under the skin into the appropriate area as directed Every Night. 15 mL 1 9/13/2020 at Unknown time   • insulin lispro (HUMALOG) 100 UNIT/ML injection Inject 5 Units under the skin into the appropriate area as directed 3 (Three) Times a Day Before Meals. 15 mL 1 9/13/2020 at Unknown time   • isosorbide mononitrate (IMDUR) 60 MG 24 hr tablet Take 1 tablet by mouth Daily. 90 tablet 1 9/13/2020 at Unknown time   • levothyroxine (SYNTHROID) 150 MCG tablet Take 1 tablet by mouth Daily. 90 tablet 3 9/13/2020 at Unknown time   • metoprolol succinate XL (TOPROL-XL) 50 MG 24 hr tablet Take 1 tablet by mouth Every 12 (Twelve) Hours. 180 tablet 3 9/14/2020 at Unknown time   • nitroglycerin (NITROSTAT) 0.4 MG SL tablet DISSOLVE 1 TAB UNDER TONGUE FOR CHEST PAIN - IF PAIN REMAINS AFTER 5 MIN, CALL 911 AND REPEAT DOSE. MAX 3 TABS IN 15 MINUTES 25 tablet 1 9/13/2020 at Unknown time   • ranolazine (RANEXA) 500 MG 12 hr tablet Take 1 tablet by mouth Every 12 (Twelve) Hours. 180 tablet 1 9/14/2020 at Unknown time   • glucose blood (ONE TOUCH ULTRA TEST) test strip Test Three times daily. DX E11.9 100 each 2    • glucose blood test  "strip Use as instructed 400 each 1    • Insulin Pen Needle (BD PEN NEEDLE LISA U/F) 32G X 4 MM misc USE AS DIRECTED THREE TIMES A DAY. 300 each 1    • spironolactone (ALDACTONE) 25 MG tablet TAKE 1 TABLET DAILY 90 tablet 3    • timolol (TIMOPTIC) 0.25 % ophthalmic solution Administer 1 drop to both eyes Tonight.      • travoprost, KAL free, (TRAVATAN) 0.004 % solution ophthalmic solution Administer 1 drop to both eyes every night. in affected eye(s)           Allergies   Allergen Reactions   • Allopurinol Unknown - High Severity   • Clindamycin/Lincomycin Itching     Felt like she was burning and itching around the neck   • Statins Myalgia   • Ampicillin Rash   • Penicillins Rash   • Pravastatin Myalgia       Social History     Socioeconomic History   • Marital status: Single     Spouse name: Not on file   • Number of children: Not on file   • Years of education: Not on file   • Highest education level: Not on file   Tobacco Use   • Smoking status: Never Smoker   • Smokeless tobacco: Never Used   • Tobacco comment: caffeine use- tea   Substance and Sexual Activity   • Alcohol use: No   • Drug use: Never   • Sexual activity: Defer   Lifestyle   • Physical activity     Days per week: 0 days     Minutes per session: 0 min   • Stress: Not on file       Family History   Problem Relation Age of Onset   • Colon cancer Other    • Heart disease Other    • Hypertension Other    • Stroke Other         ISCHEMIC   • Colon cancer Mother    • Stroke Father    • Heart attack Father    • Heart disease Father    • Heart attack Brother    • Stroke Brother    • Heart disease Brother    • Breast cancer Maternal Grandmother        Physical Exam:  /77   Pulse 72   Temp 97.5 °F (36.4 °C) (Oral)   Resp 24   Ht 162.6 cm (64\")   Wt 84.4 kg (186 lb)   SpO2 93%   BMI 31.93 kg/m²   Body mass index is 31.93 kg/m².   General appearance: ill but non toxic, conversant   Eyes: anicteric sclerae, moist conjunctivae; no lid-lag; " PERRLA  HENT: Atraumatic; oropharynx clear with moist mucous membranes and no mucosal ulcerations; normal hard and soft palate  Neck: Trachea midline; FROM, supple, no thyromegaly or lymphadenopathy  Lungs: CTA without wheeze or rhonchi, with normal respiratory effort and no intercostal retractions  CV: RRR, no rub  Abdomen: +tender to mild touch, bs +  Extremities: No peripheral edema or extremity lymphadenopathy  Skin: Normal temperature, turgor and texture; no rash, ulcers or subcutaneous nodules  Psych: Appropriate affect, alert and oriented to person, place and time    LABS:  Results from last 7 days   Lab Units 09/15/20  0543 09/14/20  1234 09/10/20  0424 09/09/20  0459   WBC 10*3/mm3 25.57* 20.26* 7.48 7.70   HEMOGLOBIN g/dL 14.3 13.3 13.7 12.5   PLATELETS 10*3/mm3 281 339 270 270     Results from last 7 days   Lab Units 09/15/20  0543 09/14/20  1853 09/14/20  1234 09/10/20  0424 09/09/20  0459   SODIUM mmol/L 133*  --  136 140 142   POTASSIUM mmol/L 5.3* 5.0 4.3 4.2 3.5   CHLORIDE mmol/L 96*  --  97* 101 104   CO2 mmol/L 22.1  --  22.9 28.9 26.7   BUN mg/dL 66*  --  50* 31* 27*   CREATININE mg/dL 3.00*  --  2.32* 1.48* 1.58*   GLUCOSE mg/dL 383*  --  287* 90 78   CALCIUM mg/dL 9.1  --  9.7* 9.3 9.0   MAGNESIUM mg/dL  --  2.1 1.9 1.8 1.6*   Estimated Creatinine Clearance: 12.8 mL/min (A) (by C-G formula based on SCr of 3 mg/dL (H)).    Troponin 6.2  White blood cell count 25.788% neutrophils  Procalcitonin 0.11  COVID negative        ct without focal infiltrate  Imaging: I personally visualized the images of scans/x-rays performed within last 3 days.  Imaging Results (Most Recent)     Procedure Component Value Units Date/Time    CT Abdomen Pelvis Without Contrast [[id number]] Collected: 09/14/20 1525     Updated: 09/14/20 1707    Narrative:      CT ABDOMEN PELVIS WO CONTRAST-, CT CHEST WO CONTRAST-     Radiation dose reduction techniques were utilized, including automated  exposure control and exposure  modulation based on body size.     CLINICAL INFORMATION: Abdominal pain, chest pain, shortness of breath,  89-year-old female.     COMPARISON: CT of the abdomen and pelvis 02/19/2016, 04/09/2004 and CT  scan of the chest 03/08/2018, 04/16/2019.     CT CHEST FINDINGS: Contour and caliber of the thoracic aorta is stable  and within normal limits. There is heavy atherosclerotic plaque  formation. There are stable mildly enlarged mediastinal lymph nodes  again demonstrated. There is cardiac enlargement similar to the prior  examination. No pericardial abnormality. There is a stable moderate size  hiatal hernia. Likely paraesophageal type. Caliber of the esophagus  leading to this location is normal. There is thickening of the septal  markings with subtle areas of groundglass infiltrate demonstrated  throughout both lungs suggesting mild vascular congestion. However, no  pleural effusion, atypical pneumonia not excluded. There is bronchial  wall thickening bilaterally, question bronchitis.     CONCLUSION: Contour and caliber of the aorta is normal, there is cardiac  enlargement with septal thickening bilaterally and subtle areas of  groundglass infiltrate seen in both lungs, however, no pleural effusion.  Mild or early vascular congestion versus atypical pneumonia. There is a  hiatal hernia again seen, moderate in size and likely paraesophageal. In  addition there is bilateral bronchial wall thickening, perhaps related  to bronchitis.     CT OF THE ABDOMEN AND PELVIS FINDINGS: The liver, pancreas and spleen  are satisfactory in appearance. No adrenal abnormality. No biliary duct  dilatation status post cholecystectomy. The kidneys are symmetric and  stable in appearance, no calculus or obstructive uropathy. Diameter of  the aorta is normal. There is heavy atherosclerotic plaque formation  with approximately 50% luminal compromise in the infrarenal region.  There is a right renal stent in position. Urinary bladder is  collapsed.     There is diverticulosis of the colon, however, no active diverticulitis.  The appendix and small bowel have a normal appearance. Small  periumbilical hernia containing only mesenteric fat.     CONCLUSION:  1. Atherosclerotic calcification of the aorta with 50% luminal  compromise in the infrarenal region. No aneurysm.  2. Diverticulosis of the colon. No indication of active diverticulitis.  3. Periumbilical hernia.     Findings of this report called to Dr. Red in the emergency room at  the time of completion 3:20 PM.        This report was finalized on 9/14/2020 5:04 PM by Dr. Rm Grace M.D.       CT Chest Without Contrast [[id number]] Collected: 09/14/20 1525     Updated: 09/14/20 1707    Narrative:      CT ABDOMEN PELVIS WO CONTRAST-, CT CHEST WO CONTRAST-     Radiation dose reduction techniques were utilized, including automated  exposure control and exposure modulation based on body size.     CLINICAL INFORMATION: Abdominal pain, chest pain, shortness of breath,  89-year-old female.     COMPARISON: CT of the abdomen and pelvis 02/19/2016, 04/09/2004 and CT  scan of the chest 03/08/2018, 04/16/2019.     CT CHEST FINDINGS: Contour and caliber of the thoracic aorta is stable  and within normal limits. There is heavy atherosclerotic plaque  formation. There are stable mildly enlarged mediastinal lymph nodes  again demonstrated. There is cardiac enlargement similar to the prior  examination. No pericardial abnormality. There is a stable moderate size  hiatal hernia. Likely paraesophageal type. Caliber of the esophagus  leading to this location is normal. There is thickening of the septal  markings with subtle areas of groundglass infiltrate demonstrated  throughout both lungs suggesting mild vascular congestion. However, no  pleural effusion, atypical pneumonia not excluded. There is bronchial  wall thickening bilaterally, question bronchitis.     CONCLUSION: Contour and caliber of the aorta  is normal, there is cardiac  enlargement with septal thickening bilaterally and subtle areas of  groundglass infiltrate seen in both lungs, however, no pleural effusion.  Mild or early vascular congestion versus atypical pneumonia. There is a  hiatal hernia again seen, moderate in size and likely paraesophageal. In  addition there is bilateral bronchial wall thickening, perhaps related  to bronchitis.     CT OF THE ABDOMEN AND PELVIS FINDINGS: The liver, pancreas and spleen  are satisfactory in appearance. No adrenal abnormality. No biliary duct  dilatation status post cholecystectomy. The kidneys are symmetric and  stable in appearance, no calculus or obstructive uropathy. Diameter of  the aorta is normal. There is heavy atherosclerotic plaque formation  with approximately 50% luminal compromise in the infrarenal region.  There is a right renal stent in position. Urinary bladder is collapsed.     There is diverticulosis of the colon, however, no active diverticulitis.  The appendix and small bowel have a normal appearance. Small  periumbilical hernia containing only mesenteric fat.     CONCLUSION:  1. Atherosclerotic calcification of the aorta with 50% luminal  compromise in the infrarenal region. No aneurysm.  2. Diverticulosis of the colon. No indication of active diverticulitis.  3. Periumbilical hernia.     Findings of this report called to Dr. Red in the emergency room at  the time of completion 3:20 PM.        This report was finalized on 9/14/2020 5:04 PM by Dr. Rm Grace M.D.       XR Chest 1 View [[id number]] Collected: 09/14/20 1259     Updated: 09/14/20 1311    Narrative:      ONE VIEW PORTABLE CHEST     HISTORY: Chest pain.     FINDINGS: There is cardiomegaly with mild vascular congestion and  interstitial prominence actually showing improvement from 8 days ago.  The findings remain concerning for changes of mild CHF. I cannot  completely exclude a component of superimposed bronchitis. There  appears  to be a moderately large hiatus hernia that is unchanged.     This report was finalized on 9/14/2020 1:08 PM by Dr. Miguel Rain M.D.             Assessment / Recommendations:  NSTEMI  CAD  Pulm edema  Leukocytosis  Patient Active Problem List   Diagnosis   • Actinic keratosis   • Anemia of chronic disorder   • Disorder of aorta (CMS/HCC)   • Chronic coronary artery disease   • CKD (chronic kidney disease) stage 3, GFR 30-59 ml/min (CMS/HCC)   • Constipation   • Cor pulmonale (CMS/HCC)   • High level of uric acid in blood   • Fatigue   • Hypertension   • Hypothyroidism   • Iron deficiency   • Mitral valve insufficiency   • Pulmonary hypertension (CMS/HCC)   • Swelling of lower extremity   • Shortness of breath   • Tricuspid valve insufficiency   • Uncontrolled type 2 diabetes mellitus (CMS/HCC)   • Type 2 diabetes mellitus, with long-term current use of insulin (CMS/HCC)   • Vitamin D deficiency   • Secondary hyperparathyroidism, non-renal (CMS/HCC)   • Pulmonary nodules/lesions, multiple   • Pleural effusion   • Paronychia of second finger of right hand   • Hyperuricemia   • Acute pain of both shoulders   • Acute transmural inferior wall MI (CMS/HCC)   • Wrist injury, left, initial encounter   • Unstable angina (CMS/HCC)   • Poorly-controlled hypertension   • Chest pain due to CAD (CMS/HCC)   • Obesity (BMI 30-39.9)   • BRITTANY (obstructive sleep apnea)   • Non-STEMI (non-ST elevated myocardial infarction) (CMS/HCC)   • Community acquired pneumonia   • Leukocytosis   • Acute chest pain   • NSTEMI (non-ST elevated myocardial infarction) (CMS/HCC)   • Acute on chronic diastolic CHF (congestive heart failure) (CMS/HCC)   • ESE (acute kidney injury) (CMS/HCC)   • Lower abdominal pain           Will resend procal, her ct chest does not show any pna  Her abd exam is concerning to me, reviewed ct abd from admission defer further work up to primary  Suspect her leukocytosis may be reactive or secondary to abd  process, no evidence.  No fever, no infiltrate on ct chest.  Will order cpap for mahendra  nstemi care per cards    Reviewed ct chest and abd ct personally.  Thanks for allowing us to participate in the care of this patient.  LPC is always available to discuss any concerns, questions or to help coordinate the patient's care.          Tony Dallas MD  [facility]  09/15/20  17:36 EDT

## 2020-09-15 NOTE — PROGRESS NOTES
"   LOS: 1 day   Patient Care Team:  Dario Mae MD as PCP - General (Internal Medicine)    Chief Complaint: abd pain    Subjective     Pt not feeling well at all. C/o LONGORIA, nausea, and RLQ pain--all worse with movement. Voiding well. No LUTS. No cough or fever.       Subjective:  Symptoms:  Stable.  She reports shortness of breath, malaise, weakness and anorexia.  No cough, chest pain, headache, chest pressure, diarrhea or anxiety.    Diet:  Poor intake.  She reports  nausea.  No vomiting.    Activity level: Impaired due to weakness.    Pain:  She complains of pain that is mild.  She reports pain is unchanged.  Pain is partially controlled.        History taken from: patient chart RN    Objective     Vital Signs  Temp:  [97.2 °F (36.2 °C)-97.6 °F (36.4 °C)] 97.5 °F (36.4 °C)  Heart Rate:  [] 72  Resp:  [20-26] 24  BP: (106-141)/(41-87) 106/48    Objective:  General Appearance:  Comfortable, in no acute distress and ill-appearing.    Vital signs: (most recent): Blood pressure 106/48, pulse 72, temperature 97.5 °F (36.4 °C), temperature source Oral, resp. rate 24, height 162.6 cm (64\"), weight 84.4 kg (186 lb), SpO2 93 %, not currently breastfeeding.  Vital signs are normal.  No fever.  (O2 requirement 4.5L/min).    Output: Producing urine and no stool output.    HEENT: Normal HEENT exam.  (MM dry)    Lungs:  Normal effort and normal respiratory rate.  There are rales (faint in bases).    Heart: Normal rate.  Regular rhythm.  No murmur.   Abdomen: Abdomen is soft and non-distended.  Bowel sounds are normal.   There is right lower quadrant and left lower quadrant tenderness. There is no rebound tenderness.  There is guarding (voluntary).     Extremities: There is dependent edema.    Pulses: Distal pulses are intact.    Neurological: Patient is alert and oriented to person, place and time.  Normal strength.  Patient has normal muscle tone.    Skin:  Warm and dry.  No rash.       Results Review:     I " reviewed the patient's new clinical results.  I reviewed the patient's new imaging results and agree with the interpretation.  I reviewed the patient's other test results and agree with the interpretation  I personally viewed and interpreted the patient's EKG/Telemetry data  Discussed with pt, RN, and CCP    Results from last 7 days   Lab Units 09/15/20  0543 09/14/20  1234 09/10/20  0424 09/09/20  0459   WBC 10*3/mm3 25.57* 20.26* 7.48 7.70   HEMOGLOBIN g/dL 14.3 13.3 13.7 12.5   PLATELETS 10*3/mm3 281 339 270 270     Results from last 7 days   Lab Units 09/15/20  0543 09/14/20  1853 09/14/20  1234 09/10/20  0424 09/09/20  0459   SODIUM mmol/L 133*  --  136 140 142   POTASSIUM mmol/L 5.3* 5.0 4.3 4.2 3.5   CHLORIDE mmol/L 96*  --  97* 101 104   CO2 mmol/L 22.1  --  22.9 28.9 26.7   BUN mg/dL 66*  --  50* 31* 27*   CREATININE mg/dL 3.00*  --  2.32* 1.48* 1.58*   CALCIUM mg/dL 9.1  --  9.7* 9.3 9.0   MAGNESIUM mg/dL  --  2.1 1.9 1.8 1.6*   Estimated Creatinine Clearance: 12.8 mL/min (A) (by C-G formula based on SCr of 3 mg/dL (H)).    Medication Review: reviewed    Assessment/Plan       NSTEMI (non-ST elevated myocardial infarction) (CMS/[facility])    Chronic coronary artery disease    CKD (chronic kidney disease) stage 3, GFR 30-59 ml/min (CMS/[facility])    Hypertension    Hypothyroidism    Type 2 diabetes mellitus, with long-term current use of insulin (CMS/[facility])    Community acquired pneumonia    Elevated troponin    Leukocytosis    Acute chest pain    Acute on chronic diastolic CHF (congestive heart failure) (CMS/[facility])    ESE (acute kidney injury) (CMS/[facility])    Lower abdominal pain          Plan:   (NSTEMI  -this is third since May, very poor prognosis  -medical mgmt per Card  -trending Trop  -Echo ordered    Acute/chronic diastolic CHF  -?component of acute MI  -diuresis per Renal and Card    Abnl Lung Imaging  -I suspect the lung findings on CT are due to edema and not PNA  -PCT wnl  -will ask Pulm to weigh in  -blood  cultures sent  -RVP neg  -continue Rocephin for now    ESE/CKD3  -appreciate Renal attention to pt  -cautious diuresis per Renal and Card here  -trending Cr  -suspect pre-renal due to AMI and poor po intake    N/V and Abd Pain  -CT unrevealing  -WBC quite elevated  -while nausea could be due to cardiac ischemia, I worry she may also have bowel ischemia  -will ask Surg to see for their opinion  -UA unremarkable    DM2  -A1c 6.6  -sugars lower today, more likely due to poor po intake  -really need to avoid hypoglycemia here  -will dc mealtime insulin as she is not eating  -continue long-acting insulin at attenuated dose (will decrease dose)  -continue SSI    Full code confirmed  SCDs for DVT ppx  Further orders to follow as suggested by evolving hospital course).       Yonatan Watson MD  09/15/20  14:37 EDT    Time: 30min

## 2020-09-15 NOTE — CONSULTS
GENERAL SURGERY HISTORY AND PHYSICAL     SUMMARY (A/P):    Ms. Alessia Madrigal is a 89 y.o. year old lady with significant medical co-morbidities and severe abdominal pain, leukocytosis; concern for ischemic bowel. CT scan without obvious source but limited due to lack of IV contrast in setting of acute on chronic kidney disease. Have discussed with her in detail that medical management of ischemic bowel is likely terminal but she has significant morbidity and even mortality from an operation. She was also counseled that there is a possibility that her intra-abdominal findings, such as ischemia of the entire small bowel, would be terminal as well. She is deciding if she is interested in operative intervention if needed. Will re-evaluate soon to rediscuss with her; have ordered repeat labs that are pending.    CC:    Abdominal pain    HPI:    Mrs. Alessia Madrigal is a 89 y.o. year old lady who presents with a few day history of chest pain, shortness of air, and abdominal pain.  She states she had abdominal pain for the last few days it is acutely worsened.  She is now having nausea and has not eaten in 2 to 3 days.  She has had a few episodes of vomiting.  Her last bowel movement was 3 days ago.    PMH:    • CKD3  • Coronary artery disease  • CHF with an ejection fraction of 35 to 40%  • Hypertension  • Diabetes  • Reflux  • Sleep apnea on CPAP    PSH:    • Cholecystectomy in 1984  • Renal stent  • Cardiac stent in 2018  • Triple cardiac bypass 1991  • Bilateral cataract     FAMILY HISTORY:    • Her mother had colon cancer    SOCIAL HISTORY:   • Denies tobacco use  • Denies alcohol use  • Lives home alone    ALLERGIES:   • Penicillin  • Allopurinol  • Clindamycin  • Statins    MEDICATIONS:   • Reviewed in epic    RADIOLOGY/ENDOSCOPY:    • CT scan from yesterday shows no acute bowel abnormality, unable to assess patency of vessels due to lack of IV contrast from kidney injury  • Regular colonoscopies, last  normal   • ECHO: EF 36-40%     LABS:    • White blood cell count 25 from 20, hemoglobin 14, platelets 281, creatinine 3.0 from 2.3    ROS:   Influenza-like illness: no fever, no  cough, no  sore throat, no  body aches, no loss of sense of taste or smell, no known exposure to person with Covid-19.  Constitutional: Negative for fevers or chills  HENT: Negative for hearing loss or runny nose  Eyes: Negative for vision changes or scleral icterus  Respiratory: Negative for cough or shortness of breath  Cardiovascular: Negative for chest pain or heart palpitations  Gastrointestinal: Positive for abdominal pain, nausea, vomiting, constipation; negative for melena, or hematochezia  Genitourinary: Negative for hematuria or dysuria  Musculoskeletal: Negative for joint swelling or gait instability  Neurologic: Negative for tremors or seizures  Psychiatric: Negative for suicidal ideations or depression  All other systems reviewed and negative    PHYSICAL EXAM:   • Constitutional: Uncomfortable laying in hospital bed  • Vital signs: Temperature 97.5, heart rate 74, respiratory rate 24, blood pressure 135/77  • Eyes: Conjunctiva normal, sclera nonicteric  • ENMT: Hearing grossly normal, oral mucosa moist  • Neck: Supple, trachea midline  • Respiratory: Clear to auscultation, normal inspiratory effort  • Cardiovascular: Regular rate, no peripheral edema, no jugular venous distention  • Gastrointestinal: Soft, diffusely tender to palpation, rebound tenderness, right lower quadrant greater than left lower quadrant  • Lymphatics (palpable nodes):  cervical-negative, axillary-negative  • Skin:  Warm, dry, no rash on visualized skin surfaces  • Musculoskeletal: Symmetric strength, normal gait  • Psychiatric: Alert and oriented ×3, normal affect     MARCO A CHAPARRO M.D.  General and Endoscopic Surgery  [facility]    [address], [address]  [place], [place], [address]  P: [phone]  F: [phone]

## 2020-09-15 NOTE — CONSULTS
Referring Provider: Dr. Aaron Warner  Reason for Consultation: ESE on CKD3    Subjective     Chief complaint   Chief Complaint   Patient presents with   • Chest Pain       History of present illness:  89 yo WF with CKD3 (baseline SCR 1.5 mg/dL) followed by Dr. Osbaldo Shields of our group, admitted yesterday for further evaluation of chest pain and lower right quadrant abdominal pain.  Found to have had another myocardial infarction, which comes on the heels of an earlier one just a few weeks ago (and another before that in May this year).  Renal consulted given elevated SCR 3.0, with value yesterday 2.3.  Full PMH outlined below.  · She states that her chest pain and abdominal pain both began yesterday, with shortness of breath developing later  · She has had nausea and vomiting both then and this morning; chest pain has since subsided, and her breathing is comfortable if she does not move  · No urinary complaints; did have one episode of diarrhea earlier  · No fever or chills  · She describes stable orthopnea; she does not know whether her weight has risen or fallen since her discharge earlier this month  · She reports significant thirst    Past Medical History:   Diagnosis Date   • Acute on chronic congestive heart failure (CMS/HCC)    • Acute transmural inferior wall MI (CMS/HCC)    • Arthritis 03/13/2014    CONT TYLENOL FOR PAIN/ JOSE DANIEL CHEW (INTERNAL MEDICINE)   • CAD (coronary artery disease)    • Cancer of uterus (CMS/[facility])    • Carotid artery stenosis    • Chronic renal failure syndrome    • Coronary artery disease involving coronary bypass graft of native heart     with other forms of angina pectoris   • Coronary atherosclerosis    • Endometrial cancer (CMS/[facility])    • Esophageal reflux    • Essential hypertension    • Glaucoma    • Hypertension    • Malaise and fatigue    • Osteoarthritis    • Sleep apnea     USING CPAP   • Spinal stenosis    • Thyroid disease    • Type 2 diabetes mellitus (CMS/HCC) 1991     INsulin begun 2012   • Vitamin B12 deficiency     SHE HAS NOT HAD THIS CHECKED IN A WHILE. SHE HAD BEEN ON SHOTS.  CHECK HER B12 LEVELS TODAY.  BY JOSE DANIEL CHEW     Past Surgical History:   Procedure Laterality Date   • CARDIAC CATHETERIZATION N/A 3/11/2018    Procedure: Left Heart Cath;  Surgeon: Cameron Chisholm MD;  Location: [facility] CATH INVASIVE LOCATION;  Service: Cardiovascular   • CARDIAC CATHETERIZATION N/A 3/11/2018    Procedure: Stent ROSSI bypass graft;  Surgeon: Cameron Chisholm MD;  Location: [facility] CATH INVASIVE LOCATION;  Service: Cardiovascular   • CARDIAC CATHETERIZATION N/A 5/7/2020    Procedure: Left Heart Cath;  Surgeon: Cameron Chisholm MD;  Location: [facility] CATH INVASIVE LOCATION;  Service: Cardiovascular;  Laterality: N/A;   • CARDIAC CATHETERIZATION N/A 5/7/2020    Procedure: Saphenous Vein Graft;  Surgeon: Cameron Chisholm MD;  Location: [facility] CATH INVASIVE LOCATION;  Service: Cardiovascular;  Laterality: N/A;   • CATARACT EXTRACTION Bilateral    • CATARACT EXTRACTION W/ INTRAOCULAR LENS IMPLANT Right 10/25/2016    Procedure: RT SUPERFICIAL KERATECTOMY ;  Surgeon: Arian Singh MD;  Location: [facility] OR [facility];  Service:    • CHOLECYSTECTOMY     • COLONOSCOPY     • CORONARY ARTERY BYPASS GRAFT  1991    X3   • HYSTERECTOMY     • OOPHORECTOMY     • TRANSLUMINAL ATHERECTOMY PERONEAL ARTERY      PERCUTANEOUS TRANSLUMINAL ATHERECTOMY RENAL ARTERY     Family History   Problem Relation Age of Onset   • Colon cancer Other    • Heart disease Other    • Hypertension Other    • Stroke Other         ISCHEMIC   • Colon cancer Mother    • Stroke Father    • Heart attack Father    • Heart disease Father    • Heart attack Brother    • Stroke Brother    • Heart disease Brother    • Breast cancer Maternal Grandmother      Social History     Tobacco Use   • Smoking status: Never Smoker   • Smokeless tobacco: Never Used   • Tobacco comment: caffeine use- tea   Substance Use Topics   • Alcohol use: No   •  Drug use: Never     Medications Prior to Admission   Medication Sig Dispense Refill Last Dose   • amLODIPine (NORVASC) 10 MG tablet Take 1 tablet by mouth Daily. 90 tablet 3 9/13/2020 at Unknown time   • aspirin 81 MG EC tablet Take 81 mg by mouth Daily.   9/14/2020 at Unknown time   • calcitriol (ROCALTROL) 0.5 MCG capsule Take 0.5 mcg by mouth Daily.   9/13/2020 at Unknown time   • clopidogrel (PLAVIX) 75 MG tablet TAKE 1 TABLET DAILY 90 tablet 4 9/13/2020 at Unknown time   • Coenzyme Q10 (CO Q-10) 200 MG capsule Take 300 mg by mouth daily.   9/13/2020 at Unknown time   • furosemide (LASIX) 40 MG tablet Take 1 tablet by mouth 2 (Two) Times a Day. 60 tablet 3 9/13/2020 at Unknown time   • Insulin Glargine (LANTUS SOLOSTAR) 100 UNIT/ML injection pen Inject 15 Units under the skin into the appropriate area as directed Every Night. 15 mL 1 9/13/2020 at Unknown time   • insulin lispro (HUMALOG) 100 UNIT/ML injection Inject 5 Units under the skin into the appropriate area as directed 3 (Three) Times a Day Before Meals. 15 mL 1 9/13/2020 at Unknown time   • isosorbide mononitrate (IMDUR) 60 MG 24 hr tablet Take 1 tablet by mouth Daily. 90 tablet 1 9/13/2020 at Unknown time   • levothyroxine (SYNTHROID) 150 MCG tablet Take 1 tablet by mouth Daily. 90 tablet 3 9/13/2020 at Unknown time   • metoprolol succinate XL (TOPROL-XL) 50 MG 24 hr tablet Take 1 tablet by mouth Every 12 (Twelve) Hours. 180 tablet 3 9/14/2020 at Unknown time   • nitroglycerin (NITROSTAT) 0.4 MG SL tablet DISSOLVE 1 TAB UNDER TONGUE FOR CHEST PAIN - IF PAIN REMAINS AFTER 5 MIN, CALL 911 AND REPEAT DOSE. MAX 3 TABS IN 15 MINUTES 25 tablet 1 9/13/2020 at Unknown time   • ranolazine (RANEXA) 500 MG 12 hr tablet Take 1 tablet by mouth Every 12 (Twelve) Hours. 180 tablet 1 9/14/2020 at Unknown time   • glucose blood (ONE TOUCH ULTRA TEST) test strip Test Three times daily. DX E11.9 100 each 2    • glucose blood test strip Use as instructed 400 each 1    •  "Insulin Pen Needle (BD PEN NEEDLE LISA U/F) 32G X 4 MM misc USE AS DIRECTED THREE TIMES A DAY. 300 each 1    • spironolactone (ALDACTONE) 25 MG tablet TAKE 1 TABLET DAILY 90 tablet 3    • timolol (TIMOPTIC) 0.25 % ophthalmic solution Administer 1 drop to both eyes Tonight.      • travoprost, KAL free, (TRAVATAN) 0.004 % solution ophthalmic solution Administer 1 drop to both eyes every night. in affected eye(s)         Allergies:  Allopurinol, Clindamycin/lincomycin, Statins, Ampicillin, Penicillins, and Pravastatin    Review of Systems  14-point ROS performed and all negative except for pertinent +/-'s detailed in HPI.     Objective     Vital Signs  Temp:  [97.2 °F (36.2 °C)-97.6 °F (36.4 °C)] 97.2 °F (36.2 °C)  Heart Rate:  [] 60  Resp:  [20-26] 24  BP: (110-177)/(41-87) 123/67    Flowsheet Rows      First Filed Value   Admission Height  162.6 cm (64\") Documented at 09/14/2020 1213   Admission Weight  84.4 kg (186 lb) Documented at 09/14/2020 1213           I/O this shift:  In: 60 [P.O.:60]  Out: 300 [Urine:300]  I/O last 3 completed shifts:  In: 460 [P.O.:210; IV Piggyback:250]  Out: -     Intake/Output Summary (Last 24 hours) at 9/15/2020 1319  Last data filed at 9/15/2020 1150  Gross per 24 hour   Intake 520 ml   Output 300 ml   Net 220 ml       Physical Exam:  NAD; pleasant; fully oriented; looks stated age  Dry MM; AT/NC   No eye discharge; no scleral icterus  No JVD; no carotid bruits  A few crackles in bases bilat; not labored although on 4.5 L/min  RRR, no rub  Soft, +T but no G or R, ND, BS+  +1-2 edema  No clubbing  No asterixis  Moves all extremities   Mood and affect are normal  Speech is fluent    Results Review:  Results from last 7 days   Lab Units 09/15/20  0543 09/14/20  1853 09/14/20  1234 09/10/20  0424   SODIUM mmol/L 133*  --  136 140   POTASSIUM mmol/L 5.3* 5.0 4.3 4.2   CHLORIDE mmol/L 96*  --  97* 101   CO2 mmol/L 22.1  --  22.9 28.9   BUN mg/dL 66*  --  50* 31*   CREATININE mg/dL " 3.00*  --  2.32* 1.48*   CALCIUM mg/dL 9.1  --  9.7* 9.3   BILIRUBIN mg/dL  --   --  0.4  --    ALK PHOS U/L  --   --  112  --    ALT (SGPT) U/L  --   --  20  --    AST (SGOT) U/L  --   --  18  --    GLUCOSE mg/dL 383*  --  287* 90       Estimated Creatinine Clearance: 12.8 mL/min (A) (by C-G formula based on SCr of 3 mg/dL (H)).    Results from last 7 days   Lab Units 09/14/20  1853 09/14/20  1234 09/10/20  0424   MAGNESIUM mg/dL 2.1 1.9 1.8       Results from last 7 days   Lab Units 09/15/20  0543 09/14/20  1234 09/10/20  0424 09/09/20  0459   WBC 10*3/mm3 25.57* 20.26* 7.48 7.70   HEMOGLOBIN g/dL 14.3 13.3 13.7 12.5   PLATELETS 10*3/mm3 281 339 270 270       Results from last 7 days   Lab Units 09/14/20  1234   INR  1.02       Active Medications  amLODIPine, 10 mg, Oral, Daily  aspirin, 81 mg, Oral, Daily  cefTRIAXone, 1 g, Intravenous, Q24H  clopidogrel, 75 mg, Oral, Daily  insulin glargine, 15 Units, Subcutaneous, Nightly  insulin lispro, 0-9 Units, Subcutaneous, TID AC  insulin lispro, 5 Units, Subcutaneous, TID AC  isosorbide mononitrate, 60 mg, Oral, Q24H  latanoprost, 1 drop, Both Eyes, Nightly  levothyroxine, 150 mcg, Oral, Daily  metoprolol succinate XL, 50 mg, Oral, Q12H  ranolazine, 500 mg, Oral, Q12H  sodium chloride, 10 mL, Intravenous, Q12H  sodium chloride, 10 mL, Intravenous, Q12H  timolol, 1 drop, Both Eyes, Daily           Assessment/Plan   Assessment  1.  ESE on CKD3, oliguric, prerenal due to AMI and poor oral intake for two days.  Mild central volume excess by imaging and exam; hyperkalemia due to hyperglycemia; pseudohyponatremia.  2.  Acute myocardial infarction: She had had another one just earlier this month, and one before that in May this year  3.  Pneumonia  4.  Acute on chronic diastolic CHF  5.  Abdominal pain: CT scan unrevealing  6.  HTN, acceptable control for now      Community acquired pneumonia    Chronic coronary artery disease    CKD (chronic kidney disease) stage 3, GFR 30-59  ml/min (CMS/[facility])    Diabetes mellitus (CMS/[facility])    Hypertension    Hypothyroidism    Elevated troponin    Leukocytosis    Acute chest pain      Plan  1.  Urine studies  2.  One dose of diuretic to keep fluid balance even to slightly negative  3.  Two heart attacks in a two-week period in a 89 year-old is very concerning: consider palliative care evaluation.  We talked about the likelihood of worsening kidney function in the event her heart problems worsen; I broached the subject of comfort-based care      I discussed the patient's findings and my recommendations with   Vivek Leong MD  09/15/20  13:19 EDT

## 2020-09-15 NOTE — DISCHARGE PLACEMENT REQUEST
"Alessia Madrigal (89 y.o. Female)     Date of Birth Social Security Number Address Home Phone MRN    10/09/1928  [address] [address] [phone] [id number]    Jainism Marital Status          [facility] Single       Admission Date Admission Type Admitting Provider Attending Provider Department, Room/Bed    9/14/20 Emergency Aaron Warner MD Benton, John B, MD [address], E455/1    Discharge Date Discharge Disposition Discharge Destination                       Attending Provider: Yonatan Watson MD    Allergies: Allopurinol, Clindamycin/lincomycin, Statins, Ampicillin, Penicillins, Pravastatin    Isolation: None   Infection: None   Code Status: CPR    Ht: 162.6 cm (64\")   Wt: 84.4 kg (186 lb)    Admission Cmt: None   Principal Problem: Community acquired pneumonia [J18.9]                 Active Insurance as of 9/14/2020     Primary Coverage     Payor Plan Insurance Group Employer/Plan Group    [facility] MEDICARE REPLACEMENT [facility] MEDICARE REPLACEMENT [id number]     Payor Plan Address Payor Plan Phone Number Payor Plan Fax Number Effective Dates    [address] [address]   1/1/2016 - None Entered    [facility] [id number]       Subscriber Name Subscriber Birth Date Member ID       ALESSIA MADRIGAL 10/9/1928 [id number]                 Emergency Contacts      (Rel.) Home Phone Work Phone Mobile Phone    José Madrigal (NEPHEW/HCS) (Relative) [phone]              "

## 2020-09-15 NOTE — PLAN OF CARE
Goal Outcome Evaluation:  Plan of Care Reviewed With: patient  Progress: no change  Outcome Summary: c/o of shortness of breath throughout the night. lung sounds very course with shallow expansion on 5L with sats 92%. cardiology and nephrology consulted and to see today NPO diet started. Trop increasing yet asymptomatic- cardiology aware and will evaluate today.

## 2020-09-16 PROBLEM — K55.9 ISCHEMIC BOWEL DISEASE (HCC): Status: ACTIVE | Noted: 2020-01-01

## 2020-09-16 PROBLEM — R10.0 ACUTE ABDOMEN: Status: ACTIVE | Noted: 2020-01-01

## 2020-09-16 NOTE — PROGRESS NOTES
Surgery Update    Discussed with patient multiple times throughout the evening. No clear diagnosis but definitely concerning for ischemic bowel from unclear etiology (vascular vs embolic vs low flow state from cardiac events) based on clinical picture, co-morbidities, and lab values. WBC up to 28 on recheck. Offered exploratory laparotomy vs comfort/supportive care. Extensive discussion of high mortality with either option and possibility of intra-operative findings that would be non-survivable if diagnosis is ischemic bowel. She understands and is adamant she does not want an operation tonight. She states she will speak with her nephews and  tomorrow. She understands that operative intervention may not be an option due to acuity going forward and she wishes to stick with the current plan. Will continue to follow.     Yohana Skinner MD

## 2020-09-16 NOTE — PROGRESS NOTES
After discussion with Dr. Skinner patient is now agreeable to comfort care and pain control only. Code status changed, Palliative orders placed and transfer to palliative floor. Zainab Peguero with palliative care aware.     Electronically signed by MILVIA Moffett, 09/16/20, 12:45 PM EDT.

## 2020-09-16 NOTE — PROGRESS NOTES
"GENERAL SURGERY PROGRESS NOTE    SUMMARY (A/P):  Mrs. Alessia Madrigal is a 89 y.o. year old lady with multiple medical comorbidities with probable ischemic bowel. Discussed with her in detail last night and she was adamant she did not want operative intervention after all risks, benefits and alternatives were explained. This morning she told the medicine team she would like an intervention. I discussed with her the high likelihood of mortality, potentially intra-operatively, likely ventilator and ICU requirement, and likely long term care facility requirement. I also discussed that there would be several intra-operative findings that may not be surgically correctable.  She states she would not want to be on a ventilator or in a long term care facility post-operatively. Throughout my discussion with her, her primary concern was pain control. Discussed with her comfort based measures that could be offered for better symptom control. I also discussed all this with her nephew and POA over the phone. They both agreed to comfort based measures with no operative intervention. Appreciate medicine and palliative care assistance. Will be available as needed.     Chief Complaint: abdominal pain     Interval Events: Continues to have worsening abdominal pain. Clinically looks much worse, rapid shallow breathing, groaning, less easy to hear/understand.     Review of Systems: + abdominal pain    O:/66 (BP Location: Right arm, Patient Position: Lying)   Pulse 71   Temp 97.8 °F (36.6 °C) (Oral)   Resp 18   Ht 162.6 cm (64\")   Wt 84.4 kg (186 lb)   SpO2 93%   BMI 31.93 kg/m²       GENERAL: alert, in distress, uncomfortable  HEENT: normocephalic, atraumatic, moist mucous membranes, clear sclerae   CHEST: clear to auscultation, rapid shallow breathing, sats 87%, symmetric air entry  CARDIAC: regular rate and rhythm    ABDOMEN: soft, peritonitic with rebound tenderness  EXTREMITIES: no cyanosis, clubbing, or edema "   SKIN: warm, clammy     LABS  Results from last 7 days   Lab Units 09/16/20  1011 09/15/20  2014 09/15/20  0543   WBC 10*3/mm3 30.61* 28.43* 25.57*   HEMOGLOBIN g/dL 13.6 14.5 14.3   HEMATOCRIT % 38.7 41.0 41.2   PLATELETS 10*3/mm3 238 198 281   MONOCYTES % % 4.0*  --   --      Results from last 7 days   Lab Units 09/16/20  1011 09/16/20  0548 09/15/20  2200  09/14/20  1234   SODIUM mmol/L 134* 129* 133*   < > 136   POTASSIUM mmol/L 5.1 5.7* 4.5   < > 4.3   CHLORIDE mmol/L 95* 94* 97*   < > 97*   CO2 mmol/L 19.4* 16.6* 24.1   < > 22.9   BUN mg/dL 72* 76* 71*   < > 50*   CREATININE mg/dL 3.00* 2.78* 2.57*   < > 2.32*   CALCIUM mg/dL 9.8* 9.6 9.5   < > 9.7*   BILIRUBIN mg/dL 0.5  --  0.5  --  0.4   ALK PHOS U/L 96  --  97  --  112   ALT (SGPT) U/L 44*  --  49*  --  20   AST (SGOT) U/L 117*  --  196*  --  18   GLUCOSE mg/dL 158* 143* 135*   < > 287*    < > = values in this interval not displayed.     Results from last 7 days   Lab Units 09/14/20  1234   INR  1.02   APTT seconds 25.1       MARCO A CHAPARRO MD  General and Endoscopic Surgery  [facility]    [address], [address]  [place], [place], [address]  P: [phone]  F: [phone]

## 2020-09-16 NOTE — PROGRESS NOTES
"  Daily Progress Note.   [address]  9/16/2020    Patient:  Name:  Alessia Madrigal  MRN:  [id number]  10/9/1928  89 y.o.  female         CC: abd and chest pain    Interval History:  Patient still has abdominal pain she says this comes and goes worse than better.  Breathing about the same, says she cant take deep breath from abd pain  - she appears ill and uncomfortable    ROS: No fever, no diarrhea, no chest pain  PMFSSH: no change    Physical Exam:  /66 (BP Location: Right arm, Patient Position: Lying)   Pulse 71   Temp 97.8 °F (36.6 °C) (Oral)   Resp 18   Ht 162.6 cm (64\")   Wt 84.4 kg (186 lb)   SpO2 93%   BMI 31.93 kg/m²   Body mass index is 31.93 kg/m².    Intake/Output Summary (Last 24 hours) at 9/16/2020 1214  Last data filed at 9/16/2020 0931  Gross per 24 hour   Intake 0 ml   Output 1200 ml   Net -1200 ml     General appearance: ill but non toxic, conversant   Eyes: anicteric sclerae, moist conjunctivae; no lid-lag; PERRLA  HENT: Atraumatic; oropharynx clear with moist mucous membranes and no mucosal ulcerations; normal hard and soft palate  Neck: Trachea midline; FROM, supple, no thyromegaly or lymphadenopathy  Lungs: CTA without wheeze or rhonchi, with slightly increased respiratory effort and mild intercostal retractions  CV: tachy reg no rub  Abdomen: +tender to gentle pressure, bs + softer than yesterday  Extremities: No peripheral edema or extremity lymphadenopathy  Skin: Normal temperature, turgor and texture; no rash, ulcers or subcutaneous nodules  Psych: Appropriate affect, alert and oriented to person, place and time  Data Review:  Notable Labs:  Results from last 7 days   Lab Units 09/16/20  1011 09/15/20  2014 09/15/20  0543 09/14/20  1234 09/10/20  0424   WBC 10*3/mm3 30.61* 28.43* 25.57* 20.26* 7.48   HEMOGLOBIN g/dL 13.6 14.5 14.3 13.3 13.7   PLATELETS 10*3/mm3 238 198 281 339 270     Results from last 7 days   Lab Units 09/16/20  1011 09/16/20  0548 " 09/15/20  2200 09/15/20  0543 09/14/20  1853 09/14/20  1234 09/10/20  0424   SODIUM mmol/L 134* 129* 133* 133*  --  136 140   POTASSIUM mmol/L 5.1 5.7* 4.5 5.3* 5.0 4.3 4.2   CHLORIDE mmol/L 95* 94* 97* 96*  --  97* 101   CO2 mmol/L 19.4* 16.6* 24.1 22.1  --  22.9 28.9   BUN mg/dL 72* 76* 71* 66*  --  50* 31*   CREATININE mg/dL 3.00* 2.78* 2.57* 3.00*  --  2.32* 1.48*   GLUCOSE mg/dL 158* 143* 135* 383*  --  287* 90   CALCIUM mg/dL 9.8* 9.6 9.5 9.1  --  9.7* 9.3   MAGNESIUM mg/dL  --  2.2  --   --  2.1 1.9 1.8   PHOSPHORUS mg/dL  --  5.9*  --   --   --   --   --    Estimated Creatinine Clearance: 12.8 mL/min (A) (by C-G formula based on SCr of 3 mg/dL (H)).    Results from last 7 days   Lab Units 09/16/20  1011 09/16/20  0548 09/15/20  2200 09/15/20  2014 09/15/20  0720 09/15/20  0543 09/14/20  2004 09/14/20  1549 09/14/20  1433 09/14/20  1234   AST (SGOT) U/L 117*  --  196*  --   --   --   --   --   --  18   ALT (SGPT) U/L 44*  --  49*  --   --   --   --   --   --  20   PROCALCITONIN ng/mL  --  14.38*  --   --   --   --   --   --  0.11  --    LACTATE mmol/L  --   --   --   --  1.9  --  5.8* 3.1*  --   --    PLATELETS 10*3/mm3 238  --   --  198  --  281  --   --   --  339       Results from last 7 days   Lab Units 09/15/20  2026   PH, ARTERIAL pH units 7.421   PCO2, ARTERIAL mm Hg 37.5   PO2 ART mm Hg 88.4   HCO3 ART mmol/L 24.4       Imaging:  Reviewed chest images personally from past 3 days    Scheduled meds:    amLODIPine, 10 mg, Oral, Daily  aspirin, 81 mg, Oral, Daily  cefTRIAXone, 1 g, Intravenous, Q24H  clopidogrel, 75 mg, Oral, Daily  insulin lispro, 0-9 Units, Subcutaneous, TID AC  isosorbide mononitrate, 60 mg, Oral, Q24H  latanoprost, 1 drop, Both Eyes, Nightly  levothyroxine, 150 mcg, Oral, Daily  metoprolol succinate XL, 50 mg, Oral, Q12H  metroNIDAZOLE, 500 mg, Intravenous, Q8H  ranolazine, 500 mg, Oral, Q12H  sodium chloride, 10 mL, Intravenous, Q12H  sodium chloride, 10 mL, Intravenous,  Q12H  timolol, 1 drop, Both Eyes, Daily        ASSESSMENT  /  PLAN:  NSTEMI (non-ST elevated myocardial infarction) (CMS/[facility])    Chronic coronary artery disease    CKD (chronic kidney disease) stage 3, GFR 30-59 ml/min (CMS/[facility])    Hypertension    Hypothyroidism    Type 2 diabetes mellitus, with long-term current use of insulin (CMS/[facility])    Community acquired pneumonia    Leukocytosis    Acute chest pain    Acute on chronic diastolic CHF (congestive heart failure) (CMS/[facility])    ESE (acute kidney injury) (CMS/[facility])    Lower abdominal pain  Moderate MV regurg  Systolic congestive heart failure  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  Not sure this is survivable.  Ill send a lactate, procal  Prognosis not good.  Cont abx coverage    Would not rec code measures however it appears patient desires.      Coordinated car with Mrs. Lunsford with medicine team today.    Tony Dallas MD  [facility]  09/16/20  12:14 EDT

## 2020-09-16 NOTE — PROGRESS NOTES
"   LOS: 2 days   Patient Care Team:  Dario Mae MD as PCP - General (Internal Medicine)    Chief Complaint: abd pain    Subjective     Abdominal pain is severe, worse with movement. She is SOB at rest. Nausea but no emesis. States   \"I cant live with this pain.\"      Chest Pain   Associated symptoms include nausea, shortness of breath and weakness. Pertinent negatives include no cough, fever or vomiting.       Subjective:  Symptoms:  Worsening.  She reports shortness of breath, malaise, chest pain, weakness and anorexia.  No cough, headache, chest pressure, diarrhea or anxiety.    Diet:  Poor intake.  She reports  nausea.  No vomiting.    Activity level: Impaired due to weakness.    Pain:  She complains of pain that is severe.  She reports pain is unchanged.  Pain is poorly controlled.        History taken from: patient chart RN    Objective     Vital Signs  Temp:  [97.5 °F (36.4 °C)-98 °F (36.7 °C)] 97.8 °F (36.6 °C)  Heart Rate:  [71-74] 71  Resp:  [16-24] 18  BP: ()/(47-77) 109/66    Objective:  General Appearance:  Ill-appearing, in pain and in distress (acutely ill appearing ).    Vital signs: (most recent): Blood pressure 109/66, pulse 71, temperature 97.8 °F (36.6 °C), temperature source Oral, resp. rate 18, height 162.6 cm (64\"), weight 84.4 kg (186 lb), SpO2 93 %, not currently breastfeeding.  Vital signs are normal.  No fever.  (O2 requirement 4.5L/min).    Output: Producing urine and no stool output.    HEENT: Normal HEENT exam.  (MM dry)    Lungs:  Tachypnea and increased effort.  There are rales (faint in bases).  (88% on 4LNc)  Heart: Normal rate.  Regular rhythm.  No murmur.   Abdomen: Abdomen is rigid and distended.  Hypoactive bowel sounds.   There is generalized tenderness.  There is right lower quadrant and left lower quadrant tenderness. There is no rebound tenderness.  There is guarding (voluntary).     Extremities: There is dependent edema.    Pulses: Distal pulses are intact.  "   Neurological: Patient is alert and oriented to person, place and time.  Normal strength.  Patient has normal muscle tone.    Skin:  Warm and dry.  No rash.           Results Review:     I reviewed the patient's new clinical results.  I reviewed the patient's new imaging results and agree with the interpretation.  I reviewed the patient's other test results and agree with the interpretation  I personally viewed and interpreted the patient's EKG/Telemetry data  Discussed with pt, RN, and CCP    Results from last 7 days   Lab Units 09/15/20  2014 09/15/20  0543 09/14/20  1234 09/10/20  0424   WBC 10*3/mm3 28.43* 25.57* 20.26* 7.48   HEMOGLOBIN g/dL 14.5 14.3 13.3 13.7   PLATELETS 10*3/mm3 198 281 339 270     Results from last 7 days   Lab Units 09/16/20  1011 09/16/20  0548 09/15/20  2200 09/15/20  0543 09/14/20  1853 09/14/20  1234 09/10/20  0424   SODIUM mmol/L 134* 129* 133* 133*  --  136 140   POTASSIUM mmol/L 5.1 5.7* 4.5 5.3* 5.0 4.3 4.2   CHLORIDE mmol/L 95* 94* 97* 96*  --  97* 101   CO2 mmol/L 19.4* 16.6* 24.1 22.1  --  22.9 28.9   BUN mg/dL 72* 76* 71* 66*  --  50* 31*   CREATININE mg/dL 3.00* 2.78* 2.57* 3.00*  --  2.32* 1.48*   CALCIUM mg/dL 9.8* 9.6 9.5 9.1  --  9.7* 9.3   MAGNESIUM mg/dL  --  2.2  --   --  2.1 1.9 1.8   PHOSPHORUS mg/dL  --  5.9*  --   --   --   --   --    Estimated Creatinine Clearance: 12.8 mL/min (A) (by C-G formula based on SCr of 3 mg/dL (H)).  Glucose   Date/Time Value Ref Range Status   09/16/2020 0608 102 70 - 130 mg/dL Final   09/15/2020 2026 129 70 - 130 mg/dL Final   09/15/2020 1734 90 70 - 130 mg/dL Final   09/15/2020 1151 182 (H) 70 - 130 mg/dL Final   09/15/2020 0637 323 (H) 70 - 130 mg/dL Final   09/14/2020 1955 414 (C) 70 - 130 mg/dL Final   09/14/2020 1838 400 (H) 70 - 130 mg/dL Final   09/14/2020 1835 409 (C) 70 - 130 mg/dL Final        Results for orders placed during the hospital encounter of 09/14/20   Adult Transthoracic Echo Complete W/ Cont if Necessary Per  "Protocol    Narrative · There is severe hypokinesis of the anterior wall. There is akinesis of   the inferolateral wall  · Left ventricular ejection fraction appears to be 36 - 40%.  · Left ventricular diastolic function is consistent with (grade II w/high   LAP) pseudonormalization.  · Normal right ventricular cavity size and systolic function noted.  · Left atrial cavity size is moderate to severely dilated.  · Moderate mitral valve regurgitation is present.  · Mild to moderate tricuspid valve regurgitation is present.  · Calculated right ventricular systolic pressure from tricuspid   regurgitation is 48 mmHg.  · There is moderate calcified plaque in the proximal ascending aorta  · There is no evidence of pericardial effusion.            Medication Review: reviewed    Assessment/Plan       NSTEMI (non-ST elevated myocardial infarction) (CMS/[facility])    Chronic coronary artery disease    CKD (chronic kidney disease) stage 3, GFR 30-59 ml/min (CMS/[facility])    Hypertension    Hypothyroidism    Type 2 diabetes mellitus, with long-term current use of insulin (CMS/[facility])    Community acquired pneumonia    Leukocytosis    Acute chest pain    Acute on chronic diastolic CHF (congestive heart failure) (CMS/[facility])    ESE (acute kidney injury) (CMS/[facility])    Lower abdominal pain      Plan:     N/V and Abd Pain/ Acute abdomen   -Abdominal pain is now severe with increased work of breathing and toxic appearing.   CT unrevealing, but WBC quite elevated now with significant procalcitonin elevation.  - she is on rocephin and flagyl  - Appreciate Gen Surg opinion. Agree likely ischemic bowel.  Yesterday patient declined surgical intervention but today she states \"I may die on the table but it is worth a shot.  I cannot live with this pain.\"  Discussed at length her operative risk and option to proceed with palliative care but she wants to proceed with surgical intervention. Dr. Skinner will see again now and discuss that surgical intervention is " not an option given risk and rapid deterioration.     NSTEMI  -this is third since May, very poor prognosis  -medical mgmt per Card  -trending Trop. Echo reviewed above.        Acute/chronic diastolic CHF  -?component of acute MI  -diuresis per Renal and Card    Abnl Lung Imaging  -  lung findings on CT are due to edema and not PNA  -PCT normal yesterday now 14.38 however suspect this is due to acute abdomen.   -blood cultures pending  -RVP neg    ESE/CKD3  -appreciate Renal attention to pt  -cautious diuresis per Renal and Card here  -trending Cr  -suspect pre-renal due to AMI and poor po intake    DM2  -A1c 6.6  -sugars lower today, more likely due to poor po intake  -really need to avoid hypoglycemia here  - dc mealtime insulin as she is not eating and stop long-acting insulin with low glucose this morning.   -continue SSI       SCDs for DVT ppx     Full code and refuses to change status. Consult palliative care for assistance.   Discussed at length with patient, Dr. Skinner, Dr. Watson, and nephew, her healthcare surrogate, over the phone  Greater than 50% of time spent in counseling and/or coordination of care. Total face/floor time 35 minutes.      Irlanda Lunsford, APRN  09/16/20  11:32 EDT    Time: 30min

## 2020-09-16 NOTE — PLAN OF CARE
Problem: Adult Inpatient Plan of Care  Goal: Plan of Care Review  Outcome: Ongoing, Progressing  Flowsheets  Taken 9/16/2020 0155 by Bettina Shelton RN  Progress: no change  Outcome Summary: VSS, A&Ox4, up with assist to BSC, possible exp lap soon with Dr Skinner, patient wants to discuss with family first, c/o RLQ pain, NPO until further notice, no nausea through the night, WBC increasing, will continue to monitor.  Taken 9/15/2020 1612 by Sugar Quigley, RN  Plan of Care Reviewed With: patient   Goal Outcome Evaluation:  Plan of Care Reviewed With: patient  Progress: no change  Outcome Summary: VSS, A&Ox4, up with assist to BSC, possible exp lap soon with Dr Skinner, patient wants to discuss with family first, c/o RLQ, NPO until further notice, no nausea through the night, WBC increasing, will continue to monitor.

## 2020-09-16 NOTE — PLAN OF CARE
Goal Outcome Evaluation:  Plan of Care Reviewed With: patient  Progress: declining  Outcome Summary: VSS. Pt on 4L O2. Pt decided to decline exploratory surgery. Palliative care orders placed. Waiting to t/f patient to [facility] for palliative care. PRN Dilaudid given for pain x1. Continue to monitor.  Problem: Fall Injury Risk  Goal: Absence of Fall and Fall-Related Injury  Outcome: Unable to Meet, Plan Revised     Problem: Adult Inpatient Plan of Care  Goal: Plan of Care Review  Outcome: Unable to Meet, Plan Revised  Flowsheets (Taken 9/16/2020 9546)  Progress: declining  Plan of Care Reviewed With: patient  Outcome Summary: VSS. Pt on 4L O2. Pt decided to decline exploratory surgery. Palliative care orders placed. Waiting to t/f patient to [facility] for palliative care. PRN Dilaudid given for pain x1. Continue to monitor.  Goal: Patient-Specific Goal (Individualized)  Outcome: Unable to Meet, Plan Revised  Goal: Absence of Hospital-Acquired Illness or Injury  Outcome: Unable to Meet, Plan Revised  Goal: Optimal Comfort and Wellbeing  Outcome: Unable to Meet, Plan Revised  Goal: Readiness for Transition of Care  Outcome: Unable to Meet, Plan Revised     Problem: Skin Injury Risk Increased  Goal: Skin Health and Integrity  Outcome: Unable to Meet, Plan Revised

## 2020-09-16 NOTE — PROGRESS NOTES
"Patient Care Team:  Dario Mae MD as PCP - General (Internal Medicine)    Chief Complaint: Follow-up non-ST elevation MI.    Interval History:   Patient denies chest pain.  Still with nausea and abdominal pain.  Awaiting surgical opinion.    Objective   Vital Signs  Temp:  [97.5 °F (36.4 °C)-98 °F (36.7 °C)] 97.8 °F (36.6 °C)  Heart Rate:  [71-74] 71  Resp:  [16-24] 18  BP: ()/(47-77) 109/66    Intake/Output Summary (Last 24 hours) at 9/16/2020 1340  Last data filed at 9/16/2020 1223  Gross per 24 hour   Intake 0 ml   Output 1200 ml   Net -1200 ml     Flowsheet Rows      First Filed Value   Admission Height  162.6 cm (64\") Documented at 09/14/2020 1213   Admission Weight  84.4 kg (186 lb) Documented at 09/14/2020 1213          General Appearance:   Elderly.  Uncomfortable.  Somnolent but arousable.   Head:    Normocephalic, without obvious abnormality, atraumatic       Neck:   No adenopathy, supple, no thyromegaly, no carotid bruit, no    JVD   Lungs:     Clear to auscultation bilaterally, no wheezes, rales, or     rhonchi    Heart:    Normal rate, regular rhythm, no murmur, no rub, no gallop   Chest Wall:    No abnormalities observed   Abdomen:     soft.  Tender to palpation.   Extremities:   No cyanosis, clubbing, or edema   Pulses:   Pulses palpable and equal bilaterally   Skin:   No bleeding or rash                       Results Review:    Results from last 7 days   Lab Units 09/16/20  1011   SODIUM mmol/L 134*   POTASSIUM mmol/L 5.1   CHLORIDE mmol/L 95*   CO2 mmol/L 19.4*   BUN mg/dL 72*   CREATININE mg/dL 3.00*   GLUCOSE mg/dL 158*   CALCIUM mg/dL 9.8*     Results from last 7 days   Lab Units 09/16/20  0548 09/15/20  0720 09/15/20  0543   TROPONIN T ng/mL 4.090* 6.200* 5.500*     Results from last 7 days   Lab Units 09/16/20  1011   WBC 10*3/mm3 30.61*   HEMOGLOBIN g/dL 13.6   HEMATOCRIT % 38.7   PLATELETS 10*3/mm3 238     Results from last 7 days   Lab Units 09/14/20  1234   INR  1.02   APTT " seconds 25.1         Results from last 7 days   Lab Units 09/16/20  0548   MAGNESIUM mg/dL 2.2         @LABRCNT(bnp)@  I reviewed the patient's new clinical results.  I personally viewed and interpreted the patient's EKG/Telemetry data            Assessment/Plan    1. NSTEMI.  It appears she has completed an infarct recently based on the degree of her troponin elevation.  She is currently chest pain free and her EKG this morning is at her baseline.    2. Abdominal pain.  This appears to be a separate issue from above.    3. ESE/CKD. Nephrology to see.    4. Acute on chronic diastolic CHF.  Received IV furosemide previously  5. Hyperkalemia  6. Coronary artery disease.  Has known severe native multivessel coronary artery disease.  Last cath in 5/2020 showed a patent SVG to PDA (with patent stent) and patent LIMA to LAD but severe disease of the mid to distal LAD and PDA beyond the graft anastomosis not amenable to PCI.   7. Hypertension. Currently well controlled.   8. Suspected pneumonia. Procalcitonin normal.   9. Diabetes mellitus type 2  10. Hypothyroidism  11. Hyperlipidemia.  Statin intolerant.        -Troponin is downtrending.  Denies chest pain  -She looks chronically ill and complains of abdominal pain along with nausea and vomiting.  I do not think this is cardiac in etiology.  -She is going to be high risk for any surgical intervention with a recent non-ST elevation MI  -I would not recommend catheterization in her case.  Would recommend palliative care involvement.

## 2020-09-16 NOTE — PROGRESS NOTES
Nephrology    Previous notes read  Palliative care being pursued  Will sign off    --Vivek Leong MD

## 2020-09-17 NOTE — CONSULTS
Patient transferred to the palliative care unit following goals of care and treatment preferences discussion with Dr. Skinner and MILVIA Nice.  Patient and/or family state goal of care to be comfort and treatment preferences to be geared toward symptom management.      Met with one of patient's nephew's at the bedside, Brown. He is not the POA but voiced understanding of patient wishes and was supportive. The palliative care team will continue to follow for any further needs.

## 2020-09-17 NOTE — PROGRESS NOTES
Note developments.  Please let me know if I can be of any assistance.  Will sign off.  Tony Dallas MD  [facility]  09/17/20  07:20 EDT

## 2020-09-17 NOTE — CONSULTS
Received hospice consult. Spoke to nephew/HCS who states he will be available to meet with us tomorrow around 11:30. Thank you for the referral and for allowing me to participate in the care of this patient.    Betty Burnett RN  [facility]  [phone]

## 2020-09-17 NOTE — CONSULTS
Visited with pt's nephew and his wife at bedside; pt sleeping soundly.   Nephew's conversation discloses he is doing well with EOL time for his aunt - he and wife will continue to visit as they are able.  He is handling all matters as pre-planned by her.  She has no children; never .  Prayer of gratitude offered for her live and for those who love her and will mourn her.  Nephew aware of 24/7 CHP availability.

## 2020-09-17 NOTE — PROGRESS NOTES
Discharge Planning Assessment  [facility]     Patient Name: Alessia Madrigal  MRN: [id number]  Today's Date: 9/17/2020    Admit Date: 9/14/2020    Discharge Needs Assessment    No documentation.       Discharge Plan     Row Name 09/17/20 1723       Plan    Plan Comments  The patient transferred to [facility] from [facility] on 9/16/2020. The patient is palliative. [facility] to evaluate. CCP will follow for any needs that may arise. DAVID Nuñez RN, CCP.        Continued Care and Services - Admitted Since 9/14/2020     Home Medical Care Coordination complete    Service Provider Request Status Selected Services Address Phone Fax    [facility] [address] [facility] [address] [place] [address] [phone]       Internal Comment last updated by Eben Joe RN 9/15/2020 1124    Pt is current with [facility]                       Selected Continued Care - Prior Encounters Includes selections from prior encounters from 6/16/2020 to 9/17/2020    Discharged on 9/10/2020 Admission date: 9/6/2020 - Discharge disposition: Home-Health Care [facility]    Home Medical Care     Service Provider Selected Services Address Phone Fax    [facility] [address] [facility] [address] [place] [address] [phone]                Discharged on 6/17/2020 Admission date: 6/15/2020 - Discharge disposition: Home-Health Care [facility]     Service Provider Selected Services Address Phone Fax    [facility] [address] [address] [place] [address] [phone]                      Demographic Summary    No documentation.       Functional Status    No documentation.       Psychosocial    No documentation.       Abuse/Neglect    No documentation.       Legal    No documentation.       Substance Abuse    No documentation.       Patient  Forms    No documentation.           Bessy Nuñez RN

## 2020-09-17 NOTE — PROGRESS NOTES
"   LOS: 3 days   Patient Care Team:  Dario Mae MD as PCP - General (Internal Medicine)    Chief Complaint: abd pain    Subjective   She  recently received narcotics and is currently resting quietly when I enter the room. when awakened she grimaces from abdominal pain. SOB but not anxious.     Subjective:  Symptoms:  Worsening.  She reports shortness of breath, malaise, chest pain, weakness and anorexia.  No cough, headache, chest pressure, diarrhea or anxiety.    Diet:  Poor intake.  She reports  nausea.  No vomiting.    Activity level: Impaired due to weakness.    Pain:  She complains of pain that is severe.  She reports pain is unchanged.  Pain is poorly controlled.        History taken from: patient chart RN    Objective     Vital Signs  Temp:  [97.5 °F (36.4 °C)-97.6 °F (36.4 °C)] 97.5 °F (36.4 °C)  Heart Rate:  [73] 73  Resp:  [16-18] 18  BP: (102-127)/(60-76) 102/60    Objective:  General Appearance:  Ill-appearing, in pain and comfortable (acutely ill appearing ).    Vital signs: (most recent): Blood pressure 102/60, pulse 73, temperature 97.5 °F (36.4 °C), temperature source Oral, resp. rate 18, height 162.6 cm (64\"), weight 84.4 kg (186 lb), SpO2 91 %, not currently breastfeeding.  Vital signs are normal.  No fever.  (O2 requirement 4.5L/min).    Output: Producing urine and no stool output.    HEENT: Normal HEENT exam.  (MM dry)    Lungs:  Tachypnea and increased effort.  There are rales (faint in bases).  (88% on 4LNc)  Heart: Normal rate.  Regular rhythm.  No murmur.   Abdomen: Abdomen is rigid and distended.  Hypoactive bowel sounds.   There is generalized tenderness.  There is right lower quadrant and left lower quadrant tenderness. There is no rebound tenderness.  There is guarding (voluntary).     Extremities: There is dependent edema.    Pulses: Distal pulses are intact.    Neurological: Patient is alert.  Patient has normal muscle tone.    Skin:  Warm and dry.  No rash.           Results " Review:     I reviewed the patient's new clinical results.  I reviewed the patient's new imaging results and agree with the interpretation.  I reviewed the patient's other test results and agree with the interpretation  I personally viewed and interpreted the patient's EKG/Telemetry data  Discussed with pt, RN, and CCP    Results from last 7 days   Lab Units 09/16/20  1011 09/15/20  2014 09/15/20  0543 09/14/20  1234   WBC 10*3/mm3 30.61* 28.43* 25.57* 20.26*   HEMOGLOBIN g/dL 13.6 14.5 14.3 13.3   PLATELETS 10*3/mm3 238 198 281 339     Results from last 7 days   Lab Units 09/16/20  1011 09/16/20  0548 09/15/20  2200 09/15/20  0543 09/14/20  1853 09/14/20  1234   SODIUM mmol/L 134* 129* 133* 133*  --  136   POTASSIUM mmol/L 5.1 5.7* 4.5 5.3* 5.0 4.3   CHLORIDE mmol/L 95* 94* 97* 96*  --  97*   CO2 mmol/L 19.4* 16.6* 24.1 22.1  --  22.9   BUN mg/dL 72* 76* 71* 66*  --  50*   CREATININE mg/dL 3.00* 2.78* 2.57* 3.00*  --  2.32*   CALCIUM mg/dL 9.8* 9.6 9.5 9.1  --  9.7*   MAGNESIUM mg/dL  --  2.2  --   --  2.1 1.9   PHOSPHORUS mg/dL  --  5.9*  --   --   --   --    Estimated Creatinine Clearance: 12.8 mL/min (A) (by C-G formula based on SCr of 3 mg/dL (H)).  Glucose   Date/Time Value Ref Range Status   09/16/2020 1106 173 (H) 70 - 130 mg/dL Final   09/16/2020 0608 102 70 - 130 mg/dL Final   09/15/2020 2026 129 70 - 130 mg/dL Final   09/15/2020 1734 90 70 - 130 mg/dL Final   09/15/2020 1151 182 (H) 70 - 130 mg/dL Final   09/15/2020 0637 323 (H) 70 - 130 mg/dL Final   09/14/2020 1955 414 (C) 70 - 130 mg/dL Final   09/14/2020 1838 400 (H) 70 - 130 mg/dL Final        Results for orders placed during the hospital encounter of 09/14/20   Adult Transthoracic Echo Complete W/ Cont if Necessary Per Protocol    Narrative · There is severe hypokinesis of the anterior wall. There is akinesis of   the inferolateral wall  · Left ventricular ejection fraction appears to be 36 - 40%.  · Left ventricular diastolic function is consistent  with (grade II w/high   LAP) pseudonormalization.  · Normal right ventricular cavity size and systolic function noted.  · Left atrial cavity size is moderate to severely dilated.  · Moderate mitral valve regurgitation is present.  · Mild to moderate tricuspid valve regurgitation is present.  · Calculated right ventricular systolic pressure from tricuspid   regurgitation is 48 mmHg.  · There is moderate calcified plaque in the proximal ascending aorta  · There is no evidence of pericardial effusion.            Medication Review: reviewed    Assessment/Plan       NSTEMI (non-ST elevated myocardial infarction) (CMS/[facility])    Chronic coronary artery disease    CKD (chronic kidney disease) stage 3, GFR 30-59 ml/min (CMS/[facility])    Hypertension    Hypothyroidism    Type 2 diabetes mellitus, with long-term current use of insulin (CMS/[facility])    Community acquired pneumonia    Leukocytosis    Acute chest pain    Acute on chronic diastolic CHF (congestive heart failure) (CMS/[facility])    ESE (acute kidney injury) (CMS/[facility])    Lower abdominal pain    Acute abdomen    Ischemic bowel disease (CMS/[facility])      Plan:     Palliative Care patient. Continue comfort measures.     N/V and Abd Pain/ Acute abdomen   -Abdominal pain became sever on 9/16 .CT unrevealing, but WBC quite elevated  with significant procalcitonin elevation.  - she was on rocephin and flagyl  - Appreciate Gen Surg opinion. Agree likely ischemic bowel.    [facility] and General surgery discussed at length her operative risk and decided to proceed with palliative care.      NSTEMI  -this is third since May, very poor prognosis  -medical mgmt per Card  -trending Trop. Echo reviewed above.       Acute/chronic diastolic CHF  -?component of acute MI  -had received diuresis per Renal and Card    Abnl Lung Imaging  -  lung findings on CT are due to edema and not PNA  -PCT normal yesterday now 14.38 however suspect this is due to acute abdomen.   -RVP neg    ESE/CKD3  -appreciate Renal  attention to pt  -cautious diuresis per Renal and Card while here but stopped given comfort care only     DM2  -A1c 6.6  -tx held        Comfort care only and symptoms management       MILVIA Moffett  09/17/20  14:15 EDT    Time: 30min

## 2020-09-17 NOTE — PLAN OF CARE
Problem: Adult Inpatient Plan of Care  Goal: Plan of Care Review  Recent Flowsheet Documentation  Taken 9/17/2020 0548 by Deidra Crump, RN  Progress: no change  Plan of Care Reviewed With: patient  Outcome Summary: Pt slept well most of the night. Sleeping in between care, received dilaudid 0.5 mg x2 tolerated well. Bladder scanned 300. liang catheter placed  q4 turns. Will continue to monitor and keep comfortable     Problem: Adult Inpatient Plan of Care  Goal: Patient-Specific Goal (Individualized)  Recent Flowsheet Documentation  Taken 9/17/2020 0548 by Deidra Crump, RN  Individualized Care Needs: PRN meds, q4 turns, liang placed

## 2020-09-17 NOTE — PLAN OF CARE
Goal Outcome Evaluation:  Plan of Care Reviewed With: patient  Progress: declining  Outcome Summary: Medicated x2 for comfort. Rested comfortably. [facility] to meet with family 9/18.

## 2020-09-18 NOTE — PLAN OF CARE
Problem: Adult Inpatient Plan of Care  Goal: Plan of Care Review  Recent Flowsheet Documentation  Taken 9/18/2020 0405 by Deidra Crump, RN  Progress: no change  Plan of Care Reviewed With: patient  Outcome Summary: premedicated prior to turns with 0.5 mg of dilaudid, pt is still showing signs of pain in abdomin increased to 1 mg. hospice to meet today @11:30. will continue to monitor and keep comfortable     Problem: Adult Inpatient Plan of Care  Goal: Patient-Specific Goal (Individualized)  Recent Flowsheet Documentation  Taken 9/18/2020 0405 by Deidra Crump, RN  Patient-Specific Goals (Include Timeframe): to keep comfortable  Individualized Care Needs: premedicate prior to turn, q4 turns. liang and oral care, offer drinks

## 2020-09-18 NOTE — NURSING NOTE
I met with pt's nephew José/LEVI. To provide an explanation of [facility] services with the focus on scattered bed admission. Patients PMH and goals of care discussed. Services elected and consents signed. Nurse updated and will request SB orders form attending MD.  Please call our office for any questions or needs.         Thank You, Lali Woodruff RN  [facility] Admissions Coordinator  [phone]

## 2020-09-18 NOTE — PROGRESS NOTES
Continued Stay Note  [facility]     Patient Name: Alessia Madrigal  MRN: [id number]  Today's Date: 9/18/2020    Admit Date: 9/14/2020    Discharge Plan     Row Name 09/18/20 1717       Plan    Plan  Follow for HSB orders.    Refused Quality and Resource List Comment  [facility] consents signed.  Follow for MD order for [facility] Scattered Bed.  ELIGIO Lewis        Discharge Codes    No documentation.             ELIGIO Meza

## 2020-09-18 NOTE — PLAN OF CARE
Goal Outcome Evaluation:  Plan of Care Reviewed With: patient  Progress: no change  Outcome Summary: Pt premedicated prior to turns with 1mg dilaudid. One extra dose of 1mg dilaudid given. Family agreeable with hospice, flip to HSB occur sometime this evening. Pt appears to be resting comfortably. Will continue to monitor and provide comfort care.

## 2020-09-19 NOTE — PROGRESS NOTES
"DAILY PROGRESS NOTE  [facility]    Patient Identification:  Name: Alessia Madrigal  Age: 89 y.o.  Sex: female  :  10/9/1928  MRN: [id number]         Primary Care Physician: Dario Mae MD    Subjective:  Interval History:She is sedated.    Objective:    Scheduled Meds:   Continuous Infusions:     Vital signs in last 24 hours:  Temp:  [100 °F (37.8 °C)-100.2 °F (37.9 °C)] 100 °F (37.8 °C)  Heart Rate:  [89-93] 89  Resp:  [14-16] 14  BP: (112-115)/(57-64) 115/57    Intake/Output:    Intake/Output Summary (Last 24 hours) at 2020 1521  Last data filed at 2020 0310  Gross per 24 hour   Intake 0 ml   Output 625 ml   Net -625 ml       Exam:  /57 (BP Location: Left arm, Patient Position: Lying)   Pulse 89   Temp 100 °F (37.8 °C) (Oral)   Resp 14   Ht 162.6 cm (64\")   Wt 84.4 kg (186 lb)   SpO2 95%   BMI 31.93 kg/m²     General Appearance:    sedated, no distress   Head:    Normocephalic, without obvious abnormality, atraumatic   Eyes:       Throat:   Lips, tongue, gums normal   Neck:   Supple, symmetrical, trachea midline, no JVD   Lungs:     Clear to auscultation bilaterally, respirations unlabored   Chest Wall:    No tenderness or deformity    Heart:    Regular rate and rhythm, S1 and S2 normal, no murmur,no  Rub or gallop   Abdomen:     Soft, diffuse tenderness, bowel sounds active, no masses, no organomegaly    Extremities:   Extremities normal, atraumatic, no cyanosis or edema   Pulses:      Skin:   Skin is warm and dry,  no rashes or palpable lesions   Neurologic:   sedated      Lab Results (last 72 hours)     Procedure Component Value Units Date/Time    Blood Culture - Blood, Arm, Left [[id number]] Collected: 20 1833    Specimen: Blood from Arm, Left Updated: 20 1900     Blood Culture No growth at 4 days    Blood Culture - Blood, Arm, Left [[id number]] Collected: 20 1549    Specimen: Blood from Arm, Left Updated: 20 1600     Blood Culture No " growth at 4 days        Data Review:  Results from last 7 days   Lab Units 09/16/20  1011 09/16/20  0548 09/15/20  2200   SODIUM mmol/L 134* 129* 133*   POTASSIUM mmol/L 5.1 5.7* 4.5   CHLORIDE mmol/L 95* 94* 97*   CO2 mmol/L 19.4* 16.6* 24.1   BUN mg/dL 72* 76* 71*   CREATININE mg/dL 3.00* 2.78* 2.57*   GLUCOSE mg/dL 158* 143* 135*   CALCIUM mg/dL 9.8* 9.6 9.5     Results from last 7 days   Lab Units 09/16/20  1011 09/15/20  2014 09/15/20  0543   WBC 10*3/mm3 30.61* 28.43* 25.57*   HEMOGLOBIN g/dL 13.6 14.5 14.3   HEMATOCRIT % 38.7 41.0 41.2   PLATELETS 10*3/mm3 238 198 281     Results from last 7 days   Lab Units 09/16/20  0548   TSH uIU/mL 0.145*     Results from last 7 days   Lab Units 09/14/20  1234   HEMOGLOBIN A1C % 6.60*     Lab Results   Lab Value Date/Time    TROPONINT 4.090 (C) 09/16/2020 0548    TROPONINT 6.200 (C) 09/15/2020 0720    TROPONINT 5.500 (C) 09/15/2020 0543    TROPONINT 0.405 (C) 09/14/2020 1853    TROPONINT 0.232 (C) 09/14/2020 1433    TROPONINT 0.236 (C) 09/14/2020 1234    TROPONINT 1.160 (C) 09/06/2020 2157    TROPONINT 0.197 (C) 09/06/2020 1750    TROPONINT 0.091 (C) 09/06/2020 1233    TROPONINT 0.041 (C) 09/06/2020 0833    TROPONINT 0.015 06/16/2020 0443    TROPONINT 0.011 06/15/2020 2120    TROPONINT 0.027 06/15/2020 0912    TROPONINT 0.213 (C) 05/07/2020 0947    TROPONINT 0.021 05/07/2020 0347    TROPONINT <0.010 08/02/2018 1325    TROPONINT <0.010 08/02/2018 1052    TROPONINT 0.558 (C) 03/11/2018 1321    TROPONINT 0.03 07/15/2015 0547    TROPONINT 0.05 (H) 07/14/2015 0533    TROPONINT 0.05 (H) 07/13/2015 2138         Results from last 7 days   Lab Units 09/16/20  1011 09/15/20  2200 09/14/20  1234   ALK PHOS U/L 96 97 112   BILIRUBIN mg/dL 0.5 0.5 0.4   ALT (SGPT) U/L 44* 49* 20   AST (SGOT) U/L 117* 196* 18     Results from last 7 days   Lab Units 09/16/20  0548   TSH uIU/mL 0.145*     Results from last 7 days   Lab Units 09/14/20  1234   HEMOGLOBIN A1C % 6.60*     No results found  for: POCGLU  Results from last 7 days   Lab Units 09/14/20  1234   INR  1.02       Past Medical History:   Diagnosis Date   • Acute on chronic congestive heart failure (CMS/[facility])    • Acute transmural inferior wall MI (CMS/[facility])    • Arthritis 03/13/2014    CONT TYLENOL FOR PAIN/ JOSE DANIEL CHEW (INTERNAL MEDICINE)   • CAD (coronary artery disease)    • Cancer of uterus (CMS/[facility])    • Carotid artery stenosis    • Chronic renal failure syndrome    • 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 SHE HAD BEEN ON SHOTS.  CHECK HER B12 LEVELS TODAY.  BY JOSE DANIEL CHEW       Assessment:  Active Hospital Problems    Diagnosis  POA   • **NSTEMI (non-ST elevated myocardial infarction) (CMS/[facility]) [I21.4]  Yes   • Acute abdomen [R10.0]  Yes   • Ischemic bowel disease (CMS/[facility]) [K55.9]  Yes   • Acute on chronic diastolic CHF (congestive heart failure) (CMS/[facility]) [I50.33]  Yes   • ESE (acute kidney injury) (CMS/[facility]) [N17.9]  Yes   • Lower abdominal pain [R10.30]  Yes   • Community acquired pneumonia [J18.9]  Yes   • Leukocytosis [D72.829]  Yes   • Acute chest pain [R07.9]  Yes   • CKD (chronic kidney disease) stage 3, GFR 30-59 ml/min (CMS/[facility]) [N18.3]  Yes   • Chronic coronary artery disease [I25.10]  Yes   • Hypertension [I10]  Yes   • Hypothyroidism [E03.9]  Yes   • Type 2 diabetes mellitus, with long-term current use of insulin (CMS/[facility]) [E11.9, Z79.4]  Not Applicable      Resolved Hospital Problems   No resolved problems to display.       Plan:  Comfort care. Measures  in place. Discussed with nurse and family.  ? Hospice     Aaron Warner MD  9/19/2020  15:21 EDT

## 2020-09-19 NOTE — PLAN OF CARE
Goal Outcome Evaluation:  Plan of Care Reviewed With: patient, family  Progress: declining  Outcome Summary: Continues to decline.  PPS at 10%.  Family sees decline.  Medicated for turns.  Continue comfort measures.

## 2020-09-19 NOTE — PLAN OF CARE
Goal Outcome Evaluation:  Plan of Care Reviewed With: patient  Progress: declining  Outcome Summary: Pt rested well. Medicated for comfort prior to Q4 turns. Continue comfort care.

## 2020-09-19 NOTE — DISCHARGE SUMMARY
PHYSICIAN DISCHARGE SUMMARY                                                                        [facility]    Patient Identification:  Name: Alessia Madrigal  Age: 89 y.o.  Sex: female  :  10/9/1928  MRN: [id number]  Primary Care Physician: Dario Mae MD    Admit date: 2020  Discharge date and time: 2020     Discharged Condition: Terminal    Discharge Diagnoses:  Obesity (BMI 30-39.9)    Non-STEMI (non-ST elevated myocardial infarction) (CMS/[facility])    NSTEMI (non-ST elevated myocardial infarction) (CMS/[facility])    Acute on chronic diastolic CHF (congestive heart failure) (CMS/[facility])    Lower abdominal pain    Ischemic bowel disease (CMS/[facility])         Hospital Course:  Pleasant 89-year-old female with multiple medical issues including coronary disease with previous MIs and severe peripheral vascular disease.  She presents with an acute myocardial infarction.  This associated with her very shortly followed by abdominal pain and unfortunately work-up appear to be consistent with ischemic bowel.  She is obviously an extremely high risk for surgery.  In the end they requested comfort measures only she is transferred to palliative care.  We have been able to get her pain under control and palliative care and at this point she is being transferred to scattered bed hospice.      The patient ended up staying an extra day due to paperwork for switching to hospice scattered bed.  She will have continued comfort measures only and hospice scattered bed conversion.    Consults:     Consults     Date and Time Order Name Status Description    9/15/2020 1451 Inpatient General Surgery Consult Completed     9/15/2020 1451 Inpatient Pulmonology Consult Completed     2020 1857 Inpatient Nephrology Consult Completed     2020 1848 Inpatient Cardiology Consult Completed     2020 1536 [facility] (on-call MD unless specified) Details  Completed     9/6/2020 7089 Cardiology (on-call MD unless specified) Completed             Discharge Exam:  Physical Exam   Presently running a low-grade temp but other vital signs stable.  She is a well-developed moderately thin female presently comfortable but also minimally responsive.  Heart at present is regular rate and rhythm and lungs are clear to auscultation.     Disposition:  Scattered bed hospice    Patient Instructions:      Discharge Medications      Continue These Medications      Instructions Start Date   Insulin Pen Needle 32G X 4 MM misc  Commonly known as: BD Pen Needle Janis U/F   USE AS DIRECTED THREE TIMES A DAY.         ASK your doctor about these medications      Instructions Start Date   amLODIPine 10 MG tablet  Commonly known as: NORVASC   10 mg, Oral, Daily      aspirin 81 MG EC tablet   81 mg, Oral, Daily      calcitriol 0.5 MCG capsule  Commonly known as: ROCALTROL   0.5 mcg, Oral, Daily      clopidogrel 75 MG tablet  Commonly known as: PLAVIX   TAKE 1 TABLET DAILY      Co Q-10 200 MG capsule   300 mg, Oral, Daily      furosemide 40 MG tablet  Commonly known as: LASIX   40 mg, Oral, 2 Times Daily      glucose blood test strip  Commonly known as: ONE TOUCH ULTRA TEST   Test Three times daily. DX E11.9      glucose blood test strip   Use as instructed      Insulin Glargine 100 UNIT/ML injection pen  Commonly known as: LANTUS SOLOSTAR   15 Units, Subcutaneous, Nightly      insulin lispro 100 UNIT/ML injection  Commonly known as: HumaLOG   5 Units, Subcutaneous, 3 Times Daily Before Meals      isosorbide mononitrate 60 MG 24 hr tablet  Commonly known as: IMDUR   60 mg, Oral, Every 24 Hours Scheduled      levothyroxine 150 MCG tablet  Commonly known as: Synthroid   150 mcg, Oral, Daily      metoprolol succinate XL 50 MG 24 hr tablet  Commonly known as: TOPROL-XL   50 mg, Oral, Every 12 Hours      nitroglycerin 0.4 MG SL tablet  Commonly known as: NITROSTAT   DISSOLVE 1 TAB UNDER TONGUE FOR  CHEST PAIN - IF PAIN REMAINS AFTER 5 MIN, CALL 911 AND REPEAT DOSE. MAX 3 TABS IN 15 MINUTES      ranolazine 500 MG 12 hr tablet  Commonly known as: RANEXA   500 mg, Oral, Every 12 Hours Scheduled      spironolactone 25 MG tablet  Commonly known as: ALDACTONE   TAKE 1 TABLET DAILY      timolol 0.25 % ophthalmic solution  Commonly known as: TIMOPTIC   1 drop, Both Eyes, Nightly - One Time      travoprost (KAL free) 0.004 % solution ophthalmic solution  Commonly known as: TRAVATAN   1 drop, Both Eyes, Nightly, in affected eye(s)             Future Appointments   Date Time Provider Department Center   9/30/2020  1:30 PM Dario Mae MD [facility] None       Discharge Order (From admission, onward)     Start     Ordered    09/18/20 2012  Discharge readmit patient  Once     Expected Discharge Date: 09/18/20    Discharge Disposition: Hospice/Medical Facility ([facility])    Physician of Record for Attribution - Please select from Treatment Team: KATIE MARCH [[id number]]    Review needed by CMO to determine Physician of Record: No       Question Answer Comment   Physician of Record for Attribution - Please select from Treatment Team KATIE MARCH    Review needed by CMO to determine Physician of Record No        09/18/20 2012                  Total time spent discharging patient including evaluation,post hospitalization follow up,  medication and post hospitalization instructions and education total time exceeds 30 minutes.    Signed:  Aaron Warner MD  9/19/2020  18:48 EDT    EMR Dragon/Transcription disclaimer:   Much of this encounter note is an electronic transcription/translation of spoken language to printed text. The electronic translation of spoken language may permit erroneous, or at times, nonsensical words or phrases to be inadvertently transcribed; Although I have reviewed the note for such errors, some may still exist.

## 2020-09-19 NOTE — PROGRESS NOTES
[facility] Visit Report    Alessia Madrigal  [id number]  9/19/2020    Admission R/T [facility] Dx: Yes    Reason for [facility] Admission: Acute ischemia of large intestine    Symptom  Management: Pain control    Nursing/Medication Recommendations: Continue EOL care    Psychosocial Issues and Recommendations:    Spiritual Concerns and Recommendations:    [facility] Discharge Plans:  No orders for discharge. Patient continues to receive IV medications about every 4 hours with turns for pain control.     Review of Visit: upon arrival to unit I received an update from [facility] RN, Kevon; he reports patient has now progressed to a PPS of 10% through the day. He verbalizes that family is aware. Kevon reports patient has been minimally responsive to pain today. I reviewed v/s, medications and notes in [facility]. Upon arrival to room, no family is present. Patient is lying in bed with her eyes closed. She does not respond to verbal or physical stimuli. She appears comfortable and peaceful at this time. Her skin is pale in color and warm to the touch. Her v/s at my visit are: , RR 18, sats 96% on O2 at 4L/NC. Upon auscultation her lungs are clear, diminished in bases. Respirations are even and shallow. Abdomen is soft and non tender with positive bowel sounds. Pedal pulses are weak bilaterally; decrease in edema noted to both feet. F/C in place with dark yellow u/o. Patient appears to have entered into the active phases of dying. In the last 24 hours patient has received Ativan 0.5mg-1mg IV PRN x3 doses and Dilaudid 1mg IV PRN x5 doses. Will continue to visit daily, assess needs of patient/family and provide support        Lety Bosch RN  [facility] Visit Nurse--Scattered bed team

## 2020-09-19 NOTE — DISCHARGE SUMMARY
PHYSICIAN DISCHARGE SUMMARY                                                                        [facility]    Patient Identification:  Name: Alessia Madrigal  Age: 89 y.o.  Sex: female  :  10/9/1928  MRN: [id number]  Primary Care Physician: Dario Mae MD    Admit date: 2020  Discharge date and time: 2020     Discharged Condition: Terminal    Discharge Diagnoses:  NSTEMI (non-ST elevated myocardial infarction) (CMS/[facility])    Chronic coronary artery disease    CKD (chronic kidney disease) stage 3, GFR 30-59 ml/min (CMS/[facility])    Hypertension    Hypothyroidism    Type 2 diabetes mellitus, with long-term current use of insulin (CMS/[facility])    Community acquired pneumonia    Leukocytosis    Acute chest pain    Acute on chronic diastolic CHF (congestive heart failure) (CMS/[facility])    ESE (acute kidney injury) (CMS/HCC)    Lower abdominal pain    Acute abdomen    Ischemic bowel disease (CMS/HCC)         Hospital Course:  Pleasant 89-year-old female with multiple medical issues including coronary disease with previous MIs and severe peripheral vascular disease.  She presents with an acute myocardial infarction.  This associated with her very shortly followed by abdominal pain and unfortunately work-up appear to be consistent with ischemic bowel.  She is obviously an extremely high risk for surgery.  In the end they requested comfort measures only she is transferred to palliative care.  We have been able to get her pain under control and palliative care and at this point she is being transferred to scattered bed hospice.      Consults:     Consults     Date and Time Order Name Status Description    9/15/2020 1451 Inpatient General Surgery Consult Completed     9/15/2020 1451 Inpatient Pulmonology Consult Completed     2020 1857 Inpatient Nephrology Consult Completed     2020 1848 Inpatient Cardiology Consult Completed      9/14/2020 1536 [facility] (on-call MD unless specified) Details Completed     9/6/2020 0989 Cardiology (on-call MD unless specified) Completed             Discharge Exam:  Physical Exam   Presently running a low-grade temp but other vital signs stable.  She is a well-developed moderately thin female presently comfortable but also minimally responsive.  Heart at present is regular rate and rhythm and lungs are clear to auscultation.     Disposition:  Scattered bed hospice    Patient Instructions:      Discharge Medications      Continue These Medications      Instructions Start Date   Insulin Pen Needle 32G X 4 MM misc  Commonly known as: BD Pen Needle Janis U/F   USE AS DIRECTED THREE TIMES A DAY.         ASK your doctor about these medications      Instructions Start Date   amLODIPine 10 MG tablet  Commonly known as: NORVASC   10 mg, Oral, Daily      aspirin 81 MG EC tablet   81 mg, Oral, Daily      calcitriol 0.5 MCG capsule  Commonly known as: ROCALTROL   0.5 mcg, Oral, Daily      clopidogrel 75 MG tablet  Commonly known as: PLAVIX   TAKE 1 TABLET DAILY      Co Q-10 200 MG capsule   300 mg, Oral, Daily      furosemide 40 MG tablet  Commonly known as: LASIX   40 mg, Oral, 2 Times Daily      glucose blood test strip  Commonly known as: ONE TOUCH ULTRA TEST   Test Three times daily. DX E11.9      glucose blood test strip   Use as instructed      Insulin Glargine 100 UNIT/ML injection pen  Commonly known as: LANTUS SOLOSTAR   15 Units, Subcutaneous, Nightly      insulin lispro 100 UNIT/ML injection  Commonly known as: HumaLOG   5 Units, Subcutaneous, 3 Times Daily Before Meals      isosorbide mononitrate 60 MG 24 hr tablet  Commonly known as: IMDUR   60 mg, Oral, Every 24 Hours Scheduled      levothyroxine 150 MCG tablet  Commonly known as: Synthroid   150 mcg, Oral, Daily      metoprolol succinate XL 50 MG 24 hr tablet  Commonly known as: TOPROL-XL   50 mg, Oral, Every 12 Hours      nitroglycerin 0.4 MG SL  tablet  Commonly known as: NITROSTAT   DISSOLVE 1 TAB UNDER TONGUE FOR CHEST PAIN - IF PAIN REMAINS AFTER 5 MIN, CALL 911 AND REPEAT DOSE. MAX 3 TABS IN 15 MINUTES      ranolazine 500 MG 12 hr tablet  Commonly known as: RANEXA   500 mg, Oral, Every 12 Hours Scheduled      spironolactone 25 MG tablet  Commonly known as: ALDACTONE   TAKE 1 TABLET DAILY      timolol 0.25 % ophthalmic solution  Commonly known as: TIMOPTIC   1 drop, Both Eyes, Nightly - One Time      travoprost (KAL free) 0.004 % solution ophthalmic solution  Commonly known as: TRAVATAN   1 drop, Both Eyes, Nightly, in affected eye(s)             Future Appointments   Date Time Provider Department Center   9/30/2020  1:30 PM Dario Mae MD [facility]  [facility] None      Contact information for follow-up providers     Dario Mae MD .    Specialty: Internal Medicine  Contact information:  [address]  [facility] [address]  [phone]                   Contact information for after-discharge care     Home Medical Care     [facility] .    Service: Home Health Services  Contact information:  [address]  [facility] [facility] [address] [phone]                           Discharge Order (From admission, onward)     Start     Ordered    09/18/20 2012  Discharge readmit patient  Once     Expected Discharge Date: 09/18/20    Discharge Disposition: Hospice/Medical Facility ([facility])    Physician of Record for Attribution - Please select from Treatment Team: KATIE MARCH [[id number]]    Review needed by CMO to determine Physician of Record: No       Question Answer Comment   Physician of Record for Attribution - Please select from Treatment Team KATIE MARCH    Review needed by CMO to determine Physician of Record No        09/18/20 2012                  Total time spent discharging patient including evaluation,post hospitalization follow up,  medication and post hospitalization  instructions and education total time exceeds 30 minutes.    Signed:  Clif Peres MD  9/18/2020  20:12 EDT    EMR Dragon/Transcription disclaimer:   Much of this encounter note is an electronic transcription/translation of spoken language to printed text. The electronic translation of spoken language may permit erroneous, or at times, nonsensical words or phrases to be inadvertently transcribed; Although I have reviewed the note for such errors, some may still exist.

## 2020-09-20 PROBLEM — Z51.5 HOSPICE CARE: Status: ACTIVE | Noted: 2020-01-01

## 2020-09-20 NOTE — PROGRESS NOTES
[facility] Visit Report    Alessia Madrigal  [id number]  9/20/2020    Admission R/T [facility] Dx: Yes    Reason for [facility] Admission: Acute ischemia of large intestine    Symptom  Management: Pain control    Nursing/Medication Recommendations: Continue EOL care    Psychosocial Issues and Recommendations:    Spiritual Concerns and Recommendations:    [facility] Discharge Plans:  No orders for discharge. Patient remains in the active phases of dying. She continues to receive IV medications prior to turns to help maintain her current level of comfort    Review of Visit: Arrived on 4P and received an update from [facility] RNOnel; he reports no changes or concerns. This RN reviewed v/s, medications and [facility]. Upon arrival to room, no family is present. Patient is lying in bed, HOB is elevated, her eyes are closed. Her skin is pale to ashen in color. Her arms and hands are warm to the touch. Her nailbeds are noted to be dusky. She is non responsive to voice or touch. Respirations are even and shallow on O2 at 2L/NC. Abdomen is soft with hypoactive bowel sounds. F/C in place; per I&O's patient with significant decrease in u/o over the last 24 hours. Pedal pulses are weak bilaterally and feet are cool to the touch. Patient remains in the active phases of dying; she appears comfortable and peaceful at this time. In the last 24 hours patient has received Ativan 0.5mg IV PRN x6 doses and Dilaudid 1mg IV PRN x6 doses. This RN called and spoke to José MONCADA with an update; he has no questions or concerns at this time. Will update team        Lety Bosch RN  [facility] Visit Nurse--Scattered bed team

## 2020-09-20 NOTE — H&P
HISTORY AND PHYSICAL   [facility]        Patient Identification:  Name: Alessia Madrigal  Age: 89 y.o.  Sex: female  :  10/9/1928  MRN: [id number]                     Primary Care Physician: Dario Mae MD    Chief Complaint: Abdominal pain with suspected ischemic bowel    History of Present Illness:          The patient is a 89-year-old female with multiple medical issues including coronary disease with previous MIs and severe peripheral vascular disease. She presents with an acute myocardial infarction. This associated with her very shortly followed by abdominal pain and unfortunately work-up appear to be consistent with ischemic bowel. She is obviously an extremely high risk for surgery. In the end they requested comfort measures only she is transferred to palliative care. We have been able to get her pain under control and palliative care and at this point she is being transferred to scattered bed hospice for comfort measures only.            Past Medical History:  Past Medical History:   Diagnosis Date   • Acute on chronic congestive heart failure (CMS/HCC)    • Acute transmural inferior wall MI (CMS/HCC)    • Arthritis 2014    CONT TYLENOL FOR PAIN/ JOSE DANIEL CHEW (INTERNAL MEDICINE)   • CAD (coronary artery disease)    • Cancer of uterus (CMS/HCC)    • Carotid artery stenosis    • Chronic renal failure syndrome    • Coronary artery disease involving coronary bypass graft of native heart     with other forms of angina pectoris   • Coronary atherosclerosis    • Endometrial cancer (CMS/HCC)    • Esophageal reflux    • Essential hypertension    • Glaucoma    • Hypertension    • Malaise and fatigue    • Osteoarthritis    • Sleep apnea     USING CPAP   • Spinal stenosis    • Thyroid disease    • Type 2 diabetes mellitus (CMS/HCC)     INsulin begun    • Vitamin B12 deficiency     SHE HAS NOT HAD THIS CHECKED IN A WHILE. SHE HAD BEEN ON SHOTS.  CHECK HER B12 LEVELS TODAY.  BY  JOSE DANIEL CHEW     Past Surgical History:  Past Surgical History:   Procedure Laterality Date   • CARDIAC CATHETERIZATION N/A 3/11/2018    Procedure: Left Heart Cath;  Surgeon: Cameron Chisholm MD;  Location: [facility] CATH INVASIVE LOCATION;  Service: Cardiovascular   • CARDIAC CATHETERIZATION N/A 3/11/2018    Procedure: Stent ROSSI bypass graft;  Surgeon: Cameron Chisholm MD;  Location: [facility] CATH INVASIVE LOCATION;  Service: Cardiovascular   • CARDIAC CATHETERIZATION N/A 5/7/2020    Procedure: Left Heart Cath;  Surgeon: Cameron Chisholm MD;  Location: [facility] CATH INVASIVE LOCATION;  Service: Cardiovascular;  Laterality: N/A;   • CARDIAC CATHETERIZATION N/A 5/7/2020    Procedure: Saphenous Vein Graft;  Surgeon: Cameron Chisholm MD;  Location: [facility] CATH INVASIVE LOCATION;  Service: Cardiovascular;  Laterality: N/A;   • CATARACT EXTRACTION Bilateral    • CATARACT EXTRACTION W/ INTRAOCULAR LENS IMPLANT Right 10/25/2016    Procedure: RT SUPERFICIAL KERATECTOMY ;  Surgeon: Arian Singh MD;  Location: [facility] OR [facility];  Service:    • CHOLECYSTECTOMY     • COLONOSCOPY     • CORONARY ARTERY BYPASS GRAFT  1991    X3   • HYSTERECTOMY     • OOPHORECTOMY     • TRANSLUMINAL ATHERECTOMY PERONEAL ARTERY      PERCUTANEOUS TRANSLUMINAL ATHERECTOMY RENAL ARTERY      Home Meds:  Medications Prior to Admission   Medication Sig Dispense Refill Last Dose   • amLODIPine (NORVASC) 10 MG tablet Take 1 tablet by mouth Daily. 90 tablet 3    • aspirin 81 MG EC tablet Take 81 mg by mouth Daily.      • calcitriol (ROCALTROL) 0.5 MCG capsule Take 0.5 mcg by mouth Daily.      • clopidogrel (PLAVIX) 75 MG tablet TAKE 1 TABLET DAILY 90 tablet 4    • Coenzyme Q10 (CO Q-10) 200 MG capsule Take 300 mg by mouth daily.      • furosemide (LASIX) 40 MG tablet Take 1 tablet by mouth 2 (Two) Times a Day. 60 tablet 3    • glucose blood (ONE TOUCH ULTRA TEST) test strip Test Three times daily. DX E11.9 100 each 2    • glucose blood test strip Use as  instructed 400 each 1    • Insulin Glargine (LANTUS SOLOSTAR) 100 UNIT/ML injection pen Inject 15 Units under the skin into the appropriate area as directed Every Night. 15 mL 1    • insulin lispro (HUMALOG) 100 UNIT/ML injection Inject 5 Units under the skin into the appropriate area as directed 3 (Three) Times a Day Before Meals. 15 mL 1    • Insulin Pen Needle (BD PEN NEEDLE LISA U/F) 32G X 4 MM misc USE AS DIRECTED THREE TIMES A DAY. 300 each 1    • isosorbide mononitrate (IMDUR) 60 MG 24 hr tablet Take 1 tablet by mouth Daily. 90 tablet 1    • levothyroxine (SYNTHROID) 150 MCG tablet Take 1 tablet by mouth Daily. 90 tablet 3    • metoprolol succinate XL (TOPROL-XL) 50 MG 24 hr tablet Take 1 tablet by mouth Every 12 (Twelve) Hours. 180 tablet 3    • nitroglycerin (NITROSTAT) 0.4 MG SL tablet DISSOLVE 1 TAB UNDER TONGUE FOR CHEST PAIN - IF PAIN REMAINS AFTER 5 MIN, CALL 911 AND REPEAT DOSE. MAX 3 TABS IN 15 MINUTES 25 tablet 1    • ranolazine (RANEXA) 500 MG 12 hr tablet Take 1 tablet by mouth Every 12 (Twelve) Hours. 180 tablet 1    • spironolactone (ALDACTONE) 25 MG tablet TAKE 1 TABLET DAILY 90 tablet 3    • timolol (TIMOPTIC) 0.25 % ophthalmic solution Administer 1 drop to both eyes Tonight.      • travoprost, KAL free, (TRAVATAN) 0.004 % solution ophthalmic solution Administer 1 drop to both eyes every night. in affected eye(s)         Current meds    Current Facility-Administered Medications:   •  acetaminophen (TYLENOL) tablet 650 mg, 650 mg, Oral, Q4H PRN **OR** acetaminophen (TYLENOL) 160 MG/5ML solution 650 mg, 650 mg, Oral, Q4H PRN **OR** acetaminophen (TYLENOL) suppository 650 mg, 650 mg, Rectal, Q4H PRN, Clif Peres MD  •  acetaminophen (TYLENOL) tablet 650 mg, 650 mg, Oral, Q6H PRN, Clif Peres MD  •  atropine 1 % ophthalmic solution 2 drop, 2 drop, Sublingual, BID PRN, Clif Peres MD  •  diphenoxylate-atropine (LOMOTIL) 2.5-0.025 MG per tablet 1 tablet, 1 tablet, Oral,  Q2H PRN, Clif Peres MD  •  glucagon (human recombinant) (GLUCAGEN DIAGNOSTIC) injection 1 mg, 1 mg, Subcutaneous, Q15 Min PRN, Clif Peres MD  •  Glycerin-Hypromellose- (ARTIFICIAL TEARS) 0.2-0.2-1 % ophthalmic solution solution 1 drop, 1 drop, Both Eyes, Q30 Min PRN, Clif Peres MD  •  glycopyrrolate PF (ROBINUL) injection 0.2 mg, 0.2 mg, Intravenous, Q2H PRN **OR** glycopyrrolate PF (ROBINUL) injection 0.2 mg, 0.2 mg, Subcutaneous, Q2H PRN **OR** glycopyrrolate PF (ROBINUL) injection 0.4 mg, 0.4 mg, Intravenous, Q2H PRN **OR** glycopyrrolate PF (ROBINUL) injection 0.4 mg, 0.4 mg, Subcutaneous, Q2H PRN, Clif Peres MD  •  HYDROcodone-acetaminophen (NORCO) 5-325 MG per tablet 1 tablet, 1 tablet, Oral, Q6H PRN, Clif Peres MD  •  HYDROmorphone (DILAUDID) injection 0.5 mg, 0.5 mg, Intravenous, Q1H PRN **OR** morphine injection 2 mg, 2 mg, Intravenous, Q1H PRN **OR** morphine concentrated solution solution 5 mg, 5 mg, Sublingual, Q1H PRN, Clif Peres MD  •  HYDROmorphone (DILAUDID) injection 1 mg, 1 mg, Intravenous, Q1H PRN, 1 mg at 09/20/20 1230 **OR** Morphine sulfate (PF) injection 4 mg, 4 mg, Intravenous, Q1H PRN **OR** morphine concentrated solution solution 10 mg, 10 mg, Sublingual, Q1H PRN, Clif Peres MD  •  HYDROmorphone (DILAUDID) injection 1.5 mg, 1.5 mg, Intravenous, Q1H PRN **OR** Morphine injection 6 mg, 6 mg, Intravenous, Q1H PRN **OR** morphine concentrated solution solution 20 mg, 20 mg, Sublingual, Q1H PRN, Clif Peres MD  •  ipratropium-albuterol (DUO-NEB) nebulizer solution 3 mL, 3 mL, Nebulization, Q4H PRN, Clif Peres MD  •  LORazepam (ATIVAN) injection 0.5 mg, 0.5 mg, Intravenous, Q1H PRN, 0.5 mg at 09/20/20 1230 **OR** LORazepam (ATIVAN) 2 MG/ML concentrated solution 0.5 mg, 0.5 mg, Sublingual, Q1H PRN, Clif Peres MD  •  LORazepam (ATIVAN) injection 1 mg, 1 mg, Intravenous, Q1H PRN **OR** LORazepam  (ATIVAN) 2 MG/ML concentrated solution 1 mg, 1 mg, Sublingual, Q1H PRN, Clif Peres MD  •  LORazepam (ATIVAN) injection 2 mg, 2 mg, Intravenous, Q1H PRN **OR** LORazepam (ATIVAN) 2 MG/ML concentrated solution 2 mg, 2 mg, Sublingual, Q1H PRN, Clif Peres MD  •  magic mouthwash oral supsension 5 mL, 5 mL, Swish & Spit, Q4H PRN, Clif Peres MD  •  morphine injection 2 mg, 2 mg, Intravenous, Q4H PRN, Clif Peres MD  •  nitroglycerin (NITROSTAT) SL tablet 0.4 mg, 0.4 mg, Sublingual, PRN, Clif Peres MD  •  ondansetron (ZOFRAN) injection 4 mg, 4 mg, Intravenous, Q6H PRN, Clif Peres MD  •  promethazine (PHENERGAN) tablet 12.5 mg, 12.5 mg, Oral, Q4H PRN **OR** promethazine (PHENERGAN) 6.25 MG/5ML syrup 12.5 mg, 12.5 mg, Oral, Q4H PRN **OR** promethazine (PHENERGAN) suppository 12.5 mg, 12.5 mg, Rectal, Q4H PRN, Clif Peres MD  •  Scopolamine (TRANSDERM-SCOP) 1.5 MG/3DAYS patch 1 patch, 1 patch, Transdermal, Q72H PRN, Clif Peres MD  •  sodium chloride 0.9 % flush 10 mL, 10 mL, Intravenous, PRNJa Emmett J., MD  •  sodium chloride 0.9 % flush 10 mL, 10 mL, Intravenous, PRNJa Emmett J., MD  •  sodium chloride 0.9 % flush 10 mL, 10 mL, Intravenous, PRN, Clif Peres MD  Allergies:  Allergies   Allergen Reactions   • Allopurinol Unknown - High Severity   • Clindamycin/Lincomycin Itching     Felt like she was burning and itching around the neck   • Statins Myalgia   • Ampicillin Rash   • Penicillins Rash   • Pravastatin Myalgia     Immunizations:  Immunization History   Administered Date(s) Administered   • Flu Mist 09/15/2014   • Fluad Quad 65+ 10/23/2019   • Fluzone High Dose =>65 Years ([employer] ONLY) 09/29/2016, 01/10/2018, 01/22/2019   • Pneumococcal, Unspecified 03/26/2015     Social History:   Social History     Social History Narrative   • Not on file     Social History     Socioeconomic History   • Marital status: Single      Spouse name: Not on file   • Number of children: Not on file   • Years of education: Not on file   • Highest education level: Not on file   Tobacco Use   • Smoking status: Never Smoker   • Smokeless tobacco: Never Used   • Tobacco comment: caffeine use- tea   Substance and Sexual Activity   • Alcohol use: No   • Drug use: Never   • Sexual activity: Defer   Lifestyle   • Physical activity     Days per week: 0 days     Minutes per session: 0 min   • Stress: Not on file       Family History:  Family History   Problem Relation Age of Onset   • Colon cancer Other    • Heart disease Other    • Hypertension Other    • Stroke Other         ISCHEMIC   • Colon cancer Mother    • Stroke Father    • Heart attack Father    • Heart disease Father    • Heart attack Brother    • Stroke Brother    • Heart disease Brother    • Breast cancer Maternal Grandmother         Review of Systems  See history of present illness and past medical history.  Patient is sedated and unable to answer any questions.  Remainder of ROS is negative.    Objective:  tMax 24 hrs: Temp (24hrs), Av.6 °F (37.6 °C), Min:99.3 °F (37.4 °C), Max:100.2 °F (37.9 °C)    Vitals Ranges:   Temp:  [99.3 °F (37.4 °C)-100.2 °F (37.9 °C)] 99.4 °F (37.4 °C)  Heart Rate:  [] 93  Resp:  [16] 16  BP: (107-129)/(61-77) 107/70      Exam:  /70 (BP Location: Left arm, Patient Position: Lying)   Pulse 93   Temp 99.4 °F (37.4 °C) (Axillary)   Resp 16   SpO2 92%     General Appearance:   Sedated, no distress, appears stated age   Head:    Normocephalic, without obvious abnormality, atraumatic   Eyes:    PERRL, conjunctivae/corneas clear, EOM's intact, both eyes   Ears:    Normal external ear canals, both ears   Nose:   Nares normal, septum midline, mucosa normal, no drainage    or sinus tenderness   Throat:   Lips, mucosa, and tongue normal   Neck:   Supple, symmetrical, trachea midline, no adenopathy;     thyroid:  no enlargement/tenderness/nodules; no carotid     bruit or JVD   Back:     Symmetric, no curvature, ROM normal, no CVA tenderness   Lungs:     Clear to auscultation bilaterally, respirations unlabored   Chest Wall:    No tenderness or deformity    Heart:    Regular rate and rhythm, S1 and S2 normal, no murmur, rub   or gallop   Abdomen:     Soft, diffusely tender, bowel sounds active all four quadrants,     no masses, no hepatomegaly, no splenomegaly   Extremities:   Extremities normal, atraumatic, no cyanosis or edema   Pulses:   2+ and symmetric all extremities   Skin:   Skin color, texture, turgor normal, no rashes or lesions   Lymph nodes:   Cervical, supraclavicular, and axillary nodes normal   Neurologic:  Sedated      .    Data Review:  Lab Results (last 72 hours)     ** No results found for the last 72 hours. **          Results from last 7 days   Lab Units 09/16/20  0548   TSH uIU/mL 0.145*     Results from last 7 days   Lab Units 09/14/20  1234   HEMOGLOBIN A1C % 6.60*      Imaging Results (All)     None        Past Medical History:   Diagnosis Date   • Acute on chronic congestive heart failure (CMS/HCC)    • Acute transmural inferior wall MI (CMS/HCC)    • Arthritis 03/13/2014    CONT TYLENOL FOR PAIN/ JOSE DANIEL CHEW (INTERNAL MEDICINE)   • CAD (coronary artery disease)    • Cancer of uterus (CMS/HCC)    • Carotid artery stenosis    • Chronic renal failure syndrome    • Coronary artery disease involving coronary bypass graft of native heart     with other forms of angina pectoris   • Coronary atherosclerosis    • Endometrial cancer (CMS/HCC)    • Esophageal reflux    • Essential hypertension    • Glaucoma    • Hypertension    • Malaise and fatigue    • Osteoarthritis    • Sleep apnea     USING CPAP   • Spinal stenosis    • Thyroid disease    • Type 2 diabetes mellitus (CMS/HCC) 1991    INsulin begun 2012   • Vitamin B12 deficiency     SHE HAS NOT HAD THIS CHECKED IN A WHILE. SHE HAD BEEN ON SHOTS.  CHECK HER B12 LEVELS TODAY.  BY JOSE DANIEL CHEW        Assessment:  Active Hospital Problems    Diagnosis  POA   • Hospice care [Z51.5]  Not Applicable   • Ischemic bowel disease (CMS/[facility]) [K55.9]  Yes   • Lower abdominal pain [R10.30]  Yes   • Acute on chronic diastolic CHF (congestive heart failure) (CMS/[facility]) [I50.33]  Yes   • NSTEMI (non-ST elevated myocardial infarction) (CMS/[facility]) [I21.4]  Yes   • Non-STEMI (non-ST elevated myocardial infarction) (CMS/[facility]) [I21.4]  Yes   • Obesity (BMI 30-39.9) [E66.9]  Yes      Resolved Hospital Problems   No resolved problems to display.       Plan:  The patient is admitted to hospice scattered bed for comfort measures only.    Aaron Warner MD  9/20/2020  12:31 EDT

## 2020-09-20 NOTE — PLAN OF CARE
Problem: Adult Inpatient Plan of Care  Goal: Plan of Care Review  Outcome: Ongoing, Progressing  Flowsheets (Taken 9/20/2020 1804)  Progress: declining  Plan of Care Reviewed With: family  Outcome Summary: Patient medicated prior to turns for symptom management. She responds to pain. Updated nephew on patient's status. Will continue to monitor per palliative goals of care.

## 2020-09-21 VITALS — OXYGEN SATURATION: 94 % | DIASTOLIC BLOOD PRESSURE: 70 MMHG | SYSTOLIC BLOOD PRESSURE: 107 MMHG | TEMPERATURE: 97 F

## 2020-09-21 NOTE — PROGRESS NOTES
Case Management Discharge Note      Final Note: Admitted to a [facility] scattered bed on 9/18/2020. DAVID Nuñez RN CCP.    Provided Post Acute Provider List?: Yes  Post Acute Provider List: Home Health, Nursing Home  Provided Post Acute Provider Quality & Resource List?: Refused  Refused Quality and Resource List Comment: [facility] consents signed.  Follow for MD order for [facility] Scattered Bed.  ELIGIO Lewis  Delivered To: Patient  Method of Delivery: In person    Selected Continued Care - Discharged on 9/19/2020 Admission date: 9/14/2020 - Discharge disposition: Hospice/Medical Facility ([facility])    Destination Coordination complete    Service Provider Selected Services Address Phone Fax    [facility]  Inpatient Hospice [address], [facility] [address] [phone]          Durable Medical Equipment    No services have been selected for the patient.              Dialysis/Infusion    No services have been selected for the patient.              Home Medical Care Coordination complete    Service Provider Selected Services Address Phone Fax    [facility] [address], [facility] [address] [phone]       Internal Comment last updated by Eben Joe RN 9/15/2020 1124    Pt is current with [facility]    No services have been selected for the patient.              Community Resources    No services have been selected for the patient.                Selected Continued Care - Prior Encounters Includes selections from prior encounters from 6/16/2020 to 9/19/2020    Discharged on 9/10/2020 Admission date: 9/6/2020 - Discharge disposition: Home-Health Care [facility]    Home Medical Care     Service Provider Selected Services Address Phone Fax    [facility] [address] [address]  [phone]                Discharged on 6/17/2020 Admission date: 6/15/2020 - Discharge disposition: Home-Health Care c    Home Medical Care     Service Provider Selected Services Address Phone Fax    [facility]  Home Health Services [address] [address] [phone]                         Final Discharge Disposition Code: 51 - hospice medical facility

## 2020-09-21 NOTE — DISCHARGE SUMMARY
PHYSICIAN DISCHARGE SUMMARY                                                                        [facility]    Patient Identification:  Name: Alessia Madrigal  Age: 89 y.o.  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          PMHX:   Past Medical History:   Diagnosis Date   • Acute on chronic congestive heart failure (CMS/HCC)    • Acute transmural inferior wall MI (CMS/[facility])    • Arthritis 2014    CONT TYLENOL FOR PAIN/ JOSE DANIEL CHEW (INTERNAL MEDICINE)   • CAD (coronary artery disease)    • Cancer of uterus (CMS/[facility])    • Carotid artery stenosis    • Chronic renal failure syndrome    • Coronary artery disease involving coronary bypass graft of native heart     with other forms of angina pectoris   • Coronary atherosclerosis    • Endometrial cancer (CMS/[facility])    • Esophageal reflux    • Essential hypertension    • Glaucoma    • Hypertension    • Malaise and fatigue    • Osteoarthritis    • Sleep apnea     USING CPAP   • Spinal stenosis    • Thyroid disease    • Type 2 diabetes mellitus (CMS/HCC)     INsulin begun    • Vitamin B12 deficiency     SHE HAS NOT HAD THIS CHECKED IN A WHILE. SHE HAD BEEN ON  SHOTS.  CHECK HER B12 LEVELS TODAY.  BY JOSE DANIEL CHEW     PSHX:   Past Surgical History:   Procedure Laterality Date   • CARDIAC CATHETERIZATION N/A 3/11/2018    Procedure: Left Heart Cath;  Surgeon: Cameron Chisholm MD;  Location: [facility] CATH INVASIVE LOCATION;  Service: Cardiovascular   • CARDIAC CATHETERIZATION N/A 3/11/2018    Procedure: Stent ROSSI bypass graft;  Surgeon: Cameron Chisholm MD;  Location: [facility] CATH INVASIVE LOCATION;  Service: Cardiovascular   • CARDIAC CATHETERIZATION N/A 5/7/2020    Procedure: Left Heart Cath;  Surgeon: Cameron Chisholm MD;  Location: [facility] CATH INVASIVE LOCATION;  Service: Cardiovascular;  Laterality: N/A;   • CARDIAC CATHETERIZATION N/A 5/7/2020    Procedure: Saphenous Vein Graft;  Surgeon: Cameron Chisholm MD;  Location: [facility] CATH INVASIVE LOCATION;  Service: Cardiovascular;  Laterality: N/A;   • CATARACT EXTRACTION Bilateral    • CATARACT EXTRACTION W/ INTRAOCULAR LENS IMPLANT Right 10/25/2016    Procedure: RT SUPERFICIAL KERATECTOMY ;  Surgeon: Arian Singh MD;  Location: [facility] OR [facility];  Service:    • CHOLECYSTECTOMY     • COLONOSCOPY     • CORONARY ARTERY BYPASS GRAFT  1991    X3   • HYSTERECTOMY     • OOPHORECTOMY     • TRANSLUMINAL ATHERECTOMY PERONEAL ARTERY      PERCUTANEOUS TRANSLUMINAL ATHERECTOMY RENAL ARTERY       Hospital Course: Alessia Madrigal is a 89-year-old female with multiple medical issues including coronary disease with previous MIs and severe peripheral vascular disease. She presents with an acute myocardial infarction. This associated with her very shortly followed by abdominal pain and unfortunately work-up appear to be consistent with ischemic bowel. She is obviously an extremely high risk for surgery. In the end they requested comfort measures only she is transferred to palliative care. We have been able to get her pain under control and palliative care and at this point she is being transferred to scattered bed hospice for comfort  measures only.        The patient was kept comfortable with palliative care order set in hospice scattered bed and she  from her illness.    Consults:     Consults     Date and Time Order Name Status Description    9/15/2020 1451 Inpatient General Surgery Consult Completed     9/15/2020 1451 Inpatient Pulmonology Consult Completed     2020 1857 Inpatient Nephrology Consult Completed     2020 1848 Inpatient Cardiology Consult Completed     2020 1536 [facility] (on-call MD unless specified) Details Completed     2020 0925 Cardiology (on-call MD unless specified) Completed         Results from last 7 days   Lab Units 20  1011   WBC 10*3/mm3 30.61*   HEMOGLOBIN g/dL 13.6   HEMATOCRIT % 38.7   PLATELETS 10*3/mm3 238     Results from last 7 days   Lab Units 20  1011   SODIUM mmol/L 134*   POTASSIUM mmol/L 5.1   CHLORIDE mmol/L 95*   CO2 mmol/L 19.4*   BUN mg/dL 72*   CREATININE mg/dL 3.00*   GLUCOSE mg/dL 158*   CALCIUM mg/dL 9.8*     Significant Diagnostic Studies: No results found for: WBC, HGB, HCT, PLT  No results found for: NA, K, CL, CO2, BUN, CREATININE, GLUCOSE  No results found for: CALCIUM, MG, PHOS  No results found for: AST, ALT, ALKPHOS  No results found for: APTT, INR  No results found for: COLORU, CLARITYU, SPECGRAV, PHUR, PROTEINUR, GLUCOSEU, KETONESU, BLOODU, NITRITE, LEUKOCYTESUR, BILIRUBINUR, UROBILINOGEN, RBCUA, WBCUA, BACTERIA, UACOMMENT  No results found for: TROPONINT, TROPONINI, BNP  No components found for: HGBA1C;2  No components found for: TSH;2  Imaging Results (All)     None        Lab Results (last 7 days)     ** No results found for the last 168 hours. **        /70 (BP Location: Left arm, Patient Position: Lying)   Pulse (!) 0   Temp 97 °F (36.1 °C) (Oral)   Resp (!) 0   SpO2 94%     Discharge Exam:                                    Disposition:      Patient Instructions:   Discharge Order (From admission, onward)     Start     Ordered     20 2350  Discharge patient  Once     Expected Discharge Date: 20    Discharge Disposition:     Physician of Record for Attribution - Please select from Treatment Team: AARON WARNER [[id number]]    Review needed by CMO to determine Physician of Record: No       Question Answer Comment   Physician of Record for Attribution - Please select from Treatment Team AARON WARNER    Review needed by CMO to determine Physician of Record No        20                Total time spent discharging patient including evaluation,post hospitalization follow up,  medication and post hospitalization instructions and education total time exceeds 30 minutes.    Signed:  Aaron Warner MD  2020  08:44 EDT

## 2020-09-21 NOTE — DISCHARGE SUMMARY
PHYSICIAN DISCHARGE SUMMARY                                                                        [facility]    Patient Identification:  Name: Alessia Madrigal  Age: 89 y.o.  Sex: female  :  10/9/1928  MRN: [id number]  Primary Care Physician: Dario Mae MD    Admit date: 2020  Discharge date and time: 2020     Discharged Condition: Terminal    Discharge Diagnoses:  NSTEMI (non-ST elevated myocardial infarction) (CMS/[facility])    Chronic coronary artery disease    CKD (chronic kidney disease) stage 3, GFR 30-59 ml/min (CMS/[facility])    Hypertension    Hypothyroidism    Type 2 diabetes mellitus, with long-term current use of insulin (CMS/[facility])    Community acquired pneumonia    Leukocytosis    Acute chest pain    Acute on chronic diastolic CHF (congestive heart failure) (CMS/[facility])    ESE (acute kidney injury) (CMS/HCC)    Lower abdominal pain    Acute abdomen    Ischemic bowel disease (CMS/HCC)         Hospital Course:  Pleasant 89-year-old female with multiple medical issues including coronary disease with previous MIs and severe peripheral vascular disease.  She presents with an acute myocardial infarction.  This associated with her very shortly followed by abdominal pain and unfortunately work-up appear to be consistent with ischemic bowel.  She is obviously an extremely high risk for surgery.  In the end they requested comfort measures only she is transferred to palliative care.  We have been able to get her pain under control and palliative care and at this point she is being transferred to scattered bed hospice.    The patient stayed an extra day awaiting hospice scattered bed approval.    Consults:     Consults     Date and Time Order Name Status Description    9/15/2020 1451 Inpatient General Surgery Consult Completed     9/15/2020 1451 Inpatient Pulmonology Consult Completed     2020 2161 Inpatient Nephrology  Consult Completed     9/14/2020 1848 Inpatient Cardiology Consult Completed     9/14/2020 1536 [facility] (on-call MD unless specified) Details Completed     9/6/2020 0928 Cardiology (on-call MD unless specified) Completed             Discharge Exam:  Physical Exam   Presently running a low-grade temp but other vital signs stable.  She is a well-developed moderately thin female presently comfortable but also minimally responsive.  Heart at present is regular rate and rhythm and lungs are clear to auscultation.     Disposition:  Scattered bed hospice    Patient Instructions:      Discharge Medications      Continue These Medications      Instructions Start Date   Insulin Pen Needle 32G X 4 MM misc  Commonly known as: BD Pen Needle Janis U/F   USE AS DIRECTED THREE TIMES A DAY.         ASK your doctor about these medications      Instructions Start Date   amLODIPine 10 MG tablet  Commonly known as: NORVASC   10 mg, Oral, Daily      aspirin 81 MG EC tablet   81 mg, Oral, Daily      calcitriol 0.5 MCG capsule  Commonly known as: ROCALTROL   0.5 mcg, Oral, Daily      clopidogrel 75 MG tablet  Commonly known as: PLAVIX   TAKE 1 TABLET DAILY      Co Q-10 200 MG capsule   300 mg, Oral, Daily      furosemide 40 MG tablet  Commonly known as: LASIX   40 mg, Oral, 2 Times Daily      glucose blood test strip  Commonly known as: ONE TOUCH ULTRA TEST   Test Three times daily. DX E11.9      glucose blood test strip   Use as instructed      Insulin Glargine 100 UNIT/ML injection pen  Commonly known as: LANTUS SOLOSTAR   15 Units, Subcutaneous, Nightly      insulin lispro 100 UNIT/ML injection  Commonly known as: HumaLOG   5 Units, Subcutaneous, 3 Times Daily Before Meals      isosorbide mononitrate 60 MG 24 hr tablet  Commonly known as: IMDUR   60 mg, Oral, Every 24 Hours Scheduled      levothyroxine 150 MCG tablet  Commonly known as: Synthroid   150 mcg, Oral, Daily      metoprolol succinate XL 50 MG 24 hr tablet  Commonly known as:  TOPROL-XL   50 mg, Oral, Every 12 Hours      nitroglycerin 0.4 MG SL tablet  Commonly known as: NITROSTAT   DISSOLVE 1 TAB UNDER TONGUE FOR CHEST PAIN - IF PAIN REMAINS AFTER 5 MIN, CALL 911 AND REPEAT DOSE. MAX 3 TABS IN 15 MINUTES      ranolazine 500 MG 12 hr tablet  Commonly known as: RANEXA   500 mg, Oral, Every 12 Hours Scheduled      spironolactone 25 MG tablet  Commonly known as: ALDACTONE   TAKE 1 TABLET DAILY      timolol 0.25 % ophthalmic solution  Commonly known as: TIMOPTIC   1 drop, Both Eyes, Nightly - One Time      travoprost (KAL free) 0.004 % solution ophthalmic solution  Commonly known as: TRAVATAN   1 drop, Both Eyes, Nightly, in affected eye(s)             No future appointments.   Contact information for follow-up providers     Dario Mae MD .    Specialty: Internal Medicine  Contact information:  [address] [facility] [address]  [facility] [id number] [phone]                   Contact information for after-discharge care     [facility]     [facility] .    Service: Home Health Services  Contact information:  [address] [facility] [address]  [facility] [address] [phone]                           Discharge Order (From admission, onward)     Start     Ordered    09/18/20 2012  Discharge readmit patient  Once     Expected Discharge Date: 09/18/20    Discharge Disposition: Hospice/Medical Facility ([facility])    Physician of Record for Attribution - Please select from Treatment Team: KATIE MARCH [[id number]]    Review needed by CMO to determine Physician of Record: No       Question Answer Comment   Physician of Record for Attribution - Please select from Treatment Team KATIE MARCH    Review needed by CMO to determine Physician of Record No        09/18/20 2012                  Total time spent discharging patient including evaluation,post hospitalization follow up,  medication and post hospitalization instructions and education  total time exceeds 30 minutes.    Signed:  Aaron Warner MD  9/21/2020  08:39 EDT    EMR Dragon/Transcription disclaimer:   Much of this encounter note is an electronic transcription/translation of spoken language to printed text. The electronic translation of spoken language may permit erroneous, or at times, nonsensical words or phrases to be inadvertently transcribed; Although I have reviewed the note for such errors, some may still exist.

## 2020-09-21 NOTE — PROGRESS NOTES
Case Management Discharge Note      Final Note: The patient  on 2020 @ 23:10         Selected Continued Care - Discharged on 2020 Admission date: 2020 - Discharge disposition:     Destination Coordination complete    Service Provider Selected Services Address Phone Fax    [facility]  Inpatient Hospice [address], [facility] [id number] [phone]          Durable Medical Equipment    No services have been selected for the patient.              Dialysis/Infusion    No services have been selected for the patient.              Home Medical Care    No services have been selected for the patient.              Therapy    No services have been selected for the patient.              Community Resources    No services have been selected for the patient.                Selected Continued Care - Prior Encounters Includes selections from prior encounters from 2020 to 2020    Discharged on 2020 Admission date: 2020 - Discharge disposition: Hospice/Medical Facility ([facility] [place])    Destination     Service Provider Selected Services Address Phone Fax    [facility]  Inpatient Hospice [address], [facility] [id number] [phone]          Home Medical Care     Service Provider Selected Services Address Phone Fax    [facility] [address] [place] [address] [phone]       Internal Comment last updated by Eben Joe RN 9/15/2020 1124    Pt is current with [facility]                           Discharged on 9/10/2020 Admission date: 2020 - Discharge disposition: Home-Health Care [facility]    Home Medical Care     Service Provider Selected Services Address Phone Fax    [facility] [address] [address] [phone]                          Final Discharge Disposition Code: 41 -  in medical facility

## 2022-09-14 NOTE — PROGRESS NOTES
Patient tolerated exercise with no complaints.   No wheezing/clear to mild end expiratory wheeze or scattered expiratory wheeze

## 2024-07-19 NOTE — PROGRESS NOTES
"Adult Nutrition  Assessment/PES    Patient Name:  Alessia Madrigal  YOB: 1928  MRN: [id number]  Admit Date:  9/14/2020    Assessment Date:  9/15/2020    Comments:  Nutri. Screen: Wound care following for coccyx PI, B=14. Currently on CC/HH diet. Requiring 4LO2. Wt index notes CBW of 186# from 200# on 9/6. BG levels elevated at 323-383. BGC ordered BID with meals to assist with meeting needs. Will continue to monitor.     Reason for Assessment     Row Name 09/15/20 1151          Reason for Assessment    Reason For Assessment  identified at risk by screening criteria     Diagnosis  pulmonary disease PNA, leukocytosis, CKD3, CAD, DM2, HTN, HLD, hypothyroid, and BRITTANY.     Identified At Risk by Screening Criteria  large or nonhealing wound, burn or pressure injury         Nutrition/Diet History     Row Name 09/15/20 1153          Nutrition/Diet History    Typical Food/Fluid Intake  Wound care following for coccyx PI, B=14. Currently on CC/HH diet. Requiring 4LO2. Wt index notes CBW of 186# from 200# on 9/6. BG levels elevated at 323-383.         Anthropometrics     Row Name 09/15/20 1207          Anthropometrics    Height  162.6 cm (64\")        Ideal Body Weight (IBW)    Ideal Body Weight (IBW) (kg)  55         Labs/Tests/Procedures/Meds     Row Name 09/15/20 1155          Labs/Procedures/Meds    Lab Results Reviewed  reviewed     Lab Results Comments  Glu 323-282, Na 133, K 5.3, Cl 96, BUN 66, Cr 3, GFR 15, WBC 25.57        Diagnostic Tests/Procedures    Diagnostic Test/Procedure Reviewed  reviewed        Medications    Pertinent Medications Reviewed  reviewed     Pertinent Medications Comments  IV rocephin, lantus, humalog, synthroid         Physical Findings     Row Name 09/15/20 1156          Physical Findings    Overall Physical Appearance  obese;on oxygen therapy     Skin  pressure injury;other (see comments) Coccyx PI, B=14         Estimated/Assessed Needs     Row Name 09/15/20 1207          " Data: Chloe Cleaning transferred to [facility] via wheelchair at 0930. Baby transferred via parent's arms.  Action: Receiving unit notified of transfer: Yes. Patient and family notified of room change. Report given to Hunter HA at 0930. Belongings sent to receiving unit. Accompanied by Registered Nurse. Oriented patient to surroundings. Call light within reach. ID bands double-checked with receiving RN.  Response: Patient tolerated transfer and is stable.    Pt unable to void after delivery. Bladder scanned fir 136m at 0845. Pt encouraged to increase oral fluids. Hunter HA aware and to assume care of patient.    "Calculation Measurements    Weight Used For Calculations  84.4 kg (186 lb 1.1 oz)     Height  162.6 cm (64\")        Estimated/Assessed Needs    Additional Documentation  KCAL/KG (Group);Protein Requirements (Group);Fluid Requirements (Group)        KCAL/KG    KCAL/KG  20 Kcal/Kg (kcal);25 Kcal/Kg (kcal)     20 Kcal/Kg (kcal)  1688     25 Kcal/Kg (kcal)  2110        Protein Requirements    Weight Used For Protein Calculations  84.4 kg (186 lb 1.1 oz)     Est Protein Requirement Amount (gms/kg)  1.2 gm protein     Estimated Protein Requirements (gms/day)  101.28        Fluid Requirements    Fluid Requirements (mL/day)  2000     Estimated Fluid Requirement Method  RDA Method     RDA Method (mL)  2000         Nutrition Prescription Ordered     Row Name 09/15/20 1211          Nutrition Prescription PO    Current PO Diet  Regular     Fluid Consistency  Thin     Common Modifiers  Cardiac;Consistent Carbohydrate         Problem/Interventions:  Problem 1     Row Name 09/15/20 1211          Nutrition Diagnoses Problem 1    Problem 1  Predicted Suboptimal Intake     Etiology (related to)  Medical Diagnosis     Pulmonary/Critical Care  Pneumonia     Signs/Symptoms (evidenced by)  Unintended Weight Change     Unintended Weight Change  Loss     Number of Pounds Lost  14#     Weight loss time period  1 week           Intervention Goal     Row Name 09/15/20 1213          Intervention Goal    General  Maintain nutrition;Improved nutrition related lab(s);Reduce/improve symptoms;Meet nutritional needs for age/condition;Disease management/therapy     PO  Increase intake;Meet estimated needs     Weight  Maintain weight         Nutrition Intervention     Row Name 09/15/20 1215          Nutrition Intervention    RD/Tech Action  Care plan reviewd;Follow Tx progress;Encourage intake;Recommend/ordered     Recommended/Ordered  Supplement         Nutrition Prescription     Row Name 09/15/20 1215          Nutrition Prescription PO    PO " Prescription  Begin/change supplement     Supplement  Boost Glucose Control     Supplement Frequency  2 times a day     New PO Prescription Ordered?  Yes         Education/Evaluation     Row Name 09/15/20 3325          Education    Education  Will Instruct as appropriate        Monitor/Evaluation    Monitor  Per protocol;PO intake;Supplement intake;Pertinent labs;Weight;Skin status;Symptoms     Education Follow-up  Reinforce PRN           Electronically signed by:  Evelyn Taveras MS,RD,LD  09/15/20 12:16 EDT

## (undated) DEVICE — 6F .070 MPA 1 100CM: Brand: VISTA BRITE TIP

## (undated) DEVICE — GW EMR FIX EXCHG J STD .035 3MM 260CM

## (undated) DEVICE — GLIDESHEATH BASIC HYDROPHILIC COATED INTRODUCER SHEATH: Brand: GLIDESHEATH

## (undated) DEVICE — PK CATH CARD 40

## (undated) DEVICE — CATH DIAG CARD PERFORMA JL4.0 BT 4F100CM

## (undated) DEVICE — DEV INDEFLATOR

## (undated) DEVICE — KT MANIFLD CARDIAC

## (undated) DEVICE — CATH DIAG IMPULSE FR4 5F 100CM

## (undated) DEVICE — CATH VENT MIV RADL PIG ST TIP 4F 110CM

## (undated) DEVICE — NC TREK CORONARY DILATATION CATHETER 3.5 MM X 20 MM / RAPID-EXCHANGE: Brand: NC TREK

## (undated) DEVICE — GW HITORQUE/BAL MID/WT J W/HCOAT .014 3X190CM

## (undated) DEVICE — CATH DIAG CARD PERFORMA MPA1 BT 4F 100CM

## (undated) DEVICE — CATH DIAG CARD PERFORMA IMA BT 4F100CM

## (undated) DEVICE — CATH DIAG IMPULSE IMT 5F 100CM

## (undated) DEVICE — RADIFOCUS GLIDEWIRE: Brand: GLIDEWIRE

## (undated) DEVICE — TREK CORONARY DILATATION CATHETER 3.0 MM X 20 MM / RAPID-EXCHANGE: Brand: TREK

## (undated) DEVICE — CATH DIAG IMPULSE FL3.5 5F 100CM

## (undated) NOTE — ED AVS SNAPSHOT
Jose Perez   MRN: [id number]    Department:  [facility] Emergency Department   Date of Visit:  3/22/2018           Disclosure     Insurance plans vary and the physician(s) referred by the ER may not be covered by your plan.  Please contact tell this physician (or your personal doctor if your instructions are to return to your personal doctor) about any new or lasting problems. The primary care or specialist physician will see patients referred from the [facility] Emergency Department.  Lyndsay Marcos

## (undated) NOTE — ED AVS SNAPSHOT
Natty Halima   MRN: [id number]    Department:  [facility] Emergency Department   Date of Visit:  11/4/2019           Disclosure     Insurance plans vary and the physician(s) referred by the ER may not be covered by your plan.  Please contact tell this physician (or your personal doctor if your instructions are to return to your personal doctor) about any new or lasting problems. The primary care or specialist physician will see patients referred from the [facility] Emergency Department.  Severo Pastures

## (undated) NOTE — IP AVS SNAPSHOT
Patient Demographics     Address Phone    [address] [phone] [facility])      Emergency Contact(s)     Name Relation Home Work KOBE Daughter [phone]    HOSP [facility] CHRISTIANE Daughter [phone] Last time this was given:  300 mg on 6/5/2017 10:43 AM   Commonly known as:  NEURONTIN        Take 300 mg by mouth daily.                             hydrochlorothiazide 25 MG Tabs   Last time this was given:  25 mg on 6/5/2017 10:43 AM   Commonly known as: [id number] gabapentin (NEURONTIN) cap 300 mg 06/04/17 2045 Given      [id number] hydrochlorothiazide (HYDRODIURIL) tab 25 mg 06/05/17 1043 Given      [id number] melatonin cap/tab 5 mg (And Linked Group #1) 06/04/17 2044 Given      [id number] melatonin tab TAB :  Alejandra Lynn DO (Physician)             [place] HOSPITALIST  History and Physical     Ira Poster Patient Status:  Emergency    10/9/1928 MRN [id number]   Location [address] [facility] Attending Nella Mckeon MD   [facility] Da Problem Relation Age of Onset   • Heart Disease Father    • Heart Disease Brother        Allergies:   Compazine [Prochlor*      Morphine Sulfate          Pentazocine               Sulfa Antibiotics           Medications:    No current facility-administered Cardiovascular: S1, S2. Regular rate and rhythm. No murmurs, rubs or gallops. Equal pulses. Chest and Back: No tenderness or deformity. Abdomen: Soft, nontender, nondistended. Positive bowel sounds. No rebound, guarding or organomegaly.   Neurologic: No ATTENDING PHYSICIAN: Luis Alberto Albright.  Lela Roy PHYSICIAN: Bryan Valdivia MD   PATIENT ACCOUNT#:   [id number]    LOCATION:  [address] [place] [address]  MEDICAL RECORD #:   [id number]       YOB: 1928  ADMISSION DATE:       06/03/2017      CON MUSCULOSKELETAL: She denies joint stiffness or joint pain. ENDOCRINE: She denies intolerance to heat or cold, excessive hunger or thirst.  NEUROLOGIC: She denies numbness, tingling, or weakness of extremities.     PHYSICAL EXAMINATION:    GENERAL:  She is echocardiogram to evaluate her left ventricular function. She feels that she made a mistake by coming to the hospital, as the pain has resolved and was better once she got out of bed and started walking.   I have discussed a potential outpatient stress nat [address] will be handled by a member of the Care Management Team.  For all other patients, please follow usual protocol for discharge care transition. Lace+ Score: 23  59-90 High Risk  29-58 Medium Risk  0-28   Low Risk.     Risk of readmission: Kizzy Commonly known as:  TUMS        Chew 1 tablet by mouth daily. Refills:  0       dimenhyDRINATE 50 MG Tabs   Commonly known as:  DRAMAMINE        Take 50 mg by mouth nightly as needed.     Refills:  0       escitalopram 5 MG Tabs   Last time this was give Neurologic: No focal neurological deficits. Musculoskeletal: Moves all extremities. Extremities: No edema.   -----------------------------------------------------------------------------------------------  PATIENT DISCHARGE INSTRUCTIONS: See electronic c • Hearing impairment    • Visual impairment    • Back problem        Past Surgical History      Past Surgical History    TONSILLECTOMY      KNEE REPLACEMENT SURGERY      Comment bilateral    REPAIR ROTATOR CUFF,ACUTE      Comment bilateral    HYSTERECTOMY R Elbow Flexion: 4/5   R Elbow Extension: 4/5   R Hand : 4/5    LUE ASSESSMENT   LUE AROM  L Shoulder Flexion: WFL - Within Functional Limits   L Elbow Flexion/Extension: WFL - Within Functional Limits   L Wrist Flexion: WFL - Within Functional Limits -   Need to walk in hospital room?: A Little   -   Climbing 3-5 steps with a railing?: A Little       AM-PAC Score:  Raw Score: 20   PT Approx Degree of Impairment Score: 35.83%   Standardized Score (AM-PAC Scale): 47.67   CMS Modifier (G-Code): LUCA SOTO intervention in order to reduce fall risk. These impairments and comorbidities manifest themselves as functional limitations in independent transfers and gait.    The patient is below baseline and would benefit from skilled inpatient PT to address the abov

## (undated) NOTE — IP AVS SNAPSHOT
[facility] [place] [address] [place], [address] ~ [phone]                Discharge Summary   6/3/2017    Milena Delgado [address] [address]           Admission Information        Provider Department    6/3/2017 DO Derian Hernandez 2ne-A Last time this was given:  5 mg on 6/5/2017 10:42 AM   Commonly known as:  LEXAPRO        Take 5 mg by mouth daily.                             gabapentin 300 MG Caps   Last time this was given:  300 mg on 6/5/2017 10:43 AM   Commonly known as:  NEURONTIN Neutrophil % Lymphocyte % Monocyte % Eosinophil % Basophil % Prelim Neut Abs Final Neut Abs Lymphocyte Abso Monocyte Absolu Eosinophil Abso Basophil Absolu    (06/03/17)  50.9 (06/03/17)  32.9 (06/03/17)  11.3 (06/03/17)  3.7 (06/03/17)  0.7 -- (06/03/17) information, go to [email or web address] and click on the Sign Up Now link in the [employer] box. Enter your [employer] Activation Code exactly as it appears below along with your Zip Code and Date of Birth to complete the sign-up process.  If you do Aspirin 81 MG Oral Tab       Use: Treat pain, fever, inflammation   Most common side effects: Stomach upset   What to report to your healthcare team: Stomach upset, unresolved pain           GI Medications     PEG 3350 Oral Powd Pack    Calcium Carbonate